# Patient Record
Sex: MALE | Race: WHITE | NOT HISPANIC OR LATINO | URBAN - METROPOLITAN AREA
[De-identification: names, ages, dates, MRNs, and addresses within clinical notes are randomized per-mention and may not be internally consistent; named-entity substitution may affect disease eponyms.]

---

## 2018-07-07 ENCOUNTER — EMERGENCY (EMERGENCY)
Facility: HOSPITAL | Age: 43
LOS: 0 days | Discharge: HOME | End: 2018-07-07
Attending: EMERGENCY MEDICINE | Admitting: EMERGENCY MEDICINE

## 2018-07-07 VITALS
DIASTOLIC BLOOD PRESSURE: 77 MMHG | TEMPERATURE: 97 F | OXYGEN SATURATION: 99 % | SYSTOLIC BLOOD PRESSURE: 149 MMHG | HEART RATE: 103 BPM | RESPIRATION RATE: 18 BRPM

## 2018-07-07 VITALS — HEIGHT: 69 IN | WEIGHT: 175.05 LBS

## 2018-07-07 DIAGNOSIS — Z88.0 ALLERGY STATUS TO PENICILLIN: ICD-10-CM

## 2018-07-07 DIAGNOSIS — K57.92 DIVERTICULITIS OF INTESTINE, PART UNSPECIFIED, WITHOUT PERFORATION OR ABSCESS WITHOUT BLEEDING: ICD-10-CM

## 2018-07-07 DIAGNOSIS — R10.9 UNSPECIFIED ABDOMINAL PAIN: ICD-10-CM

## 2018-07-07 DIAGNOSIS — I10 ESSENTIAL (PRIMARY) HYPERTENSION: ICD-10-CM

## 2018-07-07 LAB
ALBUMIN SERPL ELPH-MCNC: 4.9 G/DL — SIGNIFICANT CHANGE UP (ref 3.5–5.2)
ALP SERPL-CCNC: 57 U/L — SIGNIFICANT CHANGE UP (ref 30–115)
ALT FLD-CCNC: 15 U/L — SIGNIFICANT CHANGE UP (ref 0–41)
ANION GAP SERPL CALC-SCNC: 16 MMOL/L — HIGH (ref 7–14)
APPEARANCE UR: CLEAR — SIGNIFICANT CHANGE UP
APTT BLD: 32.5 SEC — SIGNIFICANT CHANGE UP (ref 27–39.2)
AST SERPL-CCNC: 20 U/L — SIGNIFICANT CHANGE UP (ref 0–41)
BASOPHILS # BLD AUTO: 0.03 K/UL — SIGNIFICANT CHANGE UP (ref 0–0.2)
BASOPHILS NFR BLD AUTO: 0.3 % — SIGNIFICANT CHANGE UP (ref 0–1)
BILIRUB SERPL-MCNC: 0.5 MG/DL — SIGNIFICANT CHANGE UP (ref 0.2–1.2)
BILIRUB UR-MCNC: NEGATIVE — SIGNIFICANT CHANGE UP
BUN SERPL-MCNC: 13 MG/DL — SIGNIFICANT CHANGE UP (ref 10–20)
CALCIUM SERPL-MCNC: 9.4 MG/DL — SIGNIFICANT CHANGE UP (ref 8.5–10.1)
CHLORIDE SERPL-SCNC: 89 MMOL/L — LOW (ref 98–110)
CO2 SERPL-SCNC: 26 MMOL/L — SIGNIFICANT CHANGE UP (ref 17–32)
COLOR SPEC: YELLOW — SIGNIFICANT CHANGE UP
CREAT SERPL-MCNC: 0.9 MG/DL — SIGNIFICANT CHANGE UP (ref 0.7–1.5)
DIFF PNL FLD: NEGATIVE — SIGNIFICANT CHANGE UP
EOSINOPHIL # BLD AUTO: 0.01 K/UL — SIGNIFICANT CHANGE UP (ref 0–0.7)
EOSINOPHIL NFR BLD AUTO: 0.1 % — SIGNIFICANT CHANGE UP (ref 0–8)
GAS PNL BLDV: SIGNIFICANT CHANGE UP
GLUCOSE SERPL-MCNC: 84 MG/DL — SIGNIFICANT CHANGE UP (ref 70–99)
GLUCOSE UR QL: NEGATIVE MG/DL — SIGNIFICANT CHANGE UP
HCT VFR BLD CALC: 40 % — LOW (ref 42–52)
HGB BLD-MCNC: 14.2 G/DL — SIGNIFICANT CHANGE UP (ref 14–18)
IMM GRANULOCYTES NFR BLD AUTO: 0.4 % — HIGH (ref 0.1–0.3)
INR BLD: 1.09 RATIO — SIGNIFICANT CHANGE UP (ref 0.65–1.3)
KETONES UR-MCNC: (no result)
LEUKOCYTE ESTERASE UR-ACNC: NEGATIVE — SIGNIFICANT CHANGE UP
LYMPHOCYTES # BLD AUTO: 0.69 K/UL — LOW (ref 1.2–3.4)
LYMPHOCYTES # BLD AUTO: 5.9 % — LOW (ref 20.5–51.1)
MCHC RBC-ENTMCNC: 30.4 PG — SIGNIFICANT CHANGE UP (ref 27–31)
MCHC RBC-ENTMCNC: 35.5 G/DL — SIGNIFICANT CHANGE UP (ref 32–37)
MCV RBC AUTO: 85.7 FL — SIGNIFICANT CHANGE UP (ref 80–94)
MONOCYTES # BLD AUTO: 0.81 K/UL — HIGH (ref 0.1–0.6)
MONOCYTES NFR BLD AUTO: 6.9 % — SIGNIFICANT CHANGE UP (ref 1.7–9.3)
NEUTROPHILS # BLD AUTO: 10.15 K/UL — HIGH (ref 1.4–6.5)
NEUTROPHILS NFR BLD AUTO: 86.4 % — HIGH (ref 42.2–75.2)
NITRITE UR-MCNC: NEGATIVE — SIGNIFICANT CHANGE UP
PH UR: 7.5 — SIGNIFICANT CHANGE UP (ref 5–8)
PLATELET # BLD AUTO: 268 K/UL — SIGNIFICANT CHANGE UP (ref 130–400)
POTASSIUM SERPL-MCNC: 4.7 MMOL/L — SIGNIFICANT CHANGE UP (ref 3.5–5)
POTASSIUM SERPL-SCNC: 4.7 MMOL/L — SIGNIFICANT CHANGE UP (ref 3.5–5)
PROT SERPL-MCNC: 7.5 G/DL — SIGNIFICANT CHANGE UP (ref 6–8)
PROT UR-MCNC: (no result) MG/DL
PROTHROM AB SERPL-ACNC: 11.8 SEC — SIGNIFICANT CHANGE UP (ref 9.95–12.87)
RBC # BLD: 4.67 M/UL — LOW (ref 4.7–6.1)
RBC # FLD: 11.7 % — SIGNIFICANT CHANGE UP (ref 11.5–14.5)
SODIUM SERPL-SCNC: 131 MMOL/L — LOW (ref 135–146)
SP GR SPEC: 1.02 — SIGNIFICANT CHANGE UP (ref 1.01–1.03)
UROBILINOGEN FLD QL: 0.2 MG/DL — SIGNIFICANT CHANGE UP (ref 0.2–0.2)
WBC # BLD: 11.74 K/UL — HIGH (ref 4.8–10.8)
WBC # FLD AUTO: 11.74 K/UL — HIGH (ref 4.8–10.8)

## 2018-07-07 RX ORDER — ACETAMINOPHEN 500 MG
975 TABLET ORAL ONCE
Qty: 0 | Refills: 0 | Status: COMPLETED | OUTPATIENT
Start: 2018-07-07 | End: 2018-07-07

## 2018-07-07 RX ORDER — SODIUM CHLORIDE 9 MG/ML
1000 INJECTION, SOLUTION INTRAVENOUS ONCE
Qty: 0 | Refills: 0 | Status: COMPLETED | OUTPATIENT
Start: 2018-07-07 | End: 2018-07-07

## 2018-07-07 RX ORDER — MORPHINE SULFATE 50 MG/1
4 CAPSULE, EXTENDED RELEASE ORAL ONCE
Qty: 0 | Refills: 0 | Status: DISCONTINUED | OUTPATIENT
Start: 2018-07-07 | End: 2018-07-07

## 2018-07-07 RX ORDER — MOXIFLOXACIN HYDROCHLORIDE TABLETS, 400 MG 400 MG/1
1 TABLET, FILM COATED ORAL
Qty: 10 | Refills: 0
Start: 2018-07-07 | End: 2018-07-16

## 2018-07-07 RX ADMIN — SODIUM CHLORIDE 2000 MILLILITER(S): 9 INJECTION, SOLUTION INTRAVENOUS at 10:29

## 2018-07-07 RX ADMIN — MORPHINE SULFATE 4 MILLIGRAM(S): 50 CAPSULE, EXTENDED RELEASE ORAL at 15:42

## 2018-07-07 RX ADMIN — MORPHINE SULFATE 4 MILLIGRAM(S): 50 CAPSULE, EXTENDED RELEASE ORAL at 10:29

## 2018-07-07 RX ADMIN — MORPHINE SULFATE 4 MILLIGRAM(S): 50 CAPSULE, EXTENDED RELEASE ORAL at 12:51

## 2018-07-07 NOTE — ED PROVIDER NOTE - PROGRESS NOTE DETAILS
Patient doing well, requests tylenol for pain. Results d/w pt and questions answered. Copies of all diagnostic tests provided to facilitate outpatient follow up w/ his GI at [facility] radiology called back, read changed to acute diverticulitis. will tx pt, he request moxifloxacin as he has had bad rxn to cipro in past.

## 2018-07-07 NOTE — ED PROVIDER NOTE - PHYSICAL EXAMINATION
AOx4, Non toxic appearing, NAD, speaking in full sentences. Skin  warm and dry, no acute rash. Head normocephalic, atraumatic. Conjunctiva and sclera clear. MM moist, no nasal discharge. Neck supple nt, no meningeal signs. Heart RRR s1s2 nl, no rub/murmur. Lungs- No retractions, BS equal, CTAB. Abdomen soft ntnd no r/g. L CVA ttp. Extremities- moves all, +equal distal pulses, brisk cap refill, sensation wnl, normal ROM. No LE edema, calves nttp b/l.

## 2018-07-07 NOTE — ED PROVIDER NOTE - ATTENDING CONTRIBUTION TO CARE
42M PMH htn, epilepsy,  erlos danlos, celiac artery aneurysm s/p repair, diverticulitis, p/w L flank pain x 2 days. feels like prior diverticulitis. started while eating dinner, constant, nonradiating, achy pain.no fever, chills. no nvdc. no dysuria, freq, hematuria. no cp, sob. no brbpr or melena. feels bloated. on exam, AFVSS, well romy nad, ncat, eomi, perrla, mmm, lctab, rrr nl s1s2 no mrg, abd soft ntnd, +LCVAT, aaox3, no focal deficits, no le edema or calf ttp, a/p; L flank pain consider renal colic vs uti/pyelo vs diverticulitis vs aortic dissection given pt vascular disease, will do CTA chest/abd/pelvis, labs UA, pain control, re-eval

## 2018-07-07 NOTE — ED PROVIDER NOTE - OBJECTIVE STATEMENT
43 y/o M pmh epilepsy, divertulitis, ehler danlos, p/w L flank for past three days 41 y/o M pmh epilepsy, divertulitis, ehler danlos, celiac and iliac aneurysms p/w L flank for past three days, pt states he has had similar pain intermittently which improved after he stopped eating gluten and worsened after he ate pasta this week. Denies f/c, n/v/d, dysuria, hematuria.

## 2018-07-07 NOTE — ED ADULT NURSE NOTE - OBJECTIVE STATEMENT
pt c.o. left flank pain that radiates to the pubic area. pt denies buring or pain during urination. hx of diverticulitis.

## 2018-07-07 NOTE — ED PROVIDER NOTE - NS ED ROS FT
Constitutional: See HPI.  Eyes: No visual changes, eye pain or discharge.  ENMT: No hearing changes, pain, discharge or infections. No neck pain or stiffness.  Cardiac: No chest pain, SOB or edema. No chest pain with exertion.  Respiratory: No cough or respiratory distress.   GI: No nausea, vomiting, diarrhea or abdominal pain.  : No dysuria, frequency or burning.  MS: No myalgia, muscle weakness, joint pain. +flank pain  Neuro: No headache or weakness. No LOC.  Skin: No skin rash.

## 2018-07-07 NOTE — ED ADULT NURSE NOTE - PMH
Aneurysm artery, celiac    Dislocations and subluxations    EDS (Melva-Danlos syndrome)    Epilepsy    HTN (hypertension)    Keratoconus

## 2019-04-28 ENCOUNTER — EMERGENCY (EMERGENCY)
Facility: HOSPITAL | Age: 44
LOS: 0 days | Discharge: HOME | End: 2019-04-28
Attending: EMERGENCY MEDICINE | Admitting: EMERGENCY MEDICINE
Payer: COMMERCIAL

## 2019-04-28 VITALS
SYSTOLIC BLOOD PRESSURE: 153 MMHG | TEMPERATURE: 97 F | OXYGEN SATURATION: 100 % | HEART RATE: 70 BPM | RESPIRATION RATE: 18 BRPM | DIASTOLIC BLOOD PRESSURE: 90 MMHG

## 2019-04-28 VITALS — HEIGHT: 69 IN | WEIGHT: 160.06 LBS

## 2019-04-28 DIAGNOSIS — I10 ESSENTIAL (PRIMARY) HYPERTENSION: ICD-10-CM

## 2019-04-28 DIAGNOSIS — R10.13 EPIGASTRIC PAIN: ICD-10-CM

## 2019-04-28 DIAGNOSIS — R07.89 OTHER CHEST PAIN: ICD-10-CM

## 2019-04-28 DIAGNOSIS — Z79.811 LONG TERM (CURRENT) USE OF AROMATASE INHIBITORS: ICD-10-CM

## 2019-04-28 DIAGNOSIS — Z79.84 LONG TERM (CURRENT) USE OF ORAL HYPOGLYCEMIC DRUGS: ICD-10-CM

## 2019-04-28 DIAGNOSIS — Z88.0 ALLERGY STATUS TO PENICILLIN: ICD-10-CM

## 2019-04-28 DIAGNOSIS — Z79.899 OTHER LONG TERM (CURRENT) DRUG THERAPY: ICD-10-CM

## 2019-04-28 DIAGNOSIS — Z79.82 LONG TERM (CURRENT) USE OF ASPIRIN: ICD-10-CM

## 2019-04-28 LAB
ALBUMIN SERPL ELPH-MCNC: 4.8 G/DL — SIGNIFICANT CHANGE UP (ref 3.5–5.2)
ALP SERPL-CCNC: 70 U/L — SIGNIFICANT CHANGE UP (ref 30–115)
ALT FLD-CCNC: 14 U/L — SIGNIFICANT CHANGE UP (ref 0–41)
ANION GAP SERPL CALC-SCNC: 13 MMOL/L — SIGNIFICANT CHANGE UP (ref 7–14)
AST SERPL-CCNC: 19 U/L — SIGNIFICANT CHANGE UP (ref 0–41)
BILIRUB SERPL-MCNC: 0.3 MG/DL — SIGNIFICANT CHANGE UP (ref 0.2–1.2)
BUN SERPL-MCNC: 16 MG/DL — SIGNIFICANT CHANGE UP (ref 10–20)
CALCIUM SERPL-MCNC: 9.9 MG/DL — SIGNIFICANT CHANGE UP (ref 8.5–10.1)
CHLORIDE SERPL-SCNC: 96 MMOL/L — LOW (ref 98–110)
CO2 SERPL-SCNC: 27 MMOL/L — SIGNIFICANT CHANGE UP (ref 17–32)
CREAT SERPL-MCNC: 0.9 MG/DL — SIGNIFICANT CHANGE UP (ref 0.7–1.5)
GLUCOSE SERPL-MCNC: 101 MG/DL — HIGH (ref 70–99)
HCT VFR BLD CALC: 41.2 % — LOW (ref 42–52)
HGB BLD-MCNC: 14.2 G/DL — SIGNIFICANT CHANGE UP (ref 14–18)
LIDOCAIN IGE QN: 27 U/L — SIGNIFICANT CHANGE UP (ref 7–60)
MCHC RBC-ENTMCNC: 30.3 PG — SIGNIFICANT CHANGE UP (ref 27–31)
MCHC RBC-ENTMCNC: 34.5 G/DL — SIGNIFICANT CHANGE UP (ref 32–37)
MCV RBC AUTO: 88 FL — SIGNIFICANT CHANGE UP (ref 80–94)
NRBC # BLD: 0 /100 WBCS — SIGNIFICANT CHANGE UP (ref 0–0)
PLATELET # BLD AUTO: 243 K/UL — SIGNIFICANT CHANGE UP (ref 130–400)
POTASSIUM SERPL-MCNC: 4.2 MMOL/L — SIGNIFICANT CHANGE UP (ref 3.5–5)
POTASSIUM SERPL-SCNC: 4.2 MMOL/L — SIGNIFICANT CHANGE UP (ref 3.5–5)
PROT SERPL-MCNC: 7.4 G/DL — SIGNIFICANT CHANGE UP (ref 6–8)
RBC # BLD: 4.68 M/UL — LOW (ref 4.7–6.1)
RBC # FLD: 11.7 % — SIGNIFICANT CHANGE UP (ref 11.5–14.5)
SODIUM SERPL-SCNC: 136 MMOL/L — SIGNIFICANT CHANGE UP (ref 135–146)
TROPONIN T SERPL-MCNC: <0.01 NG/ML — SIGNIFICANT CHANGE UP
WBC # BLD: 5.17 K/UL — SIGNIFICANT CHANGE UP (ref 4.8–10.8)
WBC # FLD AUTO: 5.17 K/UL — SIGNIFICANT CHANGE UP (ref 4.8–10.8)

## 2019-04-28 PROCEDURE — 93010 ELECTROCARDIOGRAM REPORT: CPT

## 2019-04-28 PROCEDURE — 71275 CT ANGIOGRAPHY CHEST: CPT | Mod: 26

## 2019-04-28 PROCEDURE — 99285 EMERGENCY DEPT VISIT HI MDM: CPT

## 2019-04-28 RX ORDER — FAMOTIDINE 10 MG/ML
20 INJECTION INTRAVENOUS ONCE
Qty: 0 | Refills: 0 | Status: COMPLETED | OUTPATIENT
Start: 2019-04-28 | End: 2019-04-28

## 2019-04-28 RX ORDER — OMEPRAZOLE 10 MG/1
1 CAPSULE, DELAYED RELEASE ORAL
Qty: 10 | Refills: 0
Start: 2019-04-28 | End: 2019-05-07

## 2019-04-28 RX ORDER — MORPHINE SULFATE 50 MG/1
6 CAPSULE, EXTENDED RELEASE ORAL ONCE
Qty: 0 | Refills: 0 | Status: DISCONTINUED | OUTPATIENT
Start: 2019-04-28 | End: 2019-04-28

## 2019-04-28 RX ADMIN — FAMOTIDINE 20 MILLIGRAM(S): 10 INJECTION INTRAVENOUS at 21:53

## 2019-04-28 RX ADMIN — MORPHINE SULFATE 6 MILLIGRAM(S): 50 CAPSULE, EXTENDED RELEASE ORAL at 21:28

## 2019-04-28 NOTE — ED PROVIDER NOTE - CLINICAL SUMMARY MEDICAL DECISION MAKING FREE TEXT BOX
Patient reports resolution of the pain. Labvs and ct reviewed. Patient aware of the results. Patient states pepcid helped resolve his pain. Denies any active CP, abd pain. No n/v. No evidence of dissection or aneurysm on CT. ECG non-ischemic. Pt eager to go home. I have full discussed the medical management and delivery of care with the patient. Patient confirms understanding and has been given detailed return precautions. Patient instructed to return to the ED should symptoms persist or worsen. Patient is well appearing upon discharge. Patient reports resolution of the pain. Labs and ct reviewed. Patient aware of the results. Patient states pepcid helped resolve his pain. Denies any active CP, abd pain. No n/v. No evidence of dissection or aneurysm on CT. ECG non-ischemic. Pt eager to go home. I have full discussed the medical management and delivery of care with the patient. Patient confirms understanding and has been given detailed return precautions. Patient instructed to return to the ED should symptoms persist or worsen. Patient is well appearing upon discharge.

## 2019-04-28 NOTE — ED ADULT NURSE NOTE - OBJECTIVE STATEMENT
c/o epigastric pain since Tuesday evening intermittently. denies any nausea/vomiting/diarrhea, report some black stool that started after having the pain.  hx

## 2019-04-28 NOTE — ED ADULT TRIAGE NOTE - ARRIVAL FROM
Home I have reviewed and confirmed nurses' notes for patient's medications, allergies, medical history, and surgical history.

## 2019-04-28 NOTE — ED PROVIDER NOTE - OBJECTIVE STATEMENT
42 y/o M w hx of EDS, HTN, epilepsy presents w epigastric burning sensation since Tuesday. Worse after meals. Non-radiating. No CP, SOB. Has hx of aneurysm in celiac and iliac arteries. No back pain. No CP, SOB. No flank pain.

## 2019-04-28 NOTE — ED ADULT TRIAGE NOTE - CHIEF COMPLAINT QUOTE
Pt c/o midsternal chest pain intermittently since Tuesday and abdominal pain, pt has a hx of celiac disease and Aneurysm sx in 2010, Pt also c/o black stools for the last few days. denies NVD.

## 2019-04-29 PROBLEM — Q79.6 EHLERS-DANLOS SYNDROMES: Chronic | Status: ACTIVE | Noted: 2018-07-07

## 2019-04-29 PROBLEM — I10 ESSENTIAL (PRIMARY) HYPERTENSION: Chronic | Status: ACTIVE | Noted: 2018-07-07

## 2019-04-29 PROBLEM — T14.8XXA OTHER INJURY OF UNSPECIFIED BODY REGION, INITIAL ENCOUNTER: Chronic | Status: ACTIVE | Noted: 2018-07-07

## 2019-04-29 PROBLEM — G40.909 EPILEPSY, UNSPECIFIED, NOT INTRACTABLE, WITHOUT STATUS EPILEPTICUS: Chronic | Status: ACTIVE | Noted: 2018-07-07

## 2019-04-29 PROBLEM — H18.609 KERATOCONUS, UNSPECIFIED, UNSPECIFIED EYE: Chronic | Status: ACTIVE | Noted: 2018-07-07

## 2019-09-03 ENCOUNTER — EMERGENCY (EMERGENCY)
Facility: HOSPITAL | Age: 44
LOS: 0 days | Discharge: HOME | End: 2019-09-04
Attending: EMERGENCY MEDICINE | Admitting: EMERGENCY MEDICINE
Payer: COMMERCIAL

## 2019-09-03 VITALS
HEART RATE: 102 BPM | OXYGEN SATURATION: 100 % | WEIGHT: 154.98 LBS | DIASTOLIC BLOOD PRESSURE: 68 MMHG | SYSTOLIC BLOOD PRESSURE: 104 MMHG | RESPIRATION RATE: 16 BRPM | TEMPERATURE: 98 F

## 2019-09-03 DIAGNOSIS — I10 ESSENTIAL (PRIMARY) HYPERTENSION: ICD-10-CM

## 2019-09-03 DIAGNOSIS — R50.9 FEVER, UNSPECIFIED: ICD-10-CM

## 2019-09-03 DIAGNOSIS — R14.0 ABDOMINAL DISTENSION (GASEOUS): ICD-10-CM

## 2019-09-03 DIAGNOSIS — Z88.0 ALLERGY STATUS TO PENICILLIN: ICD-10-CM

## 2019-09-03 PROCEDURE — 99284 EMERGENCY DEPT VISIT MOD MDM: CPT

## 2019-09-03 NOTE — ED ADULT NURSE NOTE - OBJECTIVE STATEMENT
Patient presents with epigastric abdominal pain, nausea, and fever. Patient states he takes pepcid dailly for acid reflux but states no relief. Admits to nausea but no vomiting. Patient reports that @ 14:30 he had a fever of 102 and took motrin. Fever has subsided to 98.6 at this time. Denies diarrhea. Hx of diverticulitis, gerd, aneurysms, and htn.

## 2019-09-04 VITALS
TEMPERATURE: 98 F | RESPIRATION RATE: 18 BRPM | OXYGEN SATURATION: 98 % | SYSTOLIC BLOOD PRESSURE: 110 MMHG | DIASTOLIC BLOOD PRESSURE: 68 MMHG | HEART RATE: 89 BPM

## 2019-09-04 LAB
ALBUMIN SERPL ELPH-MCNC: 4.7 G/DL — SIGNIFICANT CHANGE UP (ref 3.5–5.2)
ALP SERPL-CCNC: 48 U/L — SIGNIFICANT CHANGE UP (ref 30–115)
ALT FLD-CCNC: 16 U/L — SIGNIFICANT CHANGE UP (ref 0–41)
ANION GAP SERPL CALC-SCNC: 15 MMOL/L — HIGH (ref 7–14)
APPEARANCE UR: CLEAR — SIGNIFICANT CHANGE UP
AST SERPL-CCNC: 31 U/L — SIGNIFICANT CHANGE UP (ref 0–41)
BASOPHILS # BLD AUTO: 0.04 K/UL — SIGNIFICANT CHANGE UP (ref 0–0.2)
BASOPHILS NFR BLD AUTO: 1.5 % — HIGH (ref 0–1)
BILIRUB SERPL-MCNC: 0.3 MG/DL — SIGNIFICANT CHANGE UP (ref 0.2–1.2)
BILIRUB UR-MCNC: NEGATIVE — SIGNIFICANT CHANGE UP
BUN SERPL-MCNC: 17 MG/DL — SIGNIFICANT CHANGE UP (ref 10–20)
CALCIUM SERPL-MCNC: 8.9 MG/DL — SIGNIFICANT CHANGE UP (ref 8.5–10.1)
CHLORIDE SERPL-SCNC: 87 MMOL/L — LOW (ref 98–110)
CO2 SERPL-SCNC: 25 MMOL/L — SIGNIFICANT CHANGE UP (ref 17–32)
COLOR SPEC: YELLOW — SIGNIFICANT CHANGE UP
CREAT SERPL-MCNC: 1 MG/DL — SIGNIFICANT CHANGE UP (ref 0.7–1.5)
DIFF PNL FLD: NEGATIVE — SIGNIFICANT CHANGE UP
EOSINOPHIL # BLD AUTO: 0 K/UL — SIGNIFICANT CHANGE UP (ref 0–0.7)
EOSINOPHIL NFR BLD AUTO: 0 % — SIGNIFICANT CHANGE UP (ref 0–8)
GLUCOSE SERPL-MCNC: 92 MG/DL — SIGNIFICANT CHANGE UP (ref 70–99)
GLUCOSE UR QL: NEGATIVE — SIGNIFICANT CHANGE UP
HCT VFR BLD CALC: 43 % — SIGNIFICANT CHANGE UP (ref 42–52)
HGB BLD-MCNC: 15 G/DL — SIGNIFICANT CHANGE UP (ref 14–18)
IMM GRANULOCYTES NFR BLD AUTO: 0.8 % — HIGH (ref 0.1–0.3)
KETONES UR-MCNC: ABNORMAL
LACTATE SERPL-SCNC: 1.4 MMOL/L — SIGNIFICANT CHANGE UP (ref 0.5–2.2)
LEUKOCYTE ESTERASE UR-ACNC: NEGATIVE — SIGNIFICANT CHANGE UP
LIDOCAIN IGE QN: 36 U/L — SIGNIFICANT CHANGE UP (ref 7–60)
LYMPHOCYTES # BLD AUTO: 0.63 K/UL — LOW (ref 1.2–3.4)
LYMPHOCYTES # BLD AUTO: 24 % — SIGNIFICANT CHANGE UP (ref 20.5–51.1)
MCHC RBC-ENTMCNC: 29.9 PG — SIGNIFICANT CHANGE UP (ref 27–31)
MCHC RBC-ENTMCNC: 34.9 G/DL — SIGNIFICANT CHANGE UP (ref 32–37)
MCV RBC AUTO: 85.8 FL — SIGNIFICANT CHANGE UP (ref 80–94)
MONOCYTES # BLD AUTO: 0.37 K/UL — SIGNIFICANT CHANGE UP (ref 0.1–0.6)
MONOCYTES NFR BLD AUTO: 14.1 % — HIGH (ref 1.7–9.3)
NEUTROPHILS # BLD AUTO: 1.56 K/UL — SIGNIFICANT CHANGE UP (ref 1.4–6.5)
NEUTROPHILS NFR BLD AUTO: 59.6 % — SIGNIFICANT CHANGE UP (ref 42.2–75.2)
NITRITE UR-MCNC: NEGATIVE — SIGNIFICANT CHANGE UP
NRBC # BLD: 0 /100 WBCS — SIGNIFICANT CHANGE UP (ref 0–0)
PH UR: 6.5 — SIGNIFICANT CHANGE UP (ref 5–8)
PLATELET # BLD AUTO: 167 K/UL — SIGNIFICANT CHANGE UP (ref 130–400)
POTASSIUM SERPL-MCNC: 4.4 MMOL/L — SIGNIFICANT CHANGE UP (ref 3.5–5)
POTASSIUM SERPL-SCNC: 4.4 MMOL/L — SIGNIFICANT CHANGE UP (ref 3.5–5)
PROT SERPL-MCNC: 7.3 G/DL — SIGNIFICANT CHANGE UP (ref 6–8)
PROT UR-MCNC: ABNORMAL
RBC # BLD: 5.01 M/UL — SIGNIFICANT CHANGE UP (ref 4.7–6.1)
RBC # FLD: 11.7 % — SIGNIFICANT CHANGE UP (ref 11.5–14.5)
SODIUM SERPL-SCNC: 127 MMOL/L — LOW (ref 135–146)
SP GR SPEC: 1.04 — HIGH (ref 1.01–1.02)
UROBILINOGEN FLD QL: SIGNIFICANT CHANGE UP
WBC # BLD: 2.62 K/UL — LOW (ref 4.8–10.8)
WBC # FLD AUTO: 2.62 K/UL — LOW (ref 4.8–10.8)

## 2019-09-04 PROCEDURE — 74177 CT ABD & PELVIS W/CONTRAST: CPT | Mod: 26

## 2019-09-04 RX ORDER — SODIUM CHLORIDE 9 MG/ML
1000 INJECTION, SOLUTION INTRAVENOUS ONCE
Refills: 0 | Status: COMPLETED | OUTPATIENT
Start: 2019-09-04 | End: 2019-09-04

## 2019-09-04 RX ORDER — FAMOTIDINE 10 MG/ML
20 INJECTION INTRAVENOUS ONCE
Refills: 0 | Status: COMPLETED | OUTPATIENT
Start: 2019-09-04 | End: 2019-09-04

## 2019-09-04 RX ORDER — ONDANSETRON 8 MG/1
1 TABLET, FILM COATED ORAL
Qty: 7 | Refills: 0
Start: 2019-09-04 | End: 2019-09-10

## 2019-09-04 RX ADMIN — SODIUM CHLORIDE 1000 MILLILITER(S): 9 INJECTION, SOLUTION INTRAVENOUS at 01:18

## 2019-09-04 RX ADMIN — FAMOTIDINE 20 MILLIGRAM(S): 10 INJECTION INTRAVENOUS at 01:18

## 2019-09-04 NOTE — ED PROVIDER NOTE - NS ED ROS FT
Constitutional: (+) fever  Eyes/ENT: (-) visual changes   Cardiovascular: (-) chest pain, (-) syncope  Respiratory: (-) cough, (-) shortness of breath  Gastrointestinal: (-) vomiting, (-) diarrhea  Genitourinary: (-) dysuria, (-) hesitancy, (-) frequency   Musculoskeletal: (-) neck pain, (-) back pain, (-) joint pain  Integumentary: (-) rash, (-) edema  Neurological: (-) headache, (-) altered mental status  Allergic/Immunologic: (-) pruritus

## 2019-09-04 NOTE — ED PROVIDER NOTE - CLINICAL SUMMARY MEDICAL DECISION MAKING FREE TEXT BOX
Labs notable only for low WBC -- CT with no source of infection, stable aneurysys and diverticulosis without diverticultiis.     VS now normal, the patient feels well.     No indication for admission, is PO tolerant. Will dc. Advised on need for close follow up and that cultures will take 24-72 hours for completion.

## 2019-09-04 NOTE — ED PROVIDER NOTE - PATIENT PORTAL LINK FT
You can access the FollowMyHealth Patient Portal offered by [facility] by registering at the following website: [email or web address]. By joining [facility]’s FollowMyHealth portal, you will also be able to view your health information using other applications (apps) compatible with our system.

## 2019-09-04 NOTE — ED PROVIDER NOTE - OBJECTIVE STATEMENT
42 yo male with a pmh of Melva-Danlos, HTN, epilepsy, and diverticulitis presents with fevers. pt states to have had a fevers with generalized body aches for he past two days. he has had a tmax of 102.5 and has taken ibu/tylebnol with periodic relief. he denies any abdominal pain but states to have felt bloated and 44 yo male with a pmh of Melva-Danlos, HTN, epilepsy, and diverticulitis presents with fevers. pt states to have had fevers with generalized body aches for the past two days. he has had a tmax of 102.5 and has taken ibu/tylenol for relief. he denies any abdominal pain but states to have felt bloated intermittently. he denies any other symptoms including fevers, chill, headache, recent illness/travel, cough, abdominal pain, chest pain, or SOB.

## 2019-09-04 NOTE — ED PROVIDER NOTE - ATTENDING CONTRIBUTION TO CARE
44 yo M, history of Melva- Danlos syndrome with multiple known aneurysyms, HTN, epilepsy, diverticulitis here for assessment of myalgias, fever with Tm 102.5 and intermittent abdominal bloating x 2 days. No URI sx, abdominal pain, dysuria, cough. He reports nausea without vomiting or diarrhea, +globally decreased appetite but good liquid intake.     No recent travel, sick contacts.     VS notable for , afebrile orally and no tactile fever. He has clear lungs, RRR, soft, Nt, ND abdomen, no nasal congestion, normal pharynx, normal Tms, no rash.    Unclear etiology of symptoms, no clear source on exam, patient is immunocompetent , sx are likely viral but he and family are very concerned given his multiple complicated medcal problems. Will check labs, hydrate, get CT to ro diverticular disease, reassess.

## 2019-09-04 NOTE — ED PROVIDER NOTE - NSFOLLOWUPINSTRUCTIONS_ED_ALL_ED_FT
Please follow up with your primary care physician within 24-72 hours and return immediately if symptoms worsen.    Fever, Adult  A fever is an increase in the body’s temperature. It is usually defined as a temperature of 100°F (38°C) or higher. Brief mild or moderate fevers generally have no long-term effects, and they often do not require treatment. Moderate or high fevers may make you feel uncomfortable and can sometimes be a sign of a serious illness or disease. The sweating that may occur with repeated or prolonged fever may also cause dehydration.    Fever is confirmed by taking a temperature with a thermometer. A measured temperature can vary with:  Age.  Time of day.  Location of the thermometer:  Mouth (oral).  Rectum (rectal).  Ear (tympanic).  Underarm (axillary).  Forehead (temporal).  Follow these instructions at home:  Pay attention to any changes in your symptoms. Take these actions to help with your condition:  Take over-the counter and prescription medicines only as told by your health care provider. Follow the dosing instructions carefully.  If you were prescribed an antibiotic medicine, take it as told by your health care provider. Do not stop taking the antibiotic even if you start to feel better.  Rest as needed.  Drink enough fluid to keep your urine clear or pale yellow. This helps to prevent dehydration.  Sponge yourself or bathe with room-temperature water to help reduce your body temperature as needed. Do not use ice water.  Do not overbundle yourself in blankets or heavy clothes.  Contact a health care provider if:  You vomit.  You cannot eat or drink without vomiting.  You have diarrhea.  You have pain when you urinate.  Your symptoms do not improve with treatment.  You develop new symptoms.  You develop excessive weakness.  Get help right away if:  You have shortness of breath or have trouble breathing.  You are dizzy or you faint.  You are disoriented or confused.  You develop signs of dehydration, such as a dry mouth, decreased urination, or paleness.  You develop severe pain in your abdomen.  You have persistent vomiting or diarrhea.  You develop a skin rash.  Your symptoms suddenly get worse.  This information is not intended to replace advice given to you by your health care provider. Make sure you discuss any questions you have with your health care provider.

## 2019-09-05 ENCOUNTER — INPATIENT (INPATIENT)
Facility: HOSPITAL | Age: 44
LOS: 0 days | Discharge: LEFT BEFORE DISCHARGE | End: 2019-09-06
Attending: INTERNAL MEDICINE | Admitting: INTERNAL MEDICINE
Payer: COMMERCIAL

## 2019-09-05 VITALS
DIASTOLIC BLOOD PRESSURE: 88 MMHG | TEMPERATURE: 98 F | OXYGEN SATURATION: 97 % | SYSTOLIC BLOOD PRESSURE: 124 MMHG | RESPIRATION RATE: 18 BRPM | HEART RATE: 65 BPM

## 2019-09-05 DIAGNOSIS — Q79.6 EHLERS-DANLOS SYNDROMES: ICD-10-CM

## 2019-09-05 LAB
ALBUMIN SERPL ELPH-MCNC: 4.8 G/DL — SIGNIFICANT CHANGE UP (ref 3.5–5.2)
ALP SERPL-CCNC: 44 U/L — SIGNIFICANT CHANGE UP (ref 30–115)
ALT FLD-CCNC: 25 U/L — SIGNIFICANT CHANGE UP (ref 0–41)
ANION GAP SERPL CALC-SCNC: 22 MMOL/L — HIGH (ref 7–14)
AST SERPL-CCNC: 41 U/L — SIGNIFICANT CHANGE UP (ref 0–41)
BASOPHILS # BLD AUTO: 0.03 K/UL — SIGNIFICANT CHANGE UP (ref 0–0.2)
BASOPHILS NFR BLD AUTO: 0.4 % — SIGNIFICANT CHANGE UP (ref 0–1)
BILIRUB SERPL-MCNC: 0.6 MG/DL — SIGNIFICANT CHANGE UP (ref 0.2–1.2)
BUN SERPL-MCNC: 10 MG/DL — SIGNIFICANT CHANGE UP (ref 10–20)
CALCIUM SERPL-MCNC: 8.9 MG/DL — SIGNIFICANT CHANGE UP (ref 8.5–10.1)
CHLORIDE SERPL-SCNC: 84 MMOL/L — LOW (ref 98–110)
CO2 SERPL-SCNC: 21 MMOL/L — SIGNIFICANT CHANGE UP (ref 17–32)
CREAT SERPL-MCNC: 1.1 MG/DL — SIGNIFICANT CHANGE UP (ref 0.7–1.5)
CULTURE RESULTS: SIGNIFICANT CHANGE UP
EOSINOPHIL # BLD AUTO: 0 K/UL — SIGNIFICANT CHANGE UP (ref 0–0.7)
EOSINOPHIL NFR BLD AUTO: 0 % — SIGNIFICANT CHANGE UP (ref 0–8)
GLUCOSE SERPL-MCNC: 194 MG/DL — HIGH (ref 70–99)
HCT VFR BLD CALC: 41.7 % — LOW (ref 42–52)
HGB BLD-MCNC: 15.3 G/DL — SIGNIFICANT CHANGE UP (ref 14–18)
IMM GRANULOCYTES NFR BLD AUTO: 0.4 % — HIGH (ref 0.1–0.3)
LIDOCAIN IGE QN: 24 U/L — SIGNIFICANT CHANGE UP (ref 7–60)
LYMPHOCYTES # BLD AUTO: 0.63 K/UL — LOW (ref 1.2–3.4)
LYMPHOCYTES # BLD AUTO: 7.6 % — LOW (ref 20.5–51.1)
MCHC RBC-ENTMCNC: 30.2 PG — SIGNIFICANT CHANGE UP (ref 27–31)
MCHC RBC-ENTMCNC: 36.7 G/DL — SIGNIFICANT CHANGE UP (ref 32–37)
MCV RBC AUTO: 82.2 FL — SIGNIFICANT CHANGE UP (ref 80–94)
MONOCYTES # BLD AUTO: 0.65 K/UL — HIGH (ref 0.1–0.6)
MONOCYTES NFR BLD AUTO: 7.8 % — SIGNIFICANT CHANGE UP (ref 1.7–9.3)
NEUTROPHILS # BLD AUTO: 6.99 K/UL — HIGH (ref 1.4–6.5)
NEUTROPHILS NFR BLD AUTO: 83.8 % — HIGH (ref 42.2–75.2)
NRBC # BLD: 0 /100 WBCS — SIGNIFICANT CHANGE UP (ref 0–0)
PLATELET # BLD AUTO: 191 K/UL — SIGNIFICANT CHANGE UP (ref 130–400)
POTASSIUM SERPL-MCNC: 4.2 MMOL/L — SIGNIFICANT CHANGE UP (ref 3.5–5)
POTASSIUM SERPL-SCNC: 4.2 MMOL/L — SIGNIFICANT CHANGE UP (ref 3.5–5)
PROT SERPL-MCNC: 7.7 G/DL — SIGNIFICANT CHANGE UP (ref 6–8)
RBC # BLD: 5.07 M/UL — SIGNIFICANT CHANGE UP (ref 4.7–6.1)
RBC # FLD: 11.4 % — LOW (ref 11.5–14.5)
SODIUM SERPL-SCNC: 127 MMOL/L — LOW (ref 135–146)
SPECIMEN SOURCE: SIGNIFICANT CHANGE UP
WBC # BLD: 8.33 K/UL — SIGNIFICANT CHANGE UP (ref 4.8–10.8)
WBC # FLD AUTO: 8.33 K/UL — SIGNIFICANT CHANGE UP (ref 4.8–10.8)

## 2019-09-05 PROCEDURE — 93010 ELECTROCARDIOGRAM REPORT: CPT

## 2019-09-05 PROCEDURE — 99285 EMERGENCY DEPT VISIT HI MDM: CPT

## 2019-09-05 RX ORDER — LISINOPRIL 2.5 MG/1
0 TABLET ORAL
Qty: 0 | Refills: 0 | DISCHARGE

## 2019-09-05 RX ORDER — SODIUM CHLORIDE 9 MG/ML
1000 INJECTION INTRAMUSCULAR; INTRAVENOUS; SUBCUTANEOUS ONCE
Refills: 0 | Status: COMPLETED | OUTPATIENT
Start: 2019-09-05 | End: 2019-09-05

## 2019-09-05 RX ORDER — ONDANSETRON 8 MG/1
4 TABLET, FILM COATED ORAL ONCE
Refills: 0 | Status: COMPLETED | OUTPATIENT
Start: 2019-09-05 | End: 2019-09-05

## 2019-09-05 RX ORDER — ENOXAPARIN SODIUM 100 MG/ML
40 INJECTION SUBCUTANEOUS DAILY
Refills: 0 | Status: DISCONTINUED | OUTPATIENT
Start: 2019-09-05 | End: 2019-09-06

## 2019-09-05 RX ORDER — OXCARBAZEPINE 300 MG/1
0 TABLET, FILM COATED ORAL
Qty: 0 | Refills: 0 | DISCHARGE

## 2019-09-05 RX ORDER — HALOPERIDOL DECANOATE 100 MG/ML
5 INJECTION INTRAMUSCULAR ONCE
Refills: 0 | Status: DISCONTINUED | OUTPATIENT
Start: 2019-09-05 | End: 2019-09-05

## 2019-09-05 RX ORDER — LEVETIRACETAM 250 MG/1
1500 TABLET, FILM COATED ORAL
Refills: 0 | Status: DISCONTINUED | OUTPATIENT
Start: 2019-09-05 | End: 2019-09-06

## 2019-09-05 RX ORDER — ACETAMINOPHEN 500 MG
650 TABLET ORAL ONCE
Refills: 0 | Status: COMPLETED | OUTPATIENT
Start: 2019-09-05 | End: 2019-09-05

## 2019-09-05 RX ORDER — METOCLOPRAMIDE HCL 10 MG
10 TABLET ORAL ONCE
Refills: 0 | Status: COMPLETED | OUTPATIENT
Start: 2019-09-05 | End: 2019-09-05

## 2019-09-05 RX ORDER — LISINOPRIL 2.5 MG/1
30 TABLET ORAL DAILY
Refills: 0 | Status: DISCONTINUED | OUTPATIENT
Start: 2019-09-05 | End: 2019-09-06

## 2019-09-05 RX ORDER — SODIUM CHLORIDE 9 MG/ML
1000 INJECTION INTRAMUSCULAR; INTRAVENOUS; SUBCUTANEOUS
Refills: 0 | Status: DISCONTINUED | OUTPATIENT
Start: 2019-09-05 | End: 2019-09-06

## 2019-09-05 RX ORDER — OXCARBAZEPINE 300 MG/1
450 TABLET, FILM COATED ORAL
Refills: 0 | Status: DISCONTINUED | OUTPATIENT
Start: 2019-09-05 | End: 2019-09-06

## 2019-09-05 RX ORDER — PANTOPRAZOLE SODIUM 20 MG/1
40 TABLET, DELAYED RELEASE ORAL
Refills: 0 | Status: DISCONTINUED | OUTPATIENT
Start: 2019-09-05 | End: 2019-09-06

## 2019-09-05 RX ORDER — ONDANSETRON 8 MG/1
4 TABLET, FILM COATED ORAL ONCE
Refills: 0 | Status: DISCONTINUED | OUTPATIENT
Start: 2019-09-05 | End: 2019-09-05

## 2019-09-05 RX ORDER — LEVETIRACETAM 250 MG/1
0 TABLET, FILM COATED ORAL
Qty: 0 | Refills: 0 | DISCHARGE

## 2019-09-05 RX ORDER — ASPIRIN/CALCIUM CARB/MAGNESIUM 324 MG
81 TABLET ORAL DAILY
Refills: 0 | Status: DISCONTINUED | OUTPATIENT
Start: 2019-09-05 | End: 2019-09-06

## 2019-09-05 RX ORDER — ONDANSETRON 8 MG/1
4 TABLET, FILM COATED ORAL THREE TIMES A DAY
Refills: 0 | Status: DISCONTINUED | OUTPATIENT
Start: 2019-09-05 | End: 2019-09-06

## 2019-09-05 RX ADMIN — Medication 10 MILLIGRAM(S): at 17:53

## 2019-09-05 RX ADMIN — SODIUM CHLORIDE 1000 MILLILITER(S): 9 INJECTION INTRAMUSCULAR; INTRAVENOUS; SUBCUTANEOUS at 18:16

## 2019-09-05 RX ADMIN — SODIUM CHLORIDE 1000 MILLILITER(S): 9 INJECTION INTRAMUSCULAR; INTRAVENOUS; SUBCUTANEOUS at 17:01

## 2019-09-05 RX ADMIN — ONDANSETRON 4 MILLIGRAM(S): 8 TABLET, FILM COATED ORAL at 17:01

## 2019-09-05 NOTE — ED PROVIDER NOTE - PROGRESS NOTE DETAILS
Vomiting refractory to Zofran and Reglan. Spoke to pt about Haldol and sedative effects. Pt amenable. Pt resting in bed, feels much better than before, abdominal exam non tender, however pt feels uncomfortable going home, was recently admitted but left AMA Pt resting in bed, feels much better than before, abdominal exam non tender, however pt feels uncomfortable going home, was recently admitted but left AMA. Haldol was not given

## 2019-09-05 NOTE — H&P ADULT - NSHPREVIEWOFSYSTEMS_GEN_ALL_CORE
REVIEW OF SYSTEMS:  CONSTITUTIONAL: Admits to vomiting. No weakness, fevers or chills  EYES/ENT: No visual changes; No vertigo or throat pain   NECK: No pain or stiffness  RESPIRATORY: No cough, wheezing, hemoptysis; No shortness of breath  CARDIOVASCULAR: No chest pain or palpitations  GASTROINTESTINAL: No abdominal or epigastric pain. Admits to nausea, vomiting. Denies hematemesis; No diarrhea or constipation. No melena or hematochezia.  GENITOURINARY: No dysuria, frequency or hematuria  NEUROLOGICAL: No numbness or weakness  SKIN: No itching, rashes

## 2019-09-05 NOTE — H&P ADULT - NSHPPHYSICALEXAM_GEN_ALL_CORE
PHYSICAL EXAM:  GENERAL: NAD, AAO x 4, 43y M  HEAD:  Atraumatic, Normocephalic  EYES: EOMI, conjunctiva clear and sclera white  NECK: Supple, No JVD  CHEST/LUNG: Clear to auscultation bilaterally; No wheeze; No crackles; No accessory muscles used  HEART: Regular rate and rhythm; No murmurs;   ABDOMEN: Soft, Nondistended; Bowel sounds present; No guarding  EXTREMITIES:  2+ Peripheral Pulses, No cyanosis or edema  NEUROLOGY: non-focal

## 2019-09-05 NOTE — H&P ADULT - NSICDXPASTMEDICALHX_GEN_ALL_CORE_FT
PAST MEDICAL HISTORY:  Aneurysm artery, celiac     Dislocations and subluxations     EDS (Melva-Danlos syndrome)     Epilepsy     HTN (hypertension)     Keratoconus

## 2019-09-05 NOTE — ED PROVIDER NOTE - CARE PLAN
Principal Discharge DX:	Abdominal pain Principal Discharge DX:	Abdominal pain  Secondary Diagnosis:	Intractable vomiting with nausea

## 2019-09-05 NOTE — H&P ADULT - NSHPLABSRESULTS_GEN_ALL_CORE
LABS:                        15.3   8.33  )-----------( 191      ( 05 Sep 2019 17:00 )             41.7           127<L>  |  84<L>  |  10  ----------------------------<  194<H>  4.2   |  21  |  1.1    Ca    8.9      05 Sep 2019 17:00    TPro  7.7  /  Alb  4.8  /  TBili  0.6  /  DBili  x   /  AST  41  /  ALT  25  /  AlkPhos  44          Urinalysis Basic - ( 04 Sep 2019 05:00 )    Color: Yellow / Appearance: Clear / S.039 / pH: x  Gluc: x / Ketone: Moderate  / Bili: Negative / Urobili: <2 mg/dL   Blood: x / Protein: 30 mg/dL / Nitrite: Negative   Leuk Esterase: Negative / RBC: 6 /HPF / WBC 4 /HPF   Sq Epi: x / Non Sq Epi: 5 /HPF / Bacteria: Negative      Lactate Trend   @ 05:00 Lactate:1.4     CAPILLARY BLOOD GLUCOSE      RADIOLOGY:    < from: CT Abdomen and Pelvis w/ IV Cont (19 @ 04:58) >  IMPRESSION:  No acute inflammatory process within the abdomen and pelvis.  Stable aneurysms as above.  Stable renal scarring.  < end of copied text >          EKGS:

## 2019-09-05 NOTE — ED PROVIDER NOTE - PHYSICAL EXAMINATION
CONSTITUTIONAL: Well-developed; well-nourished; in no acute distress.   SKIN: warm, dry  HEAD: Normocephalic; atraumatic.  EYES: PERRL, EOMI, normal sclera and conjunctiva   ENT: No nasal discharge; airway clear.  NECK: Supple; non tender.  CARD:  Regular rate and rhythm.   RESP: NO inc WOB   ABD: soft , TTP LUQ  EXT: Normal ROM.    LYMPH: No acute cervical adenopathy.  NEURO: Alert, oriented, grossly unremarkable  PSYCH: Cooperative, appropriate.

## 2019-09-05 NOTE — H&P ADULT - ATTENDING COMMENTS
42 yo M w/ PMH of Melva danlos syndrome, HTN, epilepsy, diverticulosis (never had colonoscopy given his hx of EDS), asiyamoiz SALINAS. aneurysm presents to ED with nausea , vomiting and abdominal pain and fever started 1 week ago.     Pt seen and examined in ED - spoke with female  at bedside     Pt is sitting in chair- states he feels much better    Febrile, but no other complaints    Insisting on leaving the hospital despite two return visits and no diagnosis    Chart reviewed- agree with above plan- and GI eval    Spoke with housestaff    f/u cultures    may DC if seen and cleared by GI for outpt f/u; otherwise would have to leave AMA    in either case- must closely follow with his PMD Dr Bello- pt and  are aware

## 2019-09-05 NOTE — ED ADULT TRIAGE NOTE - CHIEF COMPLAINT QUOTE
vomiting x 4 days, patient reports being seen and treated in ED yesterday with relief but symptoms returned today.

## 2019-09-05 NOTE — ED PROVIDER NOTE - ATTENDING CONTRIBUTION TO CARE
43 year old male, pmhx as documented above, presenting with nausea, vomiting and diffuse abdominal pain described as crampy, non-radiating, worse with vomiting, no palliative factors, moderate severity. Patient was seen in ED yesterday for same complaint at which time CT abd/pelvis was done and negative. He was tx and felt better so he was discharged. States since discharge his vomiting has returned and zofran was not working so he came back. Otherwise denies fevers, headache, vision changes, weakness/numbness, confusion, URI symptoms, neck pain, chest pain, back pain, dyspnea, cough, palpitations, diarrhea, constipation, blood in stool/dark stools, urinary symptoms, penile discharge/testicular pain, leg swelling, rash, recent travel or sick contacts.    Vital Signs: I have reviewed the initial vital signs.  Constitutional: NAD, well-nourished, appears stated age, no acute distress.  HEENT: Airway patent, moist MM, no erythema/swelling/deformity of oral structures. EOMI, PERRLA.  CV: regular rate, regular rhythm, well-perfused extremities, 2+ b/l DP and radial pulses equal.  Lungs: BCTA, no increased WOB.  ABD: NTND, no guarding or rebound, no pulsatile mass, no hernias.   MSK: Neck supple, nontender, nl ROM, no stepoff. Chest nontender. Back nontender in TLS spine or to b/l bony structures or flanks. Ext nontender, nl rom, no deformity.   INTEG: Skin warm, dry, no rash.  NEURO: A&Ox3, normal strength, nl sensation throughout, normal speech.   PSYCH: Calm, cooperative, normal affect and interaction.    Abd non-tender and patient with negative CT abd/pelvis. Will give IVF, symptomatic control, recheck labs, re-eval.

## 2019-09-05 NOTE — ED PROVIDER NOTE - CLINICAL SUMMARY MEDICAL DECISION MAKING FREE TEXT BOX
Patient presented with diffuse abdominal pain, vomiting. Non-tender on exam and otherwise afebrile, HD stable, well appearing. Obtained repeat labs which were grossly unremarkable. Patient had negative CT abd/pelvis yesterday which was negative. Treated symptomatically in ED with mild improvement but unable to tolerate PO and does not feel well enough to go home. Repeat abdominal exam benign. Will admit for further IV hydration, monitoring and management. Patient agreeable with plan. HD stable at time of admission.

## 2019-09-05 NOTE — ED PROVIDER NOTE - OBJECTIVE STATEMENT
Pt is a 44 yo M with hx of Melva danlos syndrome, HTN, epilepsy presenting w/  nausea , vomiting and abdominal pain 4days. Vomiting started 4days ago. not associated with food. NBNB.  Abdominal pain located on left side, non radiating. Denies any diarrhea , BRBPR or melena. + Constipation prior to onset of vomiting and abdominal pain. Of note pt presented to Ed yesterday where he received CT scan and anti nausea medication. Declined to stay for further workup and monitoring because he felt better. Sent home with Zofran. Reports later that night he's started vomiting again. Refractory to home Zofran.

## 2019-09-05 NOTE — H&P ADULT - HISTORY OF PRESENT ILLNESS
A 42 yo M w/ PMH of Melva danlos syndrome, HTN, epilepsy, diverticulosis (never had colonoscopy given his hx of EDS), abdominal aneurysm presents to ED with nausea , vomiting and abdominal pain started 1 week ago. As per pt, Vomiting started 1 week ago and it is not associated with food. After the intractable vomiting, pt started having fever with Tmax of 102.5 (checked at home). Pt also complains of abdominal pain located on left side, non radiating. Pt states his vomiting is so severe he has not been eating for a week. Of note, pt was in ED yeserday for this sxs. He had a CT AP which shows no acute pathology. Pt was given anti nausea medication and decided to leave after he felt better. Today, pt presented to the ED with the same sxs. Pt denies recent travel or sick contacts. Pt denies SOB, CP, palpitation, diarrhea/constipation, LE edema, flank pain.     Vital Signs Last 24 Hrs  T(C): 36.6 (05 Sep 2019 16:27), Max: 36.6 (05 Sep 2019 16:27)  T(F): 97.9 (05 Sep 2019 16:27), Max: 97.9 (05 Sep 2019 16:27)  HR: 85 (05 Sep 2019 19:30) (65 - 85)  BP: 117/71 (05 Sep 2019 19:30) (117/71 - 124/88)  BP(mean): --  RR: 18 (05 Sep 2019 19:30) (18 - 18)  SpO2: 99% (05 Sep 2019 19:30) (97% - 99%)    In ED, pt was given zofran and reglan. A 42 yo M w/ PMH of Melva danlos syndrome, HTN, epilepsy, diverticulosis (never had colonoscopy given his hx of EDS), cristel sandra presents to ED with nausea , vomiting and abdominal pain started 1 week ago. As per pt, Vomiting started 1 week ago and it is not associated with food. After the intractable vomiting, pt started having fever with Tmax of 102.5 (checked at home). Pt also complains of abdominal pain located on left side, non radiating. Pt states his vomiting is so severe he has not been eating for a week. Of note, pt was in ED yeserday for this sxs. He had a CT AP which shows no acute pathology. Pt was given anti nausea medication and decided to leave after he felt better. Today, pt presented to the ED with the same sxs. Pt denies recent travel or sick contacts. Pt denies SOB, CP, palpitation, diarrhea/constipation, LE edema, flank pain.     Vital Signs Last 24 Hrs  T(C): 36.6 (05 Sep 2019 16:27), Max: 36.6 (05 Sep 2019 16:27)  T(F): 97.9 (05 Sep 2019 16:27), Max: 97.9 (05 Sep 2019 16:27)  HR: 85 (05 Sep 2019 19:30) (65 - 85)  BP: 117/71 (05 Sep 2019 19:30) (117/71 - 124/88)  BP(mean): --  RR: 18 (05 Sep 2019 19:30) (18 - 18)  SpO2: 99% (05 Sep 2019 19:30) (97% - 99%)    In ED, pt was given zofran and reglan.

## 2019-09-05 NOTE — H&P ADULT - ASSESSMENT
A 44 yo M w/ PMH of Melva danlos syndrome, HTN, epilepsy, diverticulosis (never had colonoscopy given his hx of EDS), asiyamoiz HERBERT jocy presents to ED with nausea , vomiting and abdominal pain started 1 week ago.     # Intractable vomiting   - r/o gastroenteritis   - zofran PRN, obtain EKG to monitor for QTC  - start gentle hydration as pt is not tolerating PO diet   - watch for hyponatremia as secondary cause of nausea and vomiting   - order GI PCR    # Hyponatremia likely chronic  - pt states he is known to have chronic hypoNa  - pt takes oxycarbazepine, consider holding if Na worsen  - DDx include psychogenic  vs renal vs extra-renal losses vs drugs vs hypothyroid vs SiADH   - check Uosmo, Nicci  - trend bmp     # HTN   - c/w home meds    # Hx of Epilepsy   - c/w home meds      Diet: DASH diet as tolerated  GI ppx: Protonix   DVT ppx: lovenox 40 mg qD  Activity: increase as tolerated  Code status: Full Code ( X ) / DNR (  ) / DNI (  )  Disposition: from home, requires medical management

## 2019-09-05 NOTE — ED PROVIDER NOTE - NS ED ROS FT
CONSTITUTIONAL -, No weight change  SKIN - No rash  HEMATOLOGIC - No abnormal bleeding or bruising  EYES - , No conjunctival injection, No drainage   ENT -, No sore throat, No neck pain, No rhinorrhea, No ear pain  RESPIRATORY - No shortness of breath, No cough  CARDIAC -No chest pain, No palpitations  GI - see Hpi ,  No diarrhea, No constipation, No bright red blood per rectum or melena.   - No dysuria, frequency, hematuria.   ENDO - No polydipsia, No polyuria, No heat/cold intolerance  MUSCULOSKELETAL - No joint paint, No swelling, No back pain  NEUROLOGIC - No numbness, No focal weakness, No headache, No dizziness  ALLERGIC- no pruritis

## 2019-09-06 ENCOUNTER — TRANSCRIPTION ENCOUNTER (OUTPATIENT)
Age: 44
End: 2019-09-06

## 2019-09-06 VITALS
OXYGEN SATURATION: 100 % | SYSTOLIC BLOOD PRESSURE: 125 MMHG | HEART RATE: 109 BPM | TEMPERATURE: 98 F | RESPIRATION RATE: 18 BRPM | DIASTOLIC BLOOD PRESSURE: 84 MMHG

## 2019-09-06 LAB
APPEARANCE UR: CLEAR — SIGNIFICANT CHANGE UP
BILIRUB UR-MCNC: NEGATIVE — SIGNIFICANT CHANGE UP
COLOR SPEC: COLORLESS — SIGNIFICANT CHANGE UP
CREAT ?TM UR-MCNC: 34 MG/DL — SIGNIFICANT CHANGE UP
CULTURE RESULTS: SIGNIFICANT CHANGE UP
DIFF PNL FLD: SIGNIFICANT CHANGE UP
GLUCOSE UR QL: NEGATIVE — SIGNIFICANT CHANGE UP
KETONES UR-MCNC: NEGATIVE — SIGNIFICANT CHANGE UP
LEUKOCYTE ESTERASE UR-ACNC: NEGATIVE — SIGNIFICANT CHANGE UP
NITRITE UR-MCNC: NEGATIVE — SIGNIFICANT CHANGE UP
OSMOLALITY UR: 168 MOS/KG — SIGNIFICANT CHANGE UP (ref 50–1400)
PH UR: 6.5 — SIGNIFICANT CHANGE UP (ref 5–8)
PROT ?TM UR-MCNC: 5 MG/DLG/24H — SIGNIFICANT CHANGE UP
PROT UR-MCNC: NEGATIVE — SIGNIFICANT CHANGE UP
PROT/CREAT UR-RTO: 0.1 RATIO — SIGNIFICANT CHANGE UP (ref 0–0.2)
SODIUM UR-SCNC: 34 MMOL/L — SIGNIFICANT CHANGE UP
SP GR SPEC: 1.01 — LOW (ref 1.01–1.02)
SPECIMEN SOURCE: SIGNIFICANT CHANGE UP
UROBILINOGEN FLD QL: SIGNIFICANT CHANGE UP

## 2019-09-06 RX ORDER — ACETAMINOPHEN 500 MG
650 TABLET ORAL ONCE
Refills: 0 | Status: COMPLETED | OUTPATIENT
Start: 2019-09-06 | End: 2019-09-06

## 2019-09-06 RX ADMIN — ONDANSETRON 4 MILLIGRAM(S): 8 TABLET, FILM COATED ORAL at 06:17

## 2019-09-06 RX ADMIN — Medication 650 MILLIGRAM(S): at 04:29

## 2019-09-06 RX ADMIN — Medication 650 MILLIGRAM(S): at 00:19

## 2019-09-06 NOTE — DISCHARGE NOTE PROVIDER - HOSPITAL COURSE
A 42 yo M w/ PMH of Melva danlos syndrome, HTN, epilepsy, diverticulosis (never had colonoscopy given his hx of EDS), cristel GODINEZ jocy presents to ED with nausea , vomiting and abdominal pain started 1 week ago. As per pt, Vomiting started 1 week ago and it is not associated with food. After the intractable vomiting, pt started having fever with Tmax of 102.5 (checked at home). Pt also complains of abdominal pain located on left side, non radiating. Pt states his vomiting is so severe he has not been eating for a week. Of note, pt was in ED yeserday for this sxs. He had a CT AP which shows no acute pathology. Pt was given anti nausea medication and decided to leave after he felt better. Today, pt presented to the ED with the same sxs. Pt denies recent travel or sick contacts. Pt denies SOB, CP, palpitation, diarrhea/constipation, LE edema, flank pain.         # Intractable vomiting     - r/o gastroenteritis     - zofran PRN, obtain EKG to monitor for QTC    - start gentle hydration as pt is not tolerating PO diet     - watch for hyponatremia as secondary cause of nausea and vomiting     - order GI PCR        # Hyponatremia likely chronic    - pt states he is known to have chronic hypoNa    - pt takes oxycarbazepine, consider holding if Na worsen    - DDx include psychogenic  vs renal vs extra-renal losses vs drugs vs hypothyroid vs SiADH     - check Uosmo, Nicci    - trend bmp         # HTN     - c/w home meds        # Hx of Epilepsy     - c/w home meds    Pt was admitted for workup and treatment but decided to leave AMA. Pt was seen by PMD and was discharge against medical advice.

## 2019-09-06 NOTE — DISCHARGE NOTE PROVIDER - NSDCCPCAREPLAN_GEN_ALL_CORE_FT
PRINCIPAL DISCHARGE DIAGNOSIS  Diagnosis: Abdominal pain  Assessment and Plan of Treatment: You are being discharged AMA. We didnot complete the workup for your abdominal pain and vomting. Follow up with your PMD for further care.

## 2019-09-06 NOTE — DISCHARGE NOTE PROVIDER - CARE PROVIDER_API CALL
Jerry Bello (DO)  Infectious Disease; Internal Medicine  [address] [place] [address]  Phone: [phone]  Fax: [phone]  Follow Up Time:

## 2019-09-09 DIAGNOSIS — G40.909 EPILEPSY, UNSPECIFIED, NOT INTRACTABLE, WITHOUT STATUS EPILEPTICUS: ICD-10-CM

## 2019-09-09 DIAGNOSIS — R11.2 NAUSEA WITH VOMITING, UNSPECIFIED: ICD-10-CM

## 2019-09-09 DIAGNOSIS — Q79.6 EHLERS-DANLOS SYNDROMES: ICD-10-CM

## 2019-09-09 DIAGNOSIS — R10.9 UNSPECIFIED ABDOMINAL PAIN: ICD-10-CM

## 2019-09-09 DIAGNOSIS — Z88.0 ALLERGY STATUS TO PENICILLIN: ICD-10-CM

## 2019-09-09 DIAGNOSIS — Z79.82 LONG TERM (CURRENT) USE OF ASPIRIN: ICD-10-CM

## 2019-09-09 DIAGNOSIS — I10 ESSENTIAL (PRIMARY) HYPERTENSION: ICD-10-CM

## 2019-09-09 LAB
CULTURE RESULTS: SIGNIFICANT CHANGE UP
SPECIMEN SOURCE: SIGNIFICANT CHANGE UP

## 2019-11-01 ENCOUNTER — INPATIENT (INPATIENT)
Facility: HOSPITAL | Age: 44
LOS: 6 days | Discharge: HOME HEALTH | End: 2019-11-08
Attending: COLON & RECTAL SURGERY | Admitting: COLON & RECTAL SURGERY
Payer: COMMERCIAL

## 2019-11-01 ENCOUNTER — RESULT REVIEW (OUTPATIENT)
Age: 44
End: 2019-11-01

## 2019-11-01 VITALS
TEMPERATURE: 99 F | RESPIRATION RATE: 18 BRPM | HEART RATE: 98 BPM | SYSTOLIC BLOOD PRESSURE: 131 MMHG | OXYGEN SATURATION: 98 % | DIASTOLIC BLOOD PRESSURE: 90 MMHG

## 2019-11-01 LAB
ALBUMIN SERPL ELPH-MCNC: 5.2 G/DL — SIGNIFICANT CHANGE UP (ref 3.5–5.2)
ALP SERPL-CCNC: 57 U/L — SIGNIFICANT CHANGE UP (ref 30–115)
ALT FLD-CCNC: 22 U/L — SIGNIFICANT CHANGE UP (ref 0–41)
ANION GAP SERPL CALC-SCNC: 15 MMOL/L — HIGH (ref 7–14)
ANION GAP SERPL CALC-SCNC: 18 MMOL/L — HIGH (ref 7–14)
APTT BLD: 26.2 SEC — LOW (ref 27–39.2)
APTT BLD: 30.7 SEC — SIGNIFICANT CHANGE UP (ref 27–39.2)
AST SERPL-CCNC: 21 U/L — SIGNIFICANT CHANGE UP (ref 0–41)
BASOPHILS # BLD AUTO: 0.03 K/UL — SIGNIFICANT CHANGE UP (ref 0–0.2)
BASOPHILS NFR BLD AUTO: 0.4 % — SIGNIFICANT CHANGE UP (ref 0–1)
BILIRUB SERPL-MCNC: 0.3 MG/DL — SIGNIFICANT CHANGE UP (ref 0.2–1.2)
BLD GP AB SCN SERPL QL: SIGNIFICANT CHANGE UP
BUN SERPL-MCNC: 16 MG/DL — SIGNIFICANT CHANGE UP (ref 10–20)
BUN SERPL-MCNC: 18 MG/DL — SIGNIFICANT CHANGE UP (ref 10–20)
CALCIUM SERPL-MCNC: 10.3 MG/DL — HIGH (ref 8.5–10.1)
CALCIUM SERPL-MCNC: 8.2 MG/DL — LOW (ref 8.5–10.1)
CHLORIDE SERPL-SCNC: 104 MMOL/L — SIGNIFICANT CHANGE UP (ref 98–110)
CHLORIDE SERPL-SCNC: 98 MMOL/L — SIGNIFICANT CHANGE UP (ref 98–110)
CK MB CFR SERPL CALC: 4.4 NG/ML — SIGNIFICANT CHANGE UP (ref 0.6–6.3)
CK SERPL-CCNC: 495 U/L — HIGH (ref 0–225)
CO2 SERPL-SCNC: 18 MMOL/L — SIGNIFICANT CHANGE UP (ref 17–32)
CO2 SERPL-SCNC: 24 MMOL/L — SIGNIFICANT CHANGE UP (ref 17–32)
CREAT SERPL-MCNC: 0.8 MG/DL — SIGNIFICANT CHANGE UP (ref 0.7–1.5)
CREAT SERPL-MCNC: 0.9 MG/DL — SIGNIFICANT CHANGE UP (ref 0.7–1.5)
EOSINOPHIL # BLD AUTO: 0 K/UL — SIGNIFICANT CHANGE UP (ref 0–0.7)
EOSINOPHIL NFR BLD AUTO: 0 % — SIGNIFICANT CHANGE UP (ref 0–8)
GAS PNL BLDA: SIGNIFICANT CHANGE UP
GLUCOSE BLDC GLUCOMTR-MCNC: 106 MG/DL — HIGH (ref 70–99)
GLUCOSE BLDC GLUCOMTR-MCNC: 110 MG/DL — HIGH (ref 70–99)
GLUCOSE BLDC GLUCOMTR-MCNC: 89 MG/DL — SIGNIFICANT CHANGE UP (ref 70–99)
GLUCOSE SERPL-MCNC: 125 MG/DL — HIGH (ref 70–99)
GLUCOSE SERPL-MCNC: 143 MG/DL — HIGH (ref 70–99)
HCT VFR BLD CALC: 46.8 % — SIGNIFICANT CHANGE UP (ref 42–52)
HCT VFR BLD CALC: 47.3 % — SIGNIFICANT CHANGE UP (ref 42–52)
HGB BLD-MCNC: 15.8 G/DL — SIGNIFICANT CHANGE UP (ref 14–18)
HGB BLD-MCNC: 15.9 G/DL — SIGNIFICANT CHANGE UP (ref 14–18)
IMM GRANULOCYTES NFR BLD AUTO: 0.7 % — HIGH (ref 0.1–0.3)
INR BLD: 1.03 RATIO — SIGNIFICANT CHANGE UP (ref 0.65–1.3)
INR BLD: 1.07 RATIO — SIGNIFICANT CHANGE UP (ref 0.65–1.3)
LACTATE SERPL-SCNC: 4.8 MMOL/L — CRITICAL HIGH (ref 0.5–2.2)
LIDOCAIN IGE QN: 50 U/L — SIGNIFICANT CHANGE UP (ref 7–60)
LYMPHOCYTES # BLD AUTO: 1.2 K/UL — SIGNIFICANT CHANGE UP (ref 1.2–3.4)
LYMPHOCYTES # BLD AUTO: 17.4 % — LOW (ref 20.5–51.1)
MAGNESIUM SERPL-MCNC: 1.3 MG/DL — LOW (ref 1.8–2.4)
MCHC RBC-ENTMCNC: 30.4 PG — SIGNIFICANT CHANGE UP (ref 27–31)
MCHC RBC-ENTMCNC: 30.6 PG — SIGNIFICANT CHANGE UP (ref 27–31)
MCHC RBC-ENTMCNC: 33.6 G/DL — SIGNIFICANT CHANGE UP (ref 32–37)
MCHC RBC-ENTMCNC: 33.8 G/DL — SIGNIFICANT CHANGE UP (ref 32–37)
MCV RBC AUTO: 90.4 FL — SIGNIFICANT CHANGE UP (ref 80–94)
MCV RBC AUTO: 90.7 FL — SIGNIFICANT CHANGE UP (ref 80–94)
MONOCYTES # BLD AUTO: 0.41 K/UL — SIGNIFICANT CHANGE UP (ref 0.1–0.6)
MONOCYTES NFR BLD AUTO: 5.9 % — SIGNIFICANT CHANGE UP (ref 1.7–9.3)
NEUTROPHILS # BLD AUTO: 5.22 K/UL — SIGNIFICANT CHANGE UP (ref 1.4–6.5)
NEUTROPHILS NFR BLD AUTO: 75.6 % — HIGH (ref 42.2–75.2)
NRBC # BLD: 0 /100 WBCS — SIGNIFICANT CHANGE UP (ref 0–0)
NRBC # BLD: 0 /100 WBCS — SIGNIFICANT CHANGE UP (ref 0–0)
PHOSPHATE SERPL-MCNC: 3.7 MG/DL — SIGNIFICANT CHANGE UP (ref 2.1–4.9)
PLATELET # BLD AUTO: 249 K/UL — SIGNIFICANT CHANGE UP (ref 130–400)
PLATELET # BLD AUTO: 290 K/UL — SIGNIFICANT CHANGE UP (ref 130–400)
POTASSIUM SERPL-MCNC: 4.2 MMOL/L — SIGNIFICANT CHANGE UP (ref 3.5–5)
POTASSIUM SERPL-MCNC: 4.2 MMOL/L — SIGNIFICANT CHANGE UP (ref 3.5–5)
POTASSIUM SERPL-SCNC: 4.2 MMOL/L — SIGNIFICANT CHANGE UP (ref 3.5–5)
POTASSIUM SERPL-SCNC: 4.2 MMOL/L — SIGNIFICANT CHANGE UP (ref 3.5–5)
PROT SERPL-MCNC: 8.2 G/DL — HIGH (ref 6–8)
PROTHROM AB SERPL-ACNC: 11.8 SEC — SIGNIFICANT CHANGE UP (ref 9.95–12.87)
PROTHROM AB SERPL-ACNC: 12.3 SEC — SIGNIFICANT CHANGE UP (ref 9.95–12.87)
RBC # BLD: 5.16 M/UL — SIGNIFICANT CHANGE UP (ref 4.7–6.1)
RBC # BLD: 5.23 M/UL — SIGNIFICANT CHANGE UP (ref 4.7–6.1)
RBC # FLD: 12.4 % — SIGNIFICANT CHANGE UP (ref 11.5–14.5)
RBC # FLD: 12.7 % — SIGNIFICANT CHANGE UP (ref 11.5–14.5)
SODIUM SERPL-SCNC: 137 MMOL/L — SIGNIFICANT CHANGE UP (ref 135–146)
SODIUM SERPL-SCNC: 140 MMOL/L — SIGNIFICANT CHANGE UP (ref 135–146)
TROPONIN T SERPL-MCNC: 0.2 NG/ML — CRITICAL HIGH
TROPONIN T SERPL-MCNC: <0.01 NG/ML — SIGNIFICANT CHANGE UP
WBC # BLD: 1.35 K/UL — LOW (ref 4.8–10.8)
WBC # BLD: 6.91 K/UL — SIGNIFICANT CHANGE UP (ref 4.8–10.8)
WBC # FLD AUTO: 1.35 K/UL — LOW (ref 4.8–10.8)
WBC # FLD AUTO: 6.91 K/UL — SIGNIFICANT CHANGE UP (ref 4.8–10.8)

## 2019-11-01 PROCEDURE — 71275 CT ANGIOGRAPHY CHEST: CPT | Mod: 26

## 2019-11-01 PROCEDURE — 93010 ELECTROCARDIOGRAM REPORT: CPT

## 2019-11-01 PROCEDURE — 99291 CRITICAL CARE FIRST HOUR: CPT

## 2019-11-01 PROCEDURE — 71045 X-RAY EXAM CHEST 1 VIEW: CPT | Mod: 26,59

## 2019-11-01 PROCEDURE — 88307 TISSUE EXAM BY PATHOLOGIST: CPT | Mod: 26

## 2019-11-01 PROCEDURE — 99223 1ST HOSP IP/OBS HIGH 75: CPT | Mod: 57

## 2019-11-01 PROCEDURE — 71045 X-RAY EXAM CHEST 1 VIEW: CPT | Mod: 26

## 2019-11-01 PROCEDURE — 44143 PARTIAL REMOVAL OF COLON: CPT

## 2019-11-01 PROCEDURE — 93010 ELECTROCARDIOGRAM REPORT: CPT | Mod: 77

## 2019-11-01 RX ORDER — OXCARBAZEPINE 300 MG/1
600 TABLET, FILM COATED ORAL
Refills: 0 | Status: DISCONTINUED | OUTPATIENT
Start: 2019-11-01 | End: 2019-11-08

## 2019-11-01 RX ORDER — SODIUM CHLORIDE 9 MG/ML
1000 INJECTION, SOLUTION INTRAVENOUS
Refills: 0 | Status: DISCONTINUED | OUTPATIENT
Start: 2019-11-01 | End: 2019-11-01

## 2019-11-01 RX ORDER — PANTOPRAZOLE SODIUM 20 MG/1
40 TABLET, DELAYED RELEASE ORAL DAILY
Refills: 0 | Status: DISCONTINUED | OUTPATIENT
Start: 2019-11-01 | End: 2019-11-01

## 2019-11-01 RX ORDER — SODIUM CHLORIDE 9 MG/ML
1000 INJECTION, SOLUTION INTRAVENOUS
Refills: 0 | Status: DISCONTINUED | OUTPATIENT
Start: 2019-11-01 | End: 2019-11-03

## 2019-11-01 RX ORDER — CIPROFLOXACIN LACTATE 400MG/40ML
VIAL (ML) INTRAVENOUS
Refills: 0 | Status: DISCONTINUED | OUTPATIENT
Start: 2019-11-01 | End: 2019-11-02

## 2019-11-01 RX ORDER — OXCARBAZEPINE 300 MG/1
300 TABLET, FILM COATED ORAL
Refills: 0 | Status: DISCONTINUED | OUTPATIENT
Start: 2019-11-01 | End: 2019-11-01

## 2019-11-01 RX ORDER — METRONIDAZOLE 500 MG
500 TABLET ORAL ONCE
Refills: 0 | Status: COMPLETED | OUTPATIENT
Start: 2019-11-01 | End: 2019-11-01

## 2019-11-01 RX ORDER — PROPOFOL 10 MG/ML
50 INJECTION, EMULSION INTRAVENOUS
Qty: 1000 | Refills: 0 | Status: DISCONTINUED | OUTPATIENT
Start: 2019-11-01 | End: 2019-11-01

## 2019-11-01 RX ORDER — OXCARBAZEPINE 300 MG/1
300 TABLET, FILM COATED ORAL
Refills: 0 | Status: DISCONTINUED | OUTPATIENT
Start: 2019-11-01 | End: 2019-11-08

## 2019-11-01 RX ORDER — CHLORHEXIDINE GLUCONATE 213 G/1000ML
1 SOLUTION TOPICAL
Refills: 0 | Status: DISCONTINUED | OUTPATIENT
Start: 2019-11-01 | End: 2019-11-01

## 2019-11-01 RX ORDER — HYDROMORPHONE HYDROCHLORIDE 2 MG/ML
0.5 INJECTION INTRAMUSCULAR; INTRAVENOUS; SUBCUTANEOUS ONCE
Refills: 0 | Status: DISCONTINUED | OUTPATIENT
Start: 2019-11-01 | End: 2019-11-01

## 2019-11-01 RX ORDER — SODIUM CHLORIDE 9 MG/ML
500 INJECTION, SOLUTION INTRAVENOUS ONCE
Refills: 0 | Status: COMPLETED | OUTPATIENT
Start: 2019-11-01 | End: 2019-11-01

## 2019-11-01 RX ORDER — CHLORHEXIDINE GLUCONATE 213 G/1000ML
15 SOLUTION TOPICAL EVERY 12 HOURS
Refills: 0 | Status: DISCONTINUED | OUTPATIENT
Start: 2019-11-01 | End: 2019-11-03

## 2019-11-01 RX ORDER — METRONIDAZOLE 500 MG
500 TABLET ORAL EVERY 8 HOURS
Refills: 0 | Status: DISCONTINUED | OUTPATIENT
Start: 2019-11-01 | End: 2019-11-01

## 2019-11-01 RX ORDER — CHLORHEXIDINE GLUCONATE 213 G/1000ML
1 SOLUTION TOPICAL
Refills: 0 | Status: DISCONTINUED | OUTPATIENT
Start: 2019-11-01 | End: 2019-11-08

## 2019-11-01 RX ORDER — HYDROMORPHONE HYDROCHLORIDE 2 MG/ML
0.5 INJECTION INTRAMUSCULAR; INTRAVENOUS; SUBCUTANEOUS EVERY 4 HOURS
Refills: 0 | Status: DISCONTINUED | OUTPATIENT
Start: 2019-11-01 | End: 2019-11-02

## 2019-11-01 RX ORDER — ACETAMINOPHEN 500 MG
650 TABLET ORAL EVERY 6 HOURS
Refills: 0 | Status: DISCONTINUED | OUTPATIENT
Start: 2019-11-01 | End: 2019-11-04

## 2019-11-01 RX ORDER — PANTOPRAZOLE SODIUM 20 MG/1
40 TABLET, DELAYED RELEASE ORAL DAILY
Refills: 0 | Status: DISCONTINUED | OUTPATIENT
Start: 2019-11-01 | End: 2019-11-02

## 2019-11-01 RX ORDER — CIPROFLOXACIN LACTATE 400MG/40ML
400 VIAL (ML) INTRAVENOUS ONCE
Refills: 0 | Status: COMPLETED | OUTPATIENT
Start: 2019-11-01 | End: 2019-11-01

## 2019-11-01 RX ORDER — METOCLOPRAMIDE HCL 10 MG
10 TABLET ORAL ONCE
Refills: 0 | Status: COMPLETED | OUTPATIENT
Start: 2019-11-01 | End: 2019-11-01

## 2019-11-01 RX ORDER — MAGNESIUM SULFATE 500 MG/ML
2 VIAL (ML) INJECTION
Refills: 0 | Status: COMPLETED | OUTPATIENT
Start: 2019-11-01 | End: 2019-11-01

## 2019-11-01 RX ORDER — CIPROFLOXACIN LACTATE 400MG/40ML
400 VIAL (ML) INTRAVENOUS EVERY 12 HOURS
Refills: 0 | Status: DISCONTINUED | OUTPATIENT
Start: 2019-11-01 | End: 2019-11-01

## 2019-11-01 RX ORDER — SODIUM CHLORIDE 9 MG/ML
1000 INJECTION, SOLUTION INTRAVENOUS ONCE
Refills: 0 | Status: DISCONTINUED | OUTPATIENT
Start: 2019-11-01 | End: 2019-11-01

## 2019-11-01 RX ORDER — CHLORHEXIDINE GLUCONATE 213 G/1000ML
15 SOLUTION TOPICAL EVERY 12 HOURS
Refills: 0 | Status: DISCONTINUED | OUTPATIENT
Start: 2019-11-01 | End: 2019-11-01

## 2019-11-01 RX ORDER — DEXMEDETOMIDINE HYDROCHLORIDE IN 0.9% SODIUM CHLORIDE 4 UG/ML
0.5 INJECTION INTRAVENOUS
Qty: 200 | Refills: 0 | Status: DISCONTINUED | OUTPATIENT
Start: 2019-11-01 | End: 2019-11-02

## 2019-11-01 RX ORDER — LEVETIRACETAM 250 MG/1
1500 TABLET, FILM COATED ORAL
Refills: 0 | Status: DISCONTINUED | OUTPATIENT
Start: 2019-11-01 | End: 2019-11-08

## 2019-11-01 RX ORDER — SODIUM CHLORIDE 9 MG/ML
1000 INJECTION INTRAMUSCULAR; INTRAVENOUS; SUBCUTANEOUS ONCE
Refills: 0 | Status: COMPLETED | OUTPATIENT
Start: 2019-11-01 | End: 2019-11-01

## 2019-11-01 RX ORDER — DEXMEDETOMIDINE HYDROCHLORIDE IN 0.9% SODIUM CHLORIDE 4 UG/ML
0.2 INJECTION INTRAVENOUS
Qty: 200 | Refills: 0 | Status: DISCONTINUED | OUTPATIENT
Start: 2019-11-01 | End: 2019-11-01

## 2019-11-01 RX ORDER — CIPROFLOXACIN LACTATE 400MG/40ML
400 VIAL (ML) INTRAVENOUS EVERY 12 HOURS
Refills: 0 | Status: DISCONTINUED | OUTPATIENT
Start: 2019-11-02 | End: 2019-11-02

## 2019-11-01 RX ORDER — ONDANSETRON 8 MG/1
4 TABLET, FILM COATED ORAL ONCE
Refills: 0 | Status: COMPLETED | OUTPATIENT
Start: 2019-11-01 | End: 2019-11-01

## 2019-11-01 RX ORDER — HYDROMORPHONE HYDROCHLORIDE 2 MG/ML
1 INJECTION INTRAMUSCULAR; INTRAVENOUS; SUBCUTANEOUS ONCE
Refills: 0 | Status: DISCONTINUED | OUTPATIENT
Start: 2019-11-01 | End: 2019-11-01

## 2019-11-01 RX ORDER — METRONIDAZOLE 500 MG
500 TABLET ORAL EVERY 8 HOURS
Refills: 0 | Status: DISCONTINUED | OUTPATIENT
Start: 2019-11-02 | End: 2019-11-02

## 2019-11-01 RX ORDER — LEVETIRACETAM 250 MG/1
750 TABLET, FILM COATED ORAL
Refills: 0 | Status: DISCONTINUED | OUTPATIENT
Start: 2019-11-01 | End: 2019-11-01

## 2019-11-01 RX ORDER — METRONIDAZOLE 500 MG
TABLET ORAL
Refills: 0 | Status: DISCONTINUED | OUTPATIENT
Start: 2019-11-01 | End: 2019-11-02

## 2019-11-01 RX ORDER — FENTANYL CITRATE 50 UG/ML
1 INJECTION INTRAVENOUS
Qty: 2500 | Refills: 0 | Status: DISCONTINUED | OUTPATIENT
Start: 2019-11-01 | End: 2019-11-01

## 2019-11-01 RX ORDER — SODIUM CHLORIDE 9 MG/ML
250 INJECTION INTRAMUSCULAR; INTRAVENOUS; SUBCUTANEOUS ONCE
Refills: 0 | Status: COMPLETED | OUTPATIENT
Start: 2019-11-01 | End: 2019-11-01

## 2019-11-01 RX ORDER — HEPARIN SODIUM 5000 [USP'U]/ML
5000 INJECTION INTRAVENOUS; SUBCUTANEOUS EVERY 8 HOURS
Refills: 0 | Status: DISCONTINUED | OUTPATIENT
Start: 2019-11-01 | End: 2019-11-08

## 2019-11-01 RX ORDER — FAMOTIDINE 10 MG/ML
20 INJECTION INTRAVENOUS ONCE
Refills: 0 | Status: COMPLETED | OUTPATIENT
Start: 2019-11-01 | End: 2019-11-01

## 2019-11-01 RX ORDER — METOPROLOL TARTRATE 50 MG
5 TABLET ORAL ONCE
Refills: 0 | Status: COMPLETED | OUTPATIENT
Start: 2019-11-01 | End: 2019-11-01

## 2019-11-01 RX ORDER — HYDROMORPHONE HYDROCHLORIDE 2 MG/ML
1 INJECTION INTRAMUSCULAR; INTRAVENOUS; SUBCUTANEOUS EVERY 4 HOURS
Refills: 0 | Status: DISCONTINUED | OUTPATIENT
Start: 2019-11-01 | End: 2019-11-01

## 2019-11-01 RX ORDER — HEPARIN SODIUM 5000 [USP'U]/ML
5000 INJECTION INTRAVENOUS; SUBCUTANEOUS EVERY 8 HOURS
Refills: 0 | Status: DISCONTINUED | OUTPATIENT
Start: 2019-11-01 | End: 2019-11-01

## 2019-11-01 RX ORDER — ONDANSETRON 8 MG/1
4 TABLET, FILM COATED ORAL ONCE
Refills: 0 | Status: DISCONTINUED | OUTPATIENT
Start: 2019-11-01 | End: 2019-11-01

## 2019-11-01 RX ORDER — MORPHINE SULFATE 50 MG/1
4 CAPSULE, EXTENDED RELEASE ORAL ONCE
Refills: 0 | Status: DISCONTINUED | OUTPATIENT
Start: 2019-11-01 | End: 2019-11-01

## 2019-11-01 RX ADMIN — HYDROMORPHONE HYDROCHLORIDE 0.5 MILLIGRAM(S): 2 INJECTION INTRAMUSCULAR; INTRAVENOUS; SUBCUTANEOUS at 22:20

## 2019-11-01 RX ADMIN — OXCARBAZEPINE 600 MILLIGRAM(S): 300 TABLET, FILM COATED ORAL at 23:15

## 2019-11-01 RX ADMIN — HYDROMORPHONE HYDROCHLORIDE 0.5 MILLIGRAM(S): 2 INJECTION INTRAMUSCULAR; INTRAVENOUS; SUBCUTANEOUS at 12:48

## 2019-11-01 RX ADMIN — DEXMEDETOMIDINE HYDROCHLORIDE IN 0.9% SODIUM CHLORIDE 8.22 MICROGRAM(S)/KG/HR: 4 INJECTION INTRAVENOUS at 20:00

## 2019-11-01 RX ADMIN — Medication 5 MILLIGRAM(S): at 20:02

## 2019-11-01 RX ADMIN — HYDROMORPHONE HYDROCHLORIDE 0.5 MILLIGRAM(S): 2 INJECTION INTRAMUSCULAR; INTRAVENOUS; SUBCUTANEOUS at 22:11

## 2019-11-01 RX ADMIN — PROPOFOL 19.74 MICROGRAM(S)/KG/MIN: 10 INJECTION, EMULSION INTRAVENOUS at 18:50

## 2019-11-01 RX ADMIN — CHLORHEXIDINE GLUCONATE 1 APPLICATION(S): 213 SOLUTION TOPICAL at 23:16

## 2019-11-01 RX ADMIN — MORPHINE SULFATE 4 MILLIGRAM(S): 50 CAPSULE, EXTENDED RELEASE ORAL at 11:28

## 2019-11-01 RX ADMIN — SODIUM CHLORIDE 100 MILLILITER(S): 9 INJECTION, SOLUTION INTRAVENOUS at 22:27

## 2019-11-01 RX ADMIN — Medication 100 MILLIGRAM(S): at 15:00

## 2019-11-01 RX ADMIN — HYDROMORPHONE HYDROCHLORIDE 1 MILLIGRAM(S): 2 INJECTION INTRAMUSCULAR; INTRAVENOUS; SUBCUTANEOUS at 14:59

## 2019-11-01 RX ADMIN — Medication 50 GRAM(S): at 23:15

## 2019-11-01 RX ADMIN — OXCARBAZEPINE 300 MILLIGRAM(S): 300 TABLET, FILM COATED ORAL at 23:15

## 2019-11-01 RX ADMIN — SODIUM CHLORIDE 1500 MILLILITER(S): 9 INJECTION, SOLUTION INTRAVENOUS at 20:30

## 2019-11-01 RX ADMIN — FAMOTIDINE 20 MILLIGRAM(S): 10 INJECTION INTRAVENOUS at 13:08

## 2019-11-01 RX ADMIN — MORPHINE SULFATE 4 MILLIGRAM(S): 50 CAPSULE, EXTENDED RELEASE ORAL at 11:43

## 2019-11-01 RX ADMIN — LEVETIRACETAM 1500 MILLIGRAM(S): 250 TABLET, FILM COATED ORAL at 23:15

## 2019-11-01 RX ADMIN — HYDROMORPHONE HYDROCHLORIDE 1 MILLIGRAM(S): 2 INJECTION INTRAMUSCULAR; INTRAVENOUS; SUBCUTANEOUS at 13:57

## 2019-11-01 RX ADMIN — DEXMEDETOMIDINE HYDROCHLORIDE IN 0.9% SODIUM CHLORIDE 8.22 MICROGRAM(S)/KG/HR: 4 INJECTION INTRAVENOUS at 22:28

## 2019-11-01 RX ADMIN — ONDANSETRON 4 MILLIGRAM(S): 8 TABLET, FILM COATED ORAL at 12:08

## 2019-11-01 RX ADMIN — Medication 50 GRAM(S): at 22:11

## 2019-11-01 RX ADMIN — HEPARIN SODIUM 5000 UNIT(S): 5000 INJECTION INTRAVENOUS; SUBCUTANEOUS at 22:10

## 2019-11-01 RX ADMIN — HYDROMORPHONE HYDROCHLORIDE 0.5 MILLIGRAM(S): 2 INJECTION INTRAMUSCULAR; INTRAVENOUS; SUBCUTANEOUS at 12:15

## 2019-11-01 RX ADMIN — Medication 10 MILLIGRAM(S): at 13:08

## 2019-11-01 RX ADMIN — SODIUM CHLORIDE 1000 MILLILITER(S): 9 INJECTION INTRAMUSCULAR; INTRAVENOUS; SUBCUTANEOUS at 21:18

## 2019-11-01 RX ADMIN — CHLORHEXIDINE GLUCONATE 15 MILLILITER(S): 213 SOLUTION TOPICAL at 23:15

## 2019-11-01 RX ADMIN — SODIUM CHLORIDE 1500 MILLILITER(S): 9 INJECTION, SOLUTION INTRAVENOUS at 23:14

## 2019-11-01 RX ADMIN — PANTOPRAZOLE SODIUM 40 MILLIGRAM(S): 20 TABLET, DELAYED RELEASE ORAL at 23:16

## 2019-11-01 RX ADMIN — HYDROMORPHONE HYDROCHLORIDE 0.5 MILLIGRAM(S): 2 INJECTION INTRAMUSCULAR; INTRAVENOUS; SUBCUTANEOUS at 12:00

## 2019-11-01 RX ADMIN — SODIUM CHLORIDE 1500 MILLILITER(S): 9 INJECTION INTRAMUSCULAR; INTRAVENOUS; SUBCUTANEOUS at 23:14

## 2019-11-01 RX ADMIN — HYDROMORPHONE HYDROCHLORIDE 0.5 MILLIGRAM(S): 2 INJECTION INTRAMUSCULAR; INTRAVENOUS; SUBCUTANEOUS at 20:02

## 2019-11-01 RX ADMIN — Medication 200 MILLIGRAM(S): at 20:13

## 2019-11-01 RX ADMIN — HYDROMORPHONE HYDROCHLORIDE 1 MILLIGRAM(S): 2 INJECTION INTRAMUSCULAR; INTRAVENOUS; SUBCUTANEOUS at 14:00

## 2019-11-01 RX ADMIN — SODIUM CHLORIDE 100 MILLILITER(S): 9 INJECTION, SOLUTION INTRAVENOUS at 18:43

## 2019-11-01 NOTE — CONSULT NOTE ADULT - SUBJECTIVE AND OBJECTIVE BOX
Vascular Surgery Consultation Note  =====================================================    HPI: 43y Male  HPI:  43M PMH Melva-Danlos syndrome followed at [facility] ([facility]--GI, [facility]--Vascular), s/p celiac artery embolization, iliac artery aneurysm b/l, HTN, seizures, diverticulitis presenting to the ED with chief complaint of acute onset abdominal pain since 9am this morning. Patient reports attempting to have a bowel movement which worsened the pain. On presentation to the ED he was afebrile, low grade tachycardic. CTA chest was performed demonstrating scattered tiny foci of free air within the visualized abdomen. Trace free fluid abutting the inferior right lobe of the liver is overall haziness of the mesentery. Surgery was consulted for urgent evaluation. He was found to be exquisitely tender, peritonitic, writhing in bed. He was scheduled for the OR as a level 1 case. (01 Nov 2019 15:04)    Vascular: Vascular team consulted for iliac artery aneurysms in setting of abdominal pain. Patient states that his pain started in his lower abdomen and migrated to his epigastrium, 10/10, described as sharp and constant. He has a history of celiac artery coil embolization in 2010. He has since been found to have bilateral common iliac artery aneurysms who he [facility]. CTA in the ED showed stable aneurysmal dilation of bilateral common iliacs (L common iliac 2.4 cm, R common iliac 1.9 cm). He was seen by Dr. Ramirez in the office yesterday (10/31/19) who follows with yearly ultrasounds. He also states a history of diverticulitis, most recent attack in april 2019 requiring IV antibiotics. Due to his history of Ethlers-Danlos, he has never received a colonoscopy. Due to peritonitic signs, to OR with general surgery.    PAST MEDICAL & SURGICAL HISTORY:  Dislocations and subluxations  Aneurysm artery, celiac  Keratoconus  HTN (hypertension)  Epilepsy  EDS (Melva-Danlos syndrome)  No significant past surgical history    Home Meds: Home Medications:  Aspir 81 oral delayed release tablet: orally once a day (05 Sep 2019 22:17)  hydroCHLOROthiazide 12.5 mg oral tablet: orally once a day (05 Sep 2019 22:17)  Keppra 750 mg oral tablet: 2 tab(s) orally 2 times a day (05 Sep 2019 22:17)  lisinopril 30 mg oral tablet: 1 tab(s) orally once a day (05 Sep 2019 22:17)  Trileptal 300 mg oral tablet: 3 tab(s) orally once a day (05 Sep 2019 22:17)    Allergies: Allergies    penicillin (Unknown)    Intolerances      Soc:   Denies Tobacco/EtOH/Substance use  Advanced Directives: Presumed Full Code     ROS:    REVIEW OF SYSTEMS    [ x ] A ten-point review of systems was otherwise negative except as noted.  [ ] Due to altered mental status/intubation, subjective information were not able to be obtained from the patient. History was obtained, to the extent possible, from review of the chart and collateral sources of information.      CURRENT MEDICATIONS:   --------------------------------------------------------------------------------------  Neurologic Medications  HYDROmorphone  Injectable 1 milliGRAM(s) IV Push every 4 hours PRN Severe Pain (7 - 10)    Respiratory Medications    Cardiovascular Medications    Gastrointestinal Medications  lactated ringers Bolus 1000 milliLiter(s) IV Bolus once  lactated ringers Bolus 1000 milliLiter(s) IV Bolus once  lactated ringers. 1000 milliLiter(s) IV Continuous <Continuous>  pantoprazole  Injectable 40 milliGRAM(s) IV Push daily    Genitourinary Medications    Hematologic/Oncologic Medications  heparin  Injectable 5000 Unit(s) SubCutaneous every 8 hours    Antimicrobial/Immunologic Medications  ciprofloxacin   IVPB 400 milliGRAM(s) IV Intermittent every 12 hours  metroNIDAZOLE  IVPB 500 milliGRAM(s) IV Intermittent every 8 hours    Endocrine/Metabolic Medications    Topical/Other Medications  chlorhexidine 4% Liquid 1 Application(s) Topical <User Schedule>    --------------------------------------------------------------------------------------    VITAL SIGNS, INS/OUTS (last 24 hours):  --------------------------------------------------------------------------------------  ICU Vital Signs Last 24 Hrs  T(C): 36.9 (01 Nov 2019 14:39), Max: 37.7 (01 Nov 2019 13:44)  T(F): 98.5 (01 Nov 2019 14:39), Max: 99.9 (01 Nov 2019 13:44)  HR: 131 (01 Nov 2019 14:39) (96 - 131)  BP: 162/103 (01 Nov 2019 14:39) (131/90 - 162/103)  BP(mean): --  ABP: --  ABP(mean): --  RR: 16 (01 Nov 2019 14:39) (16 - 19)  SpO2: 100% (01 Nov 2019 14:39) (98% - 100%)    I&O's Summary    --------------------------------------------------------------------------------------  PHYSICAL EXAM  General: NAD, AAOx3, calm and cooperative  HEENT: NCAT, DENNIS, EOMI  Cardiac: Tachycardic, S1, S2, no Murmurs, rubs or gallops  Respiratory: CTAB, normal respiratory effort, breath sounds equal BL  Abdomen: rigid, non-distended, TTP, +guarding, -rebound  Musculoskeletal: Strength 5/5 BL UE/LE, ROM intact, compartments soft  Vascular: R: DP 2+, L DP 2+, foot capillary refill <2 seconds, extremities well perfused  Skin: Warm/dry, normal color, no jaundice    LABS  --------------------------------------------------------------------------------------  Labs:  CAPILLARY BLOOD GLUCOSE      POCT Blood Glucose.: 110 mg/dL (01 Nov 2019 15:42)                          15.8   6.91  )-----------( 290      ( 01 Nov 2019 11:00 )             46.8       Auto Neutrophil %: 75.6 % (11-01-19 @ 11:00)  Auto Immature Granulocyte %: 0.7 % (11-01-19 @ 11:00)    11-01    140  |  98  |  18  ----------------------------<  125<H>  4.2   |  24  |  0.9      Calcium, Total Serum: 10.3 mg/dL (11-01-19 @ 11:00)      LFTs:             8.2  | 0.3  | 21       ------------------[57      ( 01 Nov 2019 11:00 )  5.2  | x    | 22          Lipase:50     Amylase:x             Coags:     12.30  ----< 1.07    ( 01 Nov 2019 14:50 )     26.2        CARDIAC MARKERS ( 01 Nov 2019 11:00 )  x     / <0.01 ng/mL / x     / x     / x              --------------------------------------------------------------------------------------  IMAGING RESULTS  < from: CT Angio Chest Dissection Protocol (11.01.19 @ 12:09) >  IMPRESSION:    No evidence of aortic aneurysm or dissection.  Stable aneurysmal   dilatation of the bilateral common iliac arteries.    Scattered tiny foci of free air within the visualized abdomen. Trace free   fluid abutting the inferior right lobe of the liver is overall haziness   of the mesentery. Cannot exclude a hollow viscus injury, note that the   pelvis was not included in this scan and patient has a history of   diverticulitis.    Segmental areas of hypoenhancement in the right kidney suspicious for   renal ischemia as detailed above. Finding is new since 9/4/2019. Patent   main right renal artery and accessory right renal artery. Less likely   differential includes pyelonephritis.    < end of copied text >    < from: CT Abdomen and Pelvis w/ IV Cont (09.04.19 @ 04:58) >  IMPRESSION:    No acute inflammatory process within the abdomen and pelvis.    Stable aneurysms as above.    Stable renal scarring.    < end of copied text >    < from: Xray Chest 1 View- PORTABLE-Urgent (11.01.19 @ 12:46) >  Impression:      No radiographic evidenceof acute cardiopulmonary disease.    < end of copied text >    ---------------------------------------------------------------------------------------

## 2019-11-01 NOTE — ED PROVIDER NOTE - PROGRESS NOTE DETAILS
Discussed with surgery. Will come evaluate the pt. Discussed with vascular. Will come evaluate the pt. patients abd is signficant tender. concern for pertioneal signs, surgery will take patietn to OR.

## 2019-11-01 NOTE — H&P ADULT - ASSESSMENT
43M PMH Melva-Danlos syndrome followed at [facility] ([facility]--GI, [facility]--Vascular), s/p celiac artery embolization, iliac artery aneuysm b/l, HTN, seizures, diverticulitis presenting to the ED w several hours of acute abdominal pain with free air on CTA.    Plan:  - OR as level 1 exlap, poss bowel resection, poss ostomy  - NPO, IVF  - pain control  - DVT/GI ppx

## 2019-11-01 NOTE — ED PROVIDER NOTE - ATTENDING CONTRIBUTION TO CARE
patient with abrupt onset abd pain with tenderness diffusely, not associated with vomiting, hx of EHS with prior aneurysm and rupture of celiac artery which is coiled. Plan is to obtain immediate CTA of abd to evaluate for anueryms or dissection. pain control and antiemetics.

## 2019-11-01 NOTE — PRE-ANESTHESIA EVALUATION ADULT - NSANTHPMHFT_GEN_ALL_CORE
History of multiple aneurysms Bilateral Carotid Arteries as well as Vertebral artery. Celiac aneurysm as well Iliac bilat  Patient also with History of multiple shoulder dislocations Left >right cannot Abduct arm > than 30 degrees without pain Bilaterally

## 2019-11-01 NOTE — H&P ADULT - NSHPPHYSICALEXAM_GEN_ALL_CORE
PHYSICAL EXAM:    General: slender, extremely uncomfortable  Chest/Lung: Clear to auscultation bilaterally; No wheeze  Heart: Regular rate and rhythm; No murmurs, rubs, or gallops  Abdomen: Soft, diffusely tender, peritonitic Nondistended;

## 2019-11-01 NOTE — BRIEF OPERATIVE NOTE - NSICDXBRIEFPROCEDURE_GEN_ALL_CORE_FT
PROCEDURES:  Kerri procedure 01-Nov-2019 18:33:20  Usman Conroy  Exploratory laparotomy 01-Nov-2019 18:33:11  Usman Conroy

## 2019-11-01 NOTE — ED PROVIDER NOTE - OBJECTIVE STATEMENT
43y M w/ PMH of Melva Danlos syndrome, HTN, epilepsy, diverticulosis, and illiac A. aneurysm presents with acute onset abd pain that started earlier today. States started in the lower abdominal region and now is migrating up to the upper abdominal region. Severe pain constant without relief. No alleviating/worsening factors. Denies fever, chills, n/v/d, or numbness/tingling.

## 2019-11-01 NOTE — H&P ADULT - ATTENDING COMMENTS
42 yo male patient with Melva-Danlos syndrome followed at [facility]. s/p celiac artery embolization, iliac artery aneurysm b/l, HTN, seizures, diverticulitis presenting to the Ed with severe abdominal pain after a failed attempt have a BM. Developed diffuce abdominal pian.  CT scan with no dissection and free air, possible perforation of sigmoid colon.  Abdomen with diffuse peritonitis, appears septic, dehydrated.    a/p:  Perforated viscus, most likely sigmoid colon.  Level 1 surgery for perforated viscus.  Sepsis on admission.  Exploratory laparotomy, possible bowel resection, possible ostomy creation.  Risks, benefits, consequences and alternatives fully discussed with patient. and wife.  Consent in chart and they agreed with the procedure.

## 2019-11-01 NOTE — PRE-ANESTHESIA EVALUATION ADULT - NSANTHADDINFOFT_GEN_ALL_CORE
R/B/A explained and accepted plan GETA . Possible postop ventilation and RBC transfusion.  Discussed Earline with Dr Luba moran not place given patient History of Vascular Enhlers Danlo syndrome Vascular Type and history of multiple vascular aneurysm and higher possibilty of complications from such a procedure .  Consent was obtained at 1540 wife at bedside. All questions answered.

## 2019-11-01 NOTE — ED PROVIDER NOTE - CLINICAL SUMMARY MEDICAL DECISION MAKING FREE TEXT BOX
patient evaluated for abrupt abd pain, he had signficant pain on exam, a ct angiogram was obtained immediately to evaluate for anuerysm/dissectionn which was negative however free air was noted. abx where started and patient was aggresively hydrated. surgery was contacted and patient was taken to OR.

## 2019-11-01 NOTE — H&P ADULT - HISTORY OF PRESENT ILLNESS
43M PMH Melva-Danlos syndrome followed at [facility] ([facility]--GI, [facility]--Vascular), s/p celiac artery embolization, iliac artery aneuysm b/l, HTN, seizures, diverticulitis presenting to the ED with chief complaint of acute onset abdominal pain since 9am this morning. Patient reports attempting to have a bowel movement which worsened the pain. On presentation to the ED he was afebrile, low grade tachycardic. CTA chest was performed demonstrating scattered tiny foci of free air within the visualized abdomen. Trace free fluid abutting the inferior right lobe of the liver is overall haziness of the mesentery. Surgery was consulted for urgent evaluation. He was found to be exquisitely tender, peritonitic, writhing in bed. He was scheduled for the OR as a level 1 case. 43M PMH Melva-Danlos syndrome followed at [facility] ([facility]--GI, [facility]--Vascular), b/l carotid d/s, vertebral a d/s s/p celiac artery embolization, iliac artery aneuysm b/l, HTN, seizures, diverticulitis presenting to the ED with chief complaint of acute onset abdominal pain since 9am this morning. Patient reports attempting to have a bowel movement which worsened the pain. On presentation to the ED he was afebrile, low grade tachycardic. CTA chest was performed demonstrating scattered tiny foci of free air within the visualized abdomen. Trace free fluid abutting the inferior right lobe of the liver is overall haziness of the mesentery. Surgery was consulted for urgent evaluation. He was found to be exquisitely tender, peritonitic, writhing in bed. He was scheduled for the OR as a level 1 case.

## 2019-11-01 NOTE — ED PROVIDER NOTE - OTHER RECORDS SUMMARY FREE TEXT FOR MDM OBTAINED AND REVIEWED OLD RECORDS QUESTION
records obtained by wife regarding CTA in 9/19 showed celaic artery stenting and no focal anuerysm of iliacs. records also showed lab work was nml

## 2019-11-01 NOTE — H&P ADULT - NSHPLABSRESULTS_GEN_ALL_CORE
Labs:  CAPILLARY BLOOD GLUCOSE                              15.8   6.91  )-----------( 290      ( 01 Nov 2019 11:00 )             46.8       Auto Neutrophil %: 75.6 % (11-01-19 @ 11:00)  Auto Immature Granulocyte %: 0.7 % (11-01-19 @ 11:00)    11-01    140  |  98  |  18  ----------------------------<  125<H>  4.2   |  24  |  0.9      Calcium, Total Serum: 10.3 mg/dL (11-01-19 @ 11:00)      LFTs:             8.2  | 0.3  | 21       ------------------[57      ( 01 Nov 2019 11:00 )  5.2  | x    | 22          Lipase:50     Amylase:x             Coags:    CARDIAC MARKERS ( 01 Nov 2019 11:00 )  x     / <0.01 ng/mL / x     / x     / x              < from: CT Angio Chest Dissection Protocol (11.01.19 @ 12:09) >    IMPRESSION:    No evidence of aortic aneurysm or dissection.  Stable aneurysmal   dilatation of the bilateral common iliac arteries.    Scattered tiny foci of free air within the visualized abdomen. Trace free   fluid abutting the inferior right lobe of the liver is overall haziness   of the mesentery. Cannot exclude a hollow viscus injury, note that the   pelvis was not included in this scan and patient has a history of   diverticulitis.    Segmental areas of hypoenhancement in the right kidney suspicious for   renal ischemia as detailed above. Finding is new since 9/4/2019. Patent   main right renal artery and accessory right renal artery. Less likely   differential includes pyelonephritis.    < end of copied text >

## 2019-11-01 NOTE — ED PROVIDER NOTE - PHYSICAL EXAMINATION
CONSTITUTIONAL: in severe pain.  SKIN: warm, dry  HEAD: Normocephalic; atraumatic.  EYES: PERRL, EOMI, no conjunctival erythema  ENT: No nasal discharge; airway clear.  NECK: Supple; non tender.  CARD: S1, S2 normal; no murmurs, gallops, or rubs. Regular rate and rhythm.   RESP: No wheezes, rales or rhonchi.  ABD: diffuse tenderness to palpation with guarding.   EXT: Normal ROM.  No clubbing, cyanosis or edema.   LYMPH: No acute cervical adenopathy.  NEURO: Alert, oriented, grossly unremarkable  PSYCH: Cooperative, appropriate.

## 2019-11-01 NOTE — ED ADULT NURSE NOTE - OBJECTIVE STATEMENT
Patient present to ED with complains of severe sharp abdomen pain rating 10/10 that started 2 hours ago, no aggregating factor expressed, did not try medication. Denies fever, chills, vomiting, dizziness, diarrhea, and blood in urine and stool.

## 2019-11-01 NOTE — CHART NOTE - NSCHARTNOTEFT_GEN_A_CORE
Anesthesia Post Op Assessment  		(  x  ) Intubated           TV _500____	Rate _14____	FiO2__50%___  		(    ) Patent airway. Full return of protective reflexes  		(    )Full recovery from anesthesia/sedation to baseline status      Cardiovascular Function:  		BP:	160/100	                  Pulse:		100                  RR:		vent12                  Temp:		99                  O2Sat:    99      Mental Status:  	        (    ) awake		  (    ) alert		 (    ) drowsy	               (  x  ) sedated      Nausea/Vomiting:  		(    ) Yes, See post-op orders		   (  x  ) No      Pain Scale: (0-10):			Treatment:     (    ) None	            (   x ) See Post-Op/PCA Orders      Post-operative Fluids: 	   (    ) Oral	          (  x  ) See post-op Orders        Comments:    Kept patient intubated. Uneventful. No complications from anesthesia.  Discharge to ICU when criteria met. Report given to ICU.

## 2019-11-01 NOTE — CONSULT NOTE ADULT - ASSESSMENT
ASSESSMENT:  43M PMH Melva-Danlos syndrome followed at [facility] ([facility]--GI, [facility]--Vascular), s/p celiac artery embolization, iliac artery aneurysm b/l, HTN, seizures, diverticulitis presenting to the ED with chief complaint of acute onset abdominal pain. Vascular surgery consulted for iliac artery aneurysms. Imaging reviewed and compared to prior CTA's showing stable bilateral common iliac artery aneurysms. Pt with free air in CT and will be going to OR with general surgery.    PLAN:   No acute vascular intervention  Stable aneurysmal dilation of common iliacs   To OR with general surgery as level 1

## 2019-11-02 LAB
ANION GAP SERPL CALC-SCNC: 16 MMOL/L — HIGH (ref 7–14)
BASOPHILS # BLD AUTO: 0.02 K/UL — SIGNIFICANT CHANGE UP (ref 0–0.2)
BASOPHILS NFR BLD AUTO: 0.4 % — SIGNIFICANT CHANGE UP (ref 0–1)
BUN SERPL-MCNC: 16 MG/DL — SIGNIFICANT CHANGE UP (ref 10–20)
CALCIUM SERPL-MCNC: 7.9 MG/DL — LOW (ref 8.5–10.1)
CHLORIDE SERPL-SCNC: 102 MMOL/L — SIGNIFICANT CHANGE UP (ref 98–110)
CK MB CFR SERPL CALC: 7.7 NG/ML — HIGH (ref 0.6–6.3)
CK MB CFR SERPL CALC: 9.2 NG/ML — HIGH (ref 0.6–6.3)
CK SERPL-CCNC: 1790 U/L — HIGH (ref 0–225)
CK SERPL-CCNC: 2964 U/L — HIGH (ref 0–225)
CO2 SERPL-SCNC: 20 MMOL/L — SIGNIFICANT CHANGE UP (ref 17–32)
CREAT SERPL-MCNC: 0.9 MG/DL — SIGNIFICANT CHANGE UP (ref 0.7–1.5)
EOSINOPHIL # BLD AUTO: 0 K/UL — SIGNIFICANT CHANGE UP (ref 0–0.7)
EOSINOPHIL NFR BLD AUTO: 0 % — SIGNIFICANT CHANGE UP (ref 0–8)
GLUCOSE SERPL-MCNC: 109 MG/DL — HIGH (ref 70–99)
HCT VFR BLD CALC: 41.7 % — LOW (ref 42–52)
HCT VFR BLD CALC: 44.2 % — SIGNIFICANT CHANGE UP (ref 42–52)
HGB BLD-MCNC: 14 G/DL — SIGNIFICANT CHANGE UP (ref 14–18)
HGB BLD-MCNC: 14.7 G/DL — SIGNIFICANT CHANGE UP (ref 14–18)
IMM GRANULOCYTES NFR BLD AUTO: 1.2 % — HIGH (ref 0.1–0.3)
LACTATE SERPL-SCNC: 2.5 MMOL/L — HIGH (ref 0.5–2.2)
LYMPHOCYTES # BLD AUTO: 0.3 K/UL — LOW (ref 1.2–3.4)
LYMPHOCYTES # BLD AUTO: 6 % — LOW (ref 20.5–51.1)
MAGNESIUM SERPL-MCNC: 2 MG/DL — SIGNIFICANT CHANGE UP (ref 1.8–2.4)
MCHC RBC-ENTMCNC: 30.1 PG — SIGNIFICANT CHANGE UP (ref 27–31)
MCHC RBC-ENTMCNC: 30.4 PG — SIGNIFICANT CHANGE UP (ref 27–31)
MCHC RBC-ENTMCNC: 33.3 G/DL — SIGNIFICANT CHANGE UP (ref 32–37)
MCHC RBC-ENTMCNC: 33.6 G/DL — SIGNIFICANT CHANGE UP (ref 32–37)
MCV RBC AUTO: 89.7 FL — SIGNIFICANT CHANGE UP (ref 80–94)
MCV RBC AUTO: 91.5 FL — SIGNIFICANT CHANGE UP (ref 80–94)
MONOCYTES # BLD AUTO: 0.18 K/UL — SIGNIFICANT CHANGE UP (ref 0.1–0.6)
MONOCYTES NFR BLD AUTO: 3.6 % — SIGNIFICANT CHANGE UP (ref 1.7–9.3)
NEUTROPHILS # BLD AUTO: 4.42 K/UL — SIGNIFICANT CHANGE UP (ref 1.4–6.5)
NEUTROPHILS NFR BLD AUTO: 88.8 % — HIGH (ref 42.2–75.2)
NRBC # BLD: 0 /100 WBCS — SIGNIFICANT CHANGE UP (ref 0–0)
NRBC # BLD: 0 /100 WBCS — SIGNIFICANT CHANGE UP (ref 0–0)
PHOSPHATE SERPL-MCNC: 3.6 MG/DL — SIGNIFICANT CHANGE UP (ref 2.1–4.9)
PLATELET # BLD AUTO: 166 K/UL — SIGNIFICANT CHANGE UP (ref 130–400)
PLATELET # BLD AUTO: 206 K/UL — SIGNIFICANT CHANGE UP (ref 130–400)
POTASSIUM SERPL-MCNC: 4.3 MMOL/L — SIGNIFICANT CHANGE UP (ref 3.5–5)
POTASSIUM SERPL-SCNC: 4.3 MMOL/L — SIGNIFICANT CHANGE UP (ref 3.5–5)
RBC # BLD: 4.65 M/UL — LOW (ref 4.7–6.1)
RBC # BLD: 4.83 M/UL — SIGNIFICANT CHANGE UP (ref 4.7–6.1)
RBC # FLD: 12.8 % — SIGNIFICANT CHANGE UP (ref 11.5–14.5)
RBC # FLD: 12.9 % — SIGNIFICANT CHANGE UP (ref 11.5–14.5)
SODIUM SERPL-SCNC: 138 MMOL/L — SIGNIFICANT CHANGE UP (ref 135–146)
TROPONIN T SERPL-MCNC: 0.02 NG/ML — HIGH
TROPONIN T SERPL-MCNC: 0.06 NG/ML — CRITICAL HIGH
WBC # BLD: 1.61 K/UL — LOW (ref 4.8–10.8)
WBC # BLD: 4.98 K/UL — SIGNIFICANT CHANGE UP (ref 4.8–10.8)
WBC # FLD AUTO: 1.61 K/UL — LOW (ref 4.8–10.8)
WBC # FLD AUTO: 4.98 K/UL — SIGNIFICANT CHANGE UP (ref 4.8–10.8)

## 2019-11-02 PROCEDURE — 93010 ELECTROCARDIOGRAM REPORT: CPT

## 2019-11-02 PROCEDURE — 99291 CRITICAL CARE FIRST HOUR: CPT

## 2019-11-02 PROCEDURE — 71045 X-RAY EXAM CHEST 1 VIEW: CPT | Mod: 26

## 2019-11-02 PROCEDURE — 99024 POSTOP FOLLOW-UP VISIT: CPT

## 2019-11-02 RX ORDER — SODIUM CHLORIDE 9 MG/ML
250 INJECTION INTRAMUSCULAR; INTRAVENOUS; SUBCUTANEOUS ONCE
Refills: 0 | Status: COMPLETED | OUTPATIENT
Start: 2019-11-02 | End: 2019-11-02

## 2019-11-02 RX ORDER — SODIUM CHLORIDE 9 MG/ML
500 INJECTION, SOLUTION INTRAVENOUS ONCE
Refills: 0 | Status: COMPLETED | OUTPATIENT
Start: 2019-11-02 | End: 2019-11-02

## 2019-11-02 RX ORDER — FENTANYL CITRATE 50 UG/ML
25 INJECTION INTRAVENOUS ONCE
Refills: 0 | Status: DISCONTINUED | OUTPATIENT
Start: 2019-11-02 | End: 2019-11-02

## 2019-11-02 RX ORDER — METRONIDAZOLE 500 MG
500 TABLET ORAL EVERY 8 HOURS
Refills: 0 | Status: DISCONTINUED | OUTPATIENT
Start: 2019-11-02 | End: 2019-11-08

## 2019-11-02 RX ORDER — PANTOPRAZOLE SODIUM 20 MG/1
40 TABLET, DELAYED RELEASE ORAL
Refills: 0 | Status: DISCONTINUED | OUTPATIENT
Start: 2019-11-02 | End: 2019-11-08

## 2019-11-02 RX ORDER — CEFEPIME 1 G/1
1000 INJECTION, POWDER, FOR SOLUTION INTRAMUSCULAR; INTRAVENOUS ONCE
Refills: 0 | Status: COMPLETED | OUTPATIENT
Start: 2019-11-02 | End: 2019-11-02

## 2019-11-02 RX ORDER — FENTANYL CITRATE 50 UG/ML
25 INJECTION INTRAVENOUS EVERY 6 HOURS
Refills: 0 | Status: DISCONTINUED | OUTPATIENT
Start: 2019-11-02 | End: 2019-11-02

## 2019-11-02 RX ORDER — SODIUM CHLORIDE 9 MG/ML
250 INJECTION, SOLUTION INTRAVENOUS ONCE
Refills: 0 | Status: COMPLETED | OUTPATIENT
Start: 2019-11-02 | End: 2019-11-02

## 2019-11-02 RX ORDER — HYDROMORPHONE HYDROCHLORIDE 2 MG/ML
0.25 INJECTION INTRAMUSCULAR; INTRAVENOUS; SUBCUTANEOUS ONCE
Refills: 0 | Status: DISCONTINUED | OUTPATIENT
Start: 2019-11-02 | End: 2019-11-02

## 2019-11-02 RX ORDER — INFLUENZA VIRUS VACCINE 15; 15; 15; 15 UG/.5ML; UG/.5ML; UG/.5ML; UG/.5ML
0.5 SUSPENSION INTRAMUSCULAR ONCE
Refills: 0 | Status: DISCONTINUED | OUTPATIENT
Start: 2019-11-02 | End: 2019-11-08

## 2019-11-02 RX ORDER — CEFEPIME 1 G/1
1000 INJECTION, POWDER, FOR SOLUTION INTRAMUSCULAR; INTRAVENOUS EVERY 8 HOURS
Refills: 0 | Status: DISCONTINUED | OUTPATIENT
Start: 2019-11-02 | End: 2019-11-08

## 2019-11-02 RX ORDER — NALOXONE HYDROCHLORIDE 4 MG/.1ML
0.1 SPRAY NASAL
Refills: 0 | Status: DISCONTINUED | OUTPATIENT
Start: 2019-11-02 | End: 2019-11-08

## 2019-11-02 RX ORDER — ONDANSETRON 8 MG/1
4 TABLET, FILM COATED ORAL EVERY 6 HOURS
Refills: 0 | Status: DISCONTINUED | OUTPATIENT
Start: 2019-11-02 | End: 2019-11-08

## 2019-11-02 RX ORDER — OXCARBAZEPINE 300 MG/1
3 TABLET, FILM COATED ORAL
Qty: 0 | Refills: 0 | DISCHARGE

## 2019-11-02 RX ORDER — ALBUTEROL 90 UG/1
2 AEROSOL, METERED ORAL EVERY 6 HOURS
Refills: 0 | Status: DISCONTINUED | OUTPATIENT
Start: 2019-11-02 | End: 2019-11-08

## 2019-11-02 RX ORDER — CEFEPIME 1 G/1
INJECTION, POWDER, FOR SOLUTION INTRAMUSCULAR; INTRAVENOUS
Refills: 0 | Status: DISCONTINUED | OUTPATIENT
Start: 2019-11-02 | End: 2019-11-08

## 2019-11-02 RX ORDER — HYDROMORPHONE HYDROCHLORIDE 2 MG/ML
30 INJECTION INTRAMUSCULAR; INTRAVENOUS; SUBCUTANEOUS
Refills: 0 | Status: DISCONTINUED | OUTPATIENT
Start: 2019-11-02 | End: 2019-11-04

## 2019-11-02 RX ORDER — OXYCODONE HYDROCHLORIDE 5 MG/1
5 TABLET ORAL ONCE
Refills: 0 | Status: DISCONTINUED | OUTPATIENT
Start: 2019-11-02 | End: 2019-11-02

## 2019-11-02 RX ADMIN — Medication 650 MILLIGRAM(S): at 12:40

## 2019-11-02 RX ADMIN — HEPARIN SODIUM 5000 UNIT(S): 5000 INJECTION INTRAVENOUS; SUBCUTANEOUS at 13:51

## 2019-11-02 RX ADMIN — SODIUM CHLORIDE 3000 MILLILITER(S): 9 INJECTION, SOLUTION INTRAVENOUS at 08:21

## 2019-11-02 RX ADMIN — HEPARIN SODIUM 5000 UNIT(S): 5000 INJECTION INTRAVENOUS; SUBCUTANEOUS at 21:11

## 2019-11-02 RX ADMIN — CHLORHEXIDINE GLUCONATE 15 MILLILITER(S): 213 SOLUTION TOPICAL at 05:10

## 2019-11-02 RX ADMIN — SODIUM CHLORIDE 1500 MILLILITER(S): 9 INJECTION, SOLUTION INTRAVENOUS at 00:52

## 2019-11-02 RX ADMIN — Medication 0.5 MILLIGRAM(S): at 15:48

## 2019-11-02 RX ADMIN — SODIUM CHLORIDE 1500 MILLILITER(S): 9 INJECTION, SOLUTION INTRAVENOUS at 01:35

## 2019-11-02 RX ADMIN — HYDROMORPHONE HYDROCHLORIDE 0.25 MILLIGRAM(S): 2 INJECTION INTRAMUSCULAR; INTRAVENOUS; SUBCUTANEOUS at 09:26

## 2019-11-02 RX ADMIN — HYDROMORPHONE HYDROCHLORIDE 0.5 MILLIGRAM(S): 2 INJECTION INTRAMUSCULAR; INTRAVENOUS; SUBCUTANEOUS at 10:55

## 2019-11-02 RX ADMIN — OXCARBAZEPINE 600 MILLIGRAM(S): 300 TABLET, FILM COATED ORAL at 21:11

## 2019-11-02 RX ADMIN — OXYCODONE HYDROCHLORIDE 5 MILLIGRAM(S): 5 TABLET ORAL at 09:35

## 2019-11-02 RX ADMIN — Medication 650 MILLIGRAM(S): at 23:46

## 2019-11-02 RX ADMIN — Medication 650 MILLIGRAM(S): at 18:15

## 2019-11-02 RX ADMIN — OXCARBAZEPINE 300 MILLIGRAM(S): 300 TABLET, FILM COATED ORAL at 05:11

## 2019-11-02 RX ADMIN — FENTANYL CITRATE 25 MICROGRAM(S): 50 INJECTION INTRAVENOUS at 03:15

## 2019-11-02 RX ADMIN — SODIUM CHLORIDE 1500 MILLILITER(S): 9 INJECTION INTRAMUSCULAR; INTRAVENOUS; SUBCUTANEOUS at 00:52

## 2019-11-02 RX ADMIN — Medication 650 MILLIGRAM(S): at 00:19

## 2019-11-02 RX ADMIN — Medication 650 MILLIGRAM(S): at 12:35

## 2019-11-02 RX ADMIN — PANTOPRAZOLE SODIUM 40 MILLIGRAM(S): 20 TABLET, DELAYED RELEASE ORAL at 12:35

## 2019-11-02 RX ADMIN — HYDROMORPHONE HYDROCHLORIDE 0.5 MILLIGRAM(S): 2 INJECTION INTRAMUSCULAR; INTRAVENOUS; SUBCUTANEOUS at 00:38

## 2019-11-02 RX ADMIN — HYDROMORPHONE HYDROCHLORIDE 0.25 MILLIGRAM(S): 2 INJECTION INTRAMUSCULAR; INTRAVENOUS; SUBCUTANEOUS at 09:17

## 2019-11-02 RX ADMIN — SODIUM CHLORIDE 1000 MILLILITER(S): 9 INJECTION, SOLUTION INTRAVENOUS at 08:45

## 2019-11-02 RX ADMIN — Medication 650 MILLIGRAM(S): at 05:46

## 2019-11-02 RX ADMIN — Medication 100 MILLIGRAM(S): at 13:51

## 2019-11-02 RX ADMIN — Medication 100 MILLIGRAM(S): at 21:11

## 2019-11-02 RX ADMIN — CHLORHEXIDINE GLUCONATE 1 APPLICATION(S): 213 SOLUTION TOPICAL at 05:10

## 2019-11-02 RX ADMIN — HEPARIN SODIUM 5000 UNIT(S): 5000 INJECTION INTRAVENOUS; SUBCUTANEOUS at 05:20

## 2019-11-02 RX ADMIN — FENTANYL CITRATE 25 MICROGRAM(S): 50 INJECTION INTRAVENOUS at 03:30

## 2019-11-02 RX ADMIN — SODIUM CHLORIDE 1500 MILLILITER(S): 9 INJECTION, SOLUTION INTRAVENOUS at 02:45

## 2019-11-02 RX ADMIN — CEFEPIME 100 MILLIGRAM(S): 1 INJECTION, POWDER, FOR SOLUTION INTRAMUSCULAR; INTRAVENOUS at 11:30

## 2019-11-02 RX ADMIN — Medication 650 MILLIGRAM(S): at 18:17

## 2019-11-02 RX ADMIN — Medication 650 MILLIGRAM(S): at 05:21

## 2019-11-02 RX ADMIN — CEFEPIME 100 MILLIGRAM(S): 1 INJECTION, POWDER, FOR SOLUTION INTRAMUSCULAR; INTRAVENOUS at 21:13

## 2019-11-02 RX ADMIN — LEVETIRACETAM 1500 MILLIGRAM(S): 250 TABLET, FILM COATED ORAL at 05:11

## 2019-11-02 RX ADMIN — LEVETIRACETAM 1500 MILLIGRAM(S): 250 TABLET, FILM COATED ORAL at 18:17

## 2019-11-02 RX ADMIN — SODIUM CHLORIDE 1500 MILLILITER(S): 9 INJECTION INTRAMUSCULAR; INTRAVENOUS; SUBCUTANEOUS at 06:51

## 2019-11-02 RX ADMIN — Medication 100 MILLIGRAM(S): at 05:09

## 2019-11-02 RX ADMIN — SODIUM CHLORIDE 1500 MILLILITER(S): 9 INJECTION, SOLUTION INTRAVENOUS at 05:09

## 2019-11-02 RX ADMIN — Medication 100 MILLIGRAM(S): at 11:30

## 2019-11-02 RX ADMIN — FENTANYL CITRATE 25 MICROGRAM(S): 50 INJECTION INTRAVENOUS at 05:44

## 2019-11-02 RX ADMIN — Medication 650 MILLIGRAM(S): at 00:20

## 2019-11-02 RX ADMIN — SODIUM CHLORIDE 1500 MILLILITER(S): 9 INJECTION INTRAMUSCULAR; INTRAVENOUS; SUBCUTANEOUS at 01:30

## 2019-11-02 RX ADMIN — FENTANYL CITRATE 25 MICROGRAM(S): 50 INJECTION INTRAVENOUS at 05:30

## 2019-11-02 RX ADMIN — Medication 200 MILLIGRAM(S): at 05:10

## 2019-11-02 RX ADMIN — OXYCODONE HYDROCHLORIDE 5 MILLIGRAM(S): 5 TABLET ORAL at 09:29

## 2019-11-02 RX ADMIN — HYDROMORPHONE HYDROCHLORIDE 0.5 MILLIGRAM(S): 2 INJECTION INTRAMUSCULAR; INTRAVENOUS; SUBCUTANEOUS at 00:55

## 2019-11-02 RX ADMIN — HYDROMORPHONE HYDROCHLORIDE 0.5 MILLIGRAM(S): 2 INJECTION INTRAMUSCULAR; INTRAVENOUS; SUBCUTANEOUS at 11:39

## 2019-11-02 NOTE — CONSULT NOTE ADULT - SUBJECTIVE AND OBJECTIVE BOX
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 Patient reports attempting to have a bowel movement which worsened the pain. On presentation to the ED he was afebrile, low grade tachycardic. CTA chest was performed demonstrating scattered tiny foci of free air within the visualized abdomen. Trace free fluid abutting the inferior right lobe of the liver is overall haziness of the mesentery. Surgery was consulted for urgent evaluation. He was found to be exquisitely tender, peritonitic, writhing in bed. He was scheduled for the OR as a level 1 case. (01 Nov 2019 15:04)    FAMILY HISTORY:    PAST MEDICAL & SURGICAL HISTORY:  Dislocations and subluxations  Aneurysm artery, celiac  Keratoconus  HTN (hypertension)  Epilepsy  EDS (Melva-Danlos syndrome)  No significant past surgical history      Substance Use ( x ) never used  (  ) IVDU (  ) Other:  Tobacco Usage:  (  x ) never smoked   (   ) former smoker   (   ) current smoker   Alcohol Usage: (   ) social  (   ) daily use (  x ) denies  Sexual History: na      ROS  General: Denies fevers, chills, nightsweats, weight loss  HEENT: Denies headache, rhinorrhea, sore throat, eye pain  CV: Denies CP, palpitations  PULM: Denies SOB, cough  GI: Denies abdominal pain, diarrhea  : Denies dysuria, hematuria  MSK: Denies arthralgias  SKIN: Denies rash   NEURO: Denies paresthesias, weakness  PSYCH: Denies depression    VITALS:  T(F): 99.6, Max: 102 (11-01-19 @ 22:00)  HR: 108  BP: 99/65  RR: 19Vital Signs Last 24 Hrs  T(C): 37.6 (02 Nov 2019 08:00), Max: 38.9 (01 Nov 2019 22:00)  T(F): 99.6 (02 Nov 2019 08:00), Max: 102 (01 Nov 2019 22:00)  HR: 108 (02 Nov 2019 09:00) (96 - 152)  BP: 99/65 (02 Nov 2019 09:00) (68/53 - 183/104)  BP(mean): 75 (02 Nov 2019 09:00) (56 - 89)  RR: 19 (02 Nov 2019 09:00) (11 - 28)  SpO2: 100% (02 Nov 2019 09:00) (98% - 100%)    PHYSICAL EXAM:  Gen: NAD, resting in bed  HEENT: Normocephalic, atraumatic  Neck: supple, no lymphadenopathy  CV: Regular rate & regular rhythm  Lungs: decreased BS at bases, no fremitus  Abdomen: Soft, BS present  Ext: Warm, well perfused  Neuro: non focal, awake  Skin: no rash, no erythema    TESTS & MEASUREMENTS:                        14.7   1.61  )-----------( 206      ( 02 Nov 2019 00:50 )             44.2     11-02    138  |  102  |  16  ----------------------------<  109<H>  4.3   |  20  |  0.9    Ca    7.9<L>      02 Nov 2019 00:50  Phos  3.6     11-02  Mg     2.0     11-02    TPro  8.2<H>  /  Alb  5.2  /  TBili  0.3  /  DBili  x   /  AST  21  /  ALT  22  /  AlkPhos  57  11-01    eGFR if Non African American: 104 mL/min/1.73M2 (11-02-19 @ 00:50)  eGFR if African American: 121 mL/min/1.73M2 (11-02-19 @ 00:50)  eGFR if Non African American: 109 mL/min/1.73M2 (11-01-19 @ 20:19)  eGFR if African American: 127 mL/min/1.73M2 (11-01-19 @ 20:19)  eGFR if Non African American: 104 mL/min/1.73M2 (11-01-19 @ 11:00)  eGFR if : 121 mL/min/1.73M2 (11-01-19 @ 11:00)    LIVER FUNCTIONS - ( 01 Nov 2019 11:00 )  Alb: 5.2 g/dL / Pro: 8.2 g/dL / ALK PHOS: 57 U/L / ALT: 22 U/L / AST: 21 U/L / GGT: x                 Lactate, Blood: 4.8 mmol/L (11-01-19 @ 14:50)      INFECTIOUS DISEASES TESTING      RADIOLOGY & ADDITIONAL TESTS:  I have personally reviewed the last Chest xray  CXR  Xray Chest 1 View- PORTABLE-Urgent:   EXAM:  XR CHEST PORTABLE URGENT 1V            PROCEDURE DATE:  11/01/2019            INTERPRETATION:  Clinical History / Reason for exam: Chest pain    Comparison : Chest radiograph 7/7/2018.    Technique/Positioning: Frontal view.    Findings:    Support devices: None.    Cardiac/mediastinum/hilum: Unremarkable.    Lung parenchyma/Pleura: Within normal limits.    Skeleton/soft tissues: Stable embolization coils overlying   gastroesophageal junction.    Impression:      No radiographic evidenceof acute cardiopulmonary disease.                      STACEY LORD M.D., ATTENDING RADIOLOGIST  This document has been electronically signed. Nov 1 2019  1:51PM             (11-01-19 @ 12:46)      CT  CT Angio Chest Dissection Protocol:   EXAM:  CTA DISSECTION             PROCEDURE DATE:  11/01/2019            INTERPRETATION:  CLINICAL STATEMENT: Dissection, aneurysm.    TECHNIQUE: Multislice helical sections were obtained from the thoracic   inlet to the upper abdomen before intravenous contrast administration.   CTA images were then obtained though the chest and abdomen following   administration of 100c Optiray 320 intravenous contrast. Multiplanar   reformats including MIP images were added.    COMPARISON CT: 4/28/2019. 9/4/2019.    OTHER STUDIES USED FOR CORRELATION: None.      FINDINGS:    AORTA: The aorta is unremarkable without evidence of dissection or   intramural hematoma. Ascending aorta is at upper limit of normal   measuring 4.0 cm in diameter. Redemonstrated celiac axis vascular coil   embolization with extensive surrounding beam hardening artifact. Multiple   small arterial collaterals are seen posterior to the pancreatic body.   Stable 2.4 cm aneurysmal dilatation of the left common iliac artery and   1.9 cm dilatation of the right common iliac artery.    CHEST:    LUNGS, PLEURA, AIRWAYS: Patent central tracheobronchial tree. No lobar  consolidation, mass, effusion, or pneumothorax.    THORACIC NODES: No mediastinal, hilar, supraclavicular, or axillary  lymphadenopathy.    MEDIASTINUM/GREAT VESSELS: No pericardial effusion. Heart size is within  normal limits. The main pulmonary arteries normal in caliber.    ABDOMEN/PELVIS:    HEPATOBILIARY: Unremarkable.    SPLEEN: Unremarkable.    PANCREAS: Unremarkable.    ADRENAL GLANDS: Unremarkable.    KIDNEYS: Focal segmental areas of hypoenhancement seen in the right   kidney at the mid to upper pole as well as at the lower pole. Note that   the right kidney upper pole is supplied by an accessory right renal   artery branch. Mild cortical scarring of the right kidney. More severe   cortical scarring of the left kidney. Normal enhancement of the left   kidney. No hydronephrosis bilaterally.    LYMPH NODES: No lymphadenopathy.    PERITONEUM/MESENTERY/VISUALIZED BOWEL: Scattered tiny foci of free air   within the visualized abdomen. Trace free fluid abutting the inferior   right lobe of the liver is overall haziness of the mesentery.    BONES/SOFT TISSUES: No acute osseous abnormality. Degenerative changes of  the spine.      IMPRESSION:    No evidence of aortic aneurysm or dissection.  Stable aneurysmal   dilatation of the bilateral common iliac arteries.    Scattered tiny foci of free air within the visualized abdomen. Trace free   fluid abutting the inferior right lobe of the liver is overall haziness   of the mesentery. Cannot exclude a hollow viscus injury, note that the   pelvis was not included in this scan and patient has a history of   diverticulitis.    Segmental areas of hypoenhancement in the right kidney suspicious for   renal ischemia as detailed above. Finding is new since 9/4/2019. Patent   main right renal artery and accessory right renal artery. Less likely   differential includes pyelonephritis.    Findings discussed with Dr. Blanco at 1:20 PM on 11/1/2019.                        BUDDY ULRICH M.D., ATTENDING RADIOLOGIST  This document has been electronically signed. Nov 1 2019  1:28PM             (11-01-19 @ 12:09)      CARDIOLOGY TESTING  12 Lead ECG:   Ventricular Rate 104 BPM    Atrial Rate 104 BPM    P-R Interval 142 ms    QRS Duration 88 ms    Q-T Interval 308 ms    QTC Calculation(Bezet) 405 ms    P Axis 64 degrees    R Axis 72 degrees    T Axis 56 degrees    Diagnosis Line Sinus tachycardia  Septal infarct , age undetermined  Abnormal ECG    Confirmed by Danelle Toth MD ([id number]) on 11/2/2019 7:40:01 AM (11-01-19 @ 20:22)  12 Lead ECG:   Ventricular Rate 119 BPM    Atrial Rate 119 BPM    P-R Interval 146 ms    QRS Duration 79 ms    Q-T Interval 308 ms    QTC Calculation(Bezet) 433 ms    P Axis 58 degrees    R Axis 56 degrees    T Axis 62 degrees    Diagnosis Line Sinus tachycardia  Septal infarct , age undetermined  Abnormal ECG    Confirmed by Danelle Toth MD ([id number]) on 11/2/2019 7:39:47 AM (11-01-19 @ 19:18)      MEDICATIONS  acetaminophen   Tablet .. 650  chlorhexidine 0.12% Liquid 15  chlorhexidine 4% Liquid 1  dexMEDEtomidine Infusion 0.5  heparin  Injectable 5000  influenza   Vaccine 0.5  lactated ringers. 1000  levETIRAcetam 1500  OXcarbazepine 600  OXcarbazepine 300  oxyCODONE    IR 5  pantoprazole  Injectable 40      ANTIBIOTICS:      All available historical data has been reviewed

## 2019-11-02 NOTE — CHART NOTE - NSCHARTNOTEFT_GEN_A_CORE
Post Operative Check    Patient is post op from a Kerri procedure (16918)  Exploratory laparotomy (70692)   and is intubated, resting in bed    Vitals    T(C): 38.5 (11-02-19 @ 00:00), Max: 38.9 (11-01-19 @ 22:00)  HR: 106 (11-02-19 @ 00:30) (96 - 152)  BP: 79/52 (11-02-19 @ 00:30) (70/51 - 183/104)  RR: 19 (11-02-19 @ 00:30) (14 - 28)  SpO2: 100% (11-02-19 @ 00:30) (98% - 100%)  Wt(kg): --      11-01 @ 07:01  -  11-02 @ 01:20  --------------------------------------------------------  IN:    dexmedetomidine Infusion: 10.3 mL    IV PiggyBack: 100 mL    Lactated Ringers IV Bolus: 1500 mL    lactated ringers.: 600 mL    propofol Infusion: 7.1 mL    Sodium Chloride 0.9% IV Bolus: 500 mL  Total IN: 2717.4 mL    OUT:    Bulb: 245 mL    Indwelling Catheter - Urethral: 360 mL  Total OUT: 605 mL    Total NET: 2112.4 mL    Physical Exam  General: NAD intubated  Cards: RRR S1S2  Resp: CTAB  Abdomen: Soft, hossein-incisional tenderness, NADIRA drain in place with serosanguinous drainage, dressing CDI  Ext: NTBL    Labs  Labs:  CAPILLARY BLOOD GLUCOSE      POCT Blood Glucose.: 106 mg/dL (01 Nov 2019 19:13)  POCT Blood Glucose.: 89 mg/dL (01 Nov 2019 18:35)  POCT Blood Glucose.: 110 mg/dL (01 Nov 2019 15:42)                          14.7   1.61  )-----------( 206      ( 02 Nov 2019 00:50 )             44.2       Auto Neutrophil %: 75.6 % (11-01-19 @ 11:00)  Auto Immature Granulocyte %: 0.7 % (11-01-19 @ 11:00)    11-01    137  |  104  |  16  ----------------------------<  143<H>  4.2   |  18  |  0.8      Calcium, Total Serum: 8.2 mg/dL (11-01-19 @ 20:19)      LFTs:             8.2  | 0.3  | 21       ------------------[57      ( 01 Nov 2019 11:00 )  5.2  | x    | 22          Lipase:50     Amylase:x         Blood Gas Arterial, Lactate: 4.7 mmoL/L (11-01-19 @ 20:18)  Lactate, Blood: 4.8 mmol/L (11-01-19 @ 14:50)    ABG - ( 01 Nov 2019 20:18 )  pH: 7.35  /  pCO2: 37    /  pO2: 236   / HCO3: 20    / Base Excess: -4.5  /  SaO2: 100               Coags:     11.80  ----< 1.03    ( 01 Nov 2019 20:19 )     30.7        CARDIAC MARKERS ( 01 Nov 2019 20:19 )  x     / 0.20 ng/mL / 495 U/L / x     / 4.4 ng/mL  CARDIAC MARKERS ( 01 Nov 2019 11:00 )  x     / <0.01 ng/mL / x     / x     / x        Patient is a 43y old Male s/p Kerri procedure (29708)  Exploratory laparotomy (74862)  Plan:  ICU management Post Operative Check    Patient is post op from a Kerri procedure ([id number])  Exploratory laparotomy (04274)   and is intubated, resting in bed    Vitals    T(C): 38.5 (11-02-19 @ 00:00), Max: 38.9 (11-01-19 @ 22:00)  HR: 106 (11-02-19 @ 00:30) (96 - 152)  BP: 79/52 (11-02-19 @ 00:30) (70/51 - 183/104)  RR: 19 (11-02-19 @ 00:30) (14 - 28)  SpO2: 100% (11-02-19 @ 00:30) (98% - 100%)  Wt(kg): --      11-01 @ 07:01  -  11-02 @ 01:20  --------------------------------------------------------  IN:    dexmedetomidine Infusion: 10.3 mL    IV PiggyBack: 100 mL    Lactated Ringers IV Bolus: 1500 mL    lactated ringers.: 600 mL    propofol Infusion: 7.1 mL    Sodium Chloride 0.9% IV Bolus: 500 mL  Total IN: 2717.4 mL    OUT:    Bulb: 245 mL    Indwelling Catheter - Urethral: 360 mL  Total OUT: 605 mL    Total NET: 2112.4 mL    Physical Exam  General: NAD intubated  Cards: RRR S1S2  Resp: CTAB  Abdomen: Soft, hossein-incisional tenderness, NADIRA drain in place with serosanguinous drainage, Ostomy viable, dressing CDI  Ext: NTBL    Labs  Labs:  CAPILLARY BLOOD GLUCOSE      POCT Blood Glucose.: 106 mg/dL (01 Nov 2019 19:13)  POCT Blood Glucose.: 89 mg/dL (01 Nov 2019 18:35)  POCT Blood Glucose.: 110 mg/dL (01 Nov 2019 15:42)                          14.7   1.61  )-----------( 206      ( 02 Nov 2019 00:50 )             44.2       Auto Neutrophil %: 75.6 % (11-01-19 @ 11:00)  Auto Immature Granulocyte %: 0.7 % (11-01-19 @ 11:00)    11-01    137  |  104  |  16  ----------------------------<  143<H>  4.2   |  18  |  0.8      Calcium, Total Serum: 8.2 mg/dL (11-01-19 @ 20:19)      LFTs:             8.2  | 0.3  | 21       ------------------[57      ( 01 Nov 2019 11:00 )  5.2  | x    | 22          Lipase:50     Amylase:x         Blood Gas Arterial, Lactate: 4.7 mmoL/L (11-01-19 @ 20:18)  Lactate, Blood: 4.8 mmol/L (11-01-19 @ 14:50)    ABG - ( 01 Nov 2019 20:18 )  pH: 7.35  /  pCO2: 37    /  pO2: 236   / HCO3: 20    / Base Excess: -4.5  /  SaO2: 100               Coags:     11.80  ----< 1.03    ( 01 Nov 2019 20:19 )     30.7        CARDIAC MARKERS ( 01 Nov 2019 20:19 )  x     / 0.20 ng/mL / 495 U/L / x     / 4.4 ng/mL  CARDIAC MARKERS ( 01 Nov 2019 11:00 )  x     / <0.01 ng/mL / x     / x     / x        Patient is a 43y old Male s/p Kerri procedure (79609)  Exploratory laparotomy (73919)  Plan:  ICU management

## 2019-11-02 NOTE — PROGRESS NOTE ADULT - ASSESSMENT
Assessment:  43y Male patient admitted S/P Kerri procedure and Exploratory laparotomy , with the above physical exam, labs, and imaging findings.    Plan:  -Pain control as needed  -Hemodynamic monitoring as per routine  -Check and replete CBC and BMP q daily  -Strict input and output monitoring  -NADIRA drain care    Date/Time: 11-02-19 @ 01:24

## 2019-11-02 NOTE — CONSULT NOTE ADULT - ASSESSMENT
Assessment & Plan    43y Male with PMH significant for Melva Danlos syndrome followed at [place], s/p celiac artery embolization, iliac artery aneurysm b/l, HTN, epilepsy, diverticulitis s/p ex-lap, Kerri procedure    NEURO:     Intubated, sedated on precedex    Hx epilepsy- on keppra 1500mg BID; oxcarbazepine 600mg at night, 300 in morning    Pain-controlled with tylenol, dilaudid    RESP:     Intubated /14/50/5    No chronic dx, maintain sat>95%    AM CXR    ABG 7.35/37/236/20.5 lact 4.7    AM ABG    CARDS:     Hx HTN- home lisinopril 30, ASA    Hypotension- bolused 4L overnight, NICOM responsive    Keep normotensive, maintain MAP>65    CE 0.2> f/u two more sets 430, 1100    EKG- sinus tachycardia 130- given 5mg lopressor    GI/NUTR:     Diet, NPO,     PPI      /RENAL:     Cr 0.8, [place]- monitor UOP 50-60cc/hr    Monitor lytes, replete PRN    Lytes-Na 138 // K 4.3 // Phos 3.6 //  Mag 2.0    HEME/ONC:     Monitor Hgb 14.7    HSQ    ID:     Afebrile, WBC 1.35>1.6 (from 6.9) monitor for signs of infection    Cipro, flagyl    No chronic dx, , 89    LINES/DRAINS: PIV, [place]   DISPO: SICU Assessment & Plan    43y Male with PMH significant for Melva Danlos syndrome followed at [place], s/p celiac artery embolization, iliac artery aneurysm b/l, HTN, epilepsy, diverticulitis s/p ex-lap, Kerri procedure    NEURO:     Intubated, sedated on precedex    Hx epilepsy- on keppra 1500mg BID; oxcarbazepine 600mg at night, 300 in morning    Pain-controlled with tylenol, dilaudid    RESP:     Intubated /14/40/5 (calc tidal vol 410)    No chronic dx, maintain sat>95%    AM CXR    ABG 7.35/37/236/20.5 lact 4.7    AM ABG 7.43/34/186/22 lact 2.5    CARDS:     Hx HTN- home lisinopril 30, ASA    Hypotension- bolused 4L overnight, NICOM responsive    Keep normotensive, maintain MAP>65    CE 0.2> f/u two more sets 430, 1100    EKG- sinus tachycardia 110-120    GI/NUTR:     Diet, NPO,     PPI      /RENAL:     Cr 0.8, austin- monitor UOP 50-60cc/hr    Monitor lytes, replete PRN    Lytes-Na 138 // K 4.3 // Phos 3.6 //  Mag 2.0    HEME/ONC:     Monitor Hgb 14.7    HSQ    ID:     Afebrile, WBC 1.35>1.6 (from 6.9) monitor for signs of infection    Cipro, flagyl    No chronic dx, , 89    LINES/DRAINS: PIV, [place]   DISPO: SICU Assessment & Plan    43y Male with PMH significant for Melva Danlos syndrome followed at [place], s/p celiac artery embolization, iliac artery aneurysm b/l, HTN, epilepsy, diverticulitis s/p ex-lap, Kerri procedure    NEURO:     Intubated, sedated on precedex    Hx epilepsy- on keppra 1500mg BID; oxcarbazepine 600mg at night, 300 in morning    Pain-controlled with tylenol, fentanyl 25mcg q6 PRN    RESP:     Intubated /14/40/5 (calc tidal vol 410)    No chronic dx, maintain sat>95%    AM CXR    ABG 7.35/37/236/20.5 lact 4.7    AM ABG 7.43/34/186/22 lact 2.5    CARDS:     Hx HTN- home lisinopril 30, ASA    Hypotension- bolused 4L overnight, NICOM responsive, last NICOM 5% 6am    Keep normotensive, maintain MAP>65    CE 0.2>0.06  f/u 1100    EKG- sinus tachycardia 110-120 overnight    GI/NUTR:     Diet, NPO,     PPI      /RENAL:     Cr 0.8, austin- monitor UOP 50-60cc/hr    Monitor lytes, replete PRN    Lytes-Na 138 // K 4.3 // Phos 3.6 //  Mag 2.0    HEME/ONC:     Monitor Hgb 14.7    HSQ    ID:     Afebrile, WBC 1.35>1.6 (from 6.9) monitor for signs of infection    Cipro, flagyl    No chronic dx, , 89    LINES/DRAINS: PIV, [place]   DISPO: SICU

## 2019-11-02 NOTE — CONSULT NOTE ADULT - ASSESSMENT
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 On presentation to the ED he was afebrile, low grade tachycardic. CTA chest was performed demonstrating scattered tiny foci of free air within the visualized abdomen. Trace free fluid abutting the inferior right lobe of the liver is overall haziness of the mesentery. Surgery was consulted for urgent evaluation. He was found to be exquisitely tender, peritonitic, writhing in bed.    ·  POST-OP DIAGNOSIS:  Perforation of sigmoid colon 01-Nov-2019 18:33:44  Usman Conroy.  ·  PROCEDURES:  Kerri procedure 01-Nov-2019 18:33:20  Usman Conroy  Exploratory laparotomy 01-Nov-2019 18:33:11  Usman Conroy.      Pt with Severe Sepsis ( T>101F, Pulse>90, Resp Rate>20, wbc<4), lactic acidosis, due to suspected peritonitis due to perforated sigmoid colon    New problem with additional W/U  acute illness which poses threat to bodily function    2. Lactic acidosis    3. Leukopenia  wbc 1.61  no diff  R/O neutropenia    4. Hypomagnesemia    5. 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 On presentation to the ED he was afebrile, low grade tachycardic. CTA chest was performed demonstrating scattered tiny foci of free air within the visualized abdomen. Trace free fluid abutting the inferior right lobe of the liver is overall haziness of the mesentery. Surgery was consulted for urgent evaluation. He was found to be exquisitely tender, peritonitic, writhing in bed.    ·  POST-OP DIAGNOSIS:  Perforation of sigmoid colon 01-Nov-2019 18:33:44  Usman Conroy.  ·  PROCEDURES:  Kerri procedure 01-Nov-2019 18:33:20  Usman Conroy  Exploratory laparotomy 01-Nov-2019 18:33:11  Usman Conroy.      Pt with Severe Sepsis ( T>101F, Pulse>90, Resp Rate>20, wbc<4), lactic acidosis, due to suspected peritonitis due to perforated sigmoid colon    New problem with additional W/U  acute illness which poses threat to bodily function    2. Lactic acidosis    3. Leukopenia  wbc 1.61  no diff  R/O neutropenia    4. Hypomagnesemia    5. Allergy: moxifloxacin (Hives)...Knee aches according to wife, but seizures on Cipro  penicillin (Unknown)  Has tolerated Rocephin & Flagyl according to wife    PLAN  Start Cefepime 1gm q8h IVPB (to cover Pseudomonas) & Flagyl 500mg q8h IVPB  Repeat  wbc with manual diff   Blood cultures x2

## 2019-11-02 NOTE — CHART NOTE - NSCHARTNOTEFT_GEN_A_CORE
Patient extubated.   Discussed with primary team PA and senior- in agreement to remove NGT, but no sips of clears. Must remain NPO with meds. In agreement with Abx as recommended by ID    ID recommended blood cultures, CBC with differential, and start cefepime and flagyl IV.

## 2019-11-02 NOTE — CONSULT NOTE ADULT - SUBJECTIVE AND OBJECTIVE BOX
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 He developed diffuse abdominal pain, peritonitic. WBC 7, afebrile, lactate 4.8, CT negative for dissection, stable b/l common iliac artery aneurysms, LCI 2.4 cm, RCI 1.9 cm-- however findings concerning for free air and possible perforated of sigmoid colon. Patient was taken to the OR, s/p ex-lap, Kerri procedure.     Procedure:  S/p exploratory laparotomy, Kerri procedure   OR time: 2hr       EBL: 100 mL           IV Fluids: 4L IVF                        UOP: 900 mL       Procedure Findings-  ·	Feculent peritonitis, sigmoid resection, colostomy created  ·	Required pushes of neosynephrine intraoperatively for hypotension  ·	Patient remained intubated postoperatively, transferred to PACU in stable condition    PAST MEDICAL & SURGICAL HISTORY:  Dislocations and subluxations  Aneurysm artery, celiac  Keratoconus  HTN (hypertension)  Epilepsy  EDS (Melva-Danlos syndrome)  No significant past surgical history    Home Meds:   Home Medications:  Aspir 81 oral delayed release tablet: orally once a day (01 Nov 2019 16:02)  Keppra 750 mg oral tablet: 2 tab(s) orally 2 times a day (01 Nov 2019 16:02)  lisinopril 30 mg oral tablet: 1 tab(s) orally once a day (01 Nov 2019 16:02)  Trileptal 300 mg oral tablet: 3 tab(s) orally once a day (01 Nov 2019 16:02)    Allergies  moxifloxacin (Hives)  penicillin (Unknown)    Advanced Directives: Full Code    CURRENT MEDICATIONS:   chlorhexidine 0.12% Liquid 15 milliLiter(s) Oral Mucosa every 12 hours  ciprofloxacin   IVPB      dexMEDEtomidine Infusion 0.5 MICROgram(s)/kG/Hr (8.225 mL/Hr) IV Continuous <Continuous>  heparin  Injectable 5000 Unit(s) SubCutaneous every 8 hours  HYDROmorphone  Injectable 0.5 milliGRAM(s) IV Push every 4 hours PRN  lactated ringers Bolus 500 milliLiter(s) IV Bolus once  lactated ringers Bolus 1000 milliLiter(s) IV Bolus once  lactated ringers Bolus 1000 milliLiter(s) IV Bolus once  lactated ringers. 1000 milliLiter(s) (100 mL/Hr) IV Continuous <Continuous>  metroNIDAZOLE  IVPB      ondansetron Injectable 4 milliGRAM(s) IV Push once PRN  pantoprazole  Injectable 40 milliGRAM(s) IV Push daily    VITAL SIGNS, INS/OUTS (Last 24hours):    ICU Vital Signs Last 24 Hrs  T(C): 37.4 (01 Nov 2019 20:30), Max: 37.7 (01 Nov 2019 13:44)  T(F): 99.4 (01 Nov 2019 20:30), Max: 99.9 (01 Nov 2019 13:44)  HR: 104 (01 Nov 2019 20:30) (96 - 152)  BP: 91/69 (01 Nov 2019 20:30) (86/65 - 183/104)  RR: 20 (01 Nov 2019 20:30) (14 - 28)  SpO2: 100% (01 Nov 2019 20:30) (98% - 100%)    I&O's Summary    01 Nov 2019 07:01  -  01 Nov 2019 21:07  --------------------------------------------------------  IN: 210.4 mL / OUT: 270 mL / NET: -59.6 mL    Height (cm): 172.72 (11-01-19)  Weight (kg): 65.8 (11-01-19)  BMI (kg/m2): 22.1 (11-01-19)  BSA (m2): 1.78 (11-01-19)    Physical Exam:  ---------------------------------------------------------------------------------------  RASS:   sedated, intubated  Exam: A&Ox3, no focal deficits    RESPIRATORY:  Intubated  Lungs clear to auscultation b/l, Normal expansion/effort  Mechanical Ventilation: Mode: AC/ CMV (Assist Control/ Continuous Mandatory Ventilation)  RR (machine): 14  TV (machine): 450  FiO2: 40  PEEP: 5  ITime: 1  MAP: 10  PIP: 24    CARDIOVASCULAR:   S1/S2.  RRR  No peripheral edema    GASTROINTESTINAL:  Abdomen soft, non-tender, incision site CDI. Fascia closed, skin open packed, staples.   Colostomy in place    MUSCULOSKELETAL:  Extremities warm, pink, well-perfused.  Palpable/Doppler pulses signals      DERM:  No skin breakdown     :   Exam: Salazar catheter in place.     Tubes/Lines/Drains   ----------------------------------------------------------------------------------------------------------  [x] Peripheral IV  [x] Urinary Catheter Salazar                                             Date Placed:     LABS  --------------------------------------------------------------------------------------  CAPILLARY BLOOD GLUCOSE      POCT Blood Glucose.: 106 mg/dL (01 Nov 2019 19:13)  POCT Blood Glucose.: 89 mg/dL (01 Nov 2019 18:35)  POCT Blood Glucose.: 110 mg/dL (01 Nov 2019 15:42)                          15.8   6.91  )-----------( 290      ( 01 Nov 2019 11:00 )             46.8         11-01    140  |  98  |  18  ----------------------------<  125<H>  4.2   |  24  |  0.9      Calcium, Total Serum: 10.3 mg/dL (11-01-19 @ 11:00)      LFTs:             8.2  | 0.3  | 21       ------------------[57      ( 01 Nov 2019 11:00 )  5.2  | x    | 22          Lipase:50       Coags:     11.80  ----< 1.03    ( 01 Nov 2019 20:19 )     30.7        CARDIAC MARKERS ( 01 Nov 2019 11:00 )  x     / <0.01 ng/mL / x     / x     / x          CT/XRAY/ECHO/TCD/EEG  --------------------------------------------------------------------------------------  < from: CT Angio Chest Dissection Protocol (11.01.19 @ 12:09) >  No evidence of aortic aneurysm or dissection.  Stable aneurysmal   dilatation of the bilateral common iliac arteries.    Scattered tiny foci of free air within the visualized abdomen. Trace free   fluid abutting the inferior right lobe of the liver is overall haziness   of the mesentery. Cannot exclude a hollow viscus injury, note that the   pelvis was not included in this scan and patient has a history of   diverticulitis.    Segmental areas of hypoenhancement in the right kidney suspicious for   renal ischemia as detailed above. Finding is new since 9/4/2019. Patent   main right renal artery and accessory right renal artery. Less likely   differential includes pyelonephritis.    < end of copied text >      --------------------------------------------------------------------------------------  Admit Diagnosis: ABDOMINAL PAIN, KIDNEY INFARCTION

## 2019-11-02 NOTE — PROGRESS NOTE ADULT - SUBJECTIVE AND OBJECTIVE BOX
Progress Note: Surgery  Patient: FAZAL CASSIDY , 43y (1975)Male   MRN: [id number]  Location: [facility] [address]  Visit: 11-01-19 Inpatient  Date: 11-02-19 @ 01:24    Procedure/Diagnosis: Kerri procedure (10099)  Exploratory laparotomy (38341)  Events over 24h: Spiking fevers, tachycardic and hypotensive overnight. No new complaint.     Vitals: T(F): 101.3 (11-02-19 @ 00:00), Max: 102 (11-01-19 @ 22:00)  HR: 106 (11-02-19 @ 00:30)  BP: 79/52 (11-02-19 @ 00:30) (70/51 - 183/104)  RR: 19 (11-02-19 @ 00:30)  SpO2: 100% (11-02-19 @ 00:30)  RR (machine): 14, TV (machine): 450, FiO2: 40, PEEP: 5, PIP: 22    Diet: Diet, NPO (11-01-19 @ 18:38)    IV Fluid: lactated ringers. 1000 milliLiter(s) (100 mL/Hr) IV Continuous <Continuous>      In:   11-01-19 @ 07:01  -  11-02-19 @ 01:24  --------------------------------------------------------  IN: 2717.4 mL      Out:   11-01-19 @ 07:01  -  11-02-19 @ 01:24  --------------------------------------------------------  OUT:    Bulb: 245 mL    Indwelling Catheter - Urethral: 360 mL  Total OUT: 605 mL        Net:   11-01-19 @ 07:01  -  11-02-19 @ 01:24  --------------------------------------------------------  NET: 2112.4 mL        Physical Examination:  General Appearance: NAD, alert and cooperative  HEENT: NCAT, WNL  Heart: S1 and S2. No murmurs. Rhythm is regular.  Lungs: Clear to auscultation BL without rales, rhonchi, wheezing, crackles or diminished breath sounds.  Abdomen:Soft, hossein-incisional tenderness, NADIRA drain in place with serosanguinous drainage, Ostomy viable, dressing CDI      Medications: [Standing]  acetaminophen   Tablet .. 650 milliGRAM(s) Oral every 6 hours  chlorhexidine 0.12% Liquid 15 milliLiter(s) Oral Mucosa every 12 hours  chlorhexidine 4% Liquid 1 Application(s) Topical <User Schedule>  ciprofloxacin   IVPB 400 milliGRAM(s) IV Intermittent every 12 hours  ciprofloxacin   IVPB      dexMEDEtomidine Infusion 0.5 MICROgram(s)/kG/Hr (8.225 mL/Hr) IV Continuous <Continuous>  heparin  Injectable 5000 Unit(s) SubCutaneous every 8 hours  influenza   Vaccine 0.5 milliLiter(s) IntraMuscular once  lactated ringers. 1000 milliLiter(s) (100 mL/Hr) IV Continuous <Continuous>  levETIRAcetam 1500 milliGRAM(s) Oral two times a day  metroNIDAZOLE  IVPB      metroNIDAZOLE  IVPB 500 milliGRAM(s) IV Intermittent every 8 hours  OXcarbazepine 600 milliGRAM(s) Oral <User Schedule>  OXcarbazepine 300 milliGRAM(s) Oral <User Schedule>  pantoprazole  Injectable 40 milliGRAM(s) IV Push daily    Medications:[PRN]  HYDROmorphone  Injectable 0.5 milliGRAM(s) IV Push every 4 hours PRN    Labs:                        14.7   1.61  )-----------( 206      ( 02 Nov 2019 00:50 )             44.2   11-01    137  |  104  |  16  ----------------------------<  143<H>  4.2   |  18  |  0.8    Ca    8.2<L>      01 Nov 2019 20:19  Phos  3.7     11-01  Mg     1.3     11-01    TPro  8.2<H>  /  Alb  5.2  /  TBili  0.3  /  DBili  x   /  AST  21  /  ALT  22  /  AlkPhos  57  11-01  LIVER FUNCTIONS - ( 01 Nov 2019 11:00 )  Alb: 5.2 g/dL / Pro: 8.2 g/dL / ALK PHOS: 57 U/L / ALT: 22 U/L / AST: 21 U/L / GGT: x         PT/INR - ( 01 Nov 2019 20:19 )   PT: 11.80 sec;   INR: 1.03 ratio         PTT - ( 01 Nov 2019 20:19 )  PTT:30.7 secABG - ( 01 Nov 2019 20:18 )  pH: 7.35  /  pCO2: 37    /  pO2: 236   / HCO3: 20    / Base Excess: -4.5  /  SaO2: 100             CARDIAC MARKERS ( 01 Nov 2019 20:19 )  x     / 0.20 ng/mL / 495 U/L / x     / 4.4 ng/mL  CARDIAC MARKERS ( 01 Nov 2019 11:00 )  x     / <0.01 ng/mL / x     / x     / x          Micro/Urine:    Imaging:  None/24h

## 2019-11-03 LAB
ANION GAP SERPL CALC-SCNC: 12 MMOL/L — SIGNIFICANT CHANGE UP (ref 7–14)
ANISOCYTOSIS BLD QL: SLIGHT — SIGNIFICANT CHANGE UP
APTT BLD: 41 SEC — HIGH (ref 27–39.2)
BUN SERPL-MCNC: 16 MG/DL — SIGNIFICANT CHANGE UP (ref 10–20)
CALCIUM SERPL-MCNC: 7.8 MG/DL — LOW (ref 8.5–10.1)
CHLORIDE SERPL-SCNC: 104 MMOL/L — SIGNIFICANT CHANGE UP (ref 98–110)
CK SERPL-CCNC: 2870 U/L — HIGH (ref 0–225)
CK SERPL-CCNC: 4024 U/L — HIGH (ref 0–225)
CO2 SERPL-SCNC: 23 MMOL/L — SIGNIFICANT CHANGE UP (ref 17–32)
CREAT SERPL-MCNC: 0.9 MG/DL — SIGNIFICANT CHANGE UP (ref 0.7–1.5)
GIANT PLATELETS BLD QL SMEAR: PRESENT — SIGNIFICANT CHANGE UP
GLUCOSE SERPL-MCNC: 100 MG/DL — HIGH (ref 70–99)
HCT VFR BLD CALC: 35.5 % — LOW (ref 42–52)
HGB BLD-MCNC: 12.1 G/DL — LOW (ref 14–18)
INR BLD: 1.93 RATIO — HIGH (ref 0.65–1.3)
LACTATE SERPL-SCNC: 2.2 MMOL/L — SIGNIFICANT CHANGE UP (ref 0.5–2.2)
MAGNESIUM SERPL-MCNC: 1.8 MG/DL — SIGNIFICANT CHANGE UP (ref 1.8–2.4)
MANUAL SMEAR VERIFICATION: SIGNIFICANT CHANGE UP
MCHC RBC-ENTMCNC: 30.7 PG — SIGNIFICANT CHANGE UP (ref 27–31)
MCHC RBC-ENTMCNC: 34.1 G/DL — SIGNIFICANT CHANGE UP (ref 32–37)
MCV RBC AUTO: 90.1 FL — SIGNIFICANT CHANGE UP (ref 80–94)
MICROCYTES BLD QL: SLIGHT — SIGNIFICANT CHANGE UP
NEUTS BAND # BLD: 12.2 % — HIGH (ref 0–6)
PHOSPHATE SERPL-MCNC: 3.5 MG/DL — SIGNIFICANT CHANGE UP (ref 2.1–4.9)
PLAT MORPH BLD: NORMAL — SIGNIFICANT CHANGE UP
PLATELET # BLD AUTO: 150 K/UL — SIGNIFICANT CHANGE UP (ref 130–400)
POIKILOCYTOSIS BLD QL AUTO: SLIGHT — SIGNIFICANT CHANGE UP
POTASSIUM SERPL-MCNC: 4.5 MMOL/L — SIGNIFICANT CHANGE UP (ref 3.5–5)
POTASSIUM SERPL-SCNC: 4.5 MMOL/L — SIGNIFICANT CHANGE UP (ref 3.5–5)
PROTHROM AB SERPL-ACNC: 22.1 SEC — HIGH (ref 9.95–12.87)
RBC # BLD: 3.94 M/UL — LOW (ref 4.7–6.1)
RBC # FLD: 13.1 % — SIGNIFICANT CHANGE UP (ref 11.5–14.5)
RBC BLD AUTO: ABNORMAL
SODIUM SERPL-SCNC: 139 MMOL/L — SIGNIFICANT CHANGE UP (ref 135–146)
VARIANT LYMPHS # BLD: 0.9 % — SIGNIFICANT CHANGE UP (ref 0–5)
WBC # BLD: 7.95 K/UL — SIGNIFICANT CHANGE UP (ref 4.8–10.8)
WBC # FLD AUTO: 7.95 K/UL — SIGNIFICANT CHANGE UP (ref 4.8–10.8)

## 2019-11-03 PROCEDURE — 93010 ELECTROCARDIOGRAM REPORT: CPT | Mod: 77

## 2019-11-03 PROCEDURE — 99232 SBSQ HOSP IP/OBS MODERATE 35: CPT

## 2019-11-03 PROCEDURE — 71045 X-RAY EXAM CHEST 1 VIEW: CPT | Mod: 26

## 2019-11-03 PROCEDURE — 93010 ELECTROCARDIOGRAM REPORT: CPT

## 2019-11-03 PROCEDURE — 99291 CRITICAL CARE FIRST HOUR: CPT | Mod: 24

## 2019-11-03 RX ORDER — METOPROLOL TARTRATE 50 MG
5 TABLET ORAL ONCE
Refills: 0 | Status: COMPLETED | OUTPATIENT
Start: 2019-11-03 | End: 2019-11-03

## 2019-11-03 RX ORDER — SODIUM CHLORIDE 9 MG/ML
1000 INJECTION, SOLUTION INTRAVENOUS
Refills: 0 | Status: DISCONTINUED | OUTPATIENT
Start: 2019-11-03 | End: 2019-11-03

## 2019-11-03 RX ORDER — ASPIRIN/CALCIUM CARB/MAGNESIUM 324 MG
81 TABLET ORAL DAILY
Refills: 0 | Status: DISCONTINUED | OUTPATIENT
Start: 2019-11-03 | End: 2019-11-08

## 2019-11-03 RX ORDER — METOPROLOL TARTRATE 50 MG
5 TABLET ORAL ONCE
Refills: 0 | Status: DISCONTINUED | OUTPATIENT
Start: 2019-11-03 | End: 2019-11-03

## 2019-11-03 RX ORDER — MAGNESIUM SULFATE 500 MG/ML
2 VIAL (ML) INJECTION ONCE
Refills: 0 | Status: COMPLETED | OUTPATIENT
Start: 2019-11-03 | End: 2019-11-03

## 2019-11-03 RX ADMIN — CEFEPIME 100 MILLIGRAM(S): 1 INJECTION, POWDER, FOR SOLUTION INTRAMUSCULAR; INTRAVENOUS at 21:19

## 2019-11-03 RX ADMIN — HEPARIN SODIUM 5000 UNIT(S): 5000 INJECTION INTRAVENOUS; SUBCUTANEOUS at 13:09

## 2019-11-03 RX ADMIN — Medication 650 MILLIGRAM(S): at 05:33

## 2019-11-03 RX ADMIN — Medication 650 MILLIGRAM(S): at 06:00

## 2019-11-03 RX ADMIN — CEFEPIME 100 MILLIGRAM(S): 1 INJECTION, POWDER, FOR SOLUTION INTRAMUSCULAR; INTRAVENOUS at 13:09

## 2019-11-03 RX ADMIN — Medication 5 MILLIGRAM(S): at 23:35

## 2019-11-03 RX ADMIN — PANTOPRAZOLE SODIUM 40 MILLIGRAM(S): 20 TABLET, DELAYED RELEASE ORAL at 05:33

## 2019-11-03 RX ADMIN — Medication 81 MILLIGRAM(S): at 10:07

## 2019-11-03 RX ADMIN — HEPARIN SODIUM 5000 UNIT(S): 5000 INJECTION INTRAVENOUS; SUBCUTANEOUS at 21:21

## 2019-11-03 RX ADMIN — LEVETIRACETAM 1500 MILLIGRAM(S): 250 TABLET, FILM COATED ORAL at 05:33

## 2019-11-03 RX ADMIN — Medication 5 MILLIGRAM(S): at 03:21

## 2019-11-03 RX ADMIN — Medication 100 MILLIGRAM(S): at 14:02

## 2019-11-03 RX ADMIN — LEVETIRACETAM 1500 MILLIGRAM(S): 250 TABLET, FILM COATED ORAL at 17:07

## 2019-11-03 RX ADMIN — OXCARBAZEPINE 300 MILLIGRAM(S): 300 TABLET, FILM COATED ORAL at 05:33

## 2019-11-03 RX ADMIN — OXCARBAZEPINE 600 MILLIGRAM(S): 300 TABLET, FILM COATED ORAL at 21:20

## 2019-11-03 RX ADMIN — Medication 650 MILLIGRAM(S): at 00:30

## 2019-11-03 RX ADMIN — Medication 50 GRAM(S): at 05:34

## 2019-11-03 RX ADMIN — Medication 650 MILLIGRAM(S): at 17:07

## 2019-11-03 RX ADMIN — Medication 100 MILLIGRAM(S): at 05:32

## 2019-11-03 RX ADMIN — Medication 100 MILLIGRAM(S): at 22:00

## 2019-11-03 RX ADMIN — HEPARIN SODIUM 5000 UNIT(S): 5000 INJECTION INTRAVENOUS; SUBCUTANEOUS at 05:32

## 2019-11-03 RX ADMIN — CHLORHEXIDINE GLUCONATE 1 APPLICATION(S): 213 SOLUTION TOPICAL at 05:32

## 2019-11-03 RX ADMIN — CEFEPIME 100 MILLIGRAM(S): 1 INJECTION, POWDER, FOR SOLUTION INTRAMUSCULAR; INTRAVENOUS at 05:32

## 2019-11-03 NOTE — PROGRESS NOTE ADULT - ASSESSMENT
Assessment:  43y Male patient admitted S/P Kerri procedure and Exploratory laparotomy , with the above physical exam, labs, and imaging findings.    Plan   Monitor drain output   Monitor Ostomy Function   f/u TOV  NPO   Trend labs   OOBTC

## 2019-11-03 NOTE — CHART NOTE - NSCHARTNOTEFT_GEN_A_CORE
Felix Hughes    44 y/o male POD # 2 s/p ex-lap Hartmanns w/ feculent peritonitis.     NEURO:     Intact, A&Ox3    Hx epilepsy- on keppra 1500mg BID; oxcarbazepine 600mg at night, 300mg in morning    Pain-controlled with Dilaudid PCA    hx anxiety     RESP:     Extubated 11/1, weaned to RA, complaining of SOB/tightness in chest/anxiety. O2 Sat 100%,  Given 1x0.5 ativan on 11/1, albuterol nebs (uses at home PRN),     No chronic dx, maintain sat>95%    AM CXR    CARDS:     Hx HTN- home lisinopril 30, ASA- restarted    Hypotension resolved    Keep normotensive, maintain MAP>65    CE 0.2> 0.06> 0.02     Overnight: new onset Afib w/ RVR, received Lopressor 5mg x1, AM EKG NSR    GI/NUTR:     Diet: advanced to clears     +ostomy output w/ stool     PPI (home PRN omeprazole)       /RENAL:     Cr 0.8, voiding     LA 4.7>2.4>3.0>2.2   CK rising 2964>4024, f/u 11am     IVL    HEME/ONC:     Monitor Hgb 14.7> 12    HSQ, restarted ASA     ID:     Afebrile, WBC increasing  1.35>1.6>4.98>9  (diff 88% neutrophils) (from 6.9 on admission)    Cipro, flagyl d/c'd. ID c/s and started on cefepime/flagyl    11/2  blood cx pending     Endo:    No chronic dx    f/u  -monitor HR  - blood cx  -full set PM labs   -monitor CK level @11am    full sign out given to primary team Dr Go  11/3 @ 9:50am

## 2019-11-03 NOTE — PROGRESS NOTE ADULT - ASSESSMENT
Assessment & Plan    43y Male with PMH significant for Melva Danlos syndrome followed at [place], s/p celiac artery embolization, iliac artery aneurysm b/l, HTN, epilepsy, diverticulitis s/p ex-lap, Kerri procedure    NEURO:     Pain control with Dilaudid PCA     Hx epilepsy- on keppra 1500mg BID; oxcarbazepine 600mg at night, 300 in morning    RESP:     RA saturating well     No chronic dx, maintain sat>95%    AM CXR    AM ABG     CARDS:     Hx HTN- home lisinopril 30, ASA    Hypotension- bolused 4L overnight, NICOM responsive, last NICOM 5% 6am    Keep normotensive, maintain MAP>65    CE 0.2>0.06> 0.02    EKG- sinus tachycardia 110-120 overnight    GI/NUTR:     Diet, NPO,     PPI      /RENAL:     Salazar out, voided     Monitor lytes, replete PRN    HEME/ONC:     Monitor Hgb 14.7    HSQ    ID:     Afebrile, WBC 1.35>1.6 (from 6.9) monitor for signs of infection    Cefepieme and Flagyl     No chronic dx, , 89    LINES/DRAINS: PIV    DISPO: SICU

## 2019-11-03 NOTE — PROGRESS NOTE ADULT - SUBJECTIVE AND OBJECTIVE BOX
GENERAL SURGERY PROGRESS NOTE     [address] day :2d  POD:  Procedure: Kerri procedure  Exploratory laparotomy    Surgical Attending: Rocky Verduzco  Overnight events: No acute events overnight, extubated, ngt, Salazar discontinued awaiting trial of void, pain well controlled on PCA.       T(F): 97.8 (19 @ 00:00), Max: 99.9 (19 @ 04:00)  HR: 116 (19 @ 01:00) (102 - 128)  BP: 127/70 (19 @ 01:00) (72/54 - 127/70)  ABP: --  ABP(mean): --  RR: 38 (19 @ 01:00) (11 - 38)  SpO2: 99% (19 @ 01:00) (99% - 100%)      19 @ 07:01  -  19 @ 07:00  --------------------------------------------------------  IN:    dexmedetomidine Infusion: 21.8 mL    IV PiggyBack: 300 mL    Lactated Ringers IV Bolus: 3000 mL    lactated ringers.: 1200 mL    propofol Infusion: 7.1 mL    Sodium Chloride 0.9% IV Bolus: 1000 mL  Total IN: 5528.9 mL    OUT:    Bulb: 285 mL    Indwelling Catheter - Urethral: 765 mL    Nasoenteral Tube: 150 mL  Total OUT: 1200 mL    Total NET: 4328.9 mL      19 @ 08:01  -  19 @ 01:18  --------------------------------------------------------  IN:    dexmedetomidine Infusion: 2 mL    IV PiggyBack: 1150 mL    lactated ringers.: 1800 mL  Total IN: 2952 mL    OUT:    Bulb: 235 mL    Indwelling Catheter - Urethral: 985 mL  Total OUT: 1220 mL    Total NET: 1732 mL        DIET/FLUIDS: lactated ringers. 1000 milliLiter(s) IV Continuous <Continuous>    N19 @ 07:01  -  19 @ 07:00  --------------------------------------------------------  OUT: 150 mL                                                                                 DRAINS:   19 @ 07:01  -  19 @ 07:00  --------------------------------------------------------  OUT: 285 mL  OUT: 765 mL       GI proph:  pantoprazole    Tablet 40 milliGRAM(s) Oral before breakfast    AC/ proph: heparin  Injectable 5000 Unit(s) SubCutaneous every 8 hours    ABx: cefepime   IVPB      cefepime   IVPB 1000 milliGRAM(s) IV Intermittent every 8 hours  metroNIDAZOLE  IVPB 500 milliGRAM(s) IV Intermittent every 8 hours      PHYSICAL EXAM:  GENERAL: NAD, well-appearing  CHEST/LUNG: Clear to auscultation bilaterally  HEART: Regular rate and rhythm  ABDOMEN: Soft, Nontender, Nondistended; staples in place, ostomy pink , well perfused with out put  EXTREMITIES:  No clubbing, cyanosis, or edema      LABS  Labs:  CAPILLARY BLOOD GLUCOSE                  14.0   4.98  )-----------( 166      ( 2019 11:26 )             41.7       Auto Immature Granulocyte %: 1.2 % (19 @ 11:26)  Auto Neutrophil %: 88.8 % (19 @ 11:26)        138  |  102  |  16  ----------------------------<  109<H>  4.3   |  20  |  0.9          LFTs:             8.2  | 0.3  | 21       ------------------[57      ( 2019 11:00 )  5.2  | x    | 22          Lipase:50     Amylase:x         Lactate, Blood: 2.5 mmol/L (19 @ 11:26)  Blood Gas Arterial, Lactate: ?1.8 mmoL/L (19 @ 10:43)  Blood Gas Arterial, Lactate: 3.0 mmoL/L (19 @ 08:35)  Blood Gas Arterial, Lactate: 2.4 mmoL/L (19 @ 04:28)  Blood Gas Arterial, Lactate: 4.7 mmoL/L (19 @ 20:18)  Lactate, Blood: 4.8 mmol/L (19 @ 14:50)    ABG - ( 2019 10:43 )  pH: 7.42  /  pCO2: 36    /  pO2: 159   / HCO3: 24    / Base Excess: -0.4  /  SaO2: 100             ABG - ( 2019 08:35 )  pH: 7.42  /  pCO2: 33    /  pO2: 130   / HCO3: 21    / Base Excess: -2.4  /  SaO2: 99              ABG - ( 2019 04:28 )  pH: 7.43  /  pCO2: 34    /  pO2: 186   / HCO3: 22    / Base Excess: -1.3  /  SaO2: 100               Coags:     11.80  ----< 1.03    ( 2019 20:19 )     30.7        CARDIAC MARKERS ( 2019 11:26 )  x     / 0.02 ng/mL / x     / x     / 9.2 ng/mL  CARDIAC MARKERS ( 2019 04:18 )  x     / 0.06 ng/mL / 2964 U/L / x     / 7.7 ng/mL  CARDIAC MARKERS ( 2019 00:50 )  x     / x     / 1790 U/L / x     / x      CARDIAC MARKERS ( 2019 20:19 )  x     / 0.20 ng/mL / 495 U/L / x     / 4.4 ng/mL  CARDIAC MARKERS ( 2019 11:00 )  x     / <0.01 ng/mL / x     / x     / x

## 2019-11-03 NOTE — PROGRESS NOTE ADULT - SUBJECTIVE AND OBJECTIVE BOX
FAZAL CASSIDY  [id number]  43y Male    Indication for ICU admission: S/p ex-lap, Kerri procedure for perforated diverticulitis  Admit Date:11-01-19  ICU Date:11-01-19  OR Date:11-01-19    moxifloxacin (Hives)  penicillin (Unknown)    PAST MEDICAL & SURGICAL HISTORY:  Dislocations and subluxations  Aneurysm artery, celiac  Keratoconus  HTN (hypertension)  Epilepsy  EDS (Melva-Danlos syndrome)  No significant past surgical history    Home Medications:  Aspir 81 oral delayed release tablet: orally once a day (01 Nov 2019 16:02)  Keppra 750 mg oral tablet: 2 tab(s) orally 2 times a day (01 Nov 2019 16:02)  lisinopril 30 mg oral tablet: 1 tab(s) orally once a day (01 Nov 2019 16:02)  Trileptal 300 mg oral tablet: 3 tab(s) orally once a day (01 Nov 2019 16:02)    24HRS EVENT:    Overnight: No acute events. Pain controled w/ dilaudid PCA. voiding. Normotensive. Afebrile. +ostomy output     NEURO:     extubated, no sedation     Hx epilepsy- on keppra 1500mg BID; oxcarbazepine 600mg at night, 300 in morning    Pain-controlled with Dilaudud PCA    Anxiety- 1x Ativan 0.5 IV, has hx anxiety     RESP:     Extubated, now tolerating 2 L NC, complaining of SOB/tightness in chest/anxiety. Os Sat 100%,  Given 1x0.5 ativan, albuterol nebs (uses at home PRN), EKG sinus tachycardia    No chronic dx, maintain sat>95%    AM CXR    CARDS:     Hx HTN- home lisinopril 30, ASA- both held    Hypotension- in am nicom responsive, bolused 750cc, recieved 4 L in ICU, 4 L intra-op    Keep normotensive, maintain MAP>65    CE 0.2> 0.06> 0.02     EKG- sinus tachycardia     GI/NUTR:     Diet, NPO, NTG d/c'd      No ostomy output    PPI (home PRN omeprazole)       /RENAL:     Cr 0.8, austin- monitor UOP 50-60cc/hr-> DC, TOV pending    LA 4.7>2.4>3.0    IVF LR @100    HEME/ONC:     Monitor Hgb 14.7    HSQ    continue holding aspirin    ID:     Afebrile, WBC incresing  1.35>1.6>4.98  (diff 88% neutrophils) (from 6.9 on admission) , lactate 1.8    Cipro, flagyl d/c'd. ID c/s and started on cefepime/flagyl    11/2  blood cx pending     Endo:    No chronic dx      DVT Prophylaxis: heparin  Injectable 5000 Unit(s) SubCutaneous every 8 hours  GI Prophylaxis:pantoprazole  Injectable 40 milliGRAM(s) IV Push daily    ***Tubes/Lines/Drains  ***  Peripheral IV  Urinary Catheter		Indication: Strict I&O    Date Placed: 11/1/19    REVIEW OF SYSTEMS    [x] A ten-point review of systems was otherwise negative except as noted.  [ ] Due to altered mental status/intubation, subjective information were not able to be obtained from the patient. History was obtained, to the extent possible, from review of the chart and collateral sources of information. FAZAL CASSIDY  [id number]  43y Male    Indication for ICU admission: S/p ex-lap, Kerri procedure for perforated diverticulitis  Admit Date:19  ICU Date:19  OR Date:19    moxifloxacin (Hives)  penicillin (Unknown)    PAST MEDICAL & SURGICAL HISTORY:  Dislocations and subluxations  Aneurysm artery, celiac  Keratoconus  HTN (hypertension)  Epilepsy  EDS (Melva-Danlos syndrome)  No significant past surgical history    Home Medications:  Aspir 81 oral delayed release tablet: orally once a day (2019 16:02)  Keppra 750 mg oral tablet: 2 tab(s) orally 2 times a day (2019 16:02)  lisinopril 30 mg oral tablet: 1 tab(s) orally once a day (2019 16:02)  Trileptal 300 mg oral tablet: 3 tab(s) orally once a day (2019 16:02)    24HRS EVENT:    Overnight: Went into Afib with RVR gicven lopressor 5mg x1 with good response. Pain controled w/ dilaudid PCA. voiding. Normotensive. Afebrile. +ostomy output     NEURO:     extubated, no sedation     Hx epilepsy- on keppra 1500mg BID; oxcarbazepine 600mg at night, 300 in morning    Pain-controlled with Dilaudud PCA    Anxiety- 1x Ativan 0.5 IV, has hx anxiety     RESP:     Extubated, now tolerating 2 L NC, complaining of SOB/tightness in chest/anxiety. Os Sat 100%,  Given 1x0.5 ativan, albuterol nebs (uses at home PRN), EKG sinus tachycardia    No chronic dx, maintain sat>95%    AM CXR    CARDS:     Hx HTN- home lisinopril 30, ASA- both held    Hypotension- in am nicom responsive, bolused 750cc, recieved 4 L in ICU, 4 L intra-op    Keep normotensive, maintain MAP>65    CE 0.2> 0.06> 0.02     EKG- sinus tachycardia     GI/NUTR:     Diet, NPO, NTG d/c'd      No ostomy output    PPI (home PRN omeprazole)       /RENAL:     Cr 0.8, austin- monitor UOP 50-60cc/hr-> DC, TOV pending    LA 4.7>2.4>3.0    IVF LR @100    HEME/ONC:     Monitor Hgb 14.7    HSQ    continue holding aspirin    ID:     Afebrile, WBC incresing  1.35>1.6>4.98  (diff 88% neutrophils) (from 6.9 on admission) , lactate 1.8    Cipro, flagyl d/c'd. ID c/s and started on cefepime/flagyl      blood cx pending     Endo:    No chronic dx      DVT Prophylaxis: heparin  Injectable 5000 Unit(s) SubCutaneous every 8 hours  GI Prophylaxis:pantoprazole  Injectable 40 milliGRAM(s) IV Push daily    ***Tubes/Lines/Drains  ***  Peripheral IV  Urinary Catheter		Indication: Strict I&O    Date Placed: 19    REVIEW OF SYSTEMS    [x] A ten-point review of systems was otherwise negative except as noted.  [ ] Due to altered mental status/intubation, subjective information were not able to be obtained from the patient. History was obtained, to the extent possible, from review of the chart and collateral sources of information.    Daily     Daily Weight in k.6 (2019 04:00)    Diet, NPO:   Except Medications (19 @ 09:22)      CURRENT MEDS:  Neurologic Medications  acetaminophen   Tablet .. 650 milliGRAM(s) Oral every 6 hours  HYDROmorphone PCA (1 mG/mL) 30 milliLiter(s) PCA Continuous PCA Continuous  levETIRAcetam 1500 milliGRAM(s) Oral two times a day  ondansetron Injectable 4 milliGRAM(s) IV Push every 6 hours PRN Nausea  OXcarbazepine 600 milliGRAM(s) Oral <User Schedule>  OXcarbazepine 300 milliGRAM(s) Oral <User Schedule>    Respiratory Medications  ALBUTerol    90 MICROgram(s) HFA Inhaler 2 Puff(s) Inhalation every 6 hours PRN Shortness of Breath and/or Wheezing    Cardiovascular Medications    Gastrointestinal Medications  lactated ringers. 1000 milliLiter(s) IV Continuous <Continuous>  pantoprazole    Tablet 40 milliGRAM(s) Oral before breakfast    Genitourinary Medications    Hematologic/Oncologic Medications  heparin  Injectable 5000 Unit(s) SubCutaneous every 8 hours  influenza   Vaccine 0.5 milliLiter(s) IntraMuscular once    Antimicrobial/Immunologic Medications  cefepime   IVPB      cefepime   IVPB 1000 milliGRAM(s) IV Intermittent every 8 hours  metroNIDAZOLE  IVPB 500 milliGRAM(s) IV Intermittent every 8 hours    Endocrine/Metabolic Medications    Topical/Other Medications  chlorhexidine 0.12% Liquid 15 milliLiter(s) Oral Mucosa every 12 hours  chlorhexidine 4% Liquid 1 Application(s) Topical <User Schedule>  naloxone Injectable 0.1 milliGRAM(s) IV Push every 3 minutes PRN For ANY of the following changes in patient status:  A. RR LESS THAN 10 breaths per minute, B. Oxygen saturation LESS THAN 90%, C. Sedation score of 6      ICU Vital Signs Last 24 Hrs  T(C): 36.4 (2019 04:00), Max: 37.6 (2019 08:00)  T(F): 97.6 (2019 04:00), Max: 99.6 (2019 08:00)  HR: 90 (2019 05:30) (90 - 128)  BP: 92/53 (2019 05:30) (81/61 - 127/70)  BP(mean): 67 (2019 05:30) (60 - 87)  ABP: --  ABP(mean): --  RR: 14 (2019 05:30) (11 - 38)  SpO2: 100% (2019 05:30) (99% - 100%)      Mode: CPAP with PS  FiO2: 40  PEEP: 5  PS: 5    ABG - ( 2019 10:43 )  pH, Arterial: 7.42  pH, Blood: x     /  pCO2: 36    /  pO2: 159   / HCO3: 24    / Base Excess: -0.4  /  SaO2: 100       I&O's Summary    2019 07:01  -  2019 07:00  --------------------------------------------------------  IN: 5528.9 mL / OUT: 1200 mL / NET: 4328.9 mL    2019 08:01  -  2019 06:31  --------------------------------------------------------  IN: 3602 mL / OUT: 1220 mL / NET: 2382 mL      I&O's Detail    2019 07:01  -  2019 07:00  --------------------------------------------------------  IN:    dexmedetomidine Infusion: 21.8 mL    IV PiggyBack: 300 mL    Lactated Ringers IV Bolus: 3000 mL    lactated ringers.: 1200 mL    propofol Infusion: 7.1 mL    Sodium Chloride 0.9% IV Bolus: 1000 mL  Total IN: 5528.9 mL    OUT:    Bulb: 285 mL    Indwelling Catheter - Urethral: 765 mL    Nasoenteral Tube: 150 mL  Total OUT: 1200 mL    Total NET: 4328.9 mL      2019 08:01  -  2019 06:31  --------------------------------------------------------  IN:    dexmedetomidine Infusion: 2 mL    IV PiggyBack: 1300 mL    lactated ringers.: 2300 mL  Total IN: 3602 mL    OUT:    Bulb: 235 mL    Indwelling Catheter - Urethral: 985 mL  Total OUT: 1220 mL    Total NET: 2382 mL      PHYSICAL EXAM:    General/Neuro  GCS: 15     = E   / V   / M      Deficits:  alert & oriented x 3, no focal deficits  Pupils: PERRLA    Lungs:  clear to auscultation, Normal expansion/effort.     Cardiovascular : S1, S2.  Regular rate and rhythm.      GI: Abdomen soft, Non-tender, Non-distended.      Extremities: Extremities warm, pink, well-perfused. Pulses: palpable    Derm: Good skin turgor, no skin breakdown.      : Austin catheter in place.      Imaging:     LABS:               12.1   7.95  )-----------( 150      ( 2019 00:36 )             35.5       11-    139  |  104  |  16  ----------------------------<  100<H>  4.5   |  23  |  0.9    Ca    7.8<L>      2019 00:36  Phos  3.5     11-  Mg     1.8     11    TPro  8.2<H>  /  Alb  5.2  /  TBili  0.3  /  DBili  x   /  AST  21  /  ALT  22  /  AlkPhos  57  11      PT/INR - ( 2019 00:36 )   PT: 22.10 sec;   INR: 1.93 ratio    PTT - ( 2019 00:36 )  PTT:41.0 sec    CARDIAC MARKERS ( 2019 00:36 )  x     / x     / 4024 U/L / x     / x      CARDIAC MARKERS ( 2019 11:26 )  x     / 0.02 ng/mL / x     / x     / 9.2 ng/mL  CARDIAC MARKERS ( 2019 04:18 )  x     / 0.06 ng/mL / 2964 U/L / x     / 7.7 ng/mL  CARDIAC MARKERS ( 2019 00:50 )  x     / x     / 1790 U/L / x     / x      CARDIAC MARKERS ( 2019 20:19 )  x     / 0.20 ng/mL / 495 U/L / x     / 4.4 ng/mL  CARDIAC MARKERS ( 2019 11:00 )  x     / <0.01 ng/mL / x     / x     / x

## 2019-11-04 DIAGNOSIS — R10.9 UNSPECIFIED ABDOMINAL PAIN: ICD-10-CM

## 2019-11-04 LAB
ANION GAP SERPL CALC-SCNC: 10 MMOL/L — SIGNIFICANT CHANGE UP (ref 7–14)
APTT BLD: 41 SEC — HIGH (ref 27–39.2)
BUN SERPL-MCNC: 13 MG/DL — SIGNIFICANT CHANGE UP (ref 10–20)
CALCIUM SERPL-MCNC: 8.1 MG/DL — LOW (ref 8.5–10.1)
CHLORIDE SERPL-SCNC: 102 MMOL/L — SIGNIFICANT CHANGE UP (ref 98–110)
CO2 SERPL-SCNC: 25 MMOL/L — SIGNIFICANT CHANGE UP (ref 17–32)
CREAT SERPL-MCNC: 0.7 MG/DL — SIGNIFICANT CHANGE UP (ref 0.7–1.5)
GLUCOSE SERPL-MCNC: 86 MG/DL — SIGNIFICANT CHANGE UP (ref 70–99)
HCT VFR BLD CALC: 30.8 % — LOW (ref 42–52)
HGB BLD-MCNC: 10.4 G/DL — LOW (ref 14–18)
INR BLD: 1.24 RATIO — SIGNIFICANT CHANGE UP (ref 0.65–1.3)
MAGNESIUM SERPL-MCNC: 2 MG/DL — SIGNIFICANT CHANGE UP (ref 1.8–2.4)
MCHC RBC-ENTMCNC: 30.8 PG — SIGNIFICANT CHANGE UP (ref 27–31)
MCHC RBC-ENTMCNC: 33.8 G/DL — SIGNIFICANT CHANGE UP (ref 32–37)
MCV RBC AUTO: 91.1 FL — SIGNIFICANT CHANGE UP (ref 80–94)
NRBC # BLD: 0 /100 WBCS — SIGNIFICANT CHANGE UP (ref 0–0)
PHOSPHATE SERPL-MCNC: 1.9 MG/DL — LOW (ref 2.1–4.9)
PLATELET # BLD AUTO: 126 K/UL — LOW (ref 130–400)
POTASSIUM SERPL-MCNC: 4 MMOL/L — SIGNIFICANT CHANGE UP (ref 3.5–5)
POTASSIUM SERPL-SCNC: 4 MMOL/L — SIGNIFICANT CHANGE UP (ref 3.5–5)
PROTHROM AB SERPL-ACNC: 14.2 SEC — HIGH (ref 9.95–12.87)
RBC # BLD: 3.38 M/UL — LOW (ref 4.7–6.1)
RBC # FLD: 13.3 % — SIGNIFICANT CHANGE UP (ref 11.5–14.5)
SODIUM SERPL-SCNC: 137 MMOL/L — SIGNIFICANT CHANGE UP (ref 135–146)
WBC # BLD: 8.4 K/UL — SIGNIFICANT CHANGE UP (ref 4.8–10.8)
WBC # FLD AUTO: 8.4 K/UL — SIGNIFICANT CHANGE UP (ref 4.8–10.8)

## 2019-11-04 PROCEDURE — 71045 X-RAY EXAM CHEST 1 VIEW: CPT | Mod: 26

## 2019-11-04 PROCEDURE — 93970 EXTREMITY STUDY: CPT | Mod: 26

## 2019-11-04 PROCEDURE — 74177 CT ABD & PELVIS W/CONTRAST: CPT | Mod: 26

## 2019-11-04 PROCEDURE — 93306 TTE W/DOPPLER COMPLETE: CPT | Mod: 26

## 2019-11-04 PROCEDURE — 71275 CT ANGIOGRAPHY CHEST: CPT | Mod: 26

## 2019-11-04 PROCEDURE — 99024 POSTOP FOLLOW-UP VISIT: CPT

## 2019-11-04 RX ORDER — OXYCODONE HYDROCHLORIDE 5 MG/1
10 TABLET ORAL EVERY 6 HOURS
Refills: 0 | Status: DISCONTINUED | OUTPATIENT
Start: 2019-11-04 | End: 2019-11-08

## 2019-11-04 RX ORDER — AMIODARONE HYDROCHLORIDE 400 MG/1
150 TABLET ORAL ONCE
Refills: 0 | Status: COMPLETED | OUTPATIENT
Start: 2019-11-04 | End: 2019-11-04

## 2019-11-04 RX ORDER — AMIODARONE HYDROCHLORIDE 400 MG/1
1 TABLET ORAL
Qty: 900 | Refills: 0 | Status: DISCONTINUED | OUTPATIENT
Start: 2019-11-04 | End: 2019-11-04

## 2019-11-04 RX ORDER — AMIODARONE HYDROCHLORIDE 400 MG/1
200 TABLET ORAL EVERY 12 HOURS
Refills: 0 | Status: DISCONTINUED | OUTPATIENT
Start: 2019-11-04 | End: 2019-11-04

## 2019-11-04 RX ORDER — AMIODARONE HYDROCHLORIDE 400 MG/1
200 TABLET ORAL EVERY 12 HOURS
Refills: 0 | Status: COMPLETED | OUTPATIENT
Start: 2019-11-04 | End: 2019-11-07

## 2019-11-04 RX ORDER — MORPHINE SULFATE 50 MG/1
2 CAPSULE, EXTENDED RELEASE ORAL EVERY 6 HOURS
Refills: 0 | Status: DISCONTINUED | OUTPATIENT
Start: 2019-11-04 | End: 2019-11-05

## 2019-11-04 RX ORDER — OXYCODONE HYDROCHLORIDE 5 MG/1
5 TABLET ORAL EVERY 6 HOURS
Refills: 0 | Status: DISCONTINUED | OUTPATIENT
Start: 2019-11-04 | End: 2019-11-08

## 2019-11-04 RX ORDER — HYDROMORPHONE HYDROCHLORIDE 2 MG/ML
0.5 INJECTION INTRAMUSCULAR; INTRAVENOUS; SUBCUTANEOUS EVERY 4 HOURS
Refills: 0 | Status: DISCONTINUED | OUTPATIENT
Start: 2019-11-04 | End: 2019-11-04

## 2019-11-04 RX ORDER — KETOROLAC TROMETHAMINE 30 MG/ML
15 SYRINGE (ML) INJECTION EVERY 6 HOURS
Refills: 0 | Status: DISCONTINUED | OUTPATIENT
Start: 2019-11-04 | End: 2019-11-06

## 2019-11-04 RX ORDER — ACETAMINOPHEN 500 MG
650 TABLET ORAL EVERY 6 HOURS
Refills: 0 | Status: DISCONTINUED | OUTPATIENT
Start: 2019-11-04 | End: 2019-11-04

## 2019-11-04 RX ORDER — ACETAMINOPHEN 500 MG
650 TABLET ORAL EVERY 6 HOURS
Refills: 0 | Status: DISCONTINUED | OUTPATIENT
Start: 2019-11-04 | End: 2019-11-08

## 2019-11-04 RX ORDER — METOPROLOL TARTRATE 50 MG
12.5 TABLET ORAL EVERY 12 HOURS
Refills: 0 | Status: DISCONTINUED | OUTPATIENT
Start: 2019-11-04 | End: 2019-11-08

## 2019-11-04 RX ORDER — AMIODARONE HYDROCHLORIDE 400 MG/1
0.5 TABLET ORAL
Qty: 900 | Refills: 0 | Status: DISCONTINUED | OUTPATIENT
Start: 2019-11-04 | End: 2019-11-05

## 2019-11-04 RX ADMIN — Medication 650 MILLIGRAM(S): at 05:25

## 2019-11-04 RX ADMIN — HEPARIN SODIUM 5000 UNIT(S): 5000 INJECTION INTRAVENOUS; SUBCUTANEOUS at 14:35

## 2019-11-04 RX ADMIN — LEVETIRACETAM 1500 MILLIGRAM(S): 250 TABLET, FILM COATED ORAL at 17:19

## 2019-11-04 RX ADMIN — Medication 650 MILLIGRAM(S): at 01:15

## 2019-11-04 RX ADMIN — OXCARBAZEPINE 300 MILLIGRAM(S): 300 TABLET, FILM COATED ORAL at 05:25

## 2019-11-04 RX ADMIN — Medication 650 MILLIGRAM(S): at 11:44

## 2019-11-04 RX ADMIN — CEFEPIME 100 MILLIGRAM(S): 1 INJECTION, POWDER, FOR SOLUTION INTRAMUSCULAR; INTRAVENOUS at 22:04

## 2019-11-04 RX ADMIN — OXCARBAZEPINE 600 MILLIGRAM(S): 300 TABLET, FILM COATED ORAL at 22:05

## 2019-11-04 RX ADMIN — Medication 100 MILLIGRAM(S): at 13:53

## 2019-11-04 RX ADMIN — Medication 650 MILLIGRAM(S): at 11:56

## 2019-11-04 RX ADMIN — Medication 62.5 MILLIMOLE(S): at 06:11

## 2019-11-04 RX ADMIN — Medication 81 MILLIGRAM(S): at 11:44

## 2019-11-04 RX ADMIN — Medication 100 MILLIGRAM(S): at 22:04

## 2019-11-04 RX ADMIN — Medication 15 MILLIGRAM(S): at 17:30

## 2019-11-04 RX ADMIN — CHLORHEXIDINE GLUCONATE 1 APPLICATION(S): 213 SOLUTION TOPICAL at 05:26

## 2019-11-04 RX ADMIN — Medication 650 MILLIGRAM(S): at 17:40

## 2019-11-04 RX ADMIN — AMIODARONE HYDROCHLORIDE 618 MILLIGRAM(S): 400 TABLET ORAL at 01:46

## 2019-11-04 RX ADMIN — LEVETIRACETAM 1500 MILLIGRAM(S): 250 TABLET, FILM COATED ORAL at 05:26

## 2019-11-04 RX ADMIN — Medication 100 MILLIGRAM(S): at 05:11

## 2019-11-04 RX ADMIN — PANTOPRAZOLE SODIUM 40 MILLIGRAM(S): 20 TABLET, DELAYED RELEASE ORAL at 06:11

## 2019-11-04 RX ADMIN — MORPHINE SULFATE 2 MILLIGRAM(S): 50 CAPSULE, EXTENDED RELEASE ORAL at 20:30

## 2019-11-04 RX ADMIN — Medication 650 MILLIGRAM(S): at 00:48

## 2019-11-04 RX ADMIN — CEFEPIME 100 MILLIGRAM(S): 1 INJECTION, POWDER, FOR SOLUTION INTRAMUSCULAR; INTRAVENOUS at 05:26

## 2019-11-04 RX ADMIN — HYDROMORPHONE HYDROCHLORIDE 0.5 MILLIGRAM(S): 2 INJECTION INTRAMUSCULAR; INTRAVENOUS; SUBCUTANEOUS at 09:50

## 2019-11-04 RX ADMIN — Medication 15 MILLIGRAM(S): at 11:45

## 2019-11-04 RX ADMIN — Medication 15 MILLIGRAM(S): at 17:19

## 2019-11-04 RX ADMIN — Medication 12.5 MILLIGRAM(S): at 08:14

## 2019-11-04 RX ADMIN — Medication 5 MILLIGRAM(S): at 00:07

## 2019-11-04 RX ADMIN — Medication 650 MILLIGRAM(S): at 05:40

## 2019-11-04 RX ADMIN — HEPARIN SODIUM 5000 UNIT(S): 5000 INJECTION INTRAVENOUS; SUBCUTANEOUS at 22:04

## 2019-11-04 RX ADMIN — HEPARIN SODIUM 5000 UNIT(S): 5000 INJECTION INTRAVENOUS; SUBCUTANEOUS at 05:25

## 2019-11-04 RX ADMIN — HYDROMORPHONE HYDROCHLORIDE 0.5 MILLIGRAM(S): 2 INJECTION INTRAMUSCULAR; INTRAVENOUS; SUBCUTANEOUS at 09:39

## 2019-11-04 RX ADMIN — AMIODARONE HYDROCHLORIDE 33.33 MG/MIN: 400 TABLET ORAL at 01:53

## 2019-11-04 RX ADMIN — Medication 650 MILLIGRAM(S): at 17:19

## 2019-11-04 RX ADMIN — Medication 15 MILLIGRAM(S): at 11:57

## 2019-11-04 RX ADMIN — Medication 12.5 MILLIGRAM(S): at 17:19

## 2019-11-04 RX ADMIN — AMIODARONE HYDROCHLORIDE 200 MILLIGRAM(S): 400 TABLET ORAL at 20:18

## 2019-11-04 RX ADMIN — Medication 15 MILLIGRAM(S): at 23:54

## 2019-11-04 RX ADMIN — CEFEPIME 100 MILLIGRAM(S): 1 INJECTION, POWDER, FOR SOLUTION INTRAMUSCULAR; INTRAVENOUS at 13:05

## 2019-11-04 NOTE — CONSULT NOTE ADULT - ASSESSMENT
REVIEW OF SYSTEMS    General:	NAD   Skin/Breast:	Neg  Ophthalmologic:	Neg  ENMT: Neg	  Respiratory and Thorax: Neg	  Cardiovascular:	Neg  Gastrointestinal:	Neg  Genitourinary:	Neg  Musculoskeletal:	Neg  Neurological:	Neg  Psychiatric:	Neg  Hematology/Lymphatics:	Neg  Endocrine:	Neg        PHYSICAL EXAM:    GENERAL: NAD, well-groomed, well-developed  HEAD:  Atraumatic, Normocephalic  EYES: EOMI, PERRLA, conjunctiva and sclera clear  ENMT: No tonsillar erythema, exudates, or enlargement; Moist mucous membranes, Good dentition, No lesions  NECK: Supple, No JVD, Normal thyroid  NERVOUS SYSTEM:  Alert & Oriented X3, Good concentration; Motor Strength 5/5 B/L upper and lower extremities  CHEST/LUNG: Clear to percussion bilaterally; No rales, rhonchi, wheezing, or rubs  HEART: Regular rate and rhythm; No murmurs, rubs, or gallops  ABDOMEN: Soft, tender, Nondistended; Bowel sounds present  EXTREMITIES: No clubbing, cyanosis, or edema  LYMPH: No lymphadenopathy noted  SKIN: No rashes or lesions      Patient  Abd with intermittent pain. Pain is burning. 5/10 now. Patient with drain to the right groin. neg s/s of infection. mid lower abd with dressing, dry and intact. Colostomy bag to the left side with very small amt of greenish fecal matter. Abd tender.

## 2019-11-04 NOTE — PROGRESS NOTE ADULT - SUBJECTIVE AND OBJECTIVE BOX
GENERAL SURGERY PROGRESS NOTE     FAZAL CASSIDY  Male  Hospital day: 4  POD: 3  Procedure: Kerri procedure  Exploratory laparotomy    OVERNIGHT EVENTS: Patient had multiple episodes of atrial fibrillation yesterday evening and last night. Patient given metoprolol.     T(F): 98 (11-03-19 @ 20:00), Max: 98 (11-03-19 @ 12:00)  HR: 106 (11-03-19 @ 22:00) (88 - 122)  BP: 97/54 (11-03-19 @ 22:00) (85/51 - 129/83)  RR: 19 (11-03-19 @ 22:00) (12 - 38)  SpO2: 100% (11-03-19 @ 22:00) (98% - 100%)                                                                       DRAINS:   11-02-19 @ 08:01  -  11-03-19 @ 07:00  --------------------------------------------------------  OUT: 310 mL    URINE:   11-02-19 @ 08:01  -  11-03-19 @ 07:00  --------------------------------------------------------  OUT: 985 mL     GI proph:  pantoprazole    Tablet 40 milliGRAM(s) Oral before breakfast    AC/ proph: aspirin  chewable 81 milliGRAM(s) Oral daily  heparin  Injectable 5000 Unit(s) SubCutaneous every 8 hours    ABx: cefepime   IVPB      cefepime   IVPB 1000 milliGRAM(s) IV Intermittent every 8 hours  metroNIDAZOLE  IVPB 500 milliGRAM(s) IV Intermittent every 8 hours    PHYSICAL EXAM:  GENERAL: NAD, well-appearing  CHEST/LUNG: Clear to auscultation bilaterally  HEART: Regular rate and rhythm  ABDOMEN: Soft, Nontender, Nondistended  EXTREMITIES:  No clubbing, cyanosis, or edema    LABS                        12.1   7.95  )-----------( 150      ( 03 Nov 2019 00:36 )             35.5       Band Neutrophils %: 12.2 % (11-03-19 @ 00:36)    11-03    139  |  104  |  16  ----------------------------<  100<H>  4.5   |  23  |  0.9      Calcium, Total Serum: 7.8 mg/dL (11-03-19 @ 00:36)    Lactate, Blood: 2.2 mmol/L (11-03-19 @ 00:36)  Lactate, Blood: 2.5 mmol/L (11-02-19 @ 11:26)  Blood Gas Arterial, Lactate: ?1.8 mmoL/L (11-02-19 @ 10:43)  Blood Gas Arterial, Lactate: 3.0 mmoL/L (11-02-19 @ 08:35)  Blood Gas Arterial, Lactate: 2.4 mmoL/L (11-02-19 @ 04:28)  Blood Gas Arterial, Lactate: 4.7 mmoL/L (11-01-19 @ 20:18)  Lactate, Blood: 4.8 mmol/L (11-01-19 @ 14:50)    ABG - ( 02 Nov 2019 10:43 )  pH: 7.42  /  pCO2: 36    /  pO2: 159   / HCO3: 24    / Base Excess: -0.4  /  SaO2: 100       ABG - ( 02 Nov 2019 08:35 )  pH: 7.42  /  pCO2: 33    /  pO2: 130   / HCO3: 21    / Base Excess: -2.4  /  SaO2: 99        ABG - ( 02 Nov 2019 04:28 )  pH: 7.43  /  pCO2: 34    /  pO2: 186   / HCO3: 22    / Base Excess: -1.3  /  SaO2: 100       Coags:     22.10  ----< 1.93    ( 03 Nov 2019 00:36 )     41.0        CARDIAC MARKERS ( 03 Nov 2019 11:41 )  x     / x     / 2870 U/L / x     / x      CARDIAC MARKERS ( 03 Nov 2019 00:36 )  x     / x     / 4024 U/L / x     / x      CARDIAC MARKERS ( 02 Nov 2019 11:26 )  x     / 0.02 ng/mL / x     / x     / 9.2 ng/mL  CARDIAC MARKERS ( 02 Nov 2019 04:18 )  x     / 0.06 ng/mL / 2964 U/L / x     / 7.7 ng/mL  CARDIAC MARKERS ( 02 Nov 2019 00:50 )  x     / x     / 1790 U/L / x     / x            Culture - Blood (collected 02 Nov 2019 11:26)  Source: .Blood None  Preliminary Report (03 Nov 2019 17:01):    No growth to date.    Culture - Blood (collected 02 Nov 2019 11:26)  Source: .Blood None  Preliminary Report (03 Nov 2019 17:01):    No growth to date.      RADIOLOGY & ADDITIONAL TESTS:  < from: Xray Chest 1 View- PORTABLE-Routine (11.03.19 @ 06:10) >  Impression:      Bibasilar opacities/effusions, increased. No pneumothorax.    < end of copied text >

## 2019-11-04 NOTE — PROGRESS NOTE ADULT - ASSESSMENT
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 On presentation to the ED he was afebrile, low grade tachycardic. CTA chest was performed demonstrating scattered tiny foci of free air within the visualized abdomen. Trace free fluid abutting the inferior right lobe of the liver is overall haziness of the mesentery. Found to have Perforation of sigmoid colon s/p Kerri procedure, Exploratory laparotomy 01-Nov-2019     IMPRESSION  #Feculent peritonitis, Severe Sepsis ( T>101F, Pulse>90, Resp Rate>20, wbc<4), lactic acidosis, due to suspected peritonitis due to perforated sigmoid colon    BCX NG  #Lactic acidosis  #Leukopenia, resolved  #Hypomagnesemia  #Allergy: moxifloxacin (Hives)...Knee aches according to wife, but seizures on Cipro  penicillin (Unknown)  Has tolerated Rocephin & Flagyl according to wife    PLAN  - Cefepime 1gm q8h IVPB & Flagyl 500mg q8h IVPB x 5-7 days post OR  - Trend WBC

## 2019-11-04 NOTE — PROGRESS NOTE ADULT - ASSESSMENT
43y Male patient admitted S/P Kerri procedure and Exploratory laparotomy 2/2 feculent peritonitis, with the above physical exam, labs, and imaging findings.    Plan:  -Continue close cardiac monitoring, metoprolol PRN for afib   -Pain control as needed  -Check and replete CBC and BMP q daily  -Strict input and output monitoring  -NADIRA drain care  -Management per SICU

## 2019-11-04 NOTE — PROGRESS NOTE ADULT - SUBJECTIVE AND OBJECTIVE BOX
ANGE, FAZAL  43y, Male  Allergy: moxifloxacin (Hives)  penicillin (Unknown)      CHIEF COMPLAINT: Diverticulitis (02 Nov 2019 09:22)      INTERVAL EVENTS/HPI  - No acute events overnight  - T(F): , Max: 98 (11-03-19 @ 12:00)  - Denies any worsening symptoms  - Tolerating medication  - WBC Count: 8.40 (11-04-19 @ 00:45) <--WBC Count: 7.95 (11-03-19 @ 00:36)  - Creatinine, Serum: 0.7 (11-04-19 @ 00:45)      ROS  General: Denies rigors, nightsweats  HEENT: Denies headache, rhinorrhea, sore throat, eye pain  CV: Denies CP, palpitations  PULM: Denies wheezing, hemoptysis  GI: Denies hematemesis, hematochezia, melena  : Denies discharge, hematuria  MSK: Denies arthralgias, myalgias  SKIN: Denies rash, lesions  NEURO: Denies paresthesias, weakness  PSYCH: Denies depression, anxiety    VITALS:  T(F): 98, Max: 98 (11-03-19 @ 12:00)  HR: 116  BP: 112/66  RR: 19Vital Signs Last 24 Hrs  T(C): 36.7 (04 Nov 2019 07:20), Max: 36.7 (03 Nov 2019 12:00)  T(F): 98 (04 Nov 2019 07:20), Max: 98 (03 Nov 2019 12:00)  HR: 116 (04 Nov 2019 07:51) (94 - 146)  BP: 112/66 (04 Nov 2019 07:51) (80/58 - 129/83)  BP(mean): 73 (04 Nov 2019 07:51) (62 - 90)  RR: 19 (04 Nov 2019 07:51) (12 - 33)  SpO2: 100% (04 Nov 2019 07:51) (94% - 100%)    PHYSICAL EXAM:  Gen: NAD, resting in bed  HEENT: Normocephalic, atraumatic  Neck: supple, no lymphadenopathy  CV: Regular rate & regular rhythm  Lungs: decreased BS at bases, no fremitus  Abdomen: Soft, BS present, ostomy + stool, dressing in place  Ext: Warm, well perfused  Neuro: non focal, awake  Skin: no rash, no erythema  Lines: no phlebitis    FH: Non-contributory  Social Hx: Non-contributory    TESTS & MEASUREMENTS:                        10.4   8.40  )-----------( 126      ( 04 Nov 2019 00:45 )             30.8     11-04    137  |  102  |  13  ----------------------------<  86  4.0   |  25  |  0.7    Ca    8.1<L>      04 Nov 2019 00:45  Phos  1.9     11-04  Mg     2.0     11-04      eGFR if Non African American: 116 mL/min/1.73M2 (11-04-19 @ 00:45)  eGFR if : 134 mL/min/1.73M2 (11-04-19 @ 00:45)          Culture - Blood (collected 11-02-19 @ 11:26)  Source: .Blood None  Preliminary Report (11-03-19 @ 17:01):    No growth to date.    Culture - Blood (collected 11-02-19 @ 11:26)  Source: .Blood None  Preliminary Report (11-03-19 @ 17:01):    No growth to date.        Lactate, Blood: 2.2 mmol/L (11-03-19 @ 00:36)  Lactate, Blood: 2.5 mmol/L (11-02-19 @ 11:26)  Lactate, Blood: 4.8 mmol/L (11-01-19 @ 14:50)      INFECTIOUS DISEASES TESTING      RADIOLOGY & ADDITIONAL TESTS:  I have personally reviewed the last Chest xray  CXR  Xray Chest 1 View- PORTABLE-Urgent:   EXAM:  XR CHEST PORTABLE URGENT 1V            PROCEDURE DATE:  11/01/2019            INTERPRETATION:  Clinical History / Reason for exam: Chest pain    Comparison : Chest radiograph 7/7/2018.    Technique/Positioning: Frontal view.    Findings:    Support devices: None.    Cardiac/mediastinum/hilum: Unremarkable.    Lung parenchyma/Pleura: Within normal limits.    Skeleton/soft tissues: Stable embolization coils overlying   gastroesophageal junction.    Impression:      No radiographic evidenceof acute cardiopulmonary disease.                      STACEY LORD M.D., ATTENDING RADIOLOGIST  This document has been electronically signed. Nov 1 2019  1:51PM             (11-01-19 @ 12:46)      CT  CT Angio Chest Dissection Protocol:   EXAM:  CTA DISSECTION             PROCEDURE DATE:  11/01/2019            INTERPRETATION:  CLINICAL STATEMENT: Dissection, aneurysm.    TECHNIQUE: Multislice helical sections were obtained from the thoracic   inlet to the upper abdomen before intravenous contrast administration.   CTA images were then obtained though the chest and abdomen following   administration of 100c Optiray 320 intravenous contrast. Multiplanar   reformats including MIP images were added.    COMPARISON CT: 4/28/2019. 9/4/2019.    OTHER STUDIES USED FOR CORRELATION: None.      FINDINGS:    AORTA: The aorta is unremarkable without evidence of dissection or   intramural hematoma. Ascending aorta is at upper limit of normal   measuring 4.0 cm in diameter. Redemonstrated celiac axis vascular coil   embolization with extensive surrounding beam hardening artifact. Multiple   small arterial collaterals are seen posterior to the pancreatic body.   Stable 2.4 cm aneurysmal dilatation of the left common iliac artery and   1.9 cm dilatation of the right common iliac artery.    CHEST:    LUNGS, PLEURA, AIRWAYS: Patent central tracheobronchial tree. No lobar  consolidation, mass, effusion, or pneumothorax.    THORACIC NODES: No mediastinal, hilar, supraclavicular, or axillary  lymphadenopathy.    MEDIASTINUM/GREAT VESSELS: No pericardial effusion. Heart size is within  normal limits. The main pulmonary arteries normal in caliber.    ABDOMEN/PELVIS:    HEPATOBILIARY: Unremarkable.    SPLEEN: Unremarkable.    PANCREAS: Unremarkable.    ADRENAL GLANDS: Unremarkable.    KIDNEYS: Focal segmental areas of hypoenhancement seen in the right   kidney at the mid to upper pole as well as at the lower pole. Note that   the right kidney upper pole is supplied by an accessory right renal   artery branch. Mild cortical scarring of the right kidney. More severe   cortical scarring of the left kidney. Normal enhancement of the left   kidney. No hydronephrosis bilaterally.    LYMPH NODES: No lymphadenopathy.    PERITONEUM/MESENTERY/VISUALIZED BOWEL: Scattered tiny foci of free air   within the visualized abdomen. Trace free fluid abutting the inferior   right lobe of the liver is overall haziness of the mesentery.    BONES/SOFT TISSUES: No acute osseous abnormality. Degenerative changes of  the spine.      IMPRESSION:    No evidence of aortic aneurysm or dissection.  Stable aneurysmal   dilatation of the bilateral common iliac arteries.    Scattered tiny foci of free air within the visualized abdomen. Trace free   fluid abutting the inferior right lobe of the liver is overall haziness   of the mesentery. Cannot exclude a hollow viscus injury, note that the   pelvis was not included in this scan and patient has a history of   diverticulitis.    Segmental areas of hypoenhancement in the right kidney suspicious for   renal ischemia as detailed above. Finding is new since 9/4/2019. Patent   main right renal artery and accessory right renal artery. Less likely   differential includes pyelonephritis.    Findings discussed with Dr. Blanco at 1:20 PM on 11/1/2019.                        BUDDY ULRICH M.D., ATTENDING RADIOLOGIST  This document has been electronically signed. Nov 1 2019  1:28PM             (11-01-19 @ 12:09)      CARDIOLOGY TESTING  12 Lead ECG:   Ventricular Rate 124 BPM    Atrial Rate 124 BPM    P-R Interval 130 ms    QRS Duration 100 ms    Q-T Interval 320 ms    QTC Calculation(Bezet) 459 ms    P Axis 27 degrees    R Axis 2 degrees    T Axis 55 degrees    Diagnosis Line Sinus tachycardia with Premature supraventricular complexes and with  occasional Premature ventricular complexes  Otherwise normal ECG    Confirmed by Danelle Toth MD ([id number]) on 11/4/2019 8:09:46 AM (11-03-19 @ 17:09)  12 Lead ECG:   Ventricular Rate 101 BPM    Atrial Rate 101 BPM    P-R Interval 140 ms    QRS Duration 86 ms    Q-T Interval 338 ms    QTC Calculation(Bezet) 438 ms    P Axis 32 degrees    R Axis 5 degrees    T Axis 27 degrees    Diagnosis Line Sinus tachycardia  Nonspecific ST abnormality  Abnormal ECG    Confirmed by Danelle Toth MD ([id number]) on 11/3/2019 11:22:54 AM (11-03-19 @ 03:18)      MEDICATIONS  acetaminophen   Tablet .. 650  aMIOdarone Infusion 1  aMIOdarone Infusion 0.5  aspirin  chewable 81  cefepime   IVPB   cefepime   IVPB 1000  chlorhexidine 4% Liquid 1  heparin  Injectable 5000  influenza   Vaccine 0.5  ketorolac   Injectable 15  levETIRAcetam 1500  metoprolol tartrate 12.5  metroNIDAZOLE  IVPB 500  OXcarbazepine 600  OXcarbazepine 300  pantoprazole    Tablet 40      ANTIBIOTICS:  cefepime   IVPB      cefepime   IVPB 1000 milliGRAM(s) IV Intermittent every 8 hours  metroNIDAZOLE  IVPB 500 milliGRAM(s) IV Intermittent every 8 hours      All available historical records have been reviewed

## 2019-11-04 NOTE — PROGRESS NOTE ADULT - SUBJECTIVE AND OBJECTIVE BOX
FAZAL CASSIDY  [id number]  43y Male    Indication for ICU admission: S/p ex-lap, Kerri procedure for perforated diverticulitis  Admit Date:19  ICU Date:19  OR Date:19    moxifloxacin (Hives)  penicillin (Unknown)    PAST MEDICAL & SURGICAL HISTORY:  Dislocations and subluxations  Aneurysm artery, celiac  Keratoconus  HTN (hypertension)  Epilepsy  EDS (Melva-Danlos syndrome)  No significant past surgical history    Home Medications:  Aspir 81 oral delayed release tablet: orally once a day (2019 16:02)  Keppra 750 mg oral tablet: 2 tab(s) orally 2 times a day (2019 16:02)  lisinopril 30 mg oral tablet: 1 tab(s) orally once a day (2019 16:02)  Trileptal 300 mg oral tablet: 3 tab(s) orally once a day (2019 16:02)    24HRS EVENT: Overnight, patient went into rapid atrial fibrillation with RVR, received lopressor 5mg x 2 without improvement- patient started on amiodarone drip    NEURO:     extubated, no sedation     Hx epilepsy- on keppra 1500mg BID; oxcarbazepine 600mg at night, 300 in morning    Pain-controlled with Dilaudud PCA    Hx of anxiety    RESP:   RA . O2 Sat 100%, albuterol nebs (uses at home PRN)    No chronic dx, maintain sat>95%    AM CXR    CARDS:     New onset rapid afib- on amiodarone drip    Hx HTN- home lisinopril 30, ASA- both held    Hypotension resolved    Keep normotensive, maintain MAP>65    CE 0.2> 0.06> 0.02 , CK downtrending 4024>2870    GI/NUTR:     Diet, advanced to clears     ostomy +stool     PPI (home PRN omeprazole)       /RENAL:     Cr 0.8, voiding     LA 4.7>2.4>3.0>2.2    IVL    HEME/ONC:     Monitor Hgb 14.7>12>10.4    HSQ, restarted ASA     ID:     Afebrile, WBC incresing  1.35>1.6>4.98>8  (diff 88% neutrophils) (from 6.9 on admission)    Cipro, flagyl d/c'd. ID c/s and started on cefepime/flagyl      blood cx -NGTD    Endo:    No chronic dx    DVT Prophylaxis: heparin  Injectable 5000 Unit(s) SubCutaneous every 8 hours  GI Prophylaxis:pantoprazole  Injectable 40 milliGRAM(s) IV Push daily    ***Tubes/Lines/Drains  ***  Peripheral IV    REVIEW OF SYSTEMS    [x] A ten-point review of systems was otherwise negative except as noted.  [ ] Due to altered mental status/intubation, subjective information were not able to be obtained from the patient. History was obtained, to the extent possible, from review of the chart and collateral sources of information.    Daily     Daily Weight in k.4 (2019 02:30)    Diet, Clear Liquid (19 @ 08:24)      CURRENT MEDS:  Neurologic Medications  acetaminophen   Tablet .. 650 milliGRAM(s) Oral every 6 hours  HYDROmorphone PCA (1 mG/mL) 30 milliLiter(s) PCA Continuous PCA Continuous  levETIRAcetam 1500 milliGRAM(s) Oral two times a day  ondansetron Injectable 4 milliGRAM(s) IV Push every 6 hours PRN Nausea  OXcarbazepine 600 milliGRAM(s) Oral <User Schedule>  OXcarbazepine 300 milliGRAM(s) Oral <User Schedule>    Respiratory Medications  ALBUTerol    90 MICROgram(s) HFA Inhaler 2 Puff(s) Inhalation every 6 hours PRN Shortness of Breath and/or Wheezing    Cardiovascular Medications  aMIOdarone Infusion 1 mG/Min IV Continuous <Continuous>    Gastrointestinal Medications  pantoprazole    Tablet 40 milliGRAM(s) Oral before breakfast    Genitourinary Medications    Hematologic/Oncologic Medications  aspirin  chewable 81 milliGRAM(s) Oral daily  heparin  Injectable 5000 Unit(s) SubCutaneous every 8 hours  influenza   Vaccine 0.5 milliLiter(s) IntraMuscular once    Antimicrobial/Immunologic Medications  cefepime   IVPB      cefepime   IVPB 1000 milliGRAM(s) IV Intermittent every 8 hours  metroNIDAZOLE  IVPB 500 milliGRAM(s) IV Intermittent every 8 hours    Endocrine/Metabolic Medications    Topical/Other Medications  chlorhexidine 4% Liquid 1 Application(s) Topical <User Schedule>  naloxone Injectable 0.1 milliGRAM(s) IV Push every 3 minutes PRN For ANY of the following changes in patient status:  A. RR LESS THAN 10 breaths per minute, B. Oxygen saturation LESS THAN 90%, C. Sedation score of 6      ICU Vital Signs Last 24 Hrs  T(C): 36.7 (2019 20:00), Max: 36.7 (2019 12:00)  T(F): 98 (2019 20:00), Max: 98 (2019 12:00)  HR: 136 (2019 03:00) (88 - 146)  BP: 91/53 (:00) (80/58 - 129/83)  BP(mean): 76 (:) (60 - 90)  ABP: --  ABP(mean): --  RR: 13 (2019 03:00) (12 - 28)  SpO2: 100% (2019 03:00) (98% - 100%)      Adult Advanced Hemodynamics Last 24 Hrs  CVP(mm Hg): --  CVP(cm H2O): --  CO: --  CI: --  PA: --  PA(mean): --  PCWP: --  SVR: --  SVRI: --  PVR: --  PVRI: --      ABG - ( 2019 10:43 )  pH, Arterial: 7.42  pH, Blood: x     /  pCO2: 36    /  pO2: 159   / HCO3: 24    / Base Excess: -0.4  /  SaO2: 100                 I&O's Summary    2019 08:  -  2019 07:00  --------------------------------------------------------  IN: 3752 mL / OUT: 1295 mL / NET: 2457 mL    2019 07:  -  2019 03:17  --------------------------------------------------------  IN: 1013.3 mL / OUT: 1195 mL / NET: -181.7 mL      I&O's Detail    2019 08:  -  2019 07:00  --------------------------------------------------------  IN:    dexmedetomidine Infusion: 2 mL    IV PiggyBack: 1350 mL    lactated ringers.: 2400 mL  Total IN: 3752 mL    OUT:    Bulb: 310 mL    Indwelling Catheter - Urethral: 985 mL  Total OUT: 1295 mL    Total NET: 2457 mL      2019 07:01  -  2019 03:17  --------------------------------------------------------  IN:    amiodarone Infusion: 33.3 mL    IV PiggyBack: 100 mL    lactated ringers.: 100 mL    Oral Fluid: 780 mL  Total IN: 1013.3 mL    OUT:    Bulb: 120 mL    Voided: 1075 mL  Total OUT: 1195 mL    Total NET: -181.7 mL      PHYSICAL EXAM:    General/Neuro  alert & oriented x 3, no focal deficits    Lungs:      clear to auscultation, Normal expansion/effort.     Cardiovascular : S1, S2.  Regular rate and rhythm.  Peripheral edema   Cardiac Rhythm: Normal Sinus Rhythm    GI: Abdomen soft, Non-tender, Non-distended. Dressign in place  Ostomy +stool    Extremities: Extremities warm, pink, well-perfused. Pulses:Rt     Lt    Derm: Good skin turgor, no skin breakdown.      :      Salazar catheter in place.      CXR:       LABS:  CAPILLARY BLOOD GLUCOSE                              10.4   8.40  )-----------( 126      ( 2019 00:45 )             30.8             139  |  104  |  16  ----------------------------<  100<H>  4.5   |  23  |  0.9    Ca    7.8<L>      2019 00:36  Phos  3.5       Mg     1.8             PT/INR - ( 2019 00:45 )   PT: 14.20 sec;   INR: 1.24 ratio         PTT - ( 2019 00:36 )  PTT:41.0 sec  CARDIAC MARKERS ( 2019 11:41 )  x     / x     / 2870 U/L / x     / x      CARDIAC MARKERS ( 2019 00:36 )  x     / x     / 4024 U/L / x     / x      CARDIAC MARKERS ( 2019 11:26 )  x     / 0.02 ng/mL / x     / x     / 9.2 ng/mL  CARDIAC MARKERS ( 2019 04:18 )  x     / 0.06 ng/mL / 2964 U/L / x     / 7.7 ng/mL      Culture - Blood (collected 2019 11:26)  Source: .Blood None  Preliminary Report (2019 17:01):    No growth to date.    Culture - Blood (collected 2019 11:26)  Source: .Blood None  Preliminary Report (2019 17:01):    No growth to date. FAZAL CASSIDY  [id number]  43y Male    Indication for ICU admission: S/p ex-lap, Kerri procedure for perforated diverticulitis  Admit Date:19  ICU Date:19  OR Date:19    moxifloxacin (Hives)  penicillin (Unknown)    PAST MEDICAL & SURGICAL HISTORY:  Dislocations and subluxations  Aneurysm artery, celiac  Keratoconus  HTN (hypertension)  Epilepsy  EDS (Melva-Danlos syndrome)  No significant past surgical history    Home Medications:  Aspir 81 oral delayed release tablet: orally once a day (2019 16:02)  Keppra 750 mg oral tablet: 2 tab(s) orally 2 times a day (2019 16:02)  lisinopril 30 mg oral tablet: 1 tab(s) orally once a day (2019 16:02)  Trileptal 300 mg oral tablet: 3 tab(s) orally once a day (2019 16:02)    24HRS EVENT: Overnight, patient went into rapid atrial fibrillation with RVR, received lopressor 5mg x 2 without improvement- patient started on amiodarone drip    NEURO:     extubated, no sedation     Hx epilepsy- on keppra 1500mg BID; oxcarbazepine 600mg at night, 300 in morning    Pain-controlled with Dilaudud PCA    Hx of anxiety    RESP:   RA . O2 Sat 100%, albuterol nebs (uses at home PRN)    No chronic dx, maintain sat>95%    AM CXR    CARDS:     New onset rapid afib- on amiodarone drip    Hx HTN- home lisinopril 30, ASA- both held    Hypotension resolved    Keep normotensive, maintain MAP>65    CE 0.2> 0.06> 0.02 , CK downtrending 4024>2870    GI/NUTR:     Diet, advanced to clears     ostomy +stool     PPI (home PRN omeprazole)       /RENAL:     Cr 0.8>0.7, voiding     LA 4.7>2.4>3.0>2.2    IVL    Lytes-Na 137 // K 4.0 // Phos 1.9 //  Mag 2.0    HEME/ONC:     Monitor Hgb 14.7>12>10.4    HSQ, restarted ASA     ID:     Afebrile, WBC incresing  1.35>1.6>4.98>8  (diff 88% neutrophils) (from 6.9 on admission)    Cipro, flagyl d/c'd. ID c/s and started on cefepime/flagyl      blood cx -NGTD    Endo:    No chronic dx    DVT Prophylaxis: heparin  Injectable 5000 Unit(s) SubCutaneous every 8 hours  GI Prophylaxis:pantoprazole  Injectable 40 milliGRAM(s) IV Push daily    ***Tubes/Lines/Drains  ***  Peripheral IV    REVIEW OF SYSTEMS    [x] A ten-point review of systems was otherwise negative except as noted.  [ ] Due to altered mental status/intubation, subjective information were not able to be obtained from the patient. History was obtained, to the extent possible, from review of the chart and collateral sources of information.    Daily     Daily Weight in k.4 (2019 02:30)    Diet, Clear Liquid (19 @ 08:24)      CURRENT MEDS:  Neurologic Medications  acetaminophen   Tablet .. 650 milliGRAM(s) Oral every 6 hours  HYDROmorphone PCA (1 mG/mL) 30 milliLiter(s) PCA Continuous PCA Continuous  levETIRAcetam 1500 milliGRAM(s) Oral two times a day  ondansetron Injectable 4 milliGRAM(s) IV Push every 6 hours PRN Nausea  OXcarbazepine 600 milliGRAM(s) Oral <User Schedule>  OXcarbazepine 300 milliGRAM(s) Oral <User Schedule>    Respiratory Medications  ALBUTerol    90 MICROgram(s) HFA Inhaler 2 Puff(s) Inhalation every 6 hours PRN Shortness of Breath and/or Wheezing    Cardiovascular Medications  aMIOdarone Infusion 1 mG/Min IV Continuous <Continuous>    Gastrointestinal Medications  pantoprazole    Tablet 40 milliGRAM(s) Oral before breakfast    Genitourinary Medications    Hematologic/Oncologic Medications  aspirin  chewable 81 milliGRAM(s) Oral daily  heparin  Injectable 5000 Unit(s) SubCutaneous every 8 hours  influenza   Vaccine 0.5 milliLiter(s) IntraMuscular once    Antimicrobial/Immunologic Medications  cefepime   IVPB      cefepime   IVPB 1000 milliGRAM(s) IV Intermittent every 8 hours  metroNIDAZOLE  IVPB 500 milliGRAM(s) IV Intermittent every 8 hours    Endocrine/Metabolic Medications    Topical/Other Medications  chlorhexidine 4% Liquid 1 Application(s) Topical <User Schedule>  naloxone Injectable 0.1 milliGRAM(s) IV Push every 3 minutes PRN For ANY of the following changes in patient status:  A. RR LESS THAN 10 breaths per minute, B. Oxygen saturation LESS THAN 90%, C. Sedation score of 6      ICU Vital Signs Last 24 Hrs  T(C): 36.7 (2019 20:00), Max: 36.7 (2019 12:00)  T(F): 98 (2019 20:00), Max: 98 (2019 12:00)  HR: 136 (2019 03:00) (88 - 146)  BP: 91/53 (2019 03:00) (80/58 - 129/83)  BP(mean): 76 (:) (60 - 90)  ABP: --  ABP(mean): --  RR: 13 (:) (12 - 28)  SpO2: 100% (2019 03:00) (98% - 100%)      Adult Advanced Hemodynamics Last 24 Hrs  CVP(mm Hg): --  CVP(cm H2O): --  CO: --  CI: --  PA: --  PA(mean): --  PCWP: --  SVR: --  SVRI: --  PVR: --  PVRI: --      ABG - ( 2019 10:43 )  pH, Arterial: 7.42  pH, Blood: x     /  pCO2: 36    /  pO2: 159   / HCO3: 24    / Base Excess: -0.4  /  SaO2: 100                 I&O's Summary    2019 08:01  -  2019 07:00  --------------------------------------------------------  IN: 3752 mL / OUT: 1295 mL / NET: 2457 mL    2019 07:  -  2019 03:17  --------------------------------------------------------  IN: 1013.3 mL / OUT: 1195 mL / NET: -181.7 mL      I&O's Detail    2019 08:01  -  2019 07:00  --------------------------------------------------------  IN:    dexmedetomidine Infusion: 2 mL    IV PiggyBack: 1350 mL    lactated ringers.: 2400 mL  Total IN: 3752 mL    OUT:    Bulb: 310 mL    Indwelling Catheter - Urethral: 985 mL  Total OUT: 1295 mL    Total NET: 2457 mL      2019 07:01  -  2019 03:17  --------------------------------------------------------  IN:    amiodarone Infusion: 33.3 mL    IV PiggyBack: 100 mL    lactated ringers.: 100 mL    Oral Fluid: 780 mL  Total IN: 1013.3 mL    OUT:    Bulb: 120 mL    Voided: 1075 mL  Total OUT: 1195 mL    Total NET: -181.7 mL      PHYSICAL EXAM:    General/Neuro  alert & oriented x 3, no focal deficits    Lungs:      clear to auscultation, Normal expansion/effort.     Cardiovascular : S1, S2.  Regular rate and rhythm.  Peripheral edema   Cardiac Rhythm: Normal Sinus Rhythm    GI: Abdomen soft, Non-tender, Non-distended. Dressign in place  Ostomy +stool    Extremities: Extremities warm, pink, well-perfused. Pulses:Rt     Lt    Derm: Good skin turgor, no skin breakdown.      :      Salazar catheter in place.      CXR:       LABS:  CAPILLARY BLOOD GLUCOSE                              10.4   8.40  )-----------( 126      ( 2019 00:45 )             30.8         11-    139  |  104  |  16  ----------------------------<  100<H>  4.5   |  23  |  0.9    Ca    7.8<L>      2019 00:36  Phos  3.5     11-03  Mg     1.8     -03        PT/INR - ( 2019 00:45 )   PT: 14.20 sec;   INR: 1.24 ratio         PTT - ( 2019 00:36 )  PTT:41.0 sec  CARDIAC MARKERS ( 2019 11:41 )  x     / x     / 2870 U/L / x     / x      CARDIAC MARKERS ( 2019 00:36 )  x     / x     / 4024 U/L / x     / x      CARDIAC MARKERS ( 2019 11:26 )  x     / 0.02 ng/mL / x     / x     / 9.2 ng/mL  CARDIAC MARKERS ( 2019 04:18 )  x     / 0.06 ng/mL / 2964 U/L / x     / 7.7 ng/mL      Culture - Blood (collected 2019 11:26)  Source: .Blood None  Preliminary Report (2019 17:01):    No growth to date.    Culture - Blood (collected 2019 11:26)  Source: .Blood None  Preliminary Report (2019 17:01):    No growth to date.

## 2019-11-04 NOTE — CONSULT NOTE ADULT - PROBLEM SELECTOR RECOMMENDATION 9
Con't acetaminophen   Tablet .. 650 milliGRAM(s) Oral every 6 hours  Con't ketorolac   Injectable 15 milliGRAM(s) IV Push every 6 hours  morphine  - Injectable 2 milliGRAM(s) IV Push every 6 hours PRN  DC oxyCODONE    IR 5 milliGRAM(s) Oral every 6 hours PRN  DC oxyCODONE    IR 10 milliGRAM(s) Oral every 6 hours PRN Con't acetaminophen   Tablet .. 650 milliGRAM(s) Oral every 6 hours  Con't ketorolac   Injectable 15 milliGRAM(s) IV Push every 6 hours  DC morphine  - Injectable 2 milliGRAM(s) IV Push every 6 hours PRN  DC oxyCODONE    IR 5 milliGRAM(s) Oral every 6 hours PRN  DC oxyCODONE    IR 10 milliGRAM(s) Oral every 6 hours PRN  Start MSIR 15mg PO Q4hrs PRN

## 2019-11-04 NOTE — CONSULT NOTE ADULT - ATTENDING COMMENTS
intubated and ventilated   SBT and extubate   PCA   OOBTC   clears   ID consult   continue SICU Care
44 y/o With EDS and chronic abdominal pain s/p ex-lap    Agree with above assessment and plan

## 2019-11-04 NOTE — CONSULT NOTE ADULT - SUBJECTIVE AND OBJECTIVE BOX
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     Patient with complaint of acute onset abdominal pain.     Allergies    moxifloxacin (Hives)  penicillin (Unknown)    Intolerances        PAST MEDICAL & SURGICAL HISTORY:  Dislocations and subluxations  Aneurysm artery, celiac  Keratoconus  HTN (hypertension)  Epilepsy  EDS (Melva-Danlos syndrome)  No significant past surgical history        SOCIAL HISTORY:  Denies Smoking, Alcohol, or Drug Use    moxifloxacin (Hives)  penicillin (Unknown)      PAIN MEDICATIONS:  acetaminophen   Tablet .. 650 milliGRAM(s) Oral every 6 hours  ketorolac   Injectable 15 milliGRAM(s) IV Push every 6 hours  levETIRAcetam 1500 milliGRAM(s) Oral two times a day  morphine  - Injectable 2 milliGRAM(s) IV Push every 6 hours PRN  ondansetron Injectable 4 milliGRAM(s) IV Push every 6 hours PRN  OXcarbazepine 600 milliGRAM(s) Oral <User Schedule>  OXcarbazepine 300 milliGRAM(s) Oral <User Schedule>  oxyCODONE    IR 5 milliGRAM(s) Oral every 6 hours PRN  oxyCODONE    IR 10 milliGRAM(s) Oral every 6 hours PRN    Heme:  aspirin  chewable 81 milliGRAM(s) Oral daily  heparin  Injectable 5000 Unit(s) SubCutaneous every 8 hours    Antibiotics:  cefepime   IVPB      cefepime   IVPB 1000 milliGRAM(s) IV Intermittent every 8 hours  metroNIDAZOLE  IVPB 500 milliGRAM(s) IV Intermittent every 8 hours    Cardiovascular:  aMIOdarone    Tablet 200 milliGRAM(s) Oral every 12 hours  aMIOdarone Infusion 1 mG/Min IV Continuous <Continuous>  aMIOdarone Infusion 0.5 mG/Min IV Continuous <Continuous>  metoprolol tartrate 12.5 milliGRAM(s) Oral every 12 hours    GI:  pantoprazole    Tablet 40 milliGRAM(s) Oral before breakfast    Endocrine:    All Other Medications:  chlorhexidine 4% Liquid 1 Application(s) Topical <User Schedule>  influenza   Vaccine 0.5 milliLiter(s) IntraMuscular once  naloxone Injectable 0.1 milliGRAM(s) IV Push every 3 minutes PRN      Vital Signs Last 24 Hrs  T(C): 36.7 (04 Nov 2019 16:00), Max: 36.8 (04 Nov 2019 12:00)  T(F): 98.1 (04 Nov 2019 16:00), Max: 98.2 (04 Nov 2019 12:00)  HR: 82 (04 Nov 2019 19:00) (82 - 146)  BP: 101/62 (04 Nov 2019 19:00) (80/58 - 130/81)  BP(mean): 78 (04 Nov 2019 19:00) (62 - 99)  RR: 18 (04 Nov 2019 19:00) (13 - 39)  SpO2: 98% (04 Nov 2019 19:00) (88% - 100%)      11-03-19 @ 07:01  -  11-04-19 @ 07:00  --------------------------------------------------------  IN: 1679.8 mL / OUT: 2360 mL / NET: -680.2 mL    11-04-19 @ 07:01  -  11-04-19 @ 20:41  --------------------------------------------------------  IN: 350.4 mL / OUT: 750 mL / NET: -399.6 mL        LABS:                          10.4   8.40  )-----------( 126      ( 04 Nov 2019 00:45 )             30.8     11-04    137  |  102  |  13  ----------------------------<  86  4.0   |  25  |  0.7    Ca    8.1<L>      04 Nov 2019 00:45  Phos  1.9     11-04  Mg     2.0     11-04      PT/INR - ( 04 Nov 2019 00:45 )   PT: 14.20 sec;   INR: 1.24 ratio         PTT - ( 04 Nov 2019 00:45 )  PTT:41.0 sec      RADIOLOGY:  EXAM:  CT ABDOMEN AND PELVIS IC            PROCEDURE DATE:  11/04/2019            INTERPRETATION:  CLINICAL HISTORY: Abdominal pain. Status post Caraballo's..    TECHNIQUE: Contiguous axial CT images were obtained from the lower chest   to the pubic symphysis following administration of Optiray intravenous   contrast. Oral contrast was not administered. Reformatted images in the   coronal and sagittal planes were acquired.    COMPARISON: CT dated 11/1/2019 CT dated 9/4/2019.      FINDINGS:    LOWER CHEST: See separately dictated CT chest.    HEPATOBILIARY: Gallbladder sludge. No biliary duct dilatation.   Unremarkable appearance of the liver.    SPLEEN: Unremarkable.    PANCREAS: Limited visualization due to extensive streak artifact from   upper abdominal embolization coils.    ADRENAL GLANDS: Unremarkable.    KIDNEYS: Focal hypoenhancement in the lower pole of the right kidney   again demonstrated. No hydronephrosis bilaterally. Bilateral renal   cortical scarring.    ABDOMINOPELVIC NODES: No lymphadenopathy.    PELVIC ORGANS: Foci of air in the bladder, likely related to recent   instrumentation.    PERITONEUM/MESENTERY/BOWEL:  Interval Caraballo's procedure with left lower quadrant colostomy and   rectal stump noted. Small pneumoperitoneum related to recent laparotomy.   Percutaneous drain with tip in the left lower quadrant. Mild mural   thickening of the right colon, which may be reactive or represent mild   colitis. Mild generalized small bowel distention to 3.0 cm without   discrete transition point identified. No fluid collections.    BONES/SOFT TISSUES: Postsurgical changes in anterior abdominal wall. Body   wall edema. Degenerative changes of the spine.    OTHER: Upper abdominal embolization coils. Bilateral iliac artery   aneurysms, larger on the left, similar to prior.      IMPRESSION:  1.  Postsurgical changes of Caraballo's procedure as described.  Mild mural   thickening of the right colon, which may be reactive or represent mild   colitis.   2.  Mild generalized small bowel distention without discrete transition   point identified, may be postoperative.  3.  Redemonstration of hyperenhancement of the lower pole of the right   kidney - differential includes pyelonephritis versus renal infarct.   Correlation with urinalysis recommended.    See separately dictated CT chest for findings in the thorax.                  GUILLERMINA GONG M.D., ATTENDING RADIOLOGIST  This document has been electronically signed. Nov 4 2019 10:03AM        Drug Screen:        [ ]  [place]  Reviewed on 11/4/19, 8:44P

## 2019-11-04 NOTE — PROGRESS NOTE ADULT - ASSESSMENT
Assessment & Plan    43y Male with PMH significant for Melva Danlos syndrome followed at [place], s/p celiac artery embolization, iliac artery aneurysm b/l, HTN, epilepsy, diverticulitis s/p ex-lap, Kerri procedure    NEURO:     extubated, no sedation     Hx epilepsy- on keppra 1500mg BID; oxcarbazepine 600mg at night, 300 in morning    Pain-controlled with Dilaudud PCA    Hx of anxiety    RESP:   RA . O2 Sat 100%, albuterol nebs (uses at home PRN)    No chronic dx, maintain sat>95%    AM CXR    CARDS:     New onset rapid afib- on amiodarone drip    Hx HTN- home lisinopril 30, ASA- both held    Hypotension resolved    Keep normotensive, maintain MAP>65    CE 0.2> 0.06> 0.02 , CK downtrending 4024>2870    GI/NUTR:     Diet, advanced to clears     ostomy +stool     PPI (home PRN omeprazole)       /RENAL:     Cr 0.8, voiding     LA 4.7>2.4>3.0>2.2    IVL    HEME/ONC:     Monitor Hgb 14.7>12>10.4    HSQ, restarted ASA     ID:     Afebrile, WBC incresing  1.35>1.6>4.98>8  (diff 88% neutrophils) (from 6.9 on admission)    Cipro, flagyl d/c'd. ID c/s and started on cefepime/flagyl    11/2  blood cx -NGTD    Endo:    No chronic dx

## 2019-11-05 LAB
ALBUMIN SERPL ELPH-MCNC: 2.2 G/DL — LOW (ref 3.5–5.2)
ALP SERPL-CCNC: 69 U/L — SIGNIFICANT CHANGE UP (ref 30–115)
ALT FLD-CCNC: 31 U/L — SIGNIFICANT CHANGE UP (ref 0–41)
ANION GAP SERPL CALC-SCNC: 14 MMOL/L — SIGNIFICANT CHANGE UP (ref 7–14)
APTT BLD: 33.7 SEC — SIGNIFICANT CHANGE UP (ref 27–39.2)
AST SERPL-CCNC: 57 U/L — HIGH (ref 0–41)
BILIRUB DIRECT SERPL-MCNC: 0.2 MG/DL — SIGNIFICANT CHANGE UP (ref 0–0.2)
BILIRUB INDIRECT FLD-MCNC: 0.2 MG/DL — SIGNIFICANT CHANGE UP (ref 0.2–1.2)
BILIRUB SERPL-MCNC: 0.4 MG/DL — SIGNIFICANT CHANGE UP (ref 0.2–1.2)
BUN SERPL-MCNC: 14 MG/DL — SIGNIFICANT CHANGE UP (ref 10–20)
CALCIUM SERPL-MCNC: 7.7 MG/DL — LOW (ref 8.5–10.1)
CHLORIDE SERPL-SCNC: 101 MMOL/L — SIGNIFICANT CHANGE UP (ref 98–110)
CO2 SERPL-SCNC: 23 MMOL/L — SIGNIFICANT CHANGE UP (ref 17–32)
CREAT SERPL-MCNC: 0.7 MG/DL — SIGNIFICANT CHANGE UP (ref 0.7–1.5)
GLUCOSE SERPL-MCNC: 80 MG/DL — SIGNIFICANT CHANGE UP (ref 70–99)
HCT VFR BLD CALC: 28.5 % — LOW (ref 42–52)
HGB BLD-MCNC: 9.5 G/DL — LOW (ref 14–18)
INR BLD: 1.17 RATIO — SIGNIFICANT CHANGE UP (ref 0.65–1.3)
MAGNESIUM SERPL-MCNC: 1.7 MG/DL — LOW (ref 1.8–2.4)
MCHC RBC-ENTMCNC: 30.4 PG — SIGNIFICANT CHANGE UP (ref 27–31)
MCHC RBC-ENTMCNC: 33.3 G/DL — SIGNIFICANT CHANGE UP (ref 32–37)
MCV RBC AUTO: 91.1 FL — SIGNIFICANT CHANGE UP (ref 80–94)
NRBC # BLD: 0 /100 WBCS — SIGNIFICANT CHANGE UP (ref 0–0)
PHOSPHATE SERPL-MCNC: 2.8 MG/DL — SIGNIFICANT CHANGE UP (ref 2.1–4.9)
PLATELET # BLD AUTO: 119 K/UL — LOW (ref 130–400)
POTASSIUM SERPL-MCNC: 3.7 MMOL/L — SIGNIFICANT CHANGE UP (ref 3.5–5)
POTASSIUM SERPL-SCNC: 3.7 MMOL/L — SIGNIFICANT CHANGE UP (ref 3.5–5)
PROT SERPL-MCNC: 4 G/DL — LOW (ref 6–8)
PROTHROM AB SERPL-ACNC: 13.4 SEC — HIGH (ref 9.95–12.87)
RBC # BLD: 3.13 M/UL — LOW (ref 4.7–6.1)
RBC # FLD: 13.7 % — SIGNIFICANT CHANGE UP (ref 11.5–14.5)
SODIUM SERPL-SCNC: 138 MMOL/L — SIGNIFICANT CHANGE UP (ref 135–146)
WBC # BLD: 7.96 K/UL — SIGNIFICANT CHANGE UP (ref 4.8–10.8)
WBC # FLD AUTO: 7.96 K/UL — SIGNIFICANT CHANGE UP (ref 4.8–10.8)

## 2019-11-05 PROCEDURE — 71045 X-RAY EXAM CHEST 1 VIEW: CPT | Mod: 26

## 2019-11-05 PROCEDURE — 99024 POSTOP FOLLOW-UP VISIT: CPT

## 2019-11-05 PROCEDURE — 99233 SBSQ HOSP IP/OBS HIGH 50: CPT

## 2019-11-05 RX ORDER — POTASSIUM PHOSPHATE, MONOBASIC POTASSIUM PHOSPHATE, DIBASIC 236; 224 MG/ML; MG/ML
15 INJECTION, SOLUTION INTRAVENOUS ONCE
Refills: 0 | Status: COMPLETED | OUTPATIENT
Start: 2019-11-05 | End: 2019-11-05

## 2019-11-05 RX ORDER — MAGNESIUM SULFATE 500 MG/ML
2 VIAL (ML) INJECTION ONCE
Refills: 0 | Status: COMPLETED | OUTPATIENT
Start: 2019-11-05 | End: 2019-11-05

## 2019-11-05 RX ORDER — AMIODARONE HYDROCHLORIDE 400 MG/1
200 TABLET ORAL DAILY
Refills: 0 | Status: DISCONTINUED | OUTPATIENT
Start: 2019-11-08 | End: 2019-11-08

## 2019-11-05 RX ADMIN — HEPARIN SODIUM 5000 UNIT(S): 5000 INJECTION INTRAVENOUS; SUBCUTANEOUS at 22:00

## 2019-11-05 RX ADMIN — Medication 15 MILLIGRAM(S): at 17:22

## 2019-11-05 RX ADMIN — Medication 15 MILLIGRAM(S): at 05:20

## 2019-11-05 RX ADMIN — POTASSIUM PHOSPHATE, MONOBASIC POTASSIUM PHOSPHATE, DIBASIC 63.75 MILLIMOLE(S): 236; 224 INJECTION, SOLUTION INTRAVENOUS at 04:53

## 2019-11-05 RX ADMIN — Medication 100 MILLIGRAM(S): at 15:23

## 2019-11-05 RX ADMIN — Medication 15 MILLIGRAM(S): at 11:13

## 2019-11-05 RX ADMIN — Medication 100 MILLIGRAM(S): at 22:00

## 2019-11-05 RX ADMIN — Medication 25 GRAM(S): at 03:28

## 2019-11-05 RX ADMIN — AMIODARONE HYDROCHLORIDE 200 MILLIGRAM(S): 400 TABLET ORAL at 05:17

## 2019-11-05 RX ADMIN — MORPHINE SULFATE 2 MILLIGRAM(S): 50 CAPSULE, EXTENDED RELEASE ORAL at 03:31

## 2019-11-05 RX ADMIN — OXYCODONE HYDROCHLORIDE 10 MILLIGRAM(S): 5 TABLET ORAL at 09:58

## 2019-11-05 RX ADMIN — Medication 650 MILLIGRAM(S): at 10:28

## 2019-11-05 RX ADMIN — CEFEPIME 100 MILLIGRAM(S): 1 INJECTION, POWDER, FOR SOLUTION INTRAMUSCULAR; INTRAVENOUS at 22:00

## 2019-11-05 RX ADMIN — Medication 15 MILLIGRAM(S): at 17:59

## 2019-11-05 RX ADMIN — Medication 81 MILLIGRAM(S): at 11:13

## 2019-11-05 RX ADMIN — PANTOPRAZOLE SODIUM 40 MILLIGRAM(S): 20 TABLET, DELAYED RELEASE ORAL at 05:25

## 2019-11-05 RX ADMIN — CEFEPIME 100 MILLIGRAM(S): 1 INJECTION, POWDER, FOR SOLUTION INTRAMUSCULAR; INTRAVENOUS at 14:02

## 2019-11-05 RX ADMIN — OXCARBAZEPINE 300 MILLIGRAM(S): 300 TABLET, FILM COATED ORAL at 05:20

## 2019-11-05 RX ADMIN — Medication 12.5 MILLIGRAM(S): at 17:29

## 2019-11-05 RX ADMIN — CHLORHEXIDINE GLUCONATE 1 APPLICATION(S): 213 SOLUTION TOPICAL at 05:17

## 2019-11-05 RX ADMIN — LEVETIRACETAM 1500 MILLIGRAM(S): 250 TABLET, FILM COATED ORAL at 17:19

## 2019-11-05 RX ADMIN — CEFEPIME 100 MILLIGRAM(S): 1 INJECTION, POWDER, FOR SOLUTION INTRAMUSCULAR; INTRAVENOUS at 05:21

## 2019-11-05 RX ADMIN — Medication 100 MILLIGRAM(S): at 05:16

## 2019-11-05 RX ADMIN — AMIODARONE HYDROCHLORIDE 200 MILLIGRAM(S): 400 TABLET ORAL at 17:22

## 2019-11-05 RX ADMIN — OXCARBAZEPINE 600 MILLIGRAM(S): 300 TABLET, FILM COATED ORAL at 22:00

## 2019-11-05 RX ADMIN — HEPARIN SODIUM 5000 UNIT(S): 5000 INJECTION INTRAVENOUS; SUBCUTANEOUS at 05:18

## 2019-11-05 RX ADMIN — OXYCODONE HYDROCHLORIDE 10 MILLIGRAM(S): 5 TABLET ORAL at 10:28

## 2019-11-05 RX ADMIN — Medication 12.5 MILLIGRAM(S): at 06:08

## 2019-11-05 RX ADMIN — Medication 650 MILLIGRAM(S): at 09:58

## 2019-11-05 RX ADMIN — LEVETIRACETAM 1500 MILLIGRAM(S): 250 TABLET, FILM COATED ORAL at 05:20

## 2019-11-05 RX ADMIN — Medication 15 MILLIGRAM(S): at 11:28

## 2019-11-05 RX ADMIN — HEPARIN SODIUM 5000 UNIT(S): 5000 INJECTION INTRAVENOUS; SUBCUTANEOUS at 14:02

## 2019-11-05 NOTE — PROGRESS NOTE ADULT - ASSESSMENT
ASSESSMENT:    NEURO:     Hx epilepsy- on keppra 1500mg BID; oxcarbazepine 600mg at night, 300 in morning    Pain-control: -D/c'd Dilaudid PCA, started Toradol standing x 48h, Oxy and Tylenol PRN    Hx of anxiety    RESP:   RA . O2 Sat 100%, albuterol nebs (uses at home PRN)    No chronic dx, maintain sat>95%    CT chest , effusions vs pulm edema    AM CXR    CARDS:   - New onset rapid afib- on PO amio,  added Lopressor 12.5mg q12h, HR slowed down to 80-90s, converted to NSR  - On PO amio  -CT chest: NEG for PE, has b/l pleural effusion, suspicious for pulm edema, scanned bedside IJs, looks euvolemic, no diuresis for now.  -ECHO 11/4: EF 55-60%, mild to moderate MR and TR    Hx HTN- home lisinopril held for now, added BB, ASA resumed.    Keep normotensive, maintain MAP>65    CE 0.2> 0.06> 0.02, not trending anymore, CK downtrending 4024>2870     GI/NUTR:     -CT a/p: post-surgical changes, no obvious collections, but abdomen mildly distended, primary team wants to keep pt NPO x meds and ice chips    ostomy +stool     PPI (home PRN omeprazole)       /RENAL:     Cr 0.8 > 0.7 voiding     LA 4.7>2.4>3.0>2.2     IVL    Lytes-Na 138 // K 3.7 // Phos 2.8 //  Mag 1.7 (repleted 2g mg sulfate, 15mmol K phos)    HEME/ONC:     Monitor Hgb 14.7>12>10.4 > 9.5    HSQ, restarted ASA   -DVT sono 11/4: NEG    ID:     Afebrile, WBC increasing  1.35>1.6>4.98>8>7.9  (diff 88% neutrophils) (from 6.9 on admission)    Cipro, flagyl d/c'd. ID c/s and started on cefepime/flagyl    11/2  blood cx -NGTD    Endo:    No chronic dx

## 2019-11-05 NOTE — DIETITIAN INITIAL EVALUATION ADULT. - ENERGY NEEDS
Energy Needs: 1874 - 2030 kcal/day  (MSJ 1.2 - 1.3 AF)  Protein Needs: 69 - 83 g/day ( 1.0 - 1.2 g/kg)  Fluid Needs: per ICU team

## 2019-11-05 NOTE — DIETITIAN INITIAL EVALUATION ADULT. - DIET TYPE
Pt. is NPO, received clear liquid diet x1day, however was discontinued 2/2 scheduled CT scan. Pt reported tolerating clear liquids well, and an increased appetite. Pt. is currently NPO, received clear liquid diet x1day, however was discontinued 2/2 scheduled CT scan. Pt reported tolerating clear liquids well, and an increased appetite.

## 2019-11-05 NOTE — DIETITIAN INITIAL EVALUATION ADULT. - ETIOLOGY
NPO in the setting of perforated diverticulitis + Kerri's procedure diet order in the setting of perforated diverticulitis s/p Kerri's procedure

## 2019-11-05 NOTE — DIETITIAN INITIAL EVALUATION ADULT. - NAME AND PHONE
RD to monitor: energy intake, diet order, nutrition focused physical finding, body composition RD to monitor: energy intake, diet order, nutrition focused physical finding, body composition, PO tolerance

## 2019-11-05 NOTE — DIETITIAN INITIAL EVALUATION ADULT. - OTHER INFO
Pertinent subjective information: Pt eating very good PTA, however on early september, pt admitted to [place]; during that time pt. lost 14 lb. in 2 weeks 2/2 loss op appetite, constant vomiting, increased medications. Pt. gained his appetite again; eats very well, 3 meals/day, drinks a protein nutrition supplement (unsure of name) 2-3 x day. Pt. reports currently has an increased appetite. NKFA. Pt was on a multivitamin. Educated pt. on nutrition education for a colostomy bag, provided hand off, pt. very receptive & verbalized agreement.       Pertinent medical information: Pt. admitted for sigmoid perforation s/p ex-lap with Hartmanns procedure. Neuro following. New onset rapid afib- on PO amio. Per GI, CT a/p: post-surgical changes, no obvious collections, but abdomen mildly distended, primary team wants to keep pt NPO x meds and ice chips. Pertinent subjective information: Pt eating very good PTA, however on early september, pt admitted to [place]; during that time pt. lost 14 lb. in 2 weeks 2/2 loss of appetite, constant vomiting, increased medications. Pt. gained his appetite again shortly after; eats very well, 3 meals/day, drinks a protein nutrition supplement (unsure of name) 2-3 x day. Pt. reports currently has an increased appetite. NKFA. Pt was on a multivitamin. Educated pt. on nutrition education for a colostomy bag, provided educational material, pt. very receptive & verbalized agreement.       Pertinent medical information: Pt. admitted for sigmoid perforation s/p ex-lap with Hartmanns procedure. Neuro following. New onset rapid afib- on PO amio. Per GI, CT a/p: post-surgical changes, no obvious collections, but abdomen mildly distended, primary team wants to keep pt NPO x meds and ice chips. Currently NADIRA drainage, pain control, CBC labs. SX team following Pertinent subjective information: Pt eating very good PTA, however on early september, pt admitted to [place]; during that time pt. lost 14 lb. in 2 weeks 2/2 loss of appetite, constant vomiting, increased medications. Pt. gained his appetite again shortly after; eats very well, 3 meals/day, drinks a protein nutrition supplement (unsure of name) 2-3 x day. Pt. reports currently has an increased appetite. NKFA. Pt was on a multivitamin. Educated pt. on nutrition education for a colostomy bag, provided educational material, pt. very receptive & verbalized agreement.       Pertinent medical information: Pt. admitted for sigmoid perforation s/p ex-lap with Hartmanns procedure. Neuro following. New onset rapid afib- on PO amio. Per GI, CT a/p: post-surgical changes, no obvious collections, but abdomen mildly distended, primary team wants to keep pt NPO x meds and ice chips. Currently with NADIRA drainage, pain control, monitoring labs. SX team following

## 2019-11-05 NOTE — PROGRESS NOTE ADULT - SUBJECTIVE AND OBJECTIVE BOX
FAZAL CASSIDY  [id number]  43y Male    Indication for ICU admission: S/p ex-lap, Kerri procedure for perforated diverticulitis  Admit Date:11-01-19  ICU Date:11-01-19  OR Date:11-01-19    moxifloxacin (Hives)  penicillin (Unknown)    PAST MEDICAL & SURGICAL HISTORY:  Dislocations and subluxations  Aneurysm artery, celiac  Keratoconus  HTN (hypertension)  Epilepsy  EDS (Melva-Danlos syndrome)  No significant past surgical history    Home Medications:  Aspir 81 oral delayed release tablet: orally once a day (01 Nov 2019 16:02)  Keppra 750 mg oral tablet: 2 tab(s) orally 2 times a day (01 Nov 2019 16:02)  lisinopril 30 mg oral tablet: 1 tab(s) orally once a day (01 Nov 2019 16:02)  Trileptal 300 mg oral tablet: 3 tab(s) orally once a day (01 Nov 2019 16:02)    24HRS EVENT:     Stable overnight. Dilaudid d/c'd. Changed to morphine. Made Tylenol PRN    NEURO:     Hx epilepsy- on keppra 1500mg BID; oxcarbazepine 600mg at night, 300 in morning    Pain-control: -D/c'd Dilaudid PCA, started Toradol standing x 48h, Oxy and Tylenol PRN    Hx of anxiety    RESP:   RA . O2 Sat 100%, albuterol nebs (uses at home PRN)    No chronic dx, maintain sat>95%    CT chest , effusions vs pulm edema    AM CXR    CARDS:   - New onset rapid afib- on PO amio,  added Lopressor 12.5mg q12h, HR slowed down to 80-90s, converted to NSR  - On PO amio  -CT chest: NEG for PE, has b/l pleural effusion, suspicious for pulm edema, scanned bedside IJs, looks euvolemic, no diuresis for now.  -ECHO 11/4: EF 55-60%, mild to moderate MR and TR    Hx HTN- home lisinopril held for now, added BB, ASA resumed.    Keep normotensive, maintain MAP>65    CE 0.2> 0.06> 0.02, not trending anymore, CK downtrending 4024>2870     GI/NUTR:     -CT a/p: post-surgical changes, no obvious collections, but abdomen mildly distended, primary team wants to keep pt NPO x meds and ice chips    ostomy +stool     PPI (home PRN omeprazole)       /RENAL:     Cr 0.8 > 0.7 voiding     LA 4.7>2.4>3.0>2.2     IVL    Lytes-Na 138 // K 3.7 // Phos 2.8 //  Mag 1.7 (repleted 2g mg sulfate, 15mmol K phos)    HEME/ONC:     Monitor Hgb 14.7>12>10.4 > 9.5    HSQ, restarted ASA   -DVT sono 11/4: NEG    ID:     Afebrile, WBC increasing  1.35>1.6>4.98>8>7.9  (diff 88% neutrophils) (from 6.9 on admission)    Cipro, flagyl d/c'd. ID c/s and started on cefepime/flagyl    11/2  blood cx -NGTD    Endo:    No chronic dx      DVT Prophylaxis: heparin  Injectable 5000 Unit(s) SubCutaneous every 8 hours  GI Prophylaxis:pantoprazole  Injectable 40 milliGRAM(s) IV Push daily    ***Tubes/Lines/Drains  ***  Peripheral IV    REVIEW OF SYSTEMS    [x] A ten-point review of systems was otherwise negative except as noted.  [ ] Due to altered mental status/intubation, subjective information were not able to be obtained from the patient. History was obtained, to the extent possible, from review of the chart and collateral sources of information. FAZAL CASSIDY  [id number]  43y Male    Indication for ICU admission: S/p ex-lap, Kerri procedure for perforated diverticulitis  Admit Date:11-01-19  ICU Date:11-01-19  OR Date:11-01-19    moxifloxacin (Hives)  penicillin (Unknown)    PAST MEDICAL & SURGICAL HISTORY:  Dislocations and subluxations  Aneurysm artery, celiac  Keratoconus  HTN (hypertension)  Epilepsy  EDS (Melva-Danlos syndrome)  No significant past surgical history    Home Medications:  Aspir 81 oral delayed release tablet: orally once a day (01 Nov 2019 16:02)  Keppra 750 mg oral tablet: 2 tab(s) orally 2 times a day (01 Nov 2019 16:02)  lisinopril 30 mg oral tablet: 1 tab(s) orally once a day (01 Nov 2019 16:02)  Trileptal 300 mg oral tablet: 3 tab(s) orally once a day (01 Nov 2019 16:02)    24HRS EVENT:     Stable overnight. Dilaudid d/c'd. Changed to morphine. Made Tylenol PRN. Remained NSR    NEURO:     Hx epilepsy- on keppra 1500mg BID; oxcarbazepine 600mg at night, 300 in morning    Pain-control: -D/c'd Dilaudid PCA, started Toradol standing x 48h, Oxy and Tylenol PRN    Hx of anxiety    RESP:   RA . O2 Sat 100%, albuterol nebs (uses at home PRN)    No chronic dx, maintain sat>95%    CT chest , effusions vs pulm edema    AM CXR    CARDS:   - New onset rapid afib- on PO amio,  added Lopressor 12.5mg q12h, HR slowed down to 80-90s, converted to NSR  - On PO amio  -CT chest: NEG for PE, has b/l pleural effusion, suspicious for pulm edema, scanned bedside IJs, looks euvolemic, no diuresis for now.  -ECHO 11/4: EF 55-60%, mild to moderate MR and TR    Hx HTN- home lisinopril held for now, added BB, ASA resumed.    Keep normotensive, maintain MAP>65    CE 0.2> 0.06> 0.02, not trending anymore, CK downtrending 4024>2870     GI/NUTR:     -CT a/p: post-surgical changes, no obvious collections, but abdomen mildly distended, primary team wants to keep pt NPO x meds and ice chips    ostomy +stool     PPI (home PRN omeprazole)       /RENAL:     Cr 0.8 > 0.7 voiding     LA 4.7>2.4>3.0>2.2     IVL    Lytes-Na 138 // K 3.7 // Phos 2.8 //  Mag 1.7 (repleted 2g mg sulfate, 15mmol K phos)    HEME/ONC:     Monitor Hgb 14.7>12>10.4 > 9.5    HSQ, restarted ASA   -DVT sono 11/4: NEG    ID:     Afebrile, WBC increasing  1.35>1.6>4.98>8>7.9  (diff 88% neutrophils) (from 6.9 on admission)    Cipro, flagyl d/c'd. ID c/s and started on cefepime/flagyl    11/2  blood cx -NGTD    Endo:    No chronic dx      DVT Prophylaxis: heparin  Injectable 5000 Unit(s) SubCutaneous every 8 hours  GI Prophylaxis:pantoprazole  Injectable 40 milliGRAM(s) IV Push daily    ***Tubes/Lines/Drains  ***  Peripheral IV    REVIEW OF SYSTEMS    [x] A ten-point review of systems was otherwise negative except as noted.  [ ] Due to altered mental status/intubation, subjective information were not able to be obtained from the patient. History was obtained, to the extent possible, from review of the chart and collateral sources of information.    Daily     Daily     Diet, NPO:   Except Medications  With Ice Chips/Sips of Water (11-04-19 @ 12:35)      CURRENT MEDS:  Neurologic Medications  acetaminophen   Tablet .. 650 milliGRAM(s) Oral every 6 hours PRN Mild Pain (1 - 3), Moderate Pain (4 - 6)  ketorolac   Injectable 15 milliGRAM(s) IV Push every 6 hours  levETIRAcetam 1500 milliGRAM(s) Oral two times a day  morphine  - Injectable 2 milliGRAM(s) IV Push every 6 hours PRN Severe Pain (7 - 10)  ondansetron Injectable 4 milliGRAM(s) IV Push every 6 hours PRN Nausea  OXcarbazepine 600 milliGRAM(s) Oral <User Schedule>  OXcarbazepine 300 milliGRAM(s) Oral <User Schedule>  oxyCODONE    IR 5 milliGRAM(s) Oral every 6 hours PRN Moderate Pain (4 - 6)  oxyCODONE    IR 10 milliGRAM(s) Oral every 6 hours PRN Severe Pain (7 - 10)    Respiratory Medications  ALBUTerol    90 MICROgram(s) HFA Inhaler 2 Puff(s) Inhalation every 6 hours PRN Shortness of Breath and/or Wheezing    Cardiovascular Medications  aMIOdarone    Tablet 200 milliGRAM(s) Oral every 12 hours  metoprolol tartrate 12.5 milliGRAM(s) Oral every 12 hours    Gastrointestinal Medications  pantoprazole    Tablet 40 milliGRAM(s) Oral before breakfast    Genitourinary Medications    Hematologic/Oncologic Medications  aspirin  chewable 81 milliGRAM(s) Oral daily  heparin  Injectable 5000 Unit(s) SubCutaneous every 8 hours  influenza   Vaccine 0.5 milliLiter(s) IntraMuscular once    Antimicrobial/Immunologic Medications  cefepime   IVPB      cefepime   IVPB 1000 milliGRAM(s) IV Intermittent every 8 hours  metroNIDAZOLE  IVPB 500 milliGRAM(s) IV Intermittent every 8 hours    Endocrine/Metabolic Medications    Topical/Other Medications  chlorhexidine 4% Liquid 1 Application(s) Topical <User Schedule>  naloxone Injectable 0.1 milliGRAM(s) IV Push every 3 minutes PRN For ANY of the following changes in patient status:  A. RR LESS THAN 10 breaths per minute, B. Oxygen saturation LESS THAN 90%, C. Sedation score of 6      ICU Vital Signs Last 24 Hrs  T(C): 36.8 (04 Nov 2019 20:00), Max: 36.8 (04 Nov 2019 12:00)  T(F): 98.2 (04 Nov 2019 20:00), Max: 98.2 (04 Nov 2019 12:00)  HR: 74 (05 Nov 2019 00:00) (74 - 126)  BP: 102/71 (05 Nov 2019 00:00) (98/62 - 130/81)  BP(mean): 80 (05 Nov 2019 00:00) (70 - 99)  ABP: --  ABP(mean): --  RR: 19 (05 Nov 2019 00:00) (16 - 39)  SpO2: 100% (05 Nov 2019 00:00) (88% - 100%)      Adult Advanced Hemodynamics Last 24 Hrs  CVP(mm Hg): --  CVP(cm H2O): --  CO: --  CI: --  PA: --  PA(mean): --  PCWP: --  SVR: --  SVRI: --  PVR: --  PVRI: --          I&O's Summary    03 Nov 2019 07:01  -  04 Nov 2019 07:00  --------------------------------------------------------  IN: 1679.8 mL / OUT: 2360 mL / NET: -680.2 mL    04 Nov 2019 07:01  -  05 Nov 2019 06:26  --------------------------------------------------------  IN: 567.2 mL / OUT: 915 mL / NET: -347.8 mL      I&O's Detail    03 Nov 2019 07:01  -  04 Nov 2019 07:00  --------------------------------------------------------  IN:    amiodarone Infusion: 199.8 mL    IV PiggyBack: 500 mL    lactated ringers.: 100 mL    Oral Fluid: 880 mL  Total IN: 1679.8 mL    OUT:    Bulb: 180 mL    Colostomy: 5 mL    Voided: 2175 mL  Total OUT: 2360 mL    Total NET: -680.2 mL      04 Nov 2019 07:01  -  05 Nov 2019 06:26  --------------------------------------------------------  IN:    amiodarone Infusion: 267.2 mL    IV PiggyBack: 300 mL  Total IN: 567.2 mL    OUT:    Bulb: 55 mL    Colostomy: 10 mL    Voided: 850 mL  Total OUT: 915 mL    Total NET: -347.8 mL      PHYSICAL EXAM:    General/Neuro   alert & oriented x 3, no focal deficits    Lungs:      clear to auscultation, Normal expansion/effort.     Cardiovascular : S1, S2.  Regular rate and rhythm.  Peripheral edema   Cardiac Rhythm: Normal Sinus Rhythm    GI: Abdomen soft, Non-tender, Non-distended.    Ostomy +stool +flatus, incision CDI    Extremities: Extremities warm, pink, well-perfused. Pulses:Rt     Lt    Derm: Good skin turgor, no skin breakdown.      :   voiding      LABS:  CAPILLARY BLOOD GLUCOSE                              9.5    7.96  )-----------( 119      ( 05 Nov 2019 00:33 )             28.5         11-05    138  |  101  |  14  ----------------------------<  80  3.7   |  23  |  0.7    Ca    7.7<L>      05 Nov 2019 00:33  Phos  2.8     11-05  Mg     1.7     11-05    TPro  4.0<L>  /  Alb  2.2<L>  /  TBili  0.4  /  DBili  0.2  /  AST  57<H>  /  ALT  31  /  AlkPhos  69  11-05      PT/INR - ( 05 Nov 2019 00:33 )   PT: 13.40 sec;   INR: 1.17 ratio         PTT - ( 05 Nov 2019 00:33 )  PTT:33.7 sec  CARDIAC MARKERS ( 03 Nov 2019 11:41 )  x     / x     / 2870 U/L / x     / x          Culture - Blood (collected 02 Nov 2019 11:26)  Source: .Blood None  Preliminary Report (03 Nov 2019 17:01):    No growth to date.    Culture - Blood (collected 02 Nov 2019 11:26)  Source: .Blood None  Preliminary Report (03 Nov 2019 17:01):    No growth to date.

## 2019-11-05 NOTE — ADVANCED PRACTICE NURSE CONSULT - ASSESSMENT
Mid quadrant Stoma  Stoma pink and moist  passing effluent  Wife and Mother engaged in education and are supportive  Stoma close to incision

## 2019-11-05 NOTE — DIETITIAN INITIAL EVALUATION ADULT. - NUTRITIONGOAL OUTCOME1
When medically feasible pt to advance diet to clear liquid diet to a goal diet order of GI soft. When medically feasible pt to consume and tolerate >50% of meal trays in the next 3 days. When medically feasible to adv diet pt to consume and tolerate >50% of meal trays in the next 3 days

## 2019-11-05 NOTE — DIETITIAN INITIAL EVALUATION ADULT. - PHYSICAL APPEARANCE
BMI: 23.1 ( ht: 172.7 wt: 69 kg)/overweight overweight/BMI: 23.1 ( ht: 172.7 wt: 69 kg) UBW:? skin: surgical incision ; no edema noted overweight/BMI: 23.1 ( ht: 172.7 wt: 69 kg) UBW: unknown at this time. skin: surgical incision ; no edema noted

## 2019-11-05 NOTE — DIETITIAN INITIAL EVALUATION ADULT. - ADD RECOMMEND
When medically feasible advance diet order to clear liquid diet with a goal diet order of DASH/TLC GI soft diet , as tolerated.

## 2019-11-05 NOTE — PROGRESS NOTE ADULT - SUBJECTIVE AND OBJECTIVE BOX
Patient sitting to chair. Patient states his abd pain is now controlled. Pain is 2/10 with current pain medication. Will sign off. Consult as needed.

## 2019-11-05 NOTE — PROGRESS NOTE ADULT - SUBJECTIVE AND OBJECTIVE BOX
GENERAL SURGERY PROGRESS NOTE     FAZAL CASSIDY  Male  Hospital day: 5d  POD: 4d  Procedure: Kerri procedure  Exploratory laparotomy    OVERNIGHT EVENTS: patient had CT PE (neg), CT A/P (mild distension without transition point), bilateral LE duplex (neg) and ECHO. Patient was transitioned off the amio drip to PO amio and lopressor. Patient has been in sinus for >12 hours and is controlled on PO meds. Patient continues to void and have ostomy output and tolerate CLD    T(F): 98.2 (11-04-19 @ 20:00), Max: 98.2 (11-04-19 @ 12:00)  HR: 74 (11-05-19 @ 00:00) (74 - 128)  BP: 102/71 (11-05-19 @ 00:00) (98/62 - 130/81)  RR: 19 (11-05-19 @ 00:00) (16 - 39)  SpO2: 100% (11-05-19 @ 00:00) (88% - 100%)                                                                            DRAINS:   11-03-19 @ 07:01  -  11-04-19 @ 07:00  --------------------------------------------------------  OUT: 180 mL    BM:   11-03-19 @ 07:01  -  11-04-19 @ 07:00  --------------------------------------------------------  OUT: 5 mL    GI proph:  pantoprazole    Tablet 40 milliGRAM(s) Oral before breakfast    AC/ proph: aspirin  chewable 81 milliGRAM(s) Oral daily  heparin  Injectable 5000 Unit(s) SubCutaneous every 8 hours    ABx: cefepime   IVPB      cefepime   IVPB 1000 milliGRAM(s) IV Intermittent every 8 hours  metroNIDAZOLE  IVPB 500 milliGRAM(s) IV Intermittent every 8 hours    PHYSICAL EXAM:  GENERAL: NAD, well-appearing  CHEST/LUNG: Clear to auscultation bilaterally  HEART: Regular rate and rhythm  ABDOMEN: Soft, Nontender, Nondistended; ostomy intact with stool, drain with serosanguinous fluid  EXTREMITIES:  No clubbing, cyanosis, or edema    Labs:               9.5    7.96  )-----------( 119      ( 05 Nov 2019 00:33 )             28.5       11-05    138  |  101  |  14  ----------------------------<  80  3.7   |  23  |  0.7    Calcium, Total Serum: 7.7 mg/dL (11-05-19 @ 00:33)    LFTs:             4.0  | 0.4  | 57       ------------------[69      ( 05 Nov 2019 00:33 )  2.2  | 0.2  | 31          Lactate, Blood: 2.2 mmol/L (11-03-19 @ 00:36)  Lactate, Blood: 2.5 mmol/L (11-02-19 @ 11:26)  Blood Gas Arterial, Lactate: ?1.8 mmoL/L (11-02-19 @ 10:43)  Blood Gas Arterial, Lactate: 3.0 mmoL/L (11-02-19 @ 08:35)    ABG - ( 02 Nov 2019 10:43 )  pH: 7.42  /  pCO2: 36    /  pO2: 159   / HCO3: 24    / Base Excess: -0.4  /  SaO2: 100       ABG - ( 02 Nov 2019 08:35 )  pH: 7.42  /  pCO2: 33    /  pO2: 130   / HCO3: 21    / Base Excess: -2.4  /  SaO2: 99        ABG - ( 02 Nov 2019 04:28 )  pH: 7.43  /  pCO2: 34    /  pO2: 186   / HCO3: 22    / Base Excess: -1.3  /  SaO2: 100       Coags:     13.40  ----< 1.17    ( 05 Nov 2019 00:33 )     33.7     CARDIAC MARKERS ( 03 Nov 2019 11:41 )  x     / x     / 2870 U/L / x     / x        Culture - Blood (collected 02 Nov 2019 11:26)  Source: .Blood None  Preliminary Report (03 Nov 2019 17:01):    No growth to date.    Culture - Blood (collected 02 Nov 2019 11:26)  Source: .Blood None  Preliminary Report (03 Nov 2019 17:01):    No growth to date.    RADIOLOGY & ADDITIONAL TESTS:    < from: CT Angio Chest w/ IV Cont (11.04.19 @ 09:29) >  IMPRESSION:    No central pulmonary embolus.  New groundglass opacities and small to moderate bilateral pleural   effusions which may represent pulmonary edema.  Please see separate report for concurrent evaluation of the abdomen and   pelvis.  < end of copied text >    < from: CT Abdomen and Pelvis w/ IV Cont (11.04.19 @ 09:29) >  IMPRESSION:  1.  Postsurgical changes of Caraballo's procedure as described.  Mild mural   thickening of the right colon, which may be reactive or represent mild   colitis.   2.  Mild generalized small bowel distention without discrete transition   point identified, may be postoperative.  3.  Redemonstration of hyperenhancement of the lower pole of the right   kidney - differential includes pyelonephritis versus renal infarct.   Correlationwith urinalysis recommended.  < end of copied text >    < from: [facility] Duplex Lower Ext Vein Scan, Bilat (11.04.19 @ 09:00) >  Impression:  No evidence of deep venous thrombosis or superficial thrombophlebitis in   bilateral lower extremities.  < end of copied text >    < from: Transthoracic Echocardiogram (11.04.19 @ 08:04) >  Summary:   1. Left ventricular ejection fraction, by visual estimation, is 55 to   60%.   2. Normal global left ventricular systolic function.   3. The left ventricular diastolic function could not be assessed in this  study.   4. Mild-to-moderate mitral regurgitation.   5. Mild-to-moderate tricuspid regurgitation.  < end of copied text >

## 2019-11-05 NOTE — CHART NOTE - NSCHARTNOTEFT_GEN_A_CORE
FAZAL CASSIDY  [id number]  43y Male    Indication for ICU admission: S/p ex-lap, Kerri procedure for perforated diverticulitis  Admit Date:11-01-19  ICU Date:11-01-19  OR Date:11-01-19    moxifloxacin (Hives)  penicillin (Unknown)    PAST MEDICAL & SURGICAL HISTORY:  Dislocations and subluxations  Aneurysm artery, celiac  Keratoconus  HTN (hypertension)  Epilepsy  EDS (Melva-Danlos syndrome)  No significant past surgical history      24HRS EVENT:     Stable overnight. Dilaudid d/c'd. Changed to morphine. Made Tylenol PRN. Remained NSR    NEURO:     Hx epilepsy- on keppra 1500mg BID; oxcarbazepine 600mg at night, 300 in morning    Pain-control: -D/c'd Dilaudid PCA, started Toradol standing x 48h, Oxy and Tylenol PRN    Hx of anxiety    RESP:   RA . O2 Sat 100%, albuterol nebs (uses at home PRN)    No chronic dx, maintain sat>95%    CT chest , effusions vs pulm edema    AM CXR    CARDS:   - New onset rapid afib- converted    - amio gtt d/c'd bridging to PO Amio 200mg BID x 3 days, ordered to change to once a day starting 11/8/19  -CT chest: NEG for PE, has b/l pleural effusion, suspicious for pulm edema, scanned bedside IJs, looks euvolemic, no diuresis for now.  -ECHO 11/4: EF 55-60%, mild to moderate MR and TR      GI/NUTR:     -CT a/p: post-surgical changes, no obvious collections, but abdomen mildly distended, primary team wants to keep pt NPO x meds and ice chips    ostomy +stool     PPI (home PRN omeprazole)       /RENAL:     Cr 0.8 > 0.7 voiding     IVL    Lytes-Na 138 // K 3.7 // Phos 2.8 //  Mag 1.7 (repleted 2g mg sulfate, 15mmol K phos)    HEME/ONC:     Monitor Hgb 10.4>9.5    HSQ for DVT ppx, restarted ASA   -DVT sono 11/4: NEG    ID:     Afebrile, WBC  7.8 (diff 88% neutrophils)     Abx: cefepime/flagyl     11/2  blood cx -NGTD    Endo:    No chronic dx      FOLLOW UP  -HR, f/u if patient flips back into afib, will need cards  -2330 labs  -amio switches to 200mg once a day on 11/8 FAZAL CASSIDY  [id number]  43y Male    Indication for ICU admission: S/p ex-lap, Kerri procedure for perforated diverticulitis  Admit Date:11-01-19  ICU Date:11-01-19  OR Date:11-01-19    moxifloxacin (Hives)  penicillin (Unknown)    PAST MEDICAL & SURGICAL HISTORY:  Dislocations and subluxations  Aneurysm artery, celiac  Keratoconus  HTN (hypertension)  Epilepsy  EDS (Melva-Danlos syndrome)  No significant past surgical history      24HRS EVENT:     NEURO:     Hx epilepsy- on keppra 1500mg BID; oxcarbazepine 600mg at night, 300 in morning    Pain-control: -D/c'd Dilaudid PCA, started Toradol standing x 48h (to be d/c'd tonight), Oxy and Tylenol PRN    Hx of anxiety    RESP:   RA . O2 Sat 100%, albuterol nebs (uses at home PRN)    No chronic dx, maintain sat>95%    CT chest , effusions vs pulm edema    AM CXR    CARDS:   - New onset rapid afib- converted    - amio gtt d/c'd bridging to PO Amio 200mg BID x 3 days, ordered to change to once a day starting 11/8/19  -CT chest: NEG for PE, has b/l pleural effusion, suspicious for pulm edema, scanned bedside IJs, looks euvolemic, no diuresis for now.  -ECHO 11/4: EF 55-60%, mild to moderate MR and TR      GI/NUTR:     -CT a/p: post-surgical changes, no obvious collections, but abdomen mildly distended, primary team wants to keep pt NPO x meds and ice chips    ostomy +stool     PPI (home PRN omeprazole)       /RENAL:     Cr 0.8 > 0.7 voiding     IVL    Lytes-Na 138 // K 3.7 // Phos 2.8 //  Mag 1.7 (repleted 2g mg sulfate, 15mmol K phos)    HEME/ONC:     Monitor Hgb 10.4>9.5    HSQ for DVT ppx, restarted ASA     DVT sono 11/4: NEG    ID:     Afebrile, WBC  7.8 (diff 88% neutrophils)     Abx: cefepime/flagyl     11/2  blood cx -NGTD    Endo:    No chronic dx    MSK: OOB2C, ambulating around unit    D/G: TELE    FOLLOW UP  -HR, f/u if patient flips back into afib, will need cards  -1810 labs  -amio switches to 200mg once a day on 11/8 FAZAL CASSIDY  [id number]  43y Male    Indication for ICU admission: S/p ex-lap, Kerri procedure for perforated diverticulitis  Admit Date:11-01-19  ICU Date:11-01-19  OR Date:11-01-19    moxifloxacin (Hives)  penicillin (Unknown)    PAST MEDICAL & SURGICAL HISTORY:  Dislocations and subluxations  Aneurysm artery, celiac  Keratoconus  HTN (hypertension)  Epilepsy  EDS (Melva-Danlos syndrome)  No significant past surgical history      24HRS EVENT:     NEURO:     Hx epilepsy- on keppra 1500mg BID; oxcarbazepine 600mg at night, 300 in morning    Pain-control: -started Toradol standing x 48h (to be d/c'd tonight), Oxy and Tylenol PRN    Hx of anxiety    RESP:   RA . O2 Sat 100%, albuterol nebs (uses at home PRN)    No chronic dx, maintain sat>95%    CT chest , effusions vs pulm edema    AM CXR    CARDS:   - New onset rapid afib started Sat converted back to NSR after 5mg metoprolol, sunday at 11pm flipped back to into afib rvr, resistant to multiple pushes of metoprolol started on amio gtt  - amio gtt converted to PO Amio 200mg BID x 3 days, ordered to change to once a day starting 11/8/19  -CT chest: NEG for PE, has b/l pleural effusion, suspicious for pulm edema, scanned bedside IJs, looks euvolemic, did not diuresis   -ECHO 11/4: EF 55-60%, mild to moderate MR and TR    GI/NUTR:     -CT a/p: post-surgical changes, no obvious collections, but abdomen mildly distended, primary team wants to keep pt NPO x meds and ice chips    ostomy +stool     PPI (home PRN omeprazole)       /RENAL:     Cr 0.8 > 0.7 voiding     IVL    Lytes-Na 138 // K 3.7 // Phos 2.8 //  Mag 1.7 (repleted 2g mg sulfate, 15mmol K phos)    HEME/ONC:     Monitor Hgb 10.4>9.5    HSQ for DVT ppx,    On home ASA     DVT sono 11/4: NEG    ID:     Afebrile, WBC  7.8 (diff 88% neutrophils)     Abx: cefepime/flagyl     11/2  blood cx -NGTD    Endo:    No chronic dx    MSK: OOB2C, ambulating around unit    D/G: TELE    FOLLOW UP  -HR, f/u if patient flips back into afib, will need cards c/s  -1888 labs  -amio switches to 200mg once a day on 11/8 FAZAL CASSIDY  [id number]  43y Male    Indication for ICU admission: S/p ex-lap, Kerri procedure for perforated diverticulitis  Admit Date:11-01-19  ICU Date:11-01-19  OR Date:11-01-19    moxifloxacin (Hives)  penicillin (Unknown)    PAST MEDICAL & SURGICAL HISTORY:  Dislocations and subluxations  Aneurysm artery, celiac  Keratoconus  HTN (hypertension)  Epilepsy  EDS (Melva-Danlos syndrome)  No significant past surgical history      24HRS EVENT:     NEURO:     Hx epilepsy- on keppra 1500mg BID; oxcarbazepine 600mg at night, 300 in morning    Pain-control: -started Toradol standing x 48h (to be d/c'd tonight), Oxy and Tylenol PRN    Hx of anxiety    RESP:   RA . O2 Sat 100%, albuterol nebs (uses at home PRN)    No chronic dx, maintain sat>95%    CT chest , effusions vs pulm edema    AM CXR    CARDS:   - New onset rapid afib started Sat converted back to NSR after 5mg metoprolol, sunday at 11pm flipped back to into afib rvr, resistant to multiple pushes of metoprolol started on amio gtt  - amio gtt converted to PO Amio 200mg BID x 3 days, ordered to change to once a day starting 11/8/19  -CT chest: NEG for PE, has b/l pleural effusion, suspicious for pulm edema, scanned bedside IJs, looks euvolemic, did not diuresis   -ECHO 11/4: EF 55-60%, mild to moderate MR and TR    GI/NUTR:     -CT a/p: post-surgical changes, no obvious collections, but abdomen mildly distended, primary team wants to keep pt NPO x meds and ice chips    ostomy +stool     PPI (home PRN omeprazole)       /RENAL:     Cr 0.8 > 0.7 voiding     IVL    Lytes-Na 138 // K 3.7 // Phos 2.8 //  Mag 1.7 (repleted 2g mg sulfate, 15mmol K phos)    HEME/ONC:     Monitor Hgb 10.4>9.5    HSQ for DVT ppx,    On home ASA     DVT sono 11/4: NEG    ID:     Afebrile, WBC  7.8 (diff 88% neutrophils)     Abx: cefepime/flagyl     11/2  blood cx -NGTD    Endo:    No chronic dx    MSK: OOB2C, ambulating around unit    D/G: TELE    FOLLOW UP  -HR, f/u if patient flips back into afib, will need cards c/s  -1492 labs  -amio switches to 200mg once a day on 11/8        Signed out to Dr Go 1400  on 11/5/19

## 2019-11-05 NOTE — PROGRESS NOTE ADULT - ASSESSMENT
Assessment:  43y Male patient admitted S/P Kerri procedure and Exploratory laparotomy 2/2 feculent peritonitis, with the above physical exam, labs, and imaging findings.    Plan:   -Pain control as needed  -Check and replete CBC and BMP q daily  -Strict input and output monitoring  -NADIRA drain care  -Management per SICU

## 2019-11-05 NOTE — ADVANCED PRACTICE NURSE CONSULT - RECOMMEDATIONS
met with patient and his support system  Supplies and literature provided  patient and his support system engaged and appropriate for self care at home.

## 2019-11-05 NOTE — DIETITIAN INITIAL EVALUATION ADULT. - PERTINENT MEDS FT
heparin, abx, Lopressor, Oxycodone, Zofran, Protonix, heparin, abx, Lopressor, Oxycodone, Zofran, Protonix, Flagyl, Ketorolac

## 2019-11-05 NOTE — DIETITIAN INITIAL EVALUATION ADULT. - FACTORS AFF FOOD INTAKE
none/No nausea/ vomiting. No chewing/swallowing issues. Pt. has a left lower Q  colostomy bag. Last night has some output. No nausea/ vomiting. No chewing/swallowing issues. Pt. has a colostomy bag. LBM: 11/4/none none/Currently with no nausea/ vomiting. No chewing/swallowing issues. Pt. has a colostomy bag. LBM: 11/4

## 2019-11-05 NOTE — DIETITIAN INITIAL EVALUATION ADULT. - PERTINENT LABORATORY DATA
11/5: RBC: 3.13, Hb.: 9.5, hct.: 28.5, Mg.: 1.7, AST: 57, corrected calcium: 9.14. 11/5: RBC: 3.13, Hb.: 9.5, hct.: 28.5, Mg.: 1.7, AST: 57, corrected calcium: 9.14, albumin: 2.2

## 2019-11-06 ENCOUNTER — TRANSCRIPTION ENCOUNTER (OUTPATIENT)
Age: 44
End: 2019-11-06

## 2019-11-06 LAB
ANION GAP SERPL CALC-SCNC: 13 MMOL/L — SIGNIFICANT CHANGE UP (ref 7–14)
ANION GAP SERPL CALC-SCNC: 9 MMOL/L — SIGNIFICANT CHANGE UP (ref 7–14)
APTT BLD: 30.2 SEC — SIGNIFICANT CHANGE UP (ref 27–39.2)
BASOPHILS # BLD AUTO: 0.04 K/UL — SIGNIFICANT CHANGE UP (ref 0–0.2)
BASOPHILS NFR BLD AUTO: 0.5 % — SIGNIFICANT CHANGE UP (ref 0–1)
BUN SERPL-MCNC: 14 MG/DL — SIGNIFICANT CHANGE UP (ref 10–20)
BUN SERPL-MCNC: 16 MG/DL — SIGNIFICANT CHANGE UP (ref 10–20)
CALCIUM SERPL-MCNC: 7.5 MG/DL — LOW (ref 8.5–10.1)
CALCIUM SERPL-MCNC: 7.9 MG/DL — LOW (ref 8.5–10.1)
CHLORIDE SERPL-SCNC: 101 MMOL/L — SIGNIFICANT CHANGE UP (ref 98–110)
CHLORIDE SERPL-SCNC: 102 MMOL/L — SIGNIFICANT CHANGE UP (ref 98–110)
CO2 SERPL-SCNC: 24 MMOL/L — SIGNIFICANT CHANGE UP (ref 17–32)
CO2 SERPL-SCNC: 25 MMOL/L — SIGNIFICANT CHANGE UP (ref 17–32)
CREAT SERPL-MCNC: 0.7 MG/DL — SIGNIFICANT CHANGE UP (ref 0.7–1.5)
CREAT SERPL-MCNC: 0.8 MG/DL — SIGNIFICANT CHANGE UP (ref 0.7–1.5)
EOSINOPHIL # BLD AUTO: 0 K/UL — SIGNIFICANT CHANGE UP (ref 0–0.7)
EOSINOPHIL NFR BLD AUTO: 0 % — SIGNIFICANT CHANGE UP (ref 0–8)
GLUCOSE SERPL-MCNC: 110 MG/DL — HIGH (ref 70–99)
GLUCOSE SERPL-MCNC: 111 MG/DL — HIGH (ref 70–99)
HCT VFR BLD CALC: 29.1 % — LOW (ref 42–52)
HCT VFR BLD CALC: 30.9 % — LOW (ref 42–52)
HGB BLD-MCNC: 10.3 G/DL — LOW (ref 14–18)
HGB BLD-MCNC: 9.7 G/DL — LOW (ref 14–18)
IMM GRANULOCYTES NFR BLD AUTO: 2.4 % — HIGH (ref 0.1–0.3)
INR BLD: 1.28 RATIO — SIGNIFICANT CHANGE UP (ref 0.65–1.3)
LYMPHOCYTES # BLD AUTO: 0.72 K/UL — LOW (ref 1.2–3.4)
LYMPHOCYTES # BLD AUTO: 8.2 % — LOW (ref 20.5–51.1)
MAGNESIUM SERPL-MCNC: 1.7 MG/DL — LOW (ref 1.8–2.4)
MAGNESIUM SERPL-MCNC: 1.9 MG/DL — SIGNIFICANT CHANGE UP (ref 1.8–2.4)
MCHC RBC-ENTMCNC: 30 PG — SIGNIFICANT CHANGE UP (ref 27–31)
MCHC RBC-ENTMCNC: 30.1 PG — SIGNIFICANT CHANGE UP (ref 27–31)
MCHC RBC-ENTMCNC: 33.3 G/DL — SIGNIFICANT CHANGE UP (ref 32–37)
MCHC RBC-ENTMCNC: 33.3 G/DL — SIGNIFICANT CHANGE UP (ref 32–37)
MCV RBC AUTO: 90.1 FL — SIGNIFICANT CHANGE UP (ref 80–94)
MCV RBC AUTO: 90.4 FL — SIGNIFICANT CHANGE UP (ref 80–94)
MONOCYTES # BLD AUTO: 0.56 K/UL — SIGNIFICANT CHANGE UP (ref 0.1–0.6)
MONOCYTES NFR BLD AUTO: 6.3 % — SIGNIFICANT CHANGE UP (ref 1.7–9.3)
NEUTROPHILS # BLD AUTO: 7.29 K/UL — HIGH (ref 1.4–6.5)
NEUTROPHILS NFR BLD AUTO: 82.6 % — HIGH (ref 42.2–75.2)
NRBC # BLD: 0 /100 WBCS — SIGNIFICANT CHANGE UP (ref 0–0)
NRBC # BLD: 0 /100 WBCS — SIGNIFICANT CHANGE UP (ref 0–0)
PHOSPHATE SERPL-MCNC: 2.6 MG/DL — SIGNIFICANT CHANGE UP (ref 2.1–4.9)
PHOSPHATE SERPL-MCNC: 3.1 MG/DL — SIGNIFICANT CHANGE UP (ref 2.1–4.9)
PLATELET # BLD AUTO: 141 K/UL — SIGNIFICANT CHANGE UP (ref 130–400)
PLATELET # BLD AUTO: 208 K/UL — SIGNIFICANT CHANGE UP (ref 130–400)
POTASSIUM SERPL-MCNC: 3.5 MMOL/L — SIGNIFICANT CHANGE UP (ref 3.5–5)
POTASSIUM SERPL-MCNC: 4.2 MMOL/L — SIGNIFICANT CHANGE UP (ref 3.5–5)
POTASSIUM SERPL-SCNC: 3.5 MMOL/L — SIGNIFICANT CHANGE UP (ref 3.5–5)
POTASSIUM SERPL-SCNC: 4.2 MMOL/L — SIGNIFICANT CHANGE UP (ref 3.5–5)
PROTHROM AB SERPL-ACNC: 14.7 SEC — HIGH (ref 9.95–12.87)
RBC # BLD: 3.22 M/UL — LOW (ref 4.7–6.1)
RBC # BLD: 3.43 M/UL — LOW (ref 4.7–6.1)
RBC # FLD: 13.6 % — SIGNIFICANT CHANGE UP (ref 11.5–14.5)
RBC # FLD: 14 % — SIGNIFICANT CHANGE UP (ref 11.5–14.5)
SODIUM SERPL-SCNC: 135 MMOL/L — SIGNIFICANT CHANGE UP (ref 135–146)
SODIUM SERPL-SCNC: 139 MMOL/L — SIGNIFICANT CHANGE UP (ref 135–146)
WBC # BLD: 6.26 K/UL — SIGNIFICANT CHANGE UP (ref 4.8–10.8)
WBC # BLD: 8.82 K/UL — SIGNIFICANT CHANGE UP (ref 4.8–10.8)
WBC # FLD AUTO: 6.26 K/UL — SIGNIFICANT CHANGE UP (ref 4.8–10.8)
WBC # FLD AUTO: 8.82 K/UL — SIGNIFICANT CHANGE UP (ref 4.8–10.8)

## 2019-11-06 PROCEDURE — 99024 POSTOP FOLLOW-UP VISIT: CPT

## 2019-11-06 PROCEDURE — 71045 X-RAY EXAM CHEST 1 VIEW: CPT | Mod: 26

## 2019-11-06 RX ORDER — POTASSIUM CHLORIDE 20 MEQ
40 PACKET (EA) ORAL ONCE
Refills: 0 | Status: COMPLETED | OUTPATIENT
Start: 2019-11-06 | End: 2019-11-06

## 2019-11-06 RX ORDER — MAGNESIUM SULFATE 500 MG/ML
2 VIAL (ML) INJECTION ONCE
Refills: 0 | Status: COMPLETED | OUTPATIENT
Start: 2019-11-06 | End: 2019-11-06

## 2019-11-06 RX ORDER — LACTULOSE 10 G/15ML
20 SOLUTION ORAL EVERY 6 HOURS
Refills: 0 | Status: DISCONTINUED | OUTPATIENT
Start: 2019-11-06 | End: 2019-11-07

## 2019-11-06 RX ORDER — LACTULOSE 10 G/15ML
20 SOLUTION ORAL DAILY
Refills: 0 | Status: DISCONTINUED | OUTPATIENT
Start: 2019-11-06 | End: 2019-11-06

## 2019-11-06 RX ORDER — SENNA PLUS 8.6 MG/1
2 TABLET ORAL AT BEDTIME
Refills: 0 | Status: DISCONTINUED | OUTPATIENT
Start: 2019-11-06 | End: 2019-11-06

## 2019-11-06 RX ADMIN — Medication 81 MILLIGRAM(S): at 11:35

## 2019-11-06 RX ADMIN — Medication 15 MILLIGRAM(S): at 06:14

## 2019-11-06 RX ADMIN — OXYCODONE HYDROCHLORIDE 10 MILLIGRAM(S): 5 TABLET ORAL at 02:15

## 2019-11-06 RX ADMIN — HEPARIN SODIUM 5000 UNIT(S): 5000 INJECTION INTRAVENOUS; SUBCUTANEOUS at 06:45

## 2019-11-06 RX ADMIN — CHLORHEXIDINE GLUCONATE 1 APPLICATION(S): 213 SOLUTION TOPICAL at 06:14

## 2019-11-06 RX ADMIN — OXYCODONE HYDROCHLORIDE 5 MILLIGRAM(S): 5 TABLET ORAL at 15:16

## 2019-11-06 RX ADMIN — LEVETIRACETAM 1500 MILLIGRAM(S): 250 TABLET, FILM COATED ORAL at 06:13

## 2019-11-06 RX ADMIN — Medication 100 MILLIGRAM(S): at 22:40

## 2019-11-06 RX ADMIN — CEFEPIME 100 MILLIGRAM(S): 1 INJECTION, POWDER, FOR SOLUTION INTRAMUSCULAR; INTRAVENOUS at 06:25

## 2019-11-06 RX ADMIN — LACTULOSE 20 GRAM(S): 10 SOLUTION ORAL at 23:10

## 2019-11-06 RX ADMIN — Medication 15 MILLIGRAM(S): at 00:05

## 2019-11-06 RX ADMIN — Medication 15 MILLIGRAM(S): at 06:45

## 2019-11-06 RX ADMIN — HEPARIN SODIUM 5000 UNIT(S): 5000 INJECTION INTRAVENOUS; SUBCUTANEOUS at 15:06

## 2019-11-06 RX ADMIN — OXYCODONE HYDROCHLORIDE 10 MILLIGRAM(S): 5 TABLET ORAL at 21:16

## 2019-11-06 RX ADMIN — AMIODARONE HYDROCHLORIDE 200 MILLIGRAM(S): 400 TABLET ORAL at 06:13

## 2019-11-06 RX ADMIN — LEVETIRACETAM 1500 MILLIGRAM(S): 250 TABLET, FILM COATED ORAL at 17:44

## 2019-11-06 RX ADMIN — Medication 40 MILLIEQUIVALENT(S): at 06:15

## 2019-11-06 RX ADMIN — Medication 25 GRAM(S): at 06:13

## 2019-11-06 RX ADMIN — Medication 12.5 MILLIGRAM(S): at 06:12

## 2019-11-06 RX ADMIN — Medication 100 MILLIGRAM(S): at 06:46

## 2019-11-06 RX ADMIN — Medication 12.5 MILLIGRAM(S): at 17:44

## 2019-11-06 RX ADMIN — CEFEPIME 100 MILLIGRAM(S): 1 INJECTION, POWDER, FOR SOLUTION INTRAMUSCULAR; INTRAVENOUS at 22:40

## 2019-11-06 RX ADMIN — OXYCODONE HYDROCHLORIDE 10 MILLIGRAM(S): 5 TABLET ORAL at 23:12

## 2019-11-06 RX ADMIN — OXCARBAZEPINE 600 MILLIGRAM(S): 300 TABLET, FILM COATED ORAL at 22:43

## 2019-11-06 RX ADMIN — LACTULOSE 20 GRAM(S): 10 SOLUTION ORAL at 17:43

## 2019-11-06 RX ADMIN — CEFEPIME 100 MILLIGRAM(S): 1 INJECTION, POWDER, FOR SOLUTION INTRAMUSCULAR; INTRAVENOUS at 15:06

## 2019-11-06 RX ADMIN — HEPARIN SODIUM 5000 UNIT(S): 5000 INJECTION INTRAVENOUS; SUBCUTANEOUS at 22:40

## 2019-11-06 RX ADMIN — Medication 15 MILLIGRAM(S): at 00:35

## 2019-11-06 RX ADMIN — PANTOPRAZOLE SODIUM 40 MILLIGRAM(S): 20 TABLET, DELAYED RELEASE ORAL at 06:12

## 2019-11-06 RX ADMIN — OXCARBAZEPINE 300 MILLIGRAM(S): 300 TABLET, FILM COATED ORAL at 06:12

## 2019-11-06 RX ADMIN — Medication 100 MILLIGRAM(S): at 15:05

## 2019-11-06 RX ADMIN — OXYCODONE HYDROCHLORIDE 5 MILLIGRAM(S): 5 TABLET ORAL at 16:15

## 2019-11-06 RX ADMIN — OXYCODONE HYDROCHLORIDE 10 MILLIGRAM(S): 5 TABLET ORAL at 01:45

## 2019-11-06 RX ADMIN — AMIODARONE HYDROCHLORIDE 200 MILLIGRAM(S): 400 TABLET ORAL at 17:45

## 2019-11-06 NOTE — PROGRESS NOTE ADULT - ASSESSMENT
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 On presentation to the ED he was afebrile, low grade tachycardic. CTA chest was performed demonstrating scattered tiny foci of free air within the visualized abdomen. Trace free fluid abutting the inferior right lobe of the liver is overall haziness of the mesentery. Found to have Perforation of sigmoid colon s/p Kerri procedure, Exploratory laparotomy 01-Nov-2019     IMPRESSION  #Feculent peritonitis OR 11/1, Severe Sepsis ( T>101F, Pulse>90, Resp Rate>20, wbc<4), lactic acidosis, due to suspected peritonitis due to perforated sigmoid colon    BCX NG  #Lactic acidosis  #Leukopenia, resolved  #Hypomagnesemia  #Allergy: moxifloxacin (Hives)...Knee aches according to wife, but seizures on Cipro  penicillin (Unknown)  Has tolerated Rocephin & Flagyl according to wife    PLAN  - Cefepime 1gm q8h IVPB & Flagyl 500mg q8h IVPB x 5 days post OR end 11/7  - Trend WBC ASSESSMENT  43y M Melva-Danlos syndrome followed at [facility] ([facility]--GI, [facility]--Vascular), b/l carotid d/s, 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 On presentation to the ED he was afebrile, low grade tachycardic. CTA chest was performed demonstrating scattered tiny foci of free air within the visualized abdomen. Trace free fluid abutting the inferior right lobe of the liver is overall haziness of the mesentery. Found to have Perforation of sigmoid colon s/p Kerri procedure, Exploratory laparotomy 01-Nov-2019     IMPRESSION  #Feculent peritonitis OR 11/1, Severe Sepsis ( T>101F, Pulse>90, Resp Rate>20, wbc<4), lactic acidosis, due to suspected peritonitis due to perforated sigmoid colon    BCX NG  #Lactic acidosis  #Leukopenia, resolved  #Hypomagnesemia  #Allergy: moxifloxacin (Hives)...Knee aches according to wife, but seizures on Cipro  penicillin (Unknown)  Has tolerated Rocephin & Flagyl according to wife    PLAN  - Cefepime 1gm q8h IVPB & Flagyl 500mg q8h IVPB x 7-10 days post OR (can complete with PO vantin 200mg BID and flagyl 500mg TID)  - Trend WBC

## 2019-11-06 NOTE — PROGRESS NOTE ADULT - SUBJECTIVE AND OBJECTIVE BOX
GENERAL SURGERY PROGRESS NOTE     FAZAL CASSIDY  Male  Hospital day :5d  POD:  Procedure: Kerri procedure  Exploratory laparotomy    OVERNIGHT EVENTS:    T(F): 97.4 (11-05-19 @ 20:01), Max: 99.8 (11-05-19 @ 08:00)  HR: 96 (11-05-19 @ 20:01) (82 - 96)  BP: 116/76 (11-05-19 @ 20:01) (106/61 - 116/76)  ABP: --  ABP(mean): --  RR: 19 (11-05-19 @ 20:01) (19 - 33)  SpO2: 100% (11-05-19 @ 14:00) (97% - 100%)    DIET/FLUIDS: magnesium sulfate  IVPB 2 Gram(s) IV Intermittent once  potassium chloride   Powder 40 milliEquivalent(s) Oral once    NG:                                                                                DRAINS:   11-04-19 @ 07:01  -  11-05-19 @ 07:00  --------------------------------------------------------  OUT: 90 mL      BM:   11-04-19 @ 07:01  -  11-05-19 @ 07:00  --------------------------------------------------------  OUT: 20 mL      EMESIS:     URINE:      GI proph:  pantoprazole    Tablet 40 milliGRAM(s) Oral before breakfast    AC/ proph: aspirin  chewable 81 milliGRAM(s) Oral daily  heparin  Injectable 5000 Unit(s) SubCutaneous every 8 hours    ABx: cefepime   IVPB      cefepime   IVPB 1000 milliGRAM(s) IV Intermittent every 8 hours  metroNIDAZOLE  IVPB 500 milliGRAM(s) IV Intermittent every 8 hours      PHYSICAL EXAM:  GENERAL: NAD, well-appearing  CHEST/LUNG: Clear to auscultation bilaterally  HEART: Regular rate and rhythm  ABDOMEN: Soft, Nontender, Nondistended;   EXTREMITIES:  No clubbing, cyanosis, or edema      LABS  Labs:  CAPILLARY BLOOD GLUCOSE                              9.7    6.26  )-----------( 141      ( 06 Nov 2019 02:02 )             29.1         11-06    139  |  102  |  14  ----------------------------<  110<H>  3.5   |  24  |  0.7      Calcium, Total Serum: 7.5 mg/dL (11-06-19 @ 02:02)      LFTs:             4.0  | 0.4  | 57       ------------------[69      ( 05 Nov 2019 00:33 )  2.2  | 0.2  | 31          Lipase:x      Amylase:x           ABG - ( 02 Nov 2019 10:43 )  pH: 7.42  /  pCO2: 36    /  pO2: 159   / HCO3: 24    / Base Excess: -0.4  /  SaO2: 100             ABG - ( 02 Nov 2019 08:35 )  pH: 7.42  /  pCO2: 33    /  pO2: 130   / HCO3: 21    / Base Excess: -2.4  /  SaO2: 99              ABG - ( 02 Nov 2019 04:28 )  pH: 7.43  /  pCO2: 34    /  pO2: 186   / HCO3: 22    / Base Excess: -1.3  /  SaO2: 100               Coags:     14.70  ----< 1.28    ( 06 Nov 2019 02:02 )     30.2 GENERAL SURGERY PROGRESS NOTE     FAZAL CASSIDY  Male  Hospital day: 6d  POD: 5d  Procedure: Kerri procedure  Exploratory laparotomy    OVERNIGHT EVENTS: no acute events overnight. Patient continues to tolerate a liquid diet without nausea or vomiting. Is having hard ostomy output. Patient has been ambulating and has minimal complaints of abdominal pain.    T(F): 97.4 (11-05-19 @ 20:01), Max: 99.8 (11-05-19 @ 08:00)  HR: 96 (11-05-19 @ 20:01) (82 - 96)  BP: 116/76 (11-05-19 @ 20:01) (106/61 - 116/76)  RR: 19 (11-05-19 @ 20:01) (19 - 33)  SpO2: 100% (11-05-19 @ 14:00) (97% - 100%)    DIET/FLUIDS: magnesium sulfate  IVPB 2 Gram(s) IV Intermittent once  potassium chloride   Powder 40 milliEquivalent(s) Oral once                                                                              DRAINS:   11-04-19 @ 07:01  -  11-05-19 @ 07:00  --------------------------------------------------------  OUT: 90 mL    BM:   11-04-19 @ 07:01  -  11-05-19 @ 07:00  --------------------------------------------------------  OUT: 20 mL    GI proph:  pantoprazole    Tablet 40 milliGRAM(s) Oral before breakfast    AC/ proph: aspirin  chewable 81 milliGRAM(s) Oral daily  heparin  Injectable 5000 Unit(s) SubCutaneous every 8 hours    ABx: cefepime   IVPB      cefepime   IVPB 1000 milliGRAM(s) IV Intermittent every 8 hours  metroNIDAZOLE  IVPB 500 milliGRAM(s) IV Intermittent every 8 hours    PHYSICAL EXAM:  GENERAL: NAD, well-appearing  CHEST/LUNG: Clear to auscultation bilaterally  HEART: Regular rate and rhythm  ABDOMEN: Soft, Nontender, Nondistended; ostomy with stool  EXTREMITIES:  No clubbing, cyanosis, or edema    Labs:               9.7    6.26  )-----------( 141      ( 06 Nov 2019 02:02 )             29.1       11-06    139  |  102  |  14  ----------------------------<  110<H>  3.5   |  24  |  0.7    Calcium, Total Serum: 7.5 mg/dL (11-06-19 @ 02:02)    LFTs:             4.0  | 0.4  | 57       ------------------[69      ( 05 Nov 2019 00:33 )  2.2  | 0.2  | 31          ABG - ( 02 Nov 2019 10:43 )  pH: 7.42  /  pCO2: 36    /  pO2: 159   / HCO3: 24    / Base Excess: -0.4  /  SaO2: 100       ABG - ( 02 Nov 2019 08:35 )  pH: 7.42  /  pCO2: 33    /  pO2: 130   / HCO3: 21    / Base Excess: -2.4  /  SaO2: 99        ABG - ( 02 Nov 2019 04:28 )  pH: 7.43  /  pCO2: 34    /  pO2: 186   / HCO3: 22    / Base Excess: -1.3  /  SaO2: 100       Coags:     14.70  ----< 1.28    ( 06 Nov 2019 02:02 )     30.2

## 2019-11-06 NOTE — DISCHARGE NOTE NURSING/CASE MANAGEMENT/SOCIAL WORK - PATIENT PORTAL LINK FT
You can access the FollowMyHealth Patient Portal offered by [facility] by registering at the following website: [email or web address]. By joining [facility]’s FollowMyHealth portal, you will also be able to view your health information using other applications (apps) compatible with our system.

## 2019-11-06 NOTE — PROGRESS NOTE ADULT - SUBJECTIVE AND OBJECTIVE BOX
ANGE, FAZAL  43y, Male  Allergy: moxifloxacin (Hives)  penicillin (Unknown)      CHIEF COMPLAINT: sigmoid perforation s/p ex-lap with Hartmanns (2019 02:06)      INTERVAL EVENTS/HPI  - No acute events overnight  - T(F): , Max: 98.4 (19 @ 07:31)  - Denies any worsening symptoms  - Tolerating medication  - WBC Count: 6.26 (19 @ 02:02)  WBC Count: 7.96 (19 @ 00:33)  - Creatinine, Serum: 0.7 (19 @ 02:02)  Creatinine, Serum: 0.7 (19 @ 00:33)       ROS  General: Denies rigors, nightsweats  HEENT: Denies headache, rhinorrhea, sore throat, eye pain  CV: Denies CP, palpitations  PULM: Denies wheezing, hemoptysis  GI: Denies hematemesis, hematochezia, melena  : Denies discharge, hematuria  MSK: Denies arthralgias, myalgias  SKIN: Denies rash, lesions  NEURO: Denies paresthesias, weakness  PSYCH: Denies depression, anxiety    VITALS:  T(F): 96.8, Max: 98.4 (19 @ 07:31)  HR: 81  BP: 119/86  RR: 18Vital Signs Last 24 Hrs  T(C): 36 (2019 12:37), Max: 36.9 (2019 07:31)  T(F): 96.8 (2019 12:37), Max: 98.4 (2019 07:31)  HR: 81 (2019 12:37) (81 - 96)  BP: 119/86 (2019 12:37) (116/76 - 121/82)  BP(mean): --  RR: 18 (2019 12:37) (18 - 19)  SpO2: --    PHYSICAL EXAM:  Gen: NAD, resting in bed  HEENT: Normocephalic, atraumatic  Neck: supple, no lymphadenopathy  CV: Regular rate & regular rhythm  Lungs: decreased BS at bases, no fremitus  Abdomen: Soft, BS present, ostomy + stool, dressing in place  Ext: Warm, well perfused  Neuro: non focal, awake  Skin: no rash, no erythema  Lines: no phlebitis        FH: Non-contributory  Social Hx: Non-contributory    TESTS & MEASUREMENTS:                        9.7    6.26  )-----------( 141      ( 2019 02:02 )             29.1         139  |  102  |  14  ----------------------------<  110<H>  3.5   |  24  |  0.7    Ca    7.5<L>      2019 02:02  Phos  3.1       Mg     1.7         TPro  4.0<L>  /  Alb  2.2<L>  /  TBili  0.4  /  DBili  0.2  /  AST  57<H>  /  ALT  31  /  AlkPhos  69      eGFR if Non African American: 116 mL/min/1.73M2 (19 @ 02:02)  eGFR if : 134 mL/min/1.73M2 (19 @ 02:02)    LIVER FUNCTIONS - ( 2019 00:33 )  Alb: 2.2 g/dL / Pro: 4.0 g/dL / ALK PHOS: 69 U/L / ALT: 31 U/L / AST: 57 U/L / GGT: x               Culture - Blood (collected 19 @ 11:26)  Source: .Blood None  Preliminary Report (19 @ 17:01):    No growth to date.    Culture - Blood (collected 19 @ 11:26)  Source: .Blood None  Preliminary Report (19 @ 17:01):    No growth to date.        Lactate, Blood: 2.2 mmol/L (19 @ 00:36)  Lactate, Blood: 2.5 mmol/L (19 @ 11:26)      INFECTIOUS DISEASES TESTING      RADIOLOGY & ADDITIONAL TESTS:  I have personally reviewed the last Chest xray  CXR      CT  CT Abdomen and Pelvis w/ IV Cont:   EXAM:  CT ABDOMEN AND PELVIS IC            PROCEDURE DATE:  2019            INTERPRETATION:  CLINICAL HISTORY: Abdominal pain. Status post Caraballo's..    TECHNIQUE: Contiguous axial CT images were obtained from the lower chest   to the pubic symphysis following administration of Optiray intravenous   contrast. Oral contrast was not administered. Reformatted images in the   coronal and sagittal planes were acquired.    COMPARISON: CT dated 2019 CT dated 2019.      FINDINGS:    LOWER CHEST: See separately dictated CT chest.    HEPATOBILIARY: Gallbladder sludge. No biliary duct dilatation.   Unremarkable appearance of the liver.    SPLEEN: Unremarkable.    PANCREAS: Limited visualization due to extensive streak artifact from   upper abdominal embolization coils.    ADRENAL GLANDS: Unremarkable.    KIDNEYS: Focal hypoenhancement in the lower pole of the right kidney   again demonstrated. No hydronephrosis bilaterally. Bilateral renal   cortical scarring.    ABDOMINOPELVIC NODES: No lymphadenopathy.    PELVIC ORGANS: Foci of air in the bladder, likely related to recent   instrumentation.    PERITONEUM/MESENTERY/BOWEL:  Interval Caraballo's procedure with left lower quadrant colostomy and   rectal stump noted. Small pneumoperitoneum related to recent laparotomy.   Percutaneous drain with tip in the left lower quadrant. Mild mural   thickening of the right colon, which may be reactive or represent mild   colitis. Mild generalized small bowel distention to 3.0 cm without   discrete transition point identified. No fluid collections.    BONES/SOFT TISSUES: Postsurgical changes in anterior abdominal wall. Body   wall edema. Degenerative changes of the spine.    OTHER: Upper abdominal embolization coils. Bilateral iliac artery   aneurysms, larger on the left, similar to prior.      IMPRESSION:  1.  Postsurgical changes of Caraballo's procedure as described.  Mild mural   thickening of the right colon, which may be reactive or represent mild   colitis.   2.  Mild generalized small bowel distention without discrete transition   point identified, may be postoperative.  3.  Redemonstration of hyperenhancement of the lower pole of the right   kidney - differential includes pyelonephritis versus renal infarct.   Correlationwith urinalysis recommended.    See separately dictated CT chest for findings in the thorax.                  GUILLERMINA GONG M.D., ATTENDING RADIOLOGIST  This document has been electronically signed. 2019 10:03AM             (19 @ 09:29)      CARDIOLOGY TESTING  Transthoracic Echocardiogram:    EXAM:  2-D ECHO (TTE) COMPLETE        PROCEDURE DATE:  2019      INTERPRETATION:  REPORT:    TRANSTHORACIC ECHOCARDIOGRAM REPORT         Patient Name:   FAZAL CASSIDY Accession #: [id number]  Medical Rec #:  [id number]      Height:      69.0 in 175.3 cm  YOB: 1975   Weight:      152.0 lb 68.95 kg  Patient Age:    43 years     BSA:         1.84 m²  Patient Gender: M            BP:          112/66 mmHg       Date of Exam:        2019 8:04:08 AM  Referring Physician: [id number]  Sonographer:         Jacinto Murillo  Reading Physician:   Theo Ramirez M.D.    Procedure:   2D Echo/Doppler/Color Doppler Complete.  Indications: R94.31 - Abnormal Electrocardiogram [ECG] [EKG]  Diagnosis:   Abnormal electrocardiogram [ECG] [EKG] - R94.31         Summary:   1. Left ventricular ejection fraction, by visual estimation, is 55 to   60%.   2. Normal global left ventricular systolic function.   3. The left ventricular diastolic function could not be assessed in this  study.   4. Mild-to-moderate mitral regurgitation.   5. Mild-to-moderate tricuspid regurgitation.    PHYSICIAN INTERPRETATION:  Left Ventricle: The left ventricular internal cavity size is normal. Left   ventricular wall thickness is normal. GlobalLV systolic function was   normal. Left ventricular ejection fraction, by visual estimation, is 55   to 60%. The left ventricular diastolic function could not be assessed in   this study.  Right Ventricle: Normal right ventricular size and function.  Left Atrium: Normal left atrial size.  Right Atrium: Normal right atrial size.  Pericardium: There is no evidence of pericardial effusion.  Mitral Valve: The mitral valve is normal in structure. Mild to moderate   mitral valve regurgitation is seen.  Tricuspid Valve: The tricuspid valve is normal in structure.   Mild-moderate tricuspid regurgitation is visualized.  Aortic Valve: The aortic valve is trileaflet. No evidence of aortic   stenosis. No aortic regurgitation.  Pulmonic Valve: Trace pulmonic valve regurgitation.  Aorta: Aortic root measured at sinotubular junction is normal.  Venous: The inferior vena cava is not well visualized.       2D AND M-MODE MEASUREMENTS (normal ranges within parentheses):  Left                  Normal   Aorta/Left             Normal  Ventricle:                     Atrium:  IVSd (2D):  1.01 cm  (0.7-1.1) AoV Cusp       1.95  (1.5-2.6)  LVPWd (2D): 0.99 cm  (0.7-1.1) Separation:     cm  LVIDd (2D): 3.83 cm  (3.4-5.7) Left Atrium    3.29  (1.9-4.0)  LVIDs (2D): 2.73 cm            (Mmode):        cm  LV FS (2D):  28.8 %   (>25%)   LA Volume      20.2  Relative      0.52    (<0.42)  Index         ml/m²  Wall  Thickness    SPECTRAL DOPPLER ANALYSIS:  Aortic Valve:  AoV VMax:    1.33 m/s AoV Area, Vmax: 3.10cm² Vmax Indx: 1.69 cm²/m²  AoV Pk Grad: 7.1 mmHg    LVOT Vmax: 1.27 m/s  LVOT VTI:  0.20 m  LVOT Diam: 2.04 cm    Tricuspid Valve and PA/RV Systolic Pressure: TR Max Velocity: 2.64 m/s RA   Pressure: 3 mmHg RVSP/PASP: 30.9 mmHg    Pulmonic Valve:  PV Max Velocity: 1.02 m/s PV Max P.2 mmHg PV Mean PG:       [id number] Theo Ramirez M.D., Electronically signed on 2019 at   9:33:27 AM         *** Final ***                    THEO RAMIREZ M.D., ATTENDING CARDIOLOGIST  This document has been electronically signed. 2019  8:04AM             (19 @ 08:04)  12 Lead ECG:   Ventricular Rate 124 BPM    Atrial Rate 124 BPM    P-R Interval 130 ms    QRS Duration 100 ms    Q-T Interval 320 ms    QTC Calculation(Bezet) 459 ms    P Axis 27 degrees    R Axis 2 degrees    T Axis 55 degrees    Diagnosis Line Sinus tachycardia with Premature supraventricular complexes and with  occasional Premature ventricular complexes  Otherwise normal ECG    Confirmed by Theo Ramirez MD ([id number]) on 2019 8:09:46 AM (19 @ 17:09)      MEDICATIONS  aMIOdarone    Tablet 200  aspirin  chewable 81  cefepime   IVPB   cefepime   IVPB 1000  chlorhexidine 4% Liquid 1  heparin  Injectable 5000  influenza   Vaccine 0.5  lactulose Syrup 20  levETIRAcetam 1500  metoprolol tartrate 12.5  metroNIDAZOLE  IVPB 500  OXcarbazepine 600  OXcarbazepine 300  pantoprazole    Tablet 40  senna 2      ANTIBIOTICS:  cefepime   IVPB      cefepime   IVPB 1000 milliGRAM(s) IV Intermittent every 8 hours  metroNIDAZOLE  IVPB 500 milliGRAM(s) IV Intermittent every 8 hours      All available historical records have been reviewed

## 2019-11-06 NOTE — PROGRESS NOTE ADULT - ASSESSMENT
Assessment:  Assessment:  43y Male patient admitted S/P Kerri procedure and Exploratory laparotomy 2/2 feculent peritonitis, with the above physical exam, labs, and imaging findings.    Plan:   -Full liquid diet  -Pain control as needed  -Check and replete CBC and BMP q daily  -Strict input and output monitoring  -NADIRA drain care  -d/c planning Assessment:  43y Male patient admitted S/P Kerri procedure and Exploratory laparotomy 2/2 feculent peritonitis    Plan:   -Full liquid diet  -bowel reg  -Pain control as needed  -Check and replete CBC and BMP q daily  -Strict input and output monitoring  -NADIRA drain care  -d/c planning

## 2019-11-07 ENCOUNTER — TRANSCRIPTION ENCOUNTER (OUTPATIENT)
Age: 44
End: 2019-11-07

## 2019-11-07 LAB
CULTURE RESULTS: SIGNIFICANT CHANGE UP
CULTURE RESULTS: SIGNIFICANT CHANGE UP
SPECIMEN SOURCE: SIGNIFICANT CHANGE UP
SPECIMEN SOURCE: SIGNIFICANT CHANGE UP

## 2019-11-07 PROCEDURE — 99024 POSTOP FOLLOW-UP VISIT: CPT

## 2019-11-07 RX ORDER — CEFPODOXIME PROXETIL 100 MG
1 TABLET ORAL
Qty: 8 | Refills: 0
Start: 2019-11-07 | End: 2019-11-10

## 2019-11-07 RX ORDER — AMIODARONE HYDROCHLORIDE 400 MG/1
1 TABLET ORAL
Qty: 60 | Refills: 0
Start: 2019-11-07 | End: 2019-12-06

## 2019-11-07 RX ORDER — METRONIDAZOLE 500 MG
1 TABLET ORAL
Qty: 12 | Refills: 0
Start: 2019-11-07 | End: 2019-11-10

## 2019-11-07 RX ORDER — OXYCODONE HYDROCHLORIDE 5 MG/1
1 TABLET ORAL
Qty: 20 | Refills: 0
Start: 2019-11-07 | End: 2019-11-11

## 2019-11-07 RX ADMIN — CEFEPIME 100 MILLIGRAM(S): 1 INJECTION, POWDER, FOR SOLUTION INTRAMUSCULAR; INTRAVENOUS at 22:36

## 2019-11-07 RX ADMIN — PANTOPRAZOLE SODIUM 40 MILLIGRAM(S): 20 TABLET, DELAYED RELEASE ORAL at 06:24

## 2019-11-07 RX ADMIN — Medication 100 MILLIGRAM(S): at 06:25

## 2019-11-07 RX ADMIN — Medication 100 MILLIGRAM(S): at 14:27

## 2019-11-07 RX ADMIN — Medication 12.5 MILLIGRAM(S): at 17:46

## 2019-11-07 RX ADMIN — AMIODARONE HYDROCHLORIDE 200 MILLIGRAM(S): 400 TABLET ORAL at 17:47

## 2019-11-07 RX ADMIN — OXCARBAZEPINE 300 MILLIGRAM(S): 300 TABLET, FILM COATED ORAL at 06:24

## 2019-11-07 RX ADMIN — LEVETIRACETAM 1500 MILLIGRAM(S): 250 TABLET, FILM COATED ORAL at 17:46

## 2019-11-07 RX ADMIN — Medication 81 MILLIGRAM(S): at 11:57

## 2019-11-07 RX ADMIN — OXYCODONE HYDROCHLORIDE 10 MILLIGRAM(S): 5 TABLET ORAL at 08:30

## 2019-11-07 RX ADMIN — Medication 85 MILLIMOLE(S): at 07:52

## 2019-11-07 RX ADMIN — AMIODARONE HYDROCHLORIDE 200 MILLIGRAM(S): 400 TABLET ORAL at 06:25

## 2019-11-07 RX ADMIN — HEPARIN SODIUM 5000 UNIT(S): 5000 INJECTION INTRAVENOUS; SUBCUTANEOUS at 14:25

## 2019-11-07 RX ADMIN — CEFEPIME 100 MILLIGRAM(S): 1 INJECTION, POWDER, FOR SOLUTION INTRAMUSCULAR; INTRAVENOUS at 14:24

## 2019-11-07 RX ADMIN — OXYCODONE HYDROCHLORIDE 10 MILLIGRAM(S): 5 TABLET ORAL at 07:52

## 2019-11-07 RX ADMIN — OXYCODONE HYDROCHLORIDE 10 MILLIGRAM(S): 5 TABLET ORAL at 23:00

## 2019-11-07 RX ADMIN — OXCARBAZEPINE 600 MILLIGRAM(S): 300 TABLET, FILM COATED ORAL at 22:36

## 2019-11-07 RX ADMIN — OXYCODONE HYDROCHLORIDE 10 MILLIGRAM(S): 5 TABLET ORAL at 22:36

## 2019-11-07 RX ADMIN — CEFEPIME 100 MILLIGRAM(S): 1 INJECTION, POWDER, FOR SOLUTION INTRAMUSCULAR; INTRAVENOUS at 06:25

## 2019-11-07 RX ADMIN — LEVETIRACETAM 1500 MILLIGRAM(S): 250 TABLET, FILM COATED ORAL at 06:24

## 2019-11-07 RX ADMIN — Medication 12.5 MILLIGRAM(S): at 06:24

## 2019-11-07 RX ADMIN — Medication 100 MILLIGRAM(S): at 22:36

## 2019-11-07 NOTE — PROGRESS NOTE ADULT - SUBJECTIVE AND OBJECTIVE BOX
GENERAL SURGERY PROGRESS NOTE     FAZAL CASSIDY  Male  Hospital day :6d  POD:  Procedure: Kerri procedure  Exploratory laparotomy    OVERNIGHT EVENTS:    T(F): 99.1 (11-07-19 @ 05:02), Max: 99.1 (11-07-19 @ 05:02)  HR: 97 (11-07-19 @ 05:02) (81 - 97)  BP: 137/85 (11-07-19 @ 05:02) (119/86 - 137/85)  ABP: --  ABP(mean): --  RR: 18 (11-07-19 @ 05:02) (18 - 19)  SpO2: --    DIET/FLUIDS: sodium phosphate IVPB 30 milliMole(s) IV Intermittent once    NG:                                                                                DRAINS:   11-05-19 @ 07:01  -  11-06-19 @ 07:00  --------------------------------------------------------  OUT: 30 mL      BM:   11-05-19 @ 07:01  -  11-06-19 @ 07:00  --------------------------------------------------------  OUT: 0 mL      EMESIS:     URINE:      GI proph:  pantoprazole    Tablet 40 milliGRAM(s) Oral before breakfast    AC/ proph: aspirin  chewable 81 milliGRAM(s) Oral daily  heparin  Injectable 5000 Unit(s) SubCutaneous every 8 hours    ABx: cefepime   IVPB      cefepime   IVPB 1000 milliGRAM(s) IV Intermittent every 8 hours  metroNIDAZOLE  IVPB 500 milliGRAM(s) IV Intermittent every 8 hours      PHYSICAL EXAM:  GENERAL: NAD, well-appearing  CHEST/LUNG: Clear to auscultation bilaterally  HEART: Regular rate and rhythm  ABDOMEN: Soft, Nontender, Nondistended;   EXTREMITIES:  No clubbing, cyanosis, or edema      LABS  Labs:  CAPILLARY BLOOD GLUCOSE                              10.3   8.82  )-----------( 208      ( 06 Nov 2019 21:23 )             30.9       Auto Neutrophil %: 82.6 % (11-06-19 @ 21:23)  Auto Immature Granulocyte %: 2.4 % (11-06-19 @ 21:23)    11-06    135  |  101  |  16  ----------------------------<  111<H>  4.2   |  25  |  0.8      Calcium, Total Serum: 7.9 mg/dL (11-06-19 @ 21:23)      LFTs:       ABG - ( 02 Nov 2019 10:43 )  pH: 7.42  /  pCO2: 36    /  pO2: 159   / HCO3: 24    / Base Excess: -0.4  /  SaO2: 100             ABG - ( 02 Nov 2019 08:35 )  pH: 7.42  /  pCO2: 33    /  pO2: 130   / HCO3: 21    / Base Excess: -2.4  /  SaO2: 99              ABG - ( 02 Nov 2019 04:28 )  pH: 7.43  /  pCO2: 34    /  pO2: 186   / HCO3: 22    / Base Excess: -1.3  /  SaO2: 100               Coags:     14.70  ----< 1.28    ( 06 Nov 2019 02:02 )     30.2                        RADIOLOGY & ADDITIONAL TESTS:      A/P GENERAL SURGERY PROGRESS NOTE     FAZAL CASSIDY  Male  Hospital day: 7d  POD: 6d  Procedure: Kerri procedure  Exploratory laparotomy    OVERNIGHT EVENTS: patient had 4 ostomy bad worth of soft stool, is tolerating a full liquid diet, ambulating and voiding. Denies abdominal pain.    T(F): 99.1 (11-07-19 @ 05:02), Max: 99.1 (11-07-19 @ 05:02)  HR: 97 (11-07-19 @ 05:02) (81 - 97)  BP: 137/85 (11-07-19 @ 05:02) (119/86 - 137/85)  RR: 18 (11-07-19 @ 05:02) (18 - 19)    DIET/FLUIDS: sodium phosphate IVPB 30 milliMole(s) IV Intermittent once                                                                   DRAINS:   11-05-19 @ 07:01  -  11-06-19 @ 07:00  --------------------------------------------------------  OUT: 30 mL    BM:   11-05-19 @ 07:01  -  11-06-19 @ 07:00  --------------------------------------------------------  OUT: 0 mL    GI proph:  pantoprazole    Tablet 40 milliGRAM(s) Oral before breakfast    AC/ proph: aspirin  chewable 81 milliGRAM(s) Oral daily  heparin  Injectable 5000 Unit(s) SubCutaneous every 8 hours    ABx: cefepime   IVPB      cefepime   IVPB 1000 milliGRAM(s) IV Intermittent every 8 hours  metroNIDAZOLE  IVPB 500 milliGRAM(s) IV Intermittent every 8 hours    PHYSICAL EXAM:  GENERAL: NAD, well-appearing  CHEST/LUNG: Clear to auscultation bilaterally  HEART: Regular rate and rhythm  ABDOMEN: Soft, Nontender, Nondistended; ostomy with liquid brown stool  EXTREMITIES:  No clubbing, cyanosis, or edema    Labs:  CAPILLARY BLOOD GLUCOSE               10.3   8.82  )-----------( 208      ( 06 Nov 2019 21:23 )             30.9       Auto Neutrophil %: 82.6 % (11-06-19 @ 21:23)  Auto Immature Granulocyte %: 2.4 % (11-06-19 @ 21:23)    11-06    135  |  101  |  16  ----------------------------<  111<H>  4.2   |  25  |  0.8    Calcium, Total Serum: 7.9 mg/dL (11-06-19 @ 21:23)    ABG - ( 02 Nov 2019 10:43 )  pH: 7.42  /  pCO2: 36    /  pO2: 159   / HCO3: 24    / Base Excess: -0.4  /  SaO2: 100       ABG - ( 02 Nov 2019 08:35 )  pH: 7.42  /  pCO2: 33    /  pO2: 130   / HCO3: 21    / Base Excess: -2.4  /  SaO2: 99        ABG - ( 02 Nov 2019 04:28 )  pH: 7.43  /  pCO2: 34    /  pO2: 186   / HCO3: 22    / Base Excess: -1.3  /  SaO2: 100       Coags:     14.70  ----< 1.28    ( 06 Nov 2019 02:02 )     30.2

## 2019-11-07 NOTE — ADVANCED PRACTICE NURSE CONSULT - RECOMMEDATIONS
due to the closeness between the incision and the stoma , a 1 3/4" appliance was use due to limited space.  Stoma is oval 1 1/4' in length,   cut the edge of the flange to avoid overlapping the incision.  Paste was applied around the stoma as a gasket.  Paste was applied to fill the indentation form the retention sutures.  Tape to secure the edges.  Family was engaged receptive and appropriate for support and care.  Patient continues to be overwhelmed with limited retention of education.    Family will be providing the necessary  care.  Supplies and literature provided and reviewed.

## 2019-11-07 NOTE — ADVANCED PRACTICE NURSE CONSULT - ASSESSMENT
Met with patient and his very supportive family  Mother , Father, and wife  Low lying pink stoma just to the left of the abdominal incision( close Proximity)  midline Abdominal incision  intact with retention sutures causing indentions and uneveness.  Stoma  pink and moist. Passing stool.

## 2019-11-07 NOTE — PROGRESS NOTE ADULT - ASSESSMENT
Assessment:  43y Male patient admitted S/P Kerri procedure and Exploratory laparotomy 2/2 feculent peritonitis. Now downgraded to telemetry, having good ostomy output and tolerating diet    Plan:   - advance to regular diet  - d/c lactulose  - f/u SW for VNS  - d/c today

## 2019-11-07 NOTE — CONSULT NOTE ADULT - ASSESSMENT
Patient post op develop afib  in NSR now  continue amiodarone 200 mg BID for one month only  no anticoagulation due to CHADs 0-1  follow up as out patient

## 2019-11-07 NOTE — CONSULT NOTE ADULT - SUBJECTIVE AND OBJECTIVE BOX
Date of Admission:    CHIEF COMPLAINT:    HISTORY OF PRESENT ILLNESS: 43 y Male with PMH below presented to the hospital for acute abdomen, had surgery   develop afib with RVR    PAST MEDICAL & SURGICAL HISTORY:  Dislocations and subluxations  Aneurysm artery, celiac  Keratoconus  HTN (hypertension)  Epilepsy  EDS (Melva-Danlos syndrome)  No significant past surgical history    HEALTH ISSUES - PROBLEM Dx:  Abdominal pain: Abdominal pain        FAMILY HISTORY:    Allergies    moxifloxacin (Hives)  penicillin (Unknown)    Intolerances    	  Home Medications:  albuterol 90 mcg/inh inhalation aerosol: inhaled once a day, As Needed (02 Nov 2019 18:21)  Aspir 81 oral delayed release tablet: orally once a day (01 Nov 2019 16:02)  Keppra 750 mg oral tablet: 2 tab(s) orally 2 times a day (01 Nov 2019 16:02)  lisinopril 30 mg oral tablet: 1 tab(s) orally once a day (01 Nov 2019 16:02)  Multiple Vitamins oral tablet: 1 tab(s) orally once a day (02 Nov 2019 18:21)  omeprazole 20 mg oral delayed release tablet: 1 tab(s) orally once a day, As Needed (02 Nov 2019 18:20)  Trileptal 300 mg oral tablet: 2 tab(s) orally in the morning   (02 Nov 2019 18:18)  Trileptal 300 mg oral tablet: 1 tab(s) orally once a day (at bedtime) (02 Nov 2019 18:18)    MEDICATIONS  (STANDING):  aMIOdarone    Tablet 200 milliGRAM(s) Oral every 12 hours  aspirin  chewable 81 milliGRAM(s) Oral daily  cefepime   IVPB      cefepime   IVPB 1000 milliGRAM(s) IV Intermittent every 8 hours  chlorhexidine 4% Liquid 1 Application(s) Topical <User Schedule>  heparin  Injectable 5000 Unit(s) SubCutaneous every 8 hours  influenza   Vaccine 0.5 milliLiter(s) IntraMuscular once  levETIRAcetam 1500 milliGRAM(s) Oral two times a day  metoprolol tartrate 12.5 milliGRAM(s) Oral every 12 hours  metroNIDAZOLE  IVPB 500 milliGRAM(s) IV Intermittent every 8 hours  OXcarbazepine 600 milliGRAM(s) Oral <User Schedule>  OXcarbazepine 300 milliGRAM(s) Oral <User Schedule>  pantoprazole    Tablet 40 milliGRAM(s) Oral before breakfast    MEDICATIONS  (PRN):  acetaminophen   Tablet .. 650 milliGRAM(s) Oral every 6 hours PRN Mild Pain (1 - 3), Moderate Pain (4 - 6)  ALBUTerol    90 MICROgram(s) HFA Inhaler 2 Puff(s) Inhalation every 6 hours PRN Shortness of Breath and/or Wheezing  naloxone Injectable 0.1 milliGRAM(s) IV Push every 3 minutes PRN For ANY of the following changes in patient status:  A. RR LESS THAN 10 breaths per minute, B. Oxygen saturation LESS THAN 90%, C. Sedation score of 6  ondansetron Injectable 4 milliGRAM(s) IV Push every 6 hours PRN Nausea  oxyCODONE    IR 5 milliGRAM(s) Oral every 6 hours PRN Moderate Pain (4 - 6)  oxyCODONE    IR 10 milliGRAM(s) Oral every 6 hours PRN Severe Pain (7 - 10)              SOCIAL HISTORY:    [x ] Non-smoker  [ ] Smoker  [ ] Alcohol      REVIEW OF SYSTEMS:  CONSTITUTIONAL: No fever, weight loss, or fatigue  CARDIOLOGY: PAtient denies chest pain, shortness of breath or syncopal episodes.   RESPIRATORY: denies shortness of breath, wheezeing.   NEUROLOGICAL: NO weakness, no focal deficits to report.  ENDOCRINOLOGICAL: no recent change in diabetic medications.   GI: no BRBPR, no N,V,diarrhea.    PSYCHIATRY: normal mood and affect  HEENT: no nasal discharge, no ecchymosis  SKIN: no ecchymosis, no breakdown  MUSCULOSKELETAL: Full range of motion x4.      PHYSICAL EXAM:  T(C): 37.3 (11-07-19 @ 05:02), Max: 37.3 (11-07-19 @ 05:02)  HR: 97 (11-07-19 @ 05:02) (81 - 97)  BP: 137/85 (11-07-19 @ 05:02) (119/86 - 137/85)  RR: 18 (11-07-19 @ 05:02) (18 - 18)  SpO2: --  Wt(kg): --  I&O's Summary    06 Nov 2019 07:01  -  07 Nov 2019 07:00  --------------------------------------------------------  IN: 750 mL / OUT: 24 mL / NET: 726 mL    07 Nov 2019 07:01  -  07 Nov 2019 10:35  --------------------------------------------------------  IN: 330 mL / OUT: 0 mL / NET: 330 mL      Daily     Daily     General Appearance: Normal	  Cardiovascular: Normal S1 S2, No JVD, No murmurs, No edema  Respiratory: Lungs clear to auscultation	  Psychiatry: A & O x 3, Mood & affect appropriate  Gastrointestinal:  Soft, Non-tender  Skin: No rashes, No ecchymoses, No cyanosis	  Neurologic: Non-focal  Extremities: Normal range of motion, No clubbing, cyanosis or edema  Vascular: Peripheral pulses palpable 2+ bilaterally        LABS:	 	                          10.3   8.82  )-----------( 208      ( 06 Nov 2019 21:23 )             30.9     11-06    135  |  101  |  16  ----------------------------<  111<H>  4.2   |  25  |  0.8    Ca    7.9<L>      06 Nov 2019 21:23  Phos  2.6     11-06  Mg     1.9     11-06          PT/INR - ( 06 Nov 2019 02:02 )   PT: 14.70 sec;   INR: 1.28 ratio         PTT - ( 06 Nov 2019 02:02 )  PTT:30.2 sec    proBNP:   Lipid Profile:   HgA1c:   TSH:       CARDIAC MARKERS:            TELEMETRY EVENTS: 	    ECG:  	NSR  RADIOLOGY:  OTHER: 	    PREVIOUS DIAGNOSTIC TESTING:    [ ] Echocardiogram:  [ ]  Catheterization:  [ ] Stress Test:  	  	  ASSESSMENT/PLAN:
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
## 2019-11-07 NOTE — DISCHARGE NOTE PROVIDER - NSDCCPCAREPLAN_GEN_ALL_CORE_FT
PRINCIPAL DISCHARGE DIAGNOSIS  Diagnosis: Abdominal pain  Assessment and Plan of Treatment:       SECONDARY DISCHARGE DIAGNOSES  Diagnosis: Kidney infarction  Assessment and Plan of Treatment:
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
## 2019-11-07 NOTE — DISCHARGE NOTE PROVIDER - NSDCMRMEDTOKEN_GEN_ALL_CORE_FT
albuterol 90 mcg/inh inhalation aerosol: inhaled once a day, As Needed  Aspir 81 oral delayed release tablet: orally once a day  Keppra 750 mg oral tablet: 2 tab(s) orally 2 times a day  lisinopril 30 mg oral tablet: 1 tab(s) orally once a day  Multiple Vitamins oral tablet: 1 tab(s) orally once a day  omeprazole 20 mg oral delayed release tablet: 1 tab(s) orally once a day, As Needed  Trileptal 300 mg oral tablet: 2 tab(s) orally in the morning    Trileptal 300 mg oral tablet: 1 tab(s) orally once a day (at bedtime)  Zofran 4 mg oral tablet: 1 tab(s) orally once a day albuterol 90 mcg/inh inhalation aerosol: inhaled once a day, As Needed  amiodarone 200 mg oral tablet: 1 tab(s) orally 2 times a day MDD:2 tablets  Aspir 81 oral delayed release tablet: orally once a day  cefpodoxime 200 mg oral tablet: 1 tab(s) orally 2 times a day MDD:2 tablets  Flagyl 500 mg oral tablet: 1 tab(s) orally 3 times a day MDD:3 tablets  Keppra 750 mg oral tablet: 2 tab(s) orally 2 times a day  lisinopril 30 mg oral tablet: 1 tab(s) orally once a day  Multiple Vitamins oral tablet: 1 tab(s) orally once a day  omeprazole 20 mg oral delayed release tablet: 1 tab(s) orally once a day, As Needed  oxyCODONE 5 mg oral tablet: 1 tab(s) orally every 6 hours, As Needed MDD:4 tablets   Trileptal 300 mg oral tablet: 2 tab(s) orally in the morning    Trileptal 300 mg oral tablet: 1 tab(s) orally once a day (at bedtime)  Zofran 4 mg oral tablet: 1 tab(s) orally once a day

## 2019-11-07 NOTE — DISCHARGE NOTE PROVIDER - NSDCFUADDINST_GEN_ALL_CORE_FT
You are being discharged from [facility]. You are scheduled to follow-up in clinic with Dr. Verduzco next Wednesday, November 13th at 9:30 AM. Please call the phone number provided and schedule a follow-up appointment with Dr. Rubio in Cardiology. You have been prescribed amiodarone for new onset atrial fibrillation per Cardiology recommendations, please take as directed. You have been prescribed antibiotics, please take for the entire course prescribed and as directed. You have been prescribed oxycodone for breakthrough pain relief, please take as needed and as directed, avoid driving while taking narcotic pain medications. Please avoid heavy weight lifting for the next 4-6 weeks.  If you have any further questions about your care, please do not hesitate to contact Dr. Verduzco's clinic.

## 2019-11-07 NOTE — DISCHARGE NOTE PROVIDER - CARE PROVIDER_API CALL
Rocky Verduzco)  Surgery  [address], [address] [place] [address]  Phone: [phone]  Fax: [phone]  Follow Up Time:     Filippo Rubio)  Cardiology; Interventional Cardiology  [address] [place] [address]  Phone: [phone]  Fax: [phone]  Follow Up Time:

## 2019-11-08 VITALS
RESPIRATION RATE: 18 BRPM | DIASTOLIC BLOOD PRESSURE: 84 MMHG | WEIGHT: 166.23 LBS | HEART RATE: 89 BPM | TEMPERATURE: 98 F | SYSTOLIC BLOOD PRESSURE: 130 MMHG

## 2019-11-08 LAB
ANION GAP SERPL CALC-SCNC: 14 MMOL/L — SIGNIFICANT CHANGE UP (ref 7–14)
BUN SERPL-MCNC: 10 MG/DL — SIGNIFICANT CHANGE UP (ref 10–20)
CALCIUM SERPL-MCNC: 7.5 MG/DL — LOW (ref 8.5–10.1)
CHLORIDE SERPL-SCNC: 99 MMOL/L — SIGNIFICANT CHANGE UP (ref 98–110)
CO2 SERPL-SCNC: 24 MMOL/L — SIGNIFICANT CHANGE UP (ref 17–32)
CREAT SERPL-MCNC: 0.7 MG/DL — SIGNIFICANT CHANGE UP (ref 0.7–1.5)
GLUCOSE SERPL-MCNC: 108 MG/DL — HIGH (ref 70–99)
HCT VFR BLD CALC: 28.6 % — LOW (ref 42–52)
HGB BLD-MCNC: 9.6 G/DL — LOW (ref 14–18)
MAGNESIUM SERPL-MCNC: 1.6 MG/DL — LOW (ref 1.8–2.4)
MCHC RBC-ENTMCNC: 30.1 PG — SIGNIFICANT CHANGE UP (ref 27–31)
MCHC RBC-ENTMCNC: 33.6 G/DL — SIGNIFICANT CHANGE UP (ref 32–37)
MCV RBC AUTO: 89.7 FL — SIGNIFICANT CHANGE UP (ref 80–94)
NRBC # BLD: 0 /100 WBCS — SIGNIFICANT CHANGE UP (ref 0–0)
PHOSPHATE SERPL-MCNC: 3.5 MG/DL — SIGNIFICANT CHANGE UP (ref 2.1–4.9)
PLATELET # BLD AUTO: 257 K/UL — SIGNIFICANT CHANGE UP (ref 130–400)
POTASSIUM SERPL-MCNC: 3.8 MMOL/L — SIGNIFICANT CHANGE UP (ref 3.5–5)
POTASSIUM SERPL-SCNC: 3.8 MMOL/L — SIGNIFICANT CHANGE UP (ref 3.5–5)
RBC # BLD: 3.19 M/UL — LOW (ref 4.7–6.1)
RBC # FLD: 13.6 % — SIGNIFICANT CHANGE UP (ref 11.5–14.5)
SODIUM SERPL-SCNC: 137 MMOL/L — SIGNIFICANT CHANGE UP (ref 135–146)
SURGICAL PATHOLOGY STUDY: SIGNIFICANT CHANGE UP
WBC # BLD: 8.39 K/UL — SIGNIFICANT CHANGE UP (ref 4.8–10.8)
WBC # FLD AUTO: 8.39 K/UL — SIGNIFICANT CHANGE UP (ref 4.8–10.8)

## 2019-11-08 PROCEDURE — 99024 POSTOP FOLLOW-UP VISIT: CPT

## 2019-11-08 RX ORDER — MAGNESIUM SULFATE 500 MG/ML
2 VIAL (ML) INJECTION ONCE
Refills: 0 | Status: COMPLETED | OUTPATIENT
Start: 2019-11-08 | End: 2019-11-08

## 2019-11-08 RX ADMIN — Medication 12.5 MILLIGRAM(S): at 06:16

## 2019-11-08 RX ADMIN — CEFEPIME 100 MILLIGRAM(S): 1 INJECTION, POWDER, FOR SOLUTION INTRAMUSCULAR; INTRAVENOUS at 06:16

## 2019-11-08 RX ADMIN — PANTOPRAZOLE SODIUM 40 MILLIGRAM(S): 20 TABLET, DELAYED RELEASE ORAL at 06:16

## 2019-11-08 RX ADMIN — AMIODARONE HYDROCHLORIDE 200 MILLIGRAM(S): 400 TABLET ORAL at 06:15

## 2019-11-08 RX ADMIN — Medication 50 GRAM(S): at 04:12

## 2019-11-08 RX ADMIN — Medication 100 MILLIGRAM(S): at 06:15

## 2019-11-08 RX ADMIN — OXCARBAZEPINE 300 MILLIGRAM(S): 300 TABLET, FILM COATED ORAL at 06:15

## 2019-11-08 RX ADMIN — LEVETIRACETAM 1500 MILLIGRAM(S): 250 TABLET, FILM COATED ORAL at 06:14

## 2019-11-08 NOTE — PROGRESS NOTE ADULT - ATTENDING COMMENTS
Stable and afebrile.  A. fib over the weekend, on Amiodarone drip. New onset.  Tolerates PO.  Colostomy gas, no stools.  Wound intact, packed open.  WC normal.  Hb stable.    a/p:  Progressing well despite his a. fib.  CT chest r/o PE.  CXR with gastric distension, ileus.  NPO, IVF.  Abx as per ID.  Cardiology consult,
Stable and afebrile.  On the vent, no pressors.  On the hypotensive side.  Progressing well.  Neutropenic from sepsis.  Hb stable.  Colostomy viable, no gas or stools in bag.    a/p:  progressing well.  Resolving sepsis.  wean off event as per ICU.  Remove NGT and keep NPO, after extubation.  Continue local wound care.
Stable and afebrile.  Tolerates clears.  Colostomy functioning.  No n/v/  wound packed open.  Colostomy viable.  Normal WC.    a/p:  progressing well.  clears today, fulls tomorrow.  Colostomy teaching.  D/c planing.   for VNS.  Possible d/c home on Thursday.
s/p ex lap with hartmans  for perforated diverticulitis.   doing better  started on clear liquid diet  ambulate  advance diet    continue abx
stable and afebrile.  Tolerates PO.  Colostomy functioning.  Wound packed open.  WC normal.    a/p:  progressing well.  clinically to go home today.  VNS for colostomy and wound care at home, ready.
A&Ox3, pain control will switch from pca to toradol, oxycodone and dilaudid  IS, deep breathing exercise  AFib with RVR : cont amio, added metoprolol, will get CTA to rule out PE   NPo: distended stomach   Iv antibiotics   oob
Stable and afebrile.  Tolerates PO.  Ileostomy functioning.  Colostomy care and teaching done.  Wound packed open.  WC normal.  NADIRA SS fluid.    a/p:  Progressed well.  Regular diet.  OOB, IS.  d/c home today with VNS for wound care and colostomy care.  Followup in my office in 6 days.
doing well   start clears   decrease IVF   OOB and ambulate   continue PCA   transfer to Tele
I examined the patient with the pa and resident and discussed my plan with them    the patient has stable afib on po meds, in sinus, no recurrence so no indication for anticoagulation at this time  lisinopril held  po pain meds  no resp dx, albuterol prn at home  anemia of acute blood loss, no evidence of active bleed, not reaching threshold for transfusion  npo with meds for ileus picture on ct, awaiting primary team for diet
Stable and afebrile.  Tolerates clears.  colostomy functioning.  wound packed open.  WC and Hb stable,    a/p:  progressing well.  fulls.  d/c IVF.  OOB, IS.  Abx as per ID.  anticipated for tomorrow to go home.

## 2019-11-08 NOTE — PROGRESS NOTE ADULT - SUBJECTIVE AND OBJECTIVE BOX
GENERAL SURGERY PROGRESS NOTE     FAZAL CASSIDY  Male  Hospital day: 8  POD: 7  Procedure: Kerri procedure  Exploratory laparotomy    OVERNIGHT EVENTS: No acute events overnight, pain well controlled and continues to have ostomy output. Stable and ready for discharge home with VNS.     T(F): 98.8 (11-07-19 @ 20:54), Max: 99.1 (11-07-19 @ 05:02)  HR: 87 (11-07-19 @ 20:54) (83 - 97)  BP: 120/86 (11-07-19 @ 20:54) (120/86 - 137/85)  RR: 18 (11-07-19 @ 20:54) (18 - 18)                                                                DRAINS:   11-06-19 @ 07:01  -  11-07-19 @ 07:00  --------------------------------------------------------  OUT: 20 mL      BM:   11-06-19 @ 07:01  -  11-07-19 @ 07:00  --------------------------------------------------------  OUT: 4 mL      GI proph:  pantoprazole    Tablet 40 milliGRAM(s) Oral before breakfast    AC/ proph: aspirin  chewable 81 milliGRAM(s) Oral daily  heparin  Injectable 5000 Unit(s) SubCutaneous every 8 hours    ABx: cefepime   IVPB      cefepime   IVPB 1000 milliGRAM(s) IV Intermittent every 8 hours  metroNIDAZOLE  IVPB 500 milliGRAM(s) IV Intermittent every 8 hours      PHYSICAL EXAM:  GENERAL: NAD, well-appearing  CHEST/LUNG: Clear to auscultation bilaterally  HEART: Regular rate and rhythm  ABDOMEN: Soft, tender at incision site which is packed and covered in gauze, minimal drainage, ostomy in place with bag  EXTREMITIES:  No clubbing, cyanosis, or edema    LABS               9.6    8.39  )-----------( 257      ( 08 Nov 2019 00:59 )             28.6         11-06    135  |  101  |  16  ----------------------------<  111<H>  4.2   |  25  |  0.8    ABG - ( 02 Nov 2019 10:43 )  pH: 7.42  /  pCO2: 36    /  pO2: 159   / HCO3: 24    / Base Excess: -0.4  /  SaO2: 100       ABG - ( 02 Nov 2019 08:35 )  pH: 7.42  /  pCO2: 33    /  pO2: 130   / HCO3: 21    / Base Excess: -2.4  /  SaO2: 99        ABG - ( 02 Nov 2019 04:28 )  pH: 7.43  /  pCO2: 34    /  pO2: 186   / HCO3: 22    / Base Excess: -1.3  /  SaO2: 100         RADIOLOGY & ADDITIONAL TESTS:  < from: Xray Chest 1 View- PORTABLE-Routine (11.06.19 @ 07:51) >  Impression:      Bibasilar opacities, right greater than left. These appear decreased   since prior exam.

## 2019-11-08 NOTE — CHART NOTE - NSCHARTNOTEFT_GEN_A_CORE
pt seen without complaints. tolerated po diet and ambulated. vital signs stable and afebrile. pt doing well . dressing change done. As per Dr Verduzco pt can be discharged home today with VNS. pt advised to follow up with Dr Verduzco in 1 week for wound check  and with cardiologist Dr Rubio . Resume home medications. and continue antibiotics and amiodarone as prescribed. precaution provided.

## 2019-11-08 NOTE — PROGRESS NOTE ADULT - ASSESSMENT
43y Male patient admitted S/P Kerri procedure and Exploratory laparotomy 2/2 feculent peritonitis. Now downgraded to telemetry, having good ostomy output and tolerating diet    Plan:   - Tolerating regular diet   - NADIRA drain removed   - d/c lactulose  - VNS set up for home  - Discharge home today

## 2019-11-08 NOTE — ADVANCED PRACTICE NURSE CONSULT - RECOMMEDATIONS
review of education and support  spoke to wife, remains engaged and appropriate   appliance 1 3/4' intact with out leakage.  ready for discharge. supplies at bedside.

## 2019-11-08 NOTE — PROGRESS NOTE ADULT - NSHPATTENDINGPLANDISCUSS_GEN_ALL_CORE
family
patient and wife
patient, SICU team and family.
patient, wife and family.
pt , family and surgical staff
patient
patient and wife
patient, family and SICU Team
patient

## 2019-11-12 ENCOUNTER — LABORATORY RESULT (OUTPATIENT)
Age: 44
End: 2019-11-12

## 2019-11-12 DIAGNOSIS — R56.9 UNSPECIFIED CONVULSIONS: ICD-10-CM

## 2019-11-12 DIAGNOSIS — Z79.82 LONG TERM (CURRENT) USE OF ASPIRIN: ICD-10-CM

## 2019-11-12 DIAGNOSIS — E83.42 HYPOMAGNESEMIA: ICD-10-CM

## 2019-11-12 DIAGNOSIS — Q79.60 EHLERS-DANLOS SYNDROME, UNSPECIFIED: ICD-10-CM

## 2019-11-12 DIAGNOSIS — E87.2 ACIDOSIS: ICD-10-CM

## 2019-11-12 DIAGNOSIS — I48.91 UNSPECIFIED ATRIAL FIBRILLATION: ICD-10-CM

## 2019-11-12 DIAGNOSIS — Z88.0 ALLERGY STATUS TO PENICILLIN: ICD-10-CM

## 2019-11-12 DIAGNOSIS — R65.20 SEVERE SEPSIS WITHOUT SEPTIC SHOCK: ICD-10-CM

## 2019-11-12 DIAGNOSIS — A41.9 SEPSIS, UNSPECIFIED ORGANISM: ICD-10-CM

## 2019-11-12 DIAGNOSIS — I10 ESSENTIAL (PRIMARY) HYPERTENSION: ICD-10-CM

## 2019-11-12 DIAGNOSIS — K63.1 PERFORATION OF INTESTINE (NONTRAUMATIC): ICD-10-CM

## 2019-11-12 DIAGNOSIS — K65.9 PERITONITIS, UNSPECIFIED: ICD-10-CM

## 2019-11-12 DIAGNOSIS — K57.20 DIVERTICULITIS OF LARGE INTESTINE WITH PERFORATION AND ABSCESS WITHOUT BLEEDING: ICD-10-CM

## 2019-11-12 DIAGNOSIS — I72.3 ANEURYSM OF ILIAC ARTERY: ICD-10-CM

## 2019-11-12 DIAGNOSIS — K65.8 OTHER PERITONITIS: ICD-10-CM

## 2019-11-12 DIAGNOSIS — I95.9 HYPOTENSION, UNSPECIFIED: ICD-10-CM

## 2019-11-12 DIAGNOSIS — H18.609 KERATOCONUS, UNSPECIFIED, UNSPECIFIED EYE: ICD-10-CM

## 2019-11-13 ENCOUNTER — CHART COPY (OUTPATIENT)
Age: 44
End: 2019-11-13

## 2019-11-13 ENCOUNTER — OUTPATIENT (OUTPATIENT)
Dept: OUTPATIENT SERVICES | Facility: HOSPITAL | Age: 44
LOS: 1 days | Discharge: HOME | End: 2019-11-13

## 2019-11-13 ENCOUNTER — APPOINTMENT (OUTPATIENT)
Dept: SURGERY | Facility: CLINIC | Age: 44
End: 2019-11-13
Payer: COMMERCIAL

## 2019-11-13 VITALS
WEIGHT: 146 LBS | HEIGHT: 69 IN | SYSTOLIC BLOOD PRESSURE: 120 MMHG | BODY MASS INDEX: 21.62 KG/M2 | HEART RATE: 96 BPM | DIASTOLIC BLOOD PRESSURE: 78 MMHG | TEMPERATURE: 98 F

## 2019-11-13 PROCEDURE — 99024 POSTOP FOLLOW-UP VISIT: CPT

## 2019-11-14 DIAGNOSIS — K57.20 DIVERTICULITIS OF LARGE INTESTINE WITH PERFORATION AND ABSCESS WITHOUT BLEEDING: ICD-10-CM

## 2019-11-15 NOTE — REASON FOR VISIT
[Follow-Up Visit] : a follow-up visit for [Spouse] : spouse [Parent] : parent [Family Member] : family member [FreeTextEntry2] : perforated sigmoid colon cancer

## 2019-11-15 NOTE — ASSESSMENT
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 S/p multiple interventional procedures for dissection, including splenic artery embolization. Followed at [facility] for his EDS. Never had a colonoscopy and has h/o sigmoid colon diverticulitis. Presented to the ED on November 1, 2019 with sepsis and diffuse peritonitis. Taken to the OR emergently for exploratory laparotomy, sigmoid colon resection and end- colostomy. Retention sutures placed. He di well and went home with VNS for colostomy care and wound care. Vantin and Flagyl were recommended for a week. Today for followup visit with no major complains.\par \par Assessment and Plan:\par \par s/p exploratory laparotomy, sigmoid colon resection and end colostomy on November 1, 2019, for perforated sigmoid colon. Pathology: diverticulitis. \par \par Continue local wound care and colostomy care.\par Culture from wound sent. No abx recommended at this point.\par Increase PO intake and protein shakes.\par Followup with Dr. Bello and at [place] for his EDS.\par \par Return in 2 weeks.\par GI for colonoscopy at some point, Dr. Oseguera.\par \par All the questions were answered to their satisfaction and I encouraged the patient to call my office at anytime if they had any questions. Plan of care fully discussed with the patient.

## 2019-11-15 NOTE — HISTORY OF PRESENT ILLNESS
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 S/p multiple interventional procedures for dissection, including splenic artery embolization. Followed at [facility] for his EDS. Never had a colonoscopy and has h/o sigmoid colon diverticulitis. Presented to the ED on November 1, 2019 with sepsis and diffuse peritonitis. Taken to the OR emergently for exploratory laparotomy, sigmoid colon resection and end- colostomy. Retention sutures placed. He di well and went home with VNS for colostomy care and wound care. Vantin and Flagyl were recommended for a week. Today for followup visit with no major complains.

## 2019-11-15 NOTE — PHYSICAL EXAM
[Normal] : supple, no neck mass and thyroid not enlarged [Normal] : oriented to person, place and time, with appropriate affect [de-identified] : LLQ colostomy viable and functioning. wound with retention sutures, staples removed, wound packed open. Culture sent.

## 2019-11-27 ENCOUNTER — APPOINTMENT (OUTPATIENT)
Dept: SURGERY | Facility: CLINIC | Age: 44
End: 2019-11-27
Payer: COMMERCIAL

## 2019-11-27 VITALS
SYSTOLIC BLOOD PRESSURE: 122 MMHG | WEIGHT: 137 LBS | DIASTOLIC BLOOD PRESSURE: 66 MMHG | HEIGHT: 69 IN | BODY MASS INDEX: 20.29 KG/M2 | HEART RATE: 73 BPM | TEMPERATURE: 97.9 F

## 2019-11-27 PROCEDURE — 99024 POSTOP FOLLOW-UP VISIT: CPT

## 2019-12-01 NOTE — ASSESSMENT
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 S/p multiple interventional procedures for dissection, including splenic artery embolization. Followed at [facility] for his EDS. Never had a colonoscopy and has h/o sigmoid colon diverticulitis. Presented to the ED on November 1, 2019 with sepsis and diffuse peritonitis. Taken to the OR emergently for exploratory laparotomy, sigmoid colon resection and end- colostomy. Retention sutures placed. He did well and went home with VNS for colostomy care and wound care. Vantin and Flagyl were recommended for a week.\par \par On f/u visit, wound culture was sent and revealed Enterococcus sensitive to Doxycycline. Today for followup visit with no major complains. Feels better, gained weight, colostomy functioning and he is happy.\par \par Assessment and Plan:\par \par s/p exploratory laparotomy, sigmoid colon resection and end colostomy on November 1, 2019, for perforated sigmoid colon. Pathology: diverticulitis. \par \par Continue local wound care and colostomy care.\par Culture from wound sent. Finished course of Dioxyline to treat enterococcus faecalis in the wound, finished course.\par Increase PO intake and protein shakes.\par Followup with Dr. Bello and at [place] for his EDS.\par \par Return in 2 weeks. we will remove retention sutures.\par GI for colonoscopy at some point, Dr. Oseguera.\par \par All the questions were answered to their satisfaction and I encouraged the patient to call my office at anytime if they had any questions. Plan of care fully discussed with the patient.

## 2019-12-01 NOTE — PHYSICAL EXAM
[Normal] : supple, no neck mass and thyroid not enlarged [Normal] : oriented to person, place and time, with appropriate affect [de-identified] : LLQ colostomy viable and functioning. wound with retention sutures, wound packed open. One retention suture removed.

## 2019-12-01 NOTE — HISTORY OF PRESENT ILLNESS
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 S/p multiple interventional procedures for dissection, including splenic artery embolization. Followed at [facility] for his EDS. Never had a colonoscopy and has h/o sigmoid colon diverticulitis. Presented to the ED on November 1, 2019 with sepsis and diffuse peritonitis. Taken to the OR emergently for exploratory laparotomy, sigmoid colon resection and end- colostomy. Retention sutures placed. He did well and went home with VNS for colostomy care and wound care. Vantin and Flagyl were recommended for a week.\par \par On f/u visit, wound culture was sent and revealed Enterococcus sensitive to Doxycycline. Today for followup visit with no major complains. Feels better, gained weight, colostomy functioning and he is happy.\par

## 2019-12-09 ENCOUNTER — APPOINTMENT (OUTPATIENT)
Dept: SURGERY | Facility: CLINIC | Age: 44
End: 2019-12-09
Payer: COMMERCIAL

## 2019-12-09 VITALS
DIASTOLIC BLOOD PRESSURE: 81 MMHG | BODY MASS INDEX: 20.44 KG/M2 | TEMPERATURE: 97.3 F | SYSTOLIC BLOOD PRESSURE: 122 MMHG | HEART RATE: 99 BPM | WEIGHT: 138 LBS | HEIGHT: 69 IN

## 2019-12-09 PROCEDURE — 99024 POSTOP FOLLOW-UP VISIT: CPT

## 2019-12-22 NOTE — PHYSICAL EXAM
[Normal] : supple, no neck mass and thyroid not enlarged [Normal] : oriented to person, place and time, with appropriate affect [de-identified] : LLQ colostomy viable and functioning. retention sutures removed, Wound packed open.

## 2019-12-22 NOTE — HISTORY OF PRESENT ILLNESS
[de-identified] : 45 yo male patient with Melva-Danlos syndrome (EDS) type IV. S/p multiple interventional procedures for dissection, including splenic artery embolization. Followed at [facility] for his EDS. Never had a colonoscopy and has h/o sigmoid colon diverticulitis. Presented to the ED on November 1, 2019 with sepsis and diffuse peritonitis. Taken to the OR emergently for exploratory laparotomy, sigmoid colon resection and end- colostomy. Retention sutures placed. He did well and went home with VNS for colostomy care and wound care. Vantin and Flagyl were recommended for a week.\par \par On f/u visit, wound culture was sent and revealed Enterococcus sensitive to Doxycycline, course completed. Today for followup visit with no major complains. Feels better, gained weight, colostomy functioning and he is happy.

## 2019-12-22 NOTE — ASSESSMENT
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 S/p multiple interventional procedures for dissection, including splenic artery embolization. Followed at [facility] for his EDS. Never had a colonoscopy and has h/o sigmoid colon diverticulitis. Presented to the ED on November 1, 2019 with sepsis and diffuse peritonitis. Taken to the OR emergently for exploratory laparotomy, sigmoid colon resection and end- colostomy. Retention sutures placed. He did well and went home with VNS for colostomy care and wound care. Vantin and Flagyl were recommended for a week.\par \par On f/u visit, wound culture was sent and revealed Enterococcus sensitive to Doxycycline, course completed. Today for followup visit with no major complains. Feels better, gained weight, colostomy functioning and he is happy.\par \par Assessment and Plan:\par \par s/p exploratory laparotomy, sigmoid colon resection and end colostomy on November 1, 2019, for perforated sigmoid colon. Pathology: diverticulitis. \par \par Continue local wound care and colostomy care. Retention sutures removed.\par Increase PO intake and protein shakes.\par Followup with Dr. Bello and at [place] for his EDS.\par \par Return in 2020 to discuss colostomy closure.\par GI for colonoscopy at some point, Dr. Oseguera.\par \par All the questions were answered to their satisfaction and I encouraged the patient to call my office at anytime if they had any questions. Plan of care fully discussed with the patient.

## 2020-02-03 ENCOUNTER — APPOINTMENT (OUTPATIENT)
Dept: SURGERY | Facility: CLINIC | Age: 45
End: 2020-02-03
Payer: COMMERCIAL

## 2020-02-03 VITALS
HEIGHT: 69 IN | DIASTOLIC BLOOD PRESSURE: 80 MMHG | HEART RATE: 103 BPM | TEMPERATURE: 97.9 F | WEIGHT: 9.19 LBS | SYSTOLIC BLOOD PRESSURE: 115 MMHG | BODY MASS INDEX: 1.36 KG/M2

## 2020-02-03 PROCEDURE — 99214 OFFICE O/P EST MOD 30 MIN: CPT

## 2020-02-04 NOTE — PHYSICAL EXAM
[Normal] : supple, no neck mass and thyroid not enlarged [Normal] : no peripheral adenopathy appreciated [Normal Neck Lymph Nodes] : normal neck lymph nodes  [de-identified] : LLQ colostomy viable and functioning. well healed scar, no hernia. [Normal Supraclavicular Lymph Nodes] : normal supraclavicular lymph nodes

## 2020-02-04 NOTE — ASSESSMENT
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 S/p multiple interventional procedures for dissection, including splenic artery embolization. Followed at [facility] for his EDS. Never had a colonoscopy and has h/o sigmoid colon diverticulitis. Presented to the ED on November 1, 2019 with sepsis and diffuse peritonitis. Taken to the OR emergently for exploratory laparotomy, sigmoid colon resection and end- colostomy. Retention sutures placed. He did well and went home with VNS for colostomy care and wound care. Vantin and Flagyl were recommended for a week.\par \par On f/u visit, wound culture was sent and revealed Enterococcus sensitive to Doxycycline, course completed. Today for followup visit with no major complains. Feels better, gained weight, colostomy functioning and he is happy. He wants to reverse colostomy this year. He denies any symptoms.\par \par Assessment and Plan:\par \par s/p exploratory laparotomy, sigmoid colon resection and end colostomy on November 1, 2019, for perforated sigmoid colon. Pathology: diverticulitis. \par \par CT colonography ordered to evaluate colon prior to colostomy closure.\par \par Risks, benefits, consequences and alternatives for his colostomy closure fully explained. mentioned his high risk of complications, including anastomotic leak, sepsis and permanent colostomy. He understands his risks and he wants to go ahead with surgery.\par \par Followup with Dr. Bello and at [place] for his EDS.\par \par Return after CT colonography to discuss and schedule colostomy closure.\par Patient does not want to undergo colonoscopy due to the risks for perforation.\par \par All the questions were answered to their satisfaction and I encouraged the patient to call my office at anytime if he had any questions. Plan of care fully discussed with the patient.

## 2020-02-04 NOTE — HISTORY OF PRESENT ILLNESS
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 S/p multiple interventional procedures for dissection, including splenic artery embolization. Followed at [facility] for his EDS. Never had a colonoscopy and has h/o sigmoid colon diverticulitis. Presented to the ED on November 1, 2019 with sepsis and diffuse peritonitis. Taken to the OR emergently for exploratory laparotomy, sigmoid colon resection and end- colostomy. Retention sutures placed. He did well and went home with VNS for colostomy care and wound care. Vantin and Flagyl were recommended for a week.\par \par On f/u visit, wound culture was sent and revealed Enterococcus sensitive to Doxycycline, course completed. Today for followup visit with no major complains. Feels better, gained weight, colostomy functioning and he is happy. He wants to reverse colostomy this year. He denies any symptoms.

## 2020-05-05 NOTE — ED PROVIDER NOTE - CRITICAL CARE INDICATION, MLM
patient was critically ill... Patient was critically ill with a high probability of imminent or life threatening deterioration. BELEM DAHL is a 34y  at 39w1d by LMP presenting to L&D for complaints of increasing frequency/severity of contractions since this AM; admin for early labor      PLAN:  - Admin to L&D  - Consent  - Admission labs  - GBS negative  - COVID-19 swab obtained. Patient denies any fevers, chills, headaches, myalgias, anosmia CP, SOB, or diarrhea. Denies any recent travel or recent sick contacts.   - Reactive tracing, regular contractions on toco  - HX of SVT s/p ablation. Patient denies any current issues throughout this pregnancy. She is being followed by outside cardiologist  - Patient desires pain relief option. Patient to receive epidural. Plan for ROM post epidural. Will consider pitocin if ctx become infrequent  - Continuous toco/FHT  - Maternal/fetal status reassuring      Plan D/w Dr. Flanagan

## 2020-06-22 ENCOUNTER — APPOINTMENT (OUTPATIENT)
Dept: SURGERY | Facility: CLINIC | Age: 45
End: 2020-06-22

## 2020-06-28 ENCOUNTER — RESULT REVIEW (OUTPATIENT)
Age: 45
End: 2020-06-28

## 2020-06-28 ENCOUNTER — INPATIENT (INPATIENT)
Facility: HOSPITAL | Age: 45
LOS: 4 days | Discharge: HOME | End: 2020-07-03
Attending: SURGERY | Admitting: SURGERY
Payer: COMMERCIAL

## 2020-06-28 VITALS
OXYGEN SATURATION: 99 % | HEART RATE: 107 BPM | RESPIRATION RATE: 18 BRPM | TEMPERATURE: 98 F | SYSTOLIC BLOOD PRESSURE: 135 MMHG | DIASTOLIC BLOOD PRESSURE: 98 MMHG

## 2020-06-28 DIAGNOSIS — Z93.3 COLOSTOMY STATUS: Chronic | ICD-10-CM

## 2020-06-28 DIAGNOSIS — Z88.1 ALLERGY STATUS TO OTHER ANTIBIOTIC AGENTS STATUS: ICD-10-CM

## 2020-06-28 LAB
ACANTHOCYTES BLD QL SMEAR: SLIGHT — SIGNIFICANT CHANGE UP
ALBUMIN SERPL ELPH-MCNC: 4.7 G/DL — SIGNIFICANT CHANGE UP (ref 3.5–5.2)
ALP SERPL-CCNC: 58 U/L — SIGNIFICANT CHANGE UP (ref 30–115)
ALT FLD-CCNC: 18 U/L — SIGNIFICANT CHANGE UP (ref 0–41)
ANION GAP SERPL CALC-SCNC: 15 MMOL/L — HIGH (ref 7–14)
ANION GAP SERPL CALC-SCNC: 16 MMOL/L — HIGH (ref 7–14)
ANION GAP SERPL CALC-SCNC: 16 MMOL/L — HIGH (ref 7–14)
APTT BLD: 26.4 SEC — LOW (ref 27–39.2)
APTT BLD: 28.1 SEC — SIGNIFICANT CHANGE UP (ref 27–39.2)
AST SERPL-CCNC: 39 U/L — SIGNIFICANT CHANGE UP (ref 0–41)
BASOPHILS # BLD AUTO: 0.03 K/UL — SIGNIFICANT CHANGE UP (ref 0–0.2)
BASOPHILS NFR BLD AUTO: 0.6 % — SIGNIFICANT CHANGE UP (ref 0–1)
BILIRUB SERPL-MCNC: 0.3 MG/DL — SIGNIFICANT CHANGE UP (ref 0.2–1.2)
BLD GP AB SCN SERPL QL: SIGNIFICANT CHANGE UP
BUN SERPL-MCNC: 13 MG/DL — SIGNIFICANT CHANGE UP (ref 10–20)
BUN SERPL-MCNC: 14 MG/DL — SIGNIFICANT CHANGE UP (ref 10–20)
BUN SERPL-MCNC: 16 MG/DL — SIGNIFICANT CHANGE UP (ref 10–20)
CALCIUM SERPL-MCNC: 7.9 MG/DL — LOW (ref 8.5–10.1)
CALCIUM SERPL-MCNC: 8.7 MG/DL — SIGNIFICANT CHANGE UP (ref 8.5–10.1)
CALCIUM SERPL-MCNC: 9.4 MG/DL — SIGNIFICANT CHANGE UP (ref 8.5–10.1)
CHLORIDE SERPL-SCNC: 97 MMOL/L — LOW (ref 98–110)
CHLORIDE SERPL-SCNC: 98 MMOL/L — SIGNIFICANT CHANGE UP (ref 98–110)
CHLORIDE SERPL-SCNC: 98 MMOL/L — SIGNIFICANT CHANGE UP (ref 98–110)
CK SERPL-CCNC: 286 U/L — HIGH (ref 0–225)
CO2 SERPL-SCNC: 18 MMOL/L — SIGNIFICANT CHANGE UP (ref 17–32)
CO2 SERPL-SCNC: 20 MMOL/L — SIGNIFICANT CHANGE UP (ref 17–32)
CO2 SERPL-SCNC: 21 MMOL/L — SIGNIFICANT CHANGE UP (ref 17–32)
CREAT SERPL-MCNC: 0.9 MG/DL — SIGNIFICANT CHANGE UP (ref 0.7–1.5)
CREAT SERPL-MCNC: 0.9 MG/DL — SIGNIFICANT CHANGE UP (ref 0.7–1.5)
CREAT SERPL-MCNC: 1 MG/DL — SIGNIFICANT CHANGE UP (ref 0.7–1.5)
EOSINOPHIL # BLD AUTO: 0.06 K/UL — SIGNIFICANT CHANGE UP (ref 0–0.7)
EOSINOPHIL NFR BLD AUTO: 1.1 % — SIGNIFICANT CHANGE UP (ref 0–8)
GAS PNL BLDA: SIGNIFICANT CHANGE UP
GIANT PLATELETS BLD QL SMEAR: PRESENT — SIGNIFICANT CHANGE UP
GLUCOSE BLDC GLUCOMTR-MCNC: 124 MG/DL — HIGH (ref 70–99)
GLUCOSE BLDC GLUCOMTR-MCNC: 134 MG/DL — HIGH (ref 70–99)
GLUCOSE SERPL-MCNC: 121 MG/DL — HIGH (ref 70–99)
GLUCOSE SERPL-MCNC: 152 MG/DL — HIGH (ref 70–99)
GLUCOSE SERPL-MCNC: 165 MG/DL — HIGH (ref 70–99)
HCT VFR BLD CALC: 43.3 % — SIGNIFICANT CHANGE UP (ref 42–52)
HCT VFR BLD CALC: 47.9 % — SIGNIFICANT CHANGE UP (ref 42–52)
HGB BLD-MCNC: 15 G/DL — SIGNIFICANT CHANGE UP (ref 14–18)
HGB BLD-MCNC: 16 G/DL — SIGNIFICANT CHANGE UP (ref 14–18)
IMM GRANULOCYTES NFR BLD AUTO: 0.6 % — HIGH (ref 0.1–0.3)
INR BLD: 0.97 RATIO — SIGNIFICANT CHANGE UP (ref 0.65–1.3)
INR BLD: 1.12 RATIO — SIGNIFICANT CHANGE UP (ref 0.65–1.3)
LACTATE SERPL-SCNC: 3.4 MMOL/L — HIGH (ref 0.7–2)
LACTATE SERPL-SCNC: 4.6 MMOL/L — CRITICAL HIGH (ref 0.7–2)
LIDOCAIN IGE QN: 32 U/L — SIGNIFICANT CHANGE UP (ref 7–60)
LYMPHOCYTES # BLD AUTO: 1.57 K/UL — SIGNIFICANT CHANGE UP (ref 1.2–3.4)
LYMPHOCYTES # BLD AUTO: 29.5 % — SIGNIFICANT CHANGE UP (ref 20.5–51.1)
MAGNESIUM SERPL-MCNC: 1.2 MG/DL — LOW (ref 1.8–2.4)
MANUAL SMEAR VERIFICATION: SIGNIFICANT CHANGE UP
MCHC RBC-ENTMCNC: 30.9 PG — SIGNIFICANT CHANGE UP (ref 27–31)
MCHC RBC-ENTMCNC: 31.2 PG — HIGH (ref 27–31)
MCHC RBC-ENTMCNC: 33.4 G/DL — SIGNIFICANT CHANGE UP (ref 32–37)
MCHC RBC-ENTMCNC: 34.6 G/DL — SIGNIFICANT CHANGE UP (ref 32–37)
MCV RBC AUTO: 90 FL — SIGNIFICANT CHANGE UP (ref 80–94)
MCV RBC AUTO: 92.5 FL — SIGNIFICANT CHANGE UP (ref 80–94)
METAMYELOCYTES # FLD: 19.4 % — HIGH (ref 0–0)
MICROCYTES BLD QL: SLIGHT — SIGNIFICANT CHANGE UP
MONOCYTES # BLD AUTO: 0.48 K/UL — SIGNIFICANT CHANGE UP (ref 0.1–0.6)
MONOCYTES NFR BLD AUTO: 9 % — SIGNIFICANT CHANGE UP (ref 1.7–9.3)
MYELOCYTES NFR BLD: 1.8 % — HIGH (ref 0–0)
NEUTROPHILS # BLD AUTO: 3.16 K/UL — SIGNIFICANT CHANGE UP (ref 1.4–6.5)
NEUTROPHILS NFR BLD AUTO: 59.2 % — SIGNIFICANT CHANGE UP (ref 42.2–75.2)
NEUTS BAND # BLD: 0.9 % — SIGNIFICANT CHANGE UP (ref 0–6)
NRBC # BLD: 0 /100 WBCS — SIGNIFICANT CHANGE UP (ref 0–0)
OVALOCYTES BLD QL SMEAR: SLIGHT — SIGNIFICANT CHANGE UP
PHOSPHATE SERPL-MCNC: 3.3 MG/DL — SIGNIFICANT CHANGE UP (ref 2.1–4.9)
PLAT MORPH BLD: NORMAL — SIGNIFICANT CHANGE UP
PLATELET # BLD AUTO: 262 K/UL — SIGNIFICANT CHANGE UP (ref 130–400)
PLATELET # BLD AUTO: 287 K/UL — SIGNIFICANT CHANGE UP (ref 130–400)
POIKILOCYTOSIS BLD QL AUTO: SLIGHT — SIGNIFICANT CHANGE UP
POTASSIUM SERPL-MCNC: 3.8 MMOL/L — SIGNIFICANT CHANGE UP (ref 3.5–5)
POTASSIUM SERPL-MCNC: 5.2 MMOL/L — HIGH (ref 3.5–5)
POTASSIUM SERPL-MCNC: 5.3 MMOL/L — HIGH (ref 3.5–5)
POTASSIUM SERPL-SCNC: 3.8 MMOL/L — SIGNIFICANT CHANGE UP (ref 3.5–5)
POTASSIUM SERPL-SCNC: 5.2 MMOL/L — HIGH (ref 3.5–5)
POTASSIUM SERPL-SCNC: 5.3 MMOL/L — HIGH (ref 3.5–5)
PROT SERPL-MCNC: 8.1 G/DL — HIGH (ref 6–8)
PROTHROM AB SERPL-ACNC: 11.2 SEC — SIGNIFICANT CHANGE UP (ref 9.95–12.87)
PROTHROM AB SERPL-ACNC: 12.9 SEC — HIGH (ref 9.95–12.87)
RBC # BLD: 4.81 M/UL — SIGNIFICANT CHANGE UP (ref 4.7–6.1)
RBC # BLD: 5.18 M/UL — SIGNIFICANT CHANGE UP (ref 4.7–6.1)
RBC # FLD: 12.3 % — SIGNIFICANT CHANGE UP (ref 11.5–14.5)
RBC # FLD: 12.6 % — SIGNIFICANT CHANGE UP (ref 11.5–14.5)
RBC BLD AUTO: NORMAL — SIGNIFICANT CHANGE UP
SARS-COV-2 RNA SPEC QL NAA+PROBE: SIGNIFICANT CHANGE UP
SODIUM SERPL-SCNC: 132 MMOL/L — LOW (ref 135–146)
SODIUM SERPL-SCNC: 133 MMOL/L — LOW (ref 135–146)
SODIUM SERPL-SCNC: 134 MMOL/L — LOW (ref 135–146)
TROPONIN T SERPL-MCNC: 0.03 NG/ML — CRITICAL HIGH
VARIANT LYMPHS # BLD: 1.8 % — SIGNIFICANT CHANGE UP (ref 0–5)
WBC # BLD: 1.78 K/UL — LOW (ref 4.8–10.8)
WBC # BLD: 5.33 K/UL — SIGNIFICANT CHANGE UP (ref 4.8–10.8)
WBC # FLD AUTO: 1.78 K/UL — LOW (ref 4.8–10.8)
WBC # FLD AUTO: 5.33 K/UL — SIGNIFICANT CHANGE UP (ref 4.8–10.8)

## 2020-06-28 PROCEDURE — 44139 MOBILIZATION OF COLON: CPT

## 2020-06-28 PROCEDURE — 88305 TISSUE EXAM BY PATHOLOGIST: CPT | Mod: 26

## 2020-06-28 PROCEDURE — 99291 CRITICAL CARE FIRST HOUR: CPT | Mod: 25

## 2020-06-28 PROCEDURE — 44144 PARTIAL REMOVAL OF COLON: CPT

## 2020-06-28 PROCEDURE — 71045 X-RAY EXAM CHEST 1 VIEW: CPT | Mod: 26,77

## 2020-06-28 PROCEDURE — 97606 NEG PRS WND THER DME>50 SQCM: CPT

## 2020-06-28 PROCEDURE — 74177 CT ABD & PELVIS W/CONTRAST: CPT | Mod: 26

## 2020-06-28 PROCEDURE — 43753 TX GASTRO INTUB W/ASP: CPT

## 2020-06-28 PROCEDURE — 93010 ELECTROCARDIOGRAM REPORT: CPT | Mod: 76

## 2020-06-28 PROCEDURE — 71045 X-RAY EXAM CHEST 1 VIEW: CPT | Mod: 26

## 2020-06-28 PROCEDURE — 88307 TISSUE EXAM BY PATHOLOGIST: CPT | Mod: 26

## 2020-06-28 PROCEDURE — 74018 RADEX ABDOMEN 1 VIEW: CPT | Mod: 26

## 2020-06-28 RX ORDER — SODIUM CHLORIDE 9 MG/ML
1000 INJECTION, SOLUTION INTRAVENOUS
Refills: 0 | Status: DISCONTINUED | OUTPATIENT
Start: 2020-06-28 | End: 2020-06-28

## 2020-06-28 RX ORDER — OMEPRAZOLE 10 MG/1
1 CAPSULE, DELAYED RELEASE ORAL
Qty: 0 | Refills: 0 | DISCHARGE

## 2020-06-28 RX ORDER — SODIUM CHLORIDE 9 MG/ML
1000 INJECTION, SOLUTION INTRAVENOUS
Refills: 0 | Status: DISCONTINUED | OUTPATIENT
Start: 2020-06-28 | End: 2020-06-29

## 2020-06-28 RX ORDER — METRONIDAZOLE 500 MG
500 TABLET ORAL ONCE
Refills: 0 | Status: COMPLETED | OUTPATIENT
Start: 2020-06-28 | End: 2020-06-28

## 2020-06-28 RX ORDER — FENTANYL CITRATE 50 UG/ML
50 INJECTION INTRAVENOUS ONCE
Refills: 0 | Status: DISCONTINUED | OUTPATIENT
Start: 2020-06-28 | End: 2020-06-28

## 2020-06-28 RX ORDER — MORPHINE SULFATE 50 MG/1
4 CAPSULE, EXTENDED RELEASE ORAL ONCE
Refills: 0 | Status: DISCONTINUED | OUTPATIENT
Start: 2020-06-28 | End: 2020-06-28

## 2020-06-28 RX ORDER — HYDROMORPHONE HYDROCHLORIDE 2 MG/ML
1 INJECTION INTRAMUSCULAR; INTRAVENOUS; SUBCUTANEOUS ONCE
Refills: 0 | Status: DISCONTINUED | OUTPATIENT
Start: 2020-06-28 | End: 2020-06-28

## 2020-06-28 RX ORDER — CALCIUM GLUCONATE 100 MG/ML
1 VIAL (ML) INTRAVENOUS ONCE
Refills: 0 | Status: COMPLETED | OUTPATIENT
Start: 2020-06-28 | End: 2020-06-28

## 2020-06-28 RX ORDER — AZTREONAM 2 G
VIAL (EA) INJECTION
Refills: 0 | Status: DISCONTINUED | OUTPATIENT
Start: 2020-06-28 | End: 2020-06-29

## 2020-06-28 RX ORDER — PANTOPRAZOLE SODIUM 20 MG/1
40 TABLET, DELAYED RELEASE ORAL DAILY
Refills: 0 | Status: DISCONTINUED | OUTPATIENT
Start: 2020-06-28 | End: 2020-06-28

## 2020-06-28 RX ORDER — HEPARIN SODIUM 5000 [USP'U]/ML
5000 INJECTION INTRAVENOUS; SUBCUTANEOUS EVERY 8 HOURS
Refills: 0 | Status: DISCONTINUED | OUTPATIENT
Start: 2020-06-28 | End: 2020-06-28

## 2020-06-28 RX ORDER — ONDANSETRON 8 MG/1
4 TABLET, FILM COATED ORAL ONCE
Refills: 0 | Status: COMPLETED | OUTPATIENT
Start: 2020-06-28 | End: 2020-06-28

## 2020-06-28 RX ORDER — OXCARBAZEPINE 300 MG/1
2 TABLET, FILM COATED ORAL
Qty: 0 | Refills: 0 | DISCHARGE

## 2020-06-28 RX ORDER — AZTREONAM 2 G
2000 VIAL (EA) INJECTION ONCE
Refills: 0 | Status: COMPLETED | OUTPATIENT
Start: 2020-06-28 | End: 2020-06-28

## 2020-06-28 RX ORDER — FENTANYL CITRATE 50 UG/ML
0.5 INJECTION INTRAVENOUS
Qty: 2500 | Refills: 0 | Status: DISCONTINUED | OUTPATIENT
Start: 2020-06-28 | End: 2020-06-28

## 2020-06-28 RX ORDER — ONDANSETRON 8 MG/1
4 TABLET, FILM COATED ORAL ONCE
Refills: 0 | Status: DISCONTINUED | OUTPATIENT
Start: 2020-06-28 | End: 2020-06-28

## 2020-06-28 RX ORDER — METRONIDAZOLE 500 MG
TABLET ORAL
Refills: 0 | Status: DISCONTINUED | OUTPATIENT
Start: 2020-06-28 | End: 2020-06-29

## 2020-06-28 RX ORDER — IOHEXOL 300 MG/ML
30 INJECTION, SOLUTION INTRAVENOUS ONCE
Refills: 0 | Status: COMPLETED | OUTPATIENT
Start: 2020-06-28 | End: 2020-06-28

## 2020-06-28 RX ORDER — FENTANYL CITRATE 50 UG/ML
0.5 INJECTION INTRAVENOUS
Qty: 2500 | Refills: 0 | Status: DISCONTINUED | OUTPATIENT
Start: 2020-06-28 | End: 2020-06-29

## 2020-06-28 RX ORDER — CHLORHEXIDINE GLUCONATE 213 G/1000ML
15 SOLUTION TOPICAL EVERY 12 HOURS
Refills: 0 | Status: DISCONTINUED | OUTPATIENT
Start: 2020-06-28 | End: 2020-06-28

## 2020-06-28 RX ORDER — HEPARIN SODIUM 5000 [USP'U]/ML
5000 INJECTION INTRAVENOUS; SUBCUTANEOUS EVERY 8 HOURS
Refills: 0 | Status: DISCONTINUED | OUTPATIENT
Start: 2020-06-28 | End: 2020-06-29

## 2020-06-28 RX ORDER — ALBUTEROL 90 UG/1
0 AEROSOL, METERED ORAL
Qty: 0 | Refills: 0 | DISCHARGE

## 2020-06-28 RX ORDER — MEPERIDINE HYDROCHLORIDE 50 MG/ML
12.5 INJECTION INTRAMUSCULAR; INTRAVENOUS; SUBCUTANEOUS
Refills: 0 | Status: DISCONTINUED | OUTPATIENT
Start: 2020-06-28 | End: 2020-06-28

## 2020-06-28 RX ORDER — METRONIDAZOLE 500 MG
500 TABLET ORAL EVERY 8 HOURS
Refills: 0 | Status: DISCONTINUED | OUTPATIENT
Start: 2020-06-29 | End: 2020-06-29

## 2020-06-28 RX ORDER — OXCARBAZEPINE 300 MG/1
1 TABLET, FILM COATED ORAL
Qty: 0 | Refills: 0 | DISCHARGE

## 2020-06-28 RX ORDER — DEXMEDETOMIDINE HYDROCHLORIDE IN 0.9% SODIUM CHLORIDE 4 UG/ML
0.25 INJECTION INTRAVENOUS
Qty: 200 | Refills: 0 | Status: DISCONTINUED | OUTPATIENT
Start: 2020-06-28 | End: 2020-06-29

## 2020-06-28 RX ORDER — LISINOPRIL 2.5 MG/1
1 TABLET ORAL
Qty: 0 | Refills: 0 | DISCHARGE

## 2020-06-28 RX ORDER — CHLORHEXIDINE GLUCONATE 213 G/1000ML
1 SOLUTION TOPICAL
Refills: 0 | Status: DISCONTINUED | OUTPATIENT
Start: 2020-06-28 | End: 2020-06-28

## 2020-06-28 RX ORDER — MORPHINE SULFATE 50 MG/1
2 CAPSULE, EXTENDED RELEASE ORAL EVERY 4 HOURS
Refills: 0 | Status: DISCONTINUED | OUTPATIENT
Start: 2020-06-28 | End: 2020-06-28

## 2020-06-28 RX ORDER — SODIUM CHLORIDE 9 MG/ML
1000 INJECTION, SOLUTION INTRAVENOUS ONCE
Refills: 0 | Status: COMPLETED | OUTPATIENT
Start: 2020-06-28 | End: 2020-06-28

## 2020-06-28 RX ORDER — PANTOPRAZOLE SODIUM 20 MG/1
40 TABLET, DELAYED RELEASE ORAL DAILY
Refills: 0 | Status: DISCONTINUED | OUTPATIENT
Start: 2020-06-28 | End: 2020-06-29

## 2020-06-28 RX ORDER — ASPIRIN/CALCIUM CARB/MAGNESIUM 324 MG
0 TABLET ORAL
Qty: 0 | Refills: 0 | DISCHARGE

## 2020-06-28 RX ORDER — OXCARBAZEPINE 300 MG/1
0 TABLET, FILM COATED ORAL
Qty: 135 | Refills: 0 | DISCHARGE

## 2020-06-28 RX ORDER — SODIUM CHLORIDE 9 MG/ML
500 INJECTION, SOLUTION INTRAVENOUS ONCE
Refills: 0 | Status: COMPLETED | OUTPATIENT
Start: 2020-06-28 | End: 2020-06-28

## 2020-06-28 RX ORDER — SODIUM CHLORIDE 9 MG/ML
1000 INJECTION INTRAMUSCULAR; INTRAVENOUS; SUBCUTANEOUS ONCE
Refills: 0 | Status: COMPLETED | OUTPATIENT
Start: 2020-06-28 | End: 2020-06-28

## 2020-06-28 RX ORDER — HYDROMORPHONE HYDROCHLORIDE 2 MG/ML
0.5 INJECTION INTRAMUSCULAR; INTRAVENOUS; SUBCUTANEOUS ONCE
Refills: 0 | Status: DISCONTINUED | OUTPATIENT
Start: 2020-06-28 | End: 2020-06-28

## 2020-06-28 RX ORDER — PROPOFOL 10 MG/ML
20 INJECTION, EMULSION INTRAVENOUS
Qty: 500 | Refills: 0 | Status: DISCONTINUED | OUTPATIENT
Start: 2020-06-28 | End: 2020-06-28

## 2020-06-28 RX ORDER — CEFOTETAN DISODIUM 1 G
2 VIAL (EA) INJECTION ONCE
Refills: 0 | Status: COMPLETED | OUTPATIENT
Start: 2020-06-28 | End: 2020-06-28

## 2020-06-28 RX ORDER — POTASSIUM PHOSPHATE, MONOBASIC POTASSIUM PHOSPHATE, DIBASIC 236; 224 MG/ML; MG/ML
15 INJECTION, SOLUTION INTRAVENOUS ONCE
Refills: 0 | Status: COMPLETED | OUTPATIENT
Start: 2020-06-28 | End: 2020-06-29

## 2020-06-28 RX ORDER — HYDROMORPHONE HYDROCHLORIDE 2 MG/ML
0.5 INJECTION INTRAMUSCULAR; INTRAVENOUS; SUBCUTANEOUS
Refills: 0 | Status: DISCONTINUED | OUTPATIENT
Start: 2020-06-28 | End: 2020-06-28

## 2020-06-28 RX ORDER — AZTREONAM 2 G
2000 VIAL (EA) INJECTION EVERY 8 HOURS
Refills: 0 | Status: DISCONTINUED | OUTPATIENT
Start: 2020-06-29 | End: 2020-06-29

## 2020-06-28 RX ORDER — SODIUM CHLORIDE 9 MG/ML
250 INJECTION, SOLUTION INTRAVENOUS ONCE
Refills: 0 | Status: COMPLETED | OUTPATIENT
Start: 2020-06-28 | End: 2020-06-28

## 2020-06-28 RX ORDER — HYDROMORPHONE HYDROCHLORIDE 2 MG/ML
1 INJECTION INTRAMUSCULAR; INTRAVENOUS; SUBCUTANEOUS
Refills: 0 | Status: DISCONTINUED | OUTPATIENT
Start: 2020-06-28 | End: 2020-06-28

## 2020-06-28 RX ORDER — MAGNESIUM SULFATE 500 MG/ML
2 VIAL (ML) INJECTION
Refills: 0 | Status: COMPLETED | OUTPATIENT
Start: 2020-06-28 | End: 2020-06-28

## 2020-06-28 RX ORDER — LEVETIRACETAM 250 MG/1
0 TABLET, FILM COATED ORAL
Qty: 360 | Refills: 0 | DISCHARGE

## 2020-06-28 RX ADMIN — ONDANSETRON 4 MILLIGRAM(S): 8 TABLET, FILM COATED ORAL at 10:52

## 2020-06-28 RX ADMIN — Medication 100 GRAM(S): at 14:00

## 2020-06-28 RX ADMIN — Medication 100 MILLIGRAM(S): at 23:48

## 2020-06-28 RX ADMIN — DEXMEDETOMIDINE HYDROCHLORIDE IN 0.9% SODIUM CHLORIDE 4.25 MICROGRAM(S)/KG/HR: 4 INJECTION INTRAVENOUS at 19:11

## 2020-06-28 RX ADMIN — FENTANYL CITRATE 50 MICROGRAM(S): 50 INJECTION INTRAVENOUS at 20:38

## 2020-06-28 RX ADMIN — Medication 100 MILLIGRAM(S): at 21:05

## 2020-06-28 RX ADMIN — HYDROMORPHONE HYDROCHLORIDE 1 MILLIGRAM(S): 2 INJECTION INTRAMUSCULAR; INTRAVENOUS; SUBCUTANEOUS at 13:30

## 2020-06-28 RX ADMIN — MORPHINE SULFATE 4 MILLIGRAM(S): 50 CAPSULE, EXTENDED RELEASE ORAL at 11:12

## 2020-06-28 RX ADMIN — FENTANYL CITRATE 3.4 MICROGRAM(S)/KG/HR: 50 INJECTION INTRAVENOUS at 19:29

## 2020-06-28 RX ADMIN — HEPARIN SODIUM 5000 UNIT(S): 5000 INJECTION INTRAVENOUS; SUBCUTANEOUS at 21:55

## 2020-06-28 RX ADMIN — SODIUM CHLORIDE 500 MILLILITER(S): 9 INJECTION, SOLUTION INTRAVENOUS at 22:03

## 2020-06-28 RX ADMIN — IOHEXOL 30 MILLILITER(S): 300 INJECTION, SOLUTION INTRAVENOUS at 10:52

## 2020-06-28 RX ADMIN — Medication 25 GRAM(S): at 21:17

## 2020-06-28 RX ADMIN — ONDANSETRON 4 MILLIGRAM(S): 8 TABLET, FILM COATED ORAL at 12:12

## 2020-06-28 RX ADMIN — SODIUM CHLORIDE 1000 MILLILITER(S): 9 INJECTION INTRAMUSCULAR; INTRAVENOUS; SUBCUTANEOUS at 11:24

## 2020-06-28 RX ADMIN — SODIUM CHLORIDE 500 MILLILITER(S): 9 INJECTION, SOLUTION INTRAVENOUS at 20:39

## 2020-06-28 RX ADMIN — HYDROMORPHONE HYDROCHLORIDE 0.5 MILLIGRAM(S): 2 INJECTION INTRAMUSCULAR; INTRAVENOUS; SUBCUTANEOUS at 11:41

## 2020-06-28 RX ADMIN — SODIUM CHLORIDE 1000 MILLILITER(S): 9 INJECTION, SOLUTION INTRAVENOUS at 13:41

## 2020-06-28 RX ADMIN — Medication 25 GRAM(S): at 22:41

## 2020-06-28 RX ADMIN — PANTOPRAZOLE SODIUM 40 MILLIGRAM(S): 20 TABLET, DELAYED RELEASE ORAL at 20:39

## 2020-06-28 RX ADMIN — MORPHINE SULFATE 4 MILLIGRAM(S): 50 CAPSULE, EXTENDED RELEASE ORAL at 10:53

## 2020-06-28 RX ADMIN — FENTANYL CITRATE 50 MICROGRAM(S): 50 INJECTION INTRAVENOUS at 14:50

## 2020-06-28 RX ADMIN — SODIUM CHLORIDE 1000 MILLILITER(S): 9 INJECTION INTRAMUSCULAR; INTRAVENOUS; SUBCUTANEOUS at 10:52

## 2020-06-28 RX ADMIN — HYDROMORPHONE HYDROCHLORIDE 0.5 MILLIGRAM(S): 2 INJECTION INTRAMUSCULAR; INTRAVENOUS; SUBCUTANEOUS at 12:12

## 2020-06-28 NOTE — H&P ADULT - NSHPLABSRESULTS_GEN_ALL_CORE
Labs:                          15.0   5.33  )-----------( 287      ( 28 Jun 2020 10:45 )             43.3       Auto Neutrophil %: 59.2 % (06-28-20 @ 10:45)  Auto Immature Granulocyte %: 0.6 % (06-28-20 @ 10:45)    06-28    133<L>  |  97<L>  |  16  ----------------------------<  121<H>  5.3<H>   |  21  |  0.9      Calcium, Total Serum: 9.4 mg/dL (06-28-20 @ 10:45)      LFTs:             8.1  | 0.3  | 39       ------------------[58      ( 28 Jun 2020 10:45 )  4.7  | x    | 18          Lipase:32        Lactate, Blood: 3.4 mmol/L (06-28-20 @ 10:45)      Coags:     11.20  ----< 0.97    ( 28 Jun 2020 10:45 )     28.1          < from: Xray Kidney Ureter Bladder (06.28.20 @ 11:12) >  IMPRESSION:    1.  Post coil embolization of celiac axis aneurysm.    2.  Paucity of bowel gas, limited to the right upper quadrant.  < end of copied text >    < from: CT Abdomen and Pelvis w/ Oral Cont and w/ IV Cont (06.28.20 @ 13:20) >  PERITONEUM/MESENTERY/BOWEL: Moderate volume free air with fecal-like material in the anterior mid abdomen (series 3 image 42). Discontinuity of the descending colonic wall, just proximal to the left lower quadrant colostomy (series 3 image 45). Post Caraballo's procedure with unchanged appearance of the rectal stump in the pelvis.  Small volume abdominal ascites and diffuse mesenteric edema. No evidence of abscess formation. Circumferential wall thickening of the transverse colon and a small bowel loop in the left hemiabdomen, reactive. An enteric tube terminates in the stomach. Small duodenal diverticulum.  IMPRESSION:   1.  Intraperitoneal free air with fecal-like material in the mid abdomen and discontinuity of the descending colonic wall, compatible with a colonic perforation.  2.  Small volume ascites and diffuse mesenteric edema.  3.  Transverse colonic and small bowel wall thickening, likely reactive from surrounding inflammation.  4.  Additional findings are stable since November 4, 2019.  < end of copied text >

## 2020-06-28 NOTE — CHART NOTE - NSCHARTNOTEFT_GEN_A_CORE
PACU ANESTHESIA ADMISSION NOTE      Procedure: Colectomy, subtotal, open    Post op diagnosis:  Perforation of colon      _x___  Intubated  TV:__500____       Rate: __15____      FiO2: ___50___    ____  Patent Airway    ____  Full return of protective reflexes    ____  Full recovery from anesthesia / back to baseline status    Vitals:            T:       37.1         BP :        127/71        R:    15          Sat:   99            P: 105      Mental Status:  ____ Awake   _____ Alert   _____ Drowsy   ___x__ Sedated    Nausea/Vomiting:  _x___  NO       ______Yes,   See Post - Op Orders         Pain Scale (0-10):  __0___    Treatment: _x___ None    ____ See Post - Op/PCA Orders    Post - Operative Fluids:   ____ Oral   _x___ See Post - Op Orders    Plan: Discharge:   ____Home       ____Floor     _____Critical Care    ___x__  Other:_________________    Comments:  No anesthesia issues or complications noted.  Discharge when criteria met. PACU ANESTHESIA ADMISSION NOTE      Procedure: Colectomy, subtotal, open    Post op diagnosis:  Perforation of colon      _x___  Intubated  TV:__500____       Rate: __15____      FiO2: ___50___    ____  Patent Airway    ____  Full return of protective reflexes    ____  Full recovery from anesthesia / back to baseline status    Vitals:            T:       37.1         BP :        127/71        R:    15          Sat:   99            P: 105      Mental Status:  ____ Awake   _____ Alert   _____ Drowsy   ___x__ Sedated    Nausea/Vomiting:  _x___  NO       ______Yes,   See Post - Op Orders         Pain Scale (0-10):  __0___    Treatment: _x___ None    ____ See Post - Op/PCA Orders    Post - Operative Fluids:   ____ Oral   _x___ See Post - Op Orders    Plan: Discharge:   ____Home       ____Floor     _____Critical Care    ___x__  Other:_________________    Comments:  No anesthesia issues or complications noted.  Patient remains intubated at surgeon request as abdominal wound remains open. PACU ANESTHESIA ADMISSION NOTE      Procedure: Colectomy, subtotal, open    Post op diagnosis:  Perforation of colon      _x___  Intubated  TV:__500____       Rate: __15____      FiO2: ___50___    ____  Patent Airway    ____  Full return of protective reflexes    ____  Full recovery from anesthesia / back to baseline status    Vitals:            T:       37.1         BP :        127/71        R:    15          Sat:   99            P: 105      Mental Status:  ____ Awake   _____ Alert   _____ Drowsy   ___x__ Sedated    Nausea/Vomiting:  _x___  NO       ______Yes,   See Post - Op Orders         Pain Scale (0-10):  __0___    Treatment: _x___ None    ____ See Post - Op/PCA Orders    Post - Operative Fluids:   ____ Oral   _x___ See Post - Op Orders    Plan: Discharge:   ____Home       ____Floor     _____Critical Care    ___x__  Other:_________________    Comments:  No anesthesia issues or complications noted.  Patient remains intubated at surgeon request as abdominal wound remains open. Will be transferred to ICU. Report given to ICU team and RN

## 2020-06-28 NOTE — ED PROVIDER NOTE - CLINICAL SUMMARY MEDICAL DECISION MAKING FREE TEXT BOX
Patient presented with sudden onset worsening abdominal pain that began this AM. Otherwise afebrile, HD stable, but in significant painful distress. Obtained labs which showed (+) elevated lactate but otherwise were grossly unremarkable including no significant leukocytosis, anemia, signs of RAIN, transaminitis or significant electrolyte abnormalities. CXR without evidence of definitive free air on initial evaluation. CT abd/pelvis however showed (+) colonic perforation. Surgery consulted prior to CT abd/pelvis given severity of patient's pain and exam findings - evaluated patient in ED and will take patient to the OR STAT for surgical intervention. Patient agreeable with plan. HD stable at time of admission.

## 2020-06-28 NOTE — CONSULT NOTE ADULT - SUBJECTIVE AND OBJECTIVE BOX
FAZAL CASSIDY  [id number]  44y Male w/ past history of Melva Danlos 4, previous hospitalization in 2019 for perforation of sigmoid colon s/p Kerri's, epilepsy, HTN, celiac aneurysm s/p coiling 2010, b/l carotid and iliac artery aneurysms    He presented on  w/severe abd pain , + nausea x1 day,with CT findings of a colonic perforation and is currently s/p OR with intraop findings of feculent peritonitis, large perforation of descending colon at splenic flexure, no defect at small bowel......    He is currently  POD 0 s/p subtotal colectomy , take down of previous colostomy, abdomen left open , abthera vac placed....    Of note, patient experienced significant "oozing" intra-op 2/2 to his underlying connective tissue disorder. Patient remained HDS and no transfusions needed. Intraop course otherwise uncomplicated. Patient remained intubated  w/plans for take back to OR on . He was transferred to PACU in stable but guarded condition.    Upgraded to SICU for close monitoring of potentially worsening intra abdominal sepsis.    Indication for ICU admission: + Colonic perforation s/p subtotal colectomy/takedown of previous colostomy, abd open upgraded for close monitoring in the setting of intraabdominal sepsis  Admit Date:   ICU Date:   OR Date:     moxifloxacin (Hives)  penicillin (Unknown)    PAST MEDICAL & SURGICAL HISTORY:  Dislocations and subluxations  Aneurysm artery, celiac: s/p coiling 2010  Keratoconus  HTN (hypertension)  Epilepsy  EDS (Melva-Danlos syndrome)  Status post Kerri's procedure: 2019    Home Medications:  Aspir 81 oral delayed release tablet: orally once a day (2020 14:40)  Keppra 750 mg oral tablet: 2 tab(s) orally 2 times a day (2020 14:40)  LISINOPRIL   TAB 30MG:  (2020 14:40)  Multiple Vitamins oral tablet: 1 tab(s) orally once a day (2020 14:40)  OXCARBAZEPIN TAB 600M milligram(s) orally once a day (at bedtime) (2020 14:44)  Trileptal 300 mg oral tablet: 300 milligram(s) orally once a day in the morning (2020 14:44)    Daily     Diet, NPO (20 @ 18:54)      CURRENT MEDS:  Neurologic Medications  dexMEDEtomidine Infusion 0.25 MICROgram(s)/kG/Hr IV Continuous <Continuous>  fentaNYL   Infusion 0.5 MICROgram(s)/kG/Hr IV Continuous <Continuous>  fentaNYL   Infusion. 0.5 MICROgram(s)/kG/Hr IV Continuous <Continuous>  Gastrointestinal Medications  magnesium sulfate  IVPB 2 Gram(s) IV Intermittent every 2 hours  pantoprazole  Injectable 40 milliGRAM(s) IV Push daily  Hematologic/Oncologic Medications  heparin   Injectable 5000 Unit(s) SubCutaneous every 8 hours  Antiicrobial/Immunologic Medications  clindamycin IVPB 900 milliGRAM(s) IV Intermittent every 8 hours    ICU Vital Signs Last 24 Hrs  T(C): 36.6 (2020 21:00), Max: 37.6 (2020 14:39)  T(F): 97.8 (2020 21:00), Max: 99.7 (2020 14:39)  HR: 108 (2020 21:40) (107 - 129)  BP: 90/52 (2020 21:40) (69/46 - 135/98)  BP(mean): 65 (2020 21:40) (60 - 110)  RR: 18 (2020 21:40) (14 - 22)  SpO2: 100% (2020 21:40) (98% - 100%)    Mode: AC/ CMV (Assist Control/ Continuous Mandatory Ventilation)  RR (machine): 16  TV (machine): 450  FiO2: 50  PEEP: 5  ITime: 1  MAP: 8.4  PIP: 18    ABG - ( 2020 20:42 )  pH, Arterial: 7.36  pH, Blood: x     /  pCO2: 36    /  pO2: 271   / HCO3: 21    / Base Excess: -4.1  /  SaO2: 99          I&O's Summary    2020 07:01  -  2020 21:46  --------------------------------------------------------  IN: 2100.5 mL / OUT: 130 mL / NET: 1970.5 mL      I&O's Detail    2020 07:01  -  2020 21:46  --------------------------------------------------------  IN:    dexmedetomidine Infusion: 7.7 mL    fentaNYL  Infusion: 142.8 mL    IV PiggyBack: 100 mL    Lactated Ringers IV Bolus: 750 mL    lactated ringers.: 100 mL    Sodium Chloride 0.9% IV Bolus: 1000 mL  Total IN: 2100.5 mL    OUT:    Indwelling Catheter - Urethral: 130 mL  Total OUT: 130 mL    Total NET: 1970.5 mL    PHYSICAL EXAM:    General/Neuro  arousable, RASS-2  Lungs:      clear to auscultation, Normal expansion/effort.   Cardiovascular : S1, S2.  Regular rate and rhythm.   GI: Abdomen soft, , Non-distended, + abthera vac   Extremities: Extremities cold  Derm: Good skin turgor, no skin breakdown.    :       Salazar catheter in place.      CXR:     LABS:  CAPILLARY BLOOD GLUCOSE      POCT Blood Glucose.: 124 mg/dL (2020 18:55)  POCT Blood Glucose.: 134 mg/dL (2020 17:21)                          16.0   1.78  )-----------( 262      ( 2020 20:22 )             47.9       28    134<L>  |  98  |  13  ----------------------------<  165<H>  3.8   |  20  |  1.0    Ca    7.9<L>      2020 20:22  Phos  3.3       Mg     1.2         TPro  8.1<H>  /  Alb  4.7  /  TBili  0.3  /  DBili  x   /  AST  39  /  ALT  18  /  AlkPhos  58  28      PT/INR - ( 2020 10:45 )   PT: 11.20 sec;   INR: 0.97 ratio         PTT - ( 2020 10:45 )  PTT:28.1 sec  CARDIAC MARKERS ( 2020 20:22 )  x     / 0.03 ng/mL / 286 U/L / x     / x        DVT PTX: Hepsq     GI PTX: PPI    ***Tubes/Lines/Drains  ***  Peripheral IV  Urinary Catheter		    REVIEW OF SYSTEMS    [x ] A ten-point review of systems was otherwise negative except as noted.  [ ] Due to altered mental status/intubation, subjective information were not able to be obtained from the patient. History was obtained, to the extent possible, from review of the chart and collateral sources of information. FAZAL CASSIDY  [id number]  44y Male w/ past history of Melva Danlos 4, previous hospitalization in 2019 for perforation of sigmoid colon s/p Kerri's, epilepsy, HTN, celiac aneurysm s/p coiling 2010, b/l carotid and iliac artery aneurysms    He presented on  w/severe abd pain , + nausea x1 day, with CT findings of a colonic perforation and is currently s/p OR with intraop findings of feculent peritonitis, large perforation of descending colon at splenic flexure, no defect at small bowel......    He is currently  POD 0 s/p subtotal colectomy , take down of previous colostomy, abdomen left open , abthera vac placed....    Of note, patient experienced significant "oozing" intra-op 2/2 to his underlying connective tissue disorder. Patient remained HDS and no transfusions needed. Intraop course otherwise uncomplicated. Patient remained intubated  w/plans for take back to OR on . He was transferred to PACU in stable but guarded condition.    Upgraded to SICU for close monitoring of potentially worsening intra abdominal sepsis.    OR time: 2 hours   IVF:3700ml  UO: 450  EBL:1200    Indication for ICU admission: + Colonic perforation s/p subtotal colectomy/takedown of previous colostomy, abd open upgraded for close monitoring in the setting of intraabdominal sepsis  Admit Date:   ICU Date:   OR Date:     moxifloxacin (Hives)  penicillin (Unknown)    PAST MEDICAL & SURGICAL HISTORY:  Dislocations and subluxations  Aneurysm artery, celiac: s/p coiling 2010  Keratoconus  HTN (hypertension)  Epilepsy  EDS (Melva-Danlos syndrome)  Status post Kerri's procedure: 2019    Home Medications:  Aspir 81 oral delayed release tablet: orally once a day (2020 14:40)  Keppra 750 mg oral tablet: 2 tab(s) orally 2 times a day (2020 14:40)  LISINOPRIL   TAB 30MG:  (2020 14:40)  Multiple Vitamins oral tablet: 1 tab(s) orally once a day (2020 14:40)  OXCARBAZEPIN TAB 600M milligram(s) orally once a day (at bedtime) (2020 14:44)  Trileptal 300 mg oral tablet: 300 milligram(s) orally once a day in the morning (2020 14:44)    Daily     Diet, NPO (20 @ 18:54)      CURRENT MEDS:  Neurologic Medications  dexMEDEtomidine Infusion 0.25 MICROgram(s)/kG/Hr IV Continuous <Continuous>  fentaNYL   Infusion 0.5 MICROgram(s)/kG/Hr IV Continuous <Continuous>  fentaNYL   Infusion. 0.5 MICROgram(s)/kG/Hr IV Continuous <Continuous>  Gastrointestinal Medications  magnesium sulfate  IVPB 2 Gram(s) IV Intermittent every 2 hours  pantoprazole  Injectable 40 milliGRAM(s) IV Push daily  Hematologic/Oncologic Medications  heparin   Injectable 5000 Unit(s) SubCutaneous every 8 hours  Antiicrobial/Immunologic Medications  clindamycin IVPB 900 milliGRAM(s) IV Intermittent every 8 hours    ICU Vital Signs Last 24 Hrs  T(C): 36.6 (2020 21:00), Max: 37.6 (2020 14:39)  T(F): 97.8 (2020 21:00), Max: 99.7 (2020 14:39)  HR: 108 (2020 21:40) (107 - 129)  BP: 90/52 (2020 21:40) (69/46 - 135/98)  BP(mean): 65 (2020 21:40) (60 - 110)  RR: 18 (2020 21:40) (14 - 22)  SpO2: 100% (2020 21:40) (98% - 100%)    Mode: AC/ CMV (Assist Control/ Continuous Mandatory Ventilation)  RR (machine): 16  TV (machine): 450  FiO2: 50  PEEP: 5  ITime: 1  MAP: 8.4  PIP: 18    ABG - ( 2020 20:42 )  pH, Arterial: 7.36  pH, Blood: x     /  pCO2: 36    /  pO2: 271   / HCO3: 21    / Base Excess: -4.1  /  SaO2: 99          I&O's Summary    2020 07:01  -  2020 21:46  --------------------------------------------------------  IN: 2100.5 mL / OUT: 130 mL / NET: 1970.5 mL      I&O's Detail    2020 07:01  -  2020 21:46  --------------------------------------------------------  IN:    dexmedetomidine Infusion: 7.7 mL    fentaNYL  Infusion: 142.8 mL    IV PiggyBack: 100 mL    Lactated Ringers IV Bolus: 750 mL    lactated ringers.: 100 mL    Sodium Chloride 0.9% IV Bolus: 1000 mL  Total IN: 2100.5 mL    OUT:    Indwelling Catheter - Urethral: 130 mL  Total OUT: 130 mL    Total NET: 1970.5 mL    PHYSICAL EXAM:    General/Neuro  arousable, RASS-2  Lungs:      clear to auscultation, Normal expansion/effort.   Cardiovascular : S1, S2.  Regular rate and rhythm.   GI: Abdomen soft, , Non-distended, + abthera vac   Extremities: Extremities cold  Derm: Good skin turgor, no skin breakdown.    :       Salazar catheter in place.      CXR:     LABS:  CAPILLARY BLOOD GLUCOSE      POCT Blood Glucose.: 124 mg/dL (2020 18:55)  POCT Blood Glucose.: 134 mg/dL (2020 17:21)                          16.0   1.78  )-----------( 262      ( 2020 20:22 )             47.9       -28    134<L>  |  98  |  13  ----------------------------<  165<H>  3.8   |  20  |  1.0    Ca    7.9<L>      2020 20:22  Phos  3.3     -  Mg     1.2         TPro  8.1<H>  /  Alb  4.7  /  TBili  0.3  /  DBili  x   /  AST  39  /  ALT  18  /  AlkPhos  58  28      PT/INR - ( 2020 10:45 )   PT: 11.20 sec;   INR: 0.97 ratio         PTT - ( 2020 10:45 )  PTT:28.1 sec  CARDIAC MARKERS ( 2020 20:22 )  x     / 0.03 ng/mL / 286 U/L / x     / x        DVT PTX: Hepsq     GI PTX: PPI    ***Tubes/Lines/Drains  ***  Peripheral IV  Urinary Catheter		    REVIEW OF SYSTEMS    [x ] A ten-point review of systems was otherwise negative except as noted.  [ ] Due to altered mental status/intubation, subjective information were not able to be obtained from the patient. History was obtained, to the extent possible, from review of the chart and collateral sources of information. FAZAL CASSIDY  [id number]  44y Male w/ past history of Melva Danlos 4, previous hospitalization in 2019 for perforation of sigmoid colon s/p Kerri's, epilepsy, HTN, celiac aneurysm s/p coiling 2010, b/l carotid and iliac artery aneurysms?    He presented on  w/severe abd pain , + nausea x1 day, with CT findings of a colonic perforation and is currently s/p OR with intraop findings of feculent peritonitis, large perforation of descending colon at splenic flexure, no defect at small bowel......    He is currently  POD 0 s/p subtotal colectomy , take down of previous colostomy, abdomen left open , abthera vac placed....    Of note, patient experienced significant "oozing" intra-op 2/2 to his underlying connective tissue disorder. Patient remained HDS and no transfusions needed. Intraop course otherwise uncomplicated. Patient remained intubated  w/plans for take back to OR on . He was transferred to PACU in stable but guarded condition.    Upgraded to SICU for close monitoring of potentially worsening intra abdominal sepsis.    OR time: 2 hours   IVF:3700ml  UO: 450  EBL:1200    Indication for ICU admission: + Colonic perforation s/p subtotal colectomy/takedown of previous colostomy, abd open upgraded for close monitoring in the setting of intraabdominal sepsis  Admit Date:   ICU Date:   OR Date:     moxifloxacin (Hives)  penicillin (Unknown)    PAST MEDICAL & SURGICAL HISTORY:  Dislocations and subluxations  Aneurysm artery, celiac: s/p coiling 2010  Keratoconus  HTN (hypertension)  Epilepsy  EDS (Melva-Danlos syndrome)  Status post Kerri's procedure: 2019    Home Medications:  Aspir 81 oral delayed release tablet: orally once a day (2020 14:40)  Keppra 750 mg oral tablet: 2 tab(s) orally 2 times a day (2020 14:40)  LISINOPRIL   TAB 30MG:  (2020 14:40)  Multiple Vitamins oral tablet: 1 tab(s) orally once a day (2020 14:40)  OXCARBAZEPIN TAB 600M milligram(s) orally once a day (at bedtime) (2020 14:44)  Trileptal 300 mg oral tablet: 300 milligram(s) orally once a day in the morning (2020 14:44)    Daily     Diet, NPO (20 @ 18:54)      CURRENT MEDS:  Neurologic Medications  dexMEDEtomidine Infusion 0.25 MICROgram(s)/kG/Hr IV Continuous <Continuous>  fentaNYL   Infusion 0.5 MICROgram(s)/kG/Hr IV Continuous <Continuous>  fentaNYL   Infusion. 0.5 MICROgram(s)/kG/Hr IV Continuous <Continuous>  Gastrointestinal Medications  magnesium sulfate  IVPB 2 Gram(s) IV Intermittent every 2 hours  pantoprazole  Injectable 40 milliGRAM(s) IV Push daily  Hematologic/Oncologic Medications  heparin   Injectable 5000 Unit(s) SubCutaneous every 8 hours  Antiicrobial/Immunologic Medications  clindamycin IVPB 900 milliGRAM(s) IV Intermittent every 8 hours    ICU Vital Signs Last 24 Hrs  T(C): 36.6 (2020 21:00), Max: 37.6 (2020 14:39)  T(F): 97.8 (2020 21:00), Max: 99.7 (2020 14:39)  HR: 108 (2020 21:40) (107 - 129)  BP: 90/52 (2020 21:40) (69/46 - 135/98)  BP(mean): 65 (2020 21:40) (60 - 110)  RR: 18 (2020 21:40) (14 - 22)  SpO2: 100% (2020 21:40) (98% - 100%)    Mode: AC/ CMV (Assist Control/ Continuous Mandatory Ventilation)  RR (machine): 16  TV (machine): 450  FiO2: 50  PEEP: 5  ITime: 1  MAP: 8.4  PIP: 18    ABG - ( 2020 20:42 )  pH, Arterial: 7.36  pH, Blood: x     /  pCO2: 36    /  pO2: 271   / HCO3: 21    / Base Excess: -4.1  /  SaO2: 99          I&O's Summary    2020 07:01  -  2020 21:46  --------------------------------------------------------  IN: 2100.5 mL / OUT: 130 mL / NET: 1970.5 mL      I&O's Detail    2020 07:01  -  2020 21:46  --------------------------------------------------------  IN:    dexmedetomidine Infusion: 7.7 mL    fentaNYL  Infusion: 142.8 mL    IV PiggyBack: 100 mL    Lactated Ringers IV Bolus: 750 mL    lactated ringers.: 100 mL    Sodium Chloride 0.9% IV Bolus: 1000 mL  Total IN: 2100.5 mL    OUT:    Indwelling Catheter - Urethral: 130 mL  Total OUT: 130 mL    Total NET: 1970.5 mL    PHYSICAL EXAM:    General/Neuro  arousable, RASS-2  Lungs:      clear to auscultation, Normal expansion/effort.   Cardiovascular : S1, S2.  Regular rate and rhythm.   GI: Abdomen soft, , Non-distended, + abthera vac   Extremities: Extremities cold  Derm: Good skin turgor, no skin breakdown.    :       Salazar catheter in place.      CXR:     LABS:  CAPILLARY BLOOD GLUCOSE      POCT Blood Glucose.: 124 mg/dL (2020 18:55)  POCT Blood Glucose.: 134 mg/dL (2020 17:21)                          16.0   1.78  )-----------( 262      ( 2020 20:22 )             47.9       28    134<L>  |  98  |  13  ----------------------------<  165<H>  3.8   |  20  |  1.0    Ca    7.9<L>      2020 20:22  Phos  3.3       Mg     1.2         TPro  8.1<H>  /  Alb  4.7  /  TBili  0.3  /  DBili  x   /  AST  39  /  ALT  18  /  AlkPhos  58        PT/INR - ( 2020 10:45 )   PT: 11.20 sec;   INR: 0.97 ratio         PTT - ( 2020 10:45 )  PTT:28.1 sec  CARDIAC MARKERS ( 2020 20:22 )  x     / 0.03 ng/mL / 286 U/L / x     / x        DVT PTX: Hepsq     GI PTX: PPI    ***Tubes/Lines/Drains  ***  Peripheral IV  Urinary Catheter		    REVIEW OF SYSTEMS    [x ] A ten-point review of systems was otherwise negative except as noted.  [ ] Due to altered mental status/intubation, subjective information were not able to be obtained from the patient. History was obtained, to the extent possible, from review of the chart and collateral sources of information.

## 2020-06-28 NOTE — CONSULT NOTE ADULT - CONSULT REASON
+ Colonic perforation s/p subtotal colectomy/takedown of previous colostomy, abd open upgraded for close monitoring in the setting of intraabdominal sepsis

## 2020-06-28 NOTE — ED ADULT TRIAGE NOTE - CHIEF COMPLAINT QUOTE
pt c/o sudden sharp epigastric pain after going to bathroom PTA. Pt states he has hx of bowel rupture Nov 2019 and has ostomy bag to L abdomen and pain is similar.

## 2020-06-28 NOTE — ED PROVIDER NOTE - PROGRESS NOTE DETAILS
Discussed case w/ surgery Discussed ct results w/ surgery.  aware. CT back - per surgery admit to  Gave - surgery to take to OR STAT

## 2020-06-28 NOTE — H&P ADULT - NSICDXPASTMEDICALHX_GEN_ALL_CORE_FT
PAST MEDICAL HISTORY:  Aneurysm artery, celiac s/p coiling 4/2010    Dislocations and subluxations     EDS (Melva-Danlos syndrome)     Epilepsy     HTN (hypertension)     Keratoconus

## 2020-06-28 NOTE — CONSULT NOTE ADULT - ASSESSMENT
ASSESSMENT/PLAN: 43yo Male with h/o Melva Danlos, multiple aneurysms, sigmoid perforation s/p Caraballo's procedure 11/2019 admitted to SICU for abdominal sepsis 2/2 colonic perforation.      Neurologic:  Pain:    Respiratory:    Cardiovascular:  H/o HTN:    Gastrointestinal/Nutrition:  Sigmoid perforation s/p Caraballo's procedure 11/2019    Genitourinary/Renal:  U/O:  BUN/Cr:  Lytes:  Lactate: 3.4 --> 4.6    Hematologic:  Hemoglobin:  DVT PPX:    Infectious Disease:  WBC:  Temp:  Ancef    Endocrine:  No dx    Disposition: SICU

## 2020-06-28 NOTE — H&P ADULT - ASSESSMENT
A/P: 44 year old male with history of hartmanns procedure 11/2019 presenting with severe epigastric and RUQ pain x 1 day, elevated lactate, imaging consistent with colonic perforation/free air    -added on for OR  -getting fluid boluses  -repeat labs sent    d/w Dr. Lane at bedside A/P: 44 year old male with history of hartmanns procedure 11/2019 presenting with severe epigastric and RUQ pain x 1 day, elevated lactate, imaging consistent with colonic perforation/free air    -added on for OR  -getting fluid boluses  -repeat labs sent    d/w Dr. Lane at bedside     Senior Resident Note  Pt seen and examined  Above note has been reviewed and edited  Plan d/w patient and Dr Lane  44M with PMH of EDS, and PSH of christina procedure for perforated sigmoid in 2019, now presenting with 1 day severe abdominal pain, tachy in ED and febrile CT shows perforation and free air. Going to OR for ex lap, possible bowel resection, possible ostomy   Jaun Pickard

## 2020-06-28 NOTE — CONSULT NOTE ADULT - ASSESSMENT
A/P: 44 year old male with history of hartmanns procedure 11/2019 presenting with severe epigastric and RUQ pain x 1 day, elevated lactate, awaiting imaging    -fluid resuscitation  -repeat BMP  -close monitoring   -CT A/P pending  -will follow up    d/w Dr. Verduzco

## 2020-06-28 NOTE — CONSULT NOTE ADULT - SUBJECTIVE AND OBJECTIVE BOX
HPI: 44 year old male pmhx as below presenting with 1 day of severe epigastric/RUQ pain. Patient seen and examined, states that he had a normal bowel movement this morning and now has severe abdominal pain since 9am. He admits to nausea, denies vomiting. States his ostomy has been functioning normally.     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 Loredo, Vascular: Dr Ramirez    PAST MEDICAL & SURGICAL HISTORY:  Dislocations and subluxations  Aneurysm artery, celiac  s/p celiac artery embolization  s/p b/l iliac artery embolization   Keratoconus  HTN (hypertension)  Epilepsy  EDS (Melva-Danlos syndrome)  ex-lap, Hartmanns procedure 11/1/2019      MEDICATIONS  (STANDING):  HYDROmorphone  Injectable 1 milliGRAM(s) IV Push Once  lactated ringers Bolus 1000 milliLiter(s) IV Bolus once    Allergies  moxifloxacin (Hives)  penicillin (Unknown)      REVIEW OF SYSTEMS  [x] A ten-point review of systems was otherwise negative except as noted.  [ ] Due to altered mental status/intubation, subjective information were not able to be obtained from the patient. History was obtained, to the extent possible, from review of the chart and collateral sources of information.      Vital Signs Last 24 Hrs  T(C): 36.8 (28 Jun 2020 10:11), Max: 36.8 (28 Jun 2020 10:11)  T(F): 98.2 (28 Jun 2020 10:11), Max: 98.2 (28 Jun 2020 10:11)  HR: 107 (28 Jun 2020 10:11) (107 - 107)  BP: 135/98 (28 Jun 2020 10:11) (135/98 - 135/98)  RR: 18 (28 Jun 2020 10:11) (18 - 18)  SpO2: 99% (28 Jun 2020 10:11) (99% - 99%)    PHYSICAL EXAM:  GENERAL: A&O, anxious 2/2 pain  CHEST/LUNG: Clear to auscultation bilaterally  HEART: Regular rate and rhythm  ABDOMEN: Soft, Nondistended, Epigastric and RUQ tenderness  EXTREMITIES:  No clubbing, cyanosis, or edema      LABS:                       15.0   5.33  )-----------( 287      ( 28 Jun 2020 10:45 )             43.3       Auto Neutrophil %: 59.2 % (06-28-20 @ 10:45)  Auto Immature Granulocyte %: 0.6 % (06-28-20 @ 10:45)    06-28    133<L>  |  97<L>  |  16  ----------------------------<  121<H>  5.3<H>   |  21  |  0.9      Calcium, Total Serum: 9.4 mg/dL (06-28-20 @ 10:45)      LFTs:             8.1  | 0.3  | 39       ------------------[58      ( 28 Jun 2020 10:45 )  4.7  | x    | 18          Lipase:32         Lactate, Blood: 3.4 mmol/L (06-28-20 @ 10:45)      Coags:     11.20  ----< 0.97    ( 28 Jun 2020 10:45 )     28.1         RADIOLOGY & ADDITIONAL STUDIES:    < from: Xray Kidney Ureter Bladder (06.28.20 @ 11:12) >  IMPRESSION:    1.  Post coil embolization of celiac axis aneurysm.    2.  Paucity of bowel gas, limited to the right upper quadrant.  < end of copied text >      CT A/P PENDING

## 2020-06-28 NOTE — ED ADULT NURSE NOTE - OBJECTIVE STATEMENT
Patient c/o abdominal pain since this morning, patient states he has a sharp pain. Patient had obstruction in November- leading to LUQ Colostomy bag. Denies vomiting/fever/chills/SOB/CP/urinary symptoms. On assessment abdomen nondistended, tender, soft. No s/s of distress noted.

## 2020-06-28 NOTE — ED PROVIDER NOTE - OBJECTIVE STATEMENT
44 y.o male w/ hx of Melva- Danlos, s/p celiac artery embolization, iliac artery aneurysm bilaterally, HTN, seizures, diverticulitis, peritonitis s/p gem procedure by Luba presents to the ED for evaluation of abd pain.  At 0900 hrs acute onset abd pain, sharp, upper abd pain, moderate severity, constant, no radiation of pain, associated w/ nausea, no vomiting. Prior to today no abd pain.  Denies fever, chills, chest pain, dyspnea, back pain, urinary sxs, testicular pain.

## 2020-06-28 NOTE — CONSULT NOTE ADULT - ASSESSMENT
ASSESSMENT/PLAN: 44yMale w/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 Upgraded to SICU for close monitoring of potentially worsening intra abdominal sepsis.      Neurologic:  Pain control/Sedation:  - Fent gtt/precedex    Hx of Epilepsy:  - Holding home Keppra, Zyprexa, Trileptal while NPO    Respiratory:  Intubated: 2/2 RTOR on 6/29  - /14/40/5  - Am Abg/CXR    Cardiovascular:  Post op hypotension/tachycardia: likely 2/2 hypovolemia vs sepsis  - Fluid resuscitation  - Monitor closely, maintain MAP >65  - F/u Cardiac enzymes x3  - F/u post op EKG    Hx of HTN:  - Holding home lisinopril    Gastrointestinal/Nutrition:  Colonic perforation: s/p subtotal colectomy/takedown of previous colostomy, abd open, + abthera vac  - Serial abd exams  - Monitor Vac op   - RTOR in am     Diet :  - NPO, strict    Prophylaxis:  - PPI    Genitourinary/Renal:  Lactic acidosis: likely 2/2 hypovolemia vs sepsis  - Fluid resuscitation/trend  - LR@100  - Salazar in place, strict I/Os    Electrolyte derangements:  - Trend and replete     Hematologic/vasc:  Melva danlos type 4:  - Monitor for post op oozing  - Trend Hg, transfuse if <7    Celiac artery aneurysm s/p coiling (2010):  - Holding home ASA    Infectious Disease:  Intra-abdominal sepsis:  - Aztreonam/Flagyl  - Monitor for S/S of worsening infection     Endocrine:  - No hx of DM  - ISS while NPO    Disposition: SICU ASSESSMENT/PLAN: 44yMale w/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 Upgraded to SICU for close monitoring of potentially worsening intra abdominal sepsis.      Neurologic:  Pain control/Sedation:  - Fent gtt/precedex    Hx of Epilepsy:  - Holding home Keppra, Zyprexa, Trileptal while NPO    Respiratory:  Intubated: 2/2 RTOR on 6/29  - /14/40/5  - Am Abg/CXR    Cardiovascular:  Post op hypotension/tachycardia: likely 2/2 hypovolemia vs sepsis  - Fluid resuscitation  - Monitor closely, maintain MAP >65  - F/u Cardiac enzymes x3  - F/u post op EKG    Hx of HTN:  - Holding home lisinopril    Gastrointestinal/Nutrition:  Colonic perforation: s/p subtotal colectomy/takedown of previous colostomy, abd open, + abthera vac  - Serial abd exams  - Monitor Vac op   - RTOR in am     Diet :  - NPO, strict    Prophylaxis:  - PPI    Genitourinary/Renal:  Lactic acidosis: likely 2/2 hypovolemia vs sepsis  - Fluid resuscitation/trend  - LR@100  - Salazar in place, strict I/Os    Electrolyte derangements:  - Trend and replete     Hematologic/vasc:  Melva danlos type 4:  - Monitor for post op oozing  - Trend Hg, transfuse if <7    Celiac artery aneurysm s/p coiling (2010):  - Holding home ASA    Infectious Disease:  Intra-abdominal sepsis:  - Aztreonam/Flagyl/Clinda  - Monitor for S/S of worsening infection     Endocrine:  - No hx of DM  - ISS while NPO    Disposition: SICU ASSESSMENT/PLAN: 44yMale w/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 Upgraded to SICU for close monitoring of potentially worsening intra abdominal sepsis.      Neurologic:  Pain control/Sedation:  - Fent gtt/precedex    Hx of Epilepsy:  - Holding home Keppra, Zyprexa, Trileptal while NPO    Respiratory:  Intubated: 2/2 RTOR on 6/29  - /14/40/5  - Am Abg/CXR    Cardiovascular:  Post op hypotension/tachycardia: likely 2/2 hypovolemia vs sepsis  - Fluid resuscitation  - Monitor closely, maintain MAP >65  - Echo 11/2019: EF 55-60 mild MR/mild TR  - F/u Cardiac enzymes x3  - F/u post op EKG    Hx of HTN:  - Holding home lisinopril    Gastrointestinal/Nutrition:  Colonic perforation: s/p subtotal colectomy/takedown of previous colostomy, abd open, + abthera vac  - Serial abd exams  - Monitor Vac op   - RTOR in am     Diet :  - NPO, strict    Prophylaxis:  - PPI    Genitourinary/Renal:  Lactic acidosis: likely 2/2 hypovolemia vs sepsis  - Fluid resuscitation/trend  - LR@100  - Salazar in place, strict I/Os    Electrolyte derangements:  - Trend and replete     Hematologic/vasc:  Melva danlos type 4:  - Monitor for post op oozing  - Trend Hg, transfuse if <7    Celiac artery aneurysm s/p coiling (2010):  - Holding home ASA    Infectious Disease:  Intra-abdominal sepsis:  - Aztreonam/Flagyl/Clinda  - Monitor for S/S of worsening infection     Endocrine:  - No hx of DM  - ISS while NPO    Disposition: SICU

## 2020-06-28 NOTE — ED PROVIDER NOTE - ATTENDING CONTRIBUTION TO CARE
44 year old male, pmhx as documented above, presenting with sudden onset abdominal pain since this AM described as sharp, diffuse, non-radiating, 10/10 without palliative or provocative factors. States he has had worsening nausea as well without vomiting. Otherwise denies fevers, diarrhea, blood in stool, urinary symptoms, or any other complaints.    Vital Signs: I have reviewed the initial vital signs.  Constitutional: Well-nourished, appears stated age, (+) severe painful distress, screaming in pain  HEENT: Airway patent, moist MM, no erythema/swelling/deformity of oral structures. EOMI, PERRLA.  CV: regular rate, regular rhythm, well-perfused extremities, 2+ b/l DP and radial pulses equal.  Lungs: BCTA, no increased WOB.  ABD: (+) diffusely tender abdomen, no guarding or rebound, no pulsatile mass, no hernias.   MSK: Neck supple, nontender, nl ROM, no stepoff. Chest nontender. Back nontender in TLS spine or to b/l bony structures or flanks. Ext nontender, nl rom, no deformity.   INTEG: Skin warm, dry, no rash.  NEURO: A&Ox3, normal strength, nl sensation throughout, normal speech.   PSYCH: Calm, cooperative, normal affect and interaction.    Will obtain emergent upright CXR, labs, CT abd/pelvis, consult surgery, IVF, pain control, re-eval.

## 2020-06-28 NOTE — CONSULT NOTE ADULT - SUBJECTIVE AND OBJECTIVE BOX
SICU Consultation Note  =====================================================  HPI:  HPI: 44 year old male with h/o melva danlos syndrome, epilepsy, HTN, celiac aneurysm s/p coiling 2010, b/l carotid and iliac artery aneurysms, sigmoid colon perforation s/p Caraballo's procedure 2019 (not reversed), presented to ED with 1 day of severe epigastric/RUQ pain. Patient seen and examined, states that he had a normal bowel movement this morning and now has severe abdominal pain since 9am. He admits to nausea, denies vomiting. States his ostomy has been functioning normally.     Pt received CT abd/pelv, showin.  Intraperitoneal free air with fecal-like material in the mid abdomen and discontinuity of the descending colonic wall, compatible with a colonic perforation.  2.  Small volume ascites and diffuse mesenteric edema.  3.  Transverse colonic and small bowel wall thickening, likely reactive from surrounding inflammation.  4.  Additional findings are stable since 2019.    Labs remarkable for lactate 3.4 --> 4.6  VS -120, normotensive, afebrile    Pt given cirpo, flagyl, 3L LR, taken to OR    Surgery Information  OR time:      EBL:       UO:             IV Fluids:       Blood Products:             PAST MEDICAL & SURGICAL HISTORY:  Dislocations and subluxations  Aneurysm artery, celiac: s/p coiling 2010  Keratoconus  HTN (hypertension)  Epilepsy  EDS (Melva-Danlos syndrome)  Status post Kerri's procedure: 2020 patient presented with abdominal pain, found to have free air on imaging and taken to OR emergently, findings included perforated sigmoid and feculent peritonitis, he underwent a sigmoid resection and colostomy creation       Allergies    moxifloxacin (Hives)  penicillin (Unknown)    Intolerances      SH:  Home Medications:  Aspir 81 oral delayed release tablet: orally once a day (2020 14:40)  Keppra 750 mg oral tablet: 2 tab(s) orally 2 times a day (2020 14:40)  LISINOPRIL   TAB 30MG:  (2020 14:40)  Multiple Vitamins oral tablet: 1 tab(s) orally once a day (2020 14:40)  OXCARBAZEPIN TAB 600M milligram(s) orally once a day (at bedtime) (2020 14:44)  Trileptal 300 mg oral tablet: 300 milligram(s) orally once a day in the morning (2020 14:44)    Advanced Directives: Full Code     ROS:  [ ] A ten-point review of systems was otherwise negative except as noted.  [ ] Due to altered mental status/intubation, subjective information were not able to be obtained from the patient. History was obtained, to the extent possible, from review of the chart and collateral sources of information.      CURRENT MEDICATIONS:   --------------------------------------------------------------------------------------  Neurologic Medications    Respiratory Medications    Cardiovascular Medications    Gastrointestinal Medications    Genitourinary Medications    Hematologic/Oncologic Medications    Antimicrobial/Immunologic Medications    Endocrine/Metabolic Medications    Topical/Other Medications    --------------------------------------------------------------------------------------    Vital Signs Last 24 Hrs  T(C): 37.6 (2020 14:43), Max: 37.6 (2020 14:39)  T(F): 99.7 (2020 14:43), Max: 99.7 (2020 14:39)  HR: 120 (2020 14:39) (107 - 120)  BP: 112/71 (2020 14:43) (112/71 - 135/98)  BP(mean): --  RR: 18 (2020 14:43) (18 - 18)  SpO2: 99% (2020 14:43) (99% - 99%)  I&O's Detail    2020 07:01  -  2020 16:52  --------------------------------------------------------  IN:    Sodium Chloride 0.9% IV Bolus: 1000 mL  Total IN: 1000 mL    OUT:  Total OUT: 0 mL    Total NET: 1000 mL        I&O's Summary    2020 07:01  -  2020 16:52  --------------------------------------------------------  IN: 1000 mL / OUT: 0 mL / NET: 1000 mL        LABS:                          15.0   5.33  )-----------( 287      ( 2020 10:45 )             43.3     06-28    132<L>  |  98  |  14  ----------------------------<  152<H>  5.2<H>   |  18  |  0.9    Ca    8.7      2020 14:24    TPro  8.1<H>  /  Alb  4.7  /  TBili  0.3  /  DBili  x   /  AST  39  /  ALT  18  /  AlkPhos  58  06-28    LIVER FUNCTIONS - ( 2020 10:45 )  Alb: 4.7 g/dL / Pro: 8.1 g/dL / ALK PHOS: 58 U/L / ALT: 18 U/L / AST: 39 U/L / GGT: x           PT/INR - ( 2020 10:45 )   PT: 11.20 sec;   INR: 0.97 ratio         PTT - ( 2020 10:45 )  PTT:28.1 sec        EXAM:  General/Neuro  GCS:   Exam: Normal, NAD, alert, oriented x 3, no focal deficits. PERRLA  ***    Respiratory  Exam: Lungs clear to auscultation, Normal expansion/effort.  ***  [] Tracheostomy   [] Intubated  Mechanical Ventilation:     Cardiovascular  Exam: S1, S2.  Regular rate and rhythm.   Cardiac Rhythm: Normal Sinus Rhythm  ECHO:     GI  Exam: Abdomen soft, Non-tender, Non-distended.  Gastrostomy / Jejunostomy tube in place.  Nasogastric tube in place.  Colostomy / Ileostomy.  ***  Wound:   ***  Current Diet:  NPO***    Extremities  Exam: Extremities warm, pink, well-perfused.   Peripheral edema    Derm:  Exam: Good skin turgor, no skin breakdown.      :   Exam: Salazar catheter in place.     Tubes/Lines/Drains  ***  [x] Peripheral IV  [] Central Venous Line     	[] R	[] L	[] IJ	[] Fem	[] SC        Type:	    Date Placed:   [] Arterial Line		[] R	[] L	[] Fem	[] Rad	[] Ax	Date Placed:   [] PICC:         	[] Midline		[] Mediport           [] Urinary Catheter		Date Placed:

## 2020-06-28 NOTE — ED ADULT NURSE REASSESSMENT NOTE - NS ED NURSE REASSESS COMMENT FT1
Patient c/o abdominal pain unresolved with pain medications. Patient vomiting brown emesis, ROCKY Will made aware. ROCKY Will at bedside assessing patient- NG Tube placed. Patient tolerated procedure well, no s/s of adverse reactions noted. Will continue to monitor.

## 2020-06-28 NOTE — H&P ADULT - NSHPPHYSICALEXAM_GEN_ALL_CORE
Vital Signs Last 24 Hrs  T(C): 36.8 (28 Jun 2020 10:11), Max: 36.8 (28 Jun 2020 10:11)  T(F): 98.2 (28 Jun 2020 10:11), Max: 98.2 (28 Jun 2020 10:11)  HR: 107 (28 Jun 2020 10:11) (107 - 107)  BP: 135/98 (28 Jun 2020 10:11) (135/98 - 135/98)  RR: 18 (28 Jun 2020 10:11) (18 - 18)  SpO2: 99% (28 Jun 2020 10:11) (99% - 99%)      PHYSICAL EXAM:  GENERAL: A&O, anxious 2/2 pain  CHEST/LUNG: Clear to auscultation bilaterally  HEART: Regular rate and rhythm  ABDOMEN: Soft, Nondistended, Epigastric and RUQ tenderness  EXTREMITIES:  No clubbing, cyanosis, or edema

## 2020-06-28 NOTE — ED PROVIDER NOTE - PHYSICAL EXAMINATION
CONST: mild distress.   EYES: Sclera and conjunctiva clear.  CARD: Normal S1 S2; Normal rate and rhythm  RESP: Equal BS B/L, No wheezes, rhonchi or rales. No distress  GI: Soft, diffuse TTP, + guarding, no rebound, no CVA tenderness, non-distended.  MS: Normal ROM in all extremities. No edema of lower extremities, no calf pain, radial pulses 2+ bilaterally  SKIN: Warm, dry, no acute rashes. Good turgor  NEURO: A&Ox3, No focal deficits. Strength 5/5 with no sensory deficits

## 2020-06-28 NOTE — H&P ADULT - HISTORY OF PRESENT ILLNESS
HPI: 44 year old male pmhx as below presenting with 1 day of severe epigastric/RUQ pain. Patient seen and examined, states that he had a normal bowel movement this morning and now has severe abdominal pain since 9am. He admits to nausea, denies vomiting. States his ostomy has been functioning normally.     11/1/2020 patient presented with abdominal pain, found to have free air on imaging and taken to OR emergently, findings included perforated sigmoid and feculent peritonitis, he underwent a sigmoid resection and colostomy creation     Melva-Danlos syndrome followed at [facility] GI: Dr. Loredo, Vascular: Dr Ramirez

## 2020-06-29 ENCOUNTER — APPOINTMENT (OUTPATIENT)
Dept: SURGERY | Facility: CLINIC | Age: 45
End: 2020-06-29

## 2020-06-29 LAB
A1C WITH ESTIMATED AVERAGE GLUCOSE RESULT: 5.3 % — SIGNIFICANT CHANGE UP (ref 4–5.6)
ANION GAP SERPL CALC-SCNC: 11 MMOL/L — SIGNIFICANT CHANGE UP (ref 7–14)
ANION GAP SERPL CALC-SCNC: 12 MMOL/L — SIGNIFICANT CHANGE UP (ref 7–14)
ANION GAP SERPL CALC-SCNC: 15 MMOL/L — HIGH (ref 7–14)
ANION GAP SERPL CALC-SCNC: 9 MMOL/L — SIGNIFICANT CHANGE UP (ref 7–14)
APTT BLD: 26.9 SEC — LOW (ref 27–39.2)
APTT BLD: 40.1 SEC — HIGH (ref 27–39.2)
BASE EXCESS BLDA CALC-SCNC: -3.6 MMOL/L — LOW (ref -2–2)
BASE EXCESS BLDA CALC-SCNC: -5.6 MMOL/L — LOW (ref -2–2)
BASE EXCESS BLDV CALC-SCNC: -6.1 MMOL/L — LOW (ref -2–2)
BASOPHILS # BLD AUTO: 0.01 K/UL — SIGNIFICANT CHANGE UP (ref 0–0.2)
BASOPHILS NFR BLD AUTO: 0.2 % — SIGNIFICANT CHANGE UP (ref 0–1)
BUN SERPL-MCNC: 18 MG/DL — SIGNIFICANT CHANGE UP (ref 10–20)
BUN SERPL-MCNC: 18 MG/DL — SIGNIFICANT CHANGE UP (ref 10–20)
BUN SERPL-MCNC: 19 MG/DL — SIGNIFICANT CHANGE UP (ref 10–20)
BUN SERPL-MCNC: 19 MG/DL — SIGNIFICANT CHANGE UP (ref 10–20)
CALCIUM SERPL-MCNC: 6 MG/DL — LOW (ref 8.5–10.1)
CALCIUM SERPL-MCNC: 6.8 MG/DL — LOW (ref 8.5–10.1)
CALCIUM SERPL-MCNC: 7.2 MG/DL — LOW (ref 8.5–10.1)
CALCIUM SERPL-MCNC: 7.6 MG/DL — LOW (ref 8.5–10.1)
CHLORIDE SERPL-SCNC: 102 MMOL/L — SIGNIFICANT CHANGE UP (ref 98–110)
CHLORIDE SERPL-SCNC: 104 MMOL/L — SIGNIFICANT CHANGE UP (ref 98–110)
CHLORIDE SERPL-SCNC: 104 MMOL/L — SIGNIFICANT CHANGE UP (ref 98–110)
CHLORIDE SERPL-SCNC: 110 MMOL/L — SIGNIFICANT CHANGE UP (ref 98–110)
CK MB CFR SERPL CALC: 30.8 NG/ML — HIGH (ref 0.6–6.3)
CK MB CFR SERPL CALC: 33.4 NG/ML — HIGH (ref 0.6–6.3)
CK MB CFR SERPL CALC: 37.8 NG/ML — HIGH (ref 0.6–6.3)
CK SERPL-CCNC: 1085 U/L — HIGH (ref 0–225)
CK SERPL-CCNC: 1181 U/L — HIGH (ref 0–225)
CK SERPL-CCNC: 975 U/L — HIGH (ref 0–225)
CO2 SERPL-SCNC: 16 MMOL/L — LOW (ref 17–32)
CO2 SERPL-SCNC: 16 MMOL/L — LOW (ref 17–32)
CO2 SERPL-SCNC: 19 MMOL/L — SIGNIFICANT CHANGE UP (ref 17–32)
CO2 SERPL-SCNC: 22 MMOL/L — SIGNIFICANT CHANGE UP (ref 17–32)
CREAT SERPL-MCNC: 0.9 MG/DL — SIGNIFICANT CHANGE UP (ref 0.7–1.5)
CREAT SERPL-MCNC: 1.2 MG/DL — SIGNIFICANT CHANGE UP (ref 0.7–1.5)
EOSINOPHIL # BLD AUTO: 0 K/UL — SIGNIFICANT CHANGE UP (ref 0–0.7)
EOSINOPHIL NFR BLD AUTO: 0 % — SIGNIFICANT CHANGE UP (ref 0–8)
ESTIMATED AVERAGE GLUCOSE: 105 MG/DL — SIGNIFICANT CHANGE UP (ref 68–114)
GAS PNL BLDA: SIGNIFICANT CHANGE UP
GLUCOSE BLDC GLUCOMTR-MCNC: 106 MG/DL — HIGH (ref 70–99)
GLUCOSE BLDC GLUCOMTR-MCNC: 123 MG/DL — HIGH (ref 70–99)
GLUCOSE BLDC GLUCOMTR-MCNC: 80 MG/DL — SIGNIFICANT CHANGE UP (ref 70–99)
GLUCOSE BLDC GLUCOMTR-MCNC: 80 MG/DL — SIGNIFICANT CHANGE UP (ref 70–99)
GLUCOSE BLDC GLUCOMTR-MCNC: 94 MG/DL — SIGNIFICANT CHANGE UP (ref 70–99)
GLUCOSE SERPL-MCNC: 101 MG/DL — HIGH (ref 70–99)
GLUCOSE SERPL-MCNC: 106 MG/DL — HIGH (ref 70–99)
GLUCOSE SERPL-MCNC: 128 MG/DL — HIGH (ref 70–99)
GLUCOSE SERPL-MCNC: 95 MG/DL — SIGNIFICANT CHANGE UP (ref 70–99)
HCO3 BLDA-SCNC: 17 MMOL/L — LOW (ref 23–27)
HCO3 BLDA-SCNC: 21 MMOL/L — LOW (ref 23–27)
HCO3 BLDV-SCNC: 21 MMOL/L — LOW (ref 22–29)
HCT VFR BLD CALC: 30.3 % — LOW (ref 42–52)
HCT VFR BLD CALC: 32.7 % — LOW (ref 42–52)
HCT VFR BLD CALC: 39.7 % — LOW (ref 42–52)
HGB BLD-MCNC: 10.2 G/DL — LOW (ref 14–18)
HGB BLD-MCNC: 11.5 G/DL — LOW (ref 14–18)
HGB BLD-MCNC: 13.4 G/DL — LOW (ref 14–18)
HOROWITZ INDEX BLDA+IHG-RTO: 40 — SIGNIFICANT CHANGE UP
IMM GRANULOCYTES NFR BLD AUTO: 0.6 % — HIGH (ref 0.1–0.3)
INR BLD: 1.71 RATIO — HIGH (ref 0.65–1.3)
INR BLD: 1.85 RATIO — HIGH (ref 0.65–1.3)
LACTATE BLDV-MCNC: 3.3 MMOL/L — HIGH (ref 0.5–1.6)
LACTATE SERPL-SCNC: 1.8 MMOL/L — SIGNIFICANT CHANGE UP (ref 0.7–2)
LYMPHOCYTES # BLD AUTO: 0.87 K/UL — LOW (ref 1.2–3.4)
LYMPHOCYTES # BLD AUTO: 14.1 % — LOW (ref 20.5–51.1)
MAGNESIUM SERPL-MCNC: 1.6 MG/DL — LOW (ref 1.8–2.4)
MAGNESIUM SERPL-MCNC: 2.2 MG/DL — SIGNIFICANT CHANGE UP (ref 1.8–2.4)
MAGNESIUM SERPL-MCNC: 2.3 MG/DL — SIGNIFICANT CHANGE UP (ref 1.8–2.4)
MCHC RBC-ENTMCNC: 29.7 PG — SIGNIFICANT CHANGE UP (ref 27–31)
MCHC RBC-ENTMCNC: 30.2 PG — SIGNIFICANT CHANGE UP (ref 27–31)
MCHC RBC-ENTMCNC: 31.3 PG — HIGH (ref 27–31)
MCHC RBC-ENTMCNC: 33.7 G/DL — SIGNIFICANT CHANGE UP (ref 32–37)
MCHC RBC-ENTMCNC: 33.8 G/DL — SIGNIFICANT CHANGE UP (ref 32–37)
MCHC RBC-ENTMCNC: 35.2 G/DL — SIGNIFICANT CHANGE UP (ref 32–37)
MCV RBC AUTO: 88.1 FL — SIGNIFICANT CHANGE UP (ref 80–94)
MCV RBC AUTO: 89.1 FL — SIGNIFICANT CHANGE UP (ref 80–94)
MCV RBC AUTO: 89.6 FL — SIGNIFICANT CHANGE UP (ref 80–94)
MONOCYTES # BLD AUTO: 0.44 K/UL — SIGNIFICANT CHANGE UP (ref 0.1–0.6)
MONOCYTES NFR BLD AUTO: 7.1 % — SIGNIFICANT CHANGE UP (ref 1.7–9.3)
NEUTROPHILS # BLD AUTO: 4.82 K/UL — SIGNIFICANT CHANGE UP (ref 1.4–6.5)
NEUTROPHILS NFR BLD AUTO: 78 % — HIGH (ref 42.2–75.2)
NRBC # BLD: 0 /100 WBCS — SIGNIFICANT CHANGE UP (ref 0–0)
PCO2 BLDA: 26 MMHG — LOW (ref 38–42)
PCO2 BLDA: 34 MMHG — LOW (ref 38–42)
PCO2 BLDV: 45 MMHG — SIGNIFICANT CHANGE UP (ref 41–51)
PH BLDA: 7.39 — SIGNIFICANT CHANGE UP (ref 7.38–7.42)
PH BLDA: 7.42 — SIGNIFICANT CHANGE UP (ref 7.38–7.42)
PH BLDV: 7.27 — SIGNIFICANT CHANGE UP (ref 7.26–7.43)
PHOSPHATE SERPL-MCNC: 3.7 MG/DL — SIGNIFICANT CHANGE UP (ref 2.1–4.9)
PHOSPHATE SERPL-MCNC: 3.9 MG/DL — SIGNIFICANT CHANGE UP (ref 2.1–4.9)
PHOSPHATE SERPL-MCNC: 4.6 MG/DL — SIGNIFICANT CHANGE UP (ref 2.1–4.9)
PLATELET # BLD AUTO: 213 K/UL — SIGNIFICANT CHANGE UP (ref 130–400)
PLATELET # BLD AUTO: 220 K/UL — SIGNIFICANT CHANGE UP (ref 130–400)
PLATELET # BLD AUTO: 237 K/UL — SIGNIFICANT CHANGE UP (ref 130–400)
PO2 BLDA: 112 MMHG — HIGH (ref 78–95)
PO2 BLDA: 192 MMHG — HIGH (ref 78–95)
PO2 BLDV: 26 MMHG — SIGNIFICANT CHANGE UP (ref 20–40)
POTASSIUM SERPL-MCNC: 3.7 MMOL/L — SIGNIFICANT CHANGE UP (ref 3.5–5)
POTASSIUM SERPL-MCNC: 5.5 MMOL/L — HIGH (ref 3.5–5)
POTASSIUM SERPL-MCNC: 5.5 MMOL/L — HIGH (ref 3.5–5)
POTASSIUM SERPL-MCNC: 5.7 MMOL/L — HIGH (ref 3.5–5)
POTASSIUM SERPL-SCNC: 3.7 MMOL/L — SIGNIFICANT CHANGE UP (ref 3.5–5)
POTASSIUM SERPL-SCNC: 5.5 MMOL/L — HIGH (ref 3.5–5)
POTASSIUM SERPL-SCNC: 5.5 MMOL/L — HIGH (ref 3.5–5)
POTASSIUM SERPL-SCNC: 5.7 MMOL/L — HIGH (ref 3.5–5)
PROTHROM AB SERPL-ACNC: 19.7 SEC — HIGH (ref 9.95–12.87)
PROTHROM AB SERPL-ACNC: 21.3 SEC — HIGH (ref 9.95–12.87)
RBC # BLD: 3.44 M/UL — LOW (ref 4.7–6.1)
RBC # BLD: 3.67 M/UL — LOW (ref 4.7–6.1)
RBC # BLD: 4.43 M/UL — LOW (ref 4.7–6.1)
RBC # FLD: 12.5 % — SIGNIFICANT CHANGE UP (ref 11.5–14.5)
RBC # FLD: 12.9 % — SIGNIFICANT CHANGE UP (ref 11.5–14.5)
RBC # FLD: 13 % — SIGNIFICANT CHANGE UP (ref 11.5–14.5)
SAO2 % BLDA: 100 % — HIGH (ref 94–98)
SAO2 % BLDA: 100 % — HIGH (ref 94–98)
SAO2 % BLDV: 41 % — SIGNIFICANT CHANGE UP
SODIUM SERPL-SCNC: 132 MMOL/L — LOW (ref 135–146)
SODIUM SERPL-SCNC: 135 MMOL/L — SIGNIFICANT CHANGE UP (ref 135–146)
SODIUM SERPL-SCNC: 135 MMOL/L — SIGNIFICANT CHANGE UP (ref 135–146)
SODIUM SERPL-SCNC: 138 MMOL/L — SIGNIFICANT CHANGE UP (ref 135–146)
TROPONIN T SERPL-MCNC: 0.02 NG/ML — HIGH
WBC # BLD: 10.67 K/UL — SIGNIFICANT CHANGE UP (ref 4.8–10.8)
WBC # BLD: 11.29 K/UL — HIGH (ref 4.8–10.8)
WBC # BLD: 6.18 K/UL — SIGNIFICANT CHANGE UP (ref 4.8–10.8)
WBC # FLD AUTO: 10.67 K/UL — SIGNIFICANT CHANGE UP (ref 4.8–10.8)
WBC # FLD AUTO: 11.29 K/UL — HIGH (ref 4.8–10.8)
WBC # FLD AUTO: 6.18 K/UL — SIGNIFICANT CHANGE UP (ref 4.8–10.8)

## 2020-06-29 PROCEDURE — 71045 X-RAY EXAM CHEST 1 VIEW: CPT | Mod: 26

## 2020-06-29 PROCEDURE — 49002 REOPENING OF ABDOMEN: CPT | Mod: 58,59

## 2020-06-29 PROCEDURE — 99291 CRITICAL CARE FIRST HOUR: CPT | Mod: 24

## 2020-06-29 PROCEDURE — 44310 ILEOSTOMY/JEJUNOSTOMY: CPT | Mod: 58

## 2020-06-29 RX ORDER — MEROPENEM 1 G/30ML
1000 INJECTION INTRAVENOUS EVERY 8 HOURS
Refills: 0 | Status: DISCONTINUED | OUTPATIENT
Start: 2020-06-29 | End: 2020-06-30

## 2020-06-29 RX ORDER — DEXTROSE 50 % IN WATER 50 %
25 SYRINGE (ML) INTRAVENOUS ONCE
Refills: 0 | Status: DISCONTINUED | OUTPATIENT
Start: 2020-06-29 | End: 2020-06-29

## 2020-06-29 RX ORDER — HYDROMORPHONE HYDROCHLORIDE 2 MG/ML
1 INJECTION INTRAMUSCULAR; INTRAVENOUS; SUBCUTANEOUS
Refills: 0 | Status: DISCONTINUED | OUTPATIENT
Start: 2020-06-29 | End: 2020-06-29

## 2020-06-29 RX ORDER — PANTOPRAZOLE SODIUM 20 MG/1
40 TABLET, DELAYED RELEASE ORAL DAILY
Refills: 0 | Status: DISCONTINUED | OUTPATIENT
Start: 2020-06-29 | End: 2020-06-30

## 2020-06-29 RX ORDER — SODIUM CHLORIDE 9 MG/ML
1000 INJECTION, SOLUTION INTRAVENOUS ONCE
Refills: 0 | Status: COMPLETED | OUTPATIENT
Start: 2020-06-29 | End: 2020-06-29

## 2020-06-29 RX ORDER — DEXTROSE 50 % IN WATER 50 %
12.5 SYRINGE (ML) INTRAVENOUS ONCE
Refills: 0 | Status: DISCONTINUED | OUTPATIENT
Start: 2020-06-29 | End: 2020-06-29

## 2020-06-29 RX ORDER — SODIUM CHLORIDE 9 MG/ML
250 INJECTION, SOLUTION INTRAVENOUS ONCE
Refills: 0 | Status: COMPLETED | OUTPATIENT
Start: 2020-06-29 | End: 2020-06-29

## 2020-06-29 RX ORDER — FENTANYL CITRATE 50 UG/ML
25 INJECTION INTRAVENOUS ONCE
Refills: 0 | Status: DISCONTINUED | OUTPATIENT
Start: 2020-06-29 | End: 2020-06-29

## 2020-06-29 RX ORDER — NOREPINEPHRINE BITARTRATE/D5W 8 MG/250ML
0.01 PLASTIC BAG, INJECTION (ML) INTRAVENOUS
Qty: 8 | Refills: 0 | Status: DISCONTINUED | OUTPATIENT
Start: 2020-06-29 | End: 2020-06-29

## 2020-06-29 RX ORDER — CHLORHEXIDINE GLUCONATE 213 G/1000ML
1 SOLUTION TOPICAL DAILY
Refills: 0 | Status: DISCONTINUED | OUTPATIENT
Start: 2020-06-29 | End: 2020-06-29

## 2020-06-29 RX ORDER — SODIUM CHLORIDE 9 MG/ML
1000 INJECTION, SOLUTION INTRAVENOUS
Refills: 0 | Status: DISCONTINUED | OUTPATIENT
Start: 2020-06-29 | End: 2020-07-01

## 2020-06-29 RX ORDER — SODIUM CHLORIDE 9 MG/ML
500 INJECTION, SOLUTION INTRAVENOUS ONCE
Refills: 0 | Status: COMPLETED | OUTPATIENT
Start: 2020-06-29 | End: 2020-06-30

## 2020-06-29 RX ORDER — GLUCAGON INJECTION, SOLUTION 0.5 MG/.1ML
1 INJECTION, SOLUTION SUBCUTANEOUS ONCE
Refills: 0 | Status: DISCONTINUED | OUTPATIENT
Start: 2020-06-29 | End: 2020-06-29

## 2020-06-29 RX ORDER — HYDROMORPHONE HYDROCHLORIDE 2 MG/ML
0.25 INJECTION INTRAMUSCULAR; INTRAVENOUS; SUBCUTANEOUS EVERY 4 HOURS
Refills: 0 | Status: DISCONTINUED | OUTPATIENT
Start: 2020-06-29 | End: 2020-06-29

## 2020-06-29 RX ORDER — HEPARIN SODIUM 5000 [USP'U]/ML
5000 INJECTION INTRAVENOUS; SUBCUTANEOUS EVERY 8 HOURS
Refills: 0 | Status: DISCONTINUED | OUTPATIENT
Start: 2020-06-29 | End: 2020-07-03

## 2020-06-29 RX ORDER — ACETAMINOPHEN 500 MG
650 TABLET ORAL EVERY 6 HOURS
Refills: 0 | Status: DISCONTINUED | OUTPATIENT
Start: 2020-06-29 | End: 2020-07-02

## 2020-06-29 RX ORDER — MORPHINE SULFATE 50 MG/1
2 CAPSULE, EXTENDED RELEASE ORAL EVERY 4 HOURS
Refills: 0 | Status: DISCONTINUED | OUTPATIENT
Start: 2020-06-29 | End: 2020-06-29

## 2020-06-29 RX ORDER — METRONIDAZOLE 500 MG
500 TABLET ORAL ONCE
Refills: 0 | Status: DISCONTINUED | OUTPATIENT
Start: 2020-06-29 | End: 2020-06-29

## 2020-06-29 RX ORDER — MAGNESIUM SULFATE 500 MG/ML
2 VIAL (ML) INJECTION ONCE
Refills: 0 | Status: COMPLETED | OUTPATIENT
Start: 2020-06-29 | End: 2020-06-29

## 2020-06-29 RX ORDER — AZTREONAM 2 G
VIAL (EA) INJECTION
Refills: 0 | Status: DISCONTINUED | OUTPATIENT
Start: 2020-06-29 | End: 2020-06-29

## 2020-06-29 RX ORDER — CHLORHEXIDINE GLUCONATE 213 G/1000ML
15 SOLUTION TOPICAL EVERY 12 HOURS
Refills: 0 | Status: DISCONTINUED | OUTPATIENT
Start: 2020-06-29 | End: 2020-06-29

## 2020-06-29 RX ORDER — ONDANSETRON 8 MG/1
4 TABLET, FILM COATED ORAL ONCE
Refills: 0 | Status: DISCONTINUED | OUTPATIENT
Start: 2020-06-29 | End: 2020-07-03

## 2020-06-29 RX ORDER — METRONIDAZOLE 500 MG
500 TABLET ORAL EVERY 8 HOURS
Refills: 0 | Status: DISCONTINUED | OUTPATIENT
Start: 2020-06-29 | End: 2020-06-29

## 2020-06-29 RX ORDER — CALCIUM GLUCONATE 100 MG/ML
1 VIAL (ML) INTRAVENOUS ONCE
Refills: 0 | Status: COMPLETED | OUTPATIENT
Start: 2020-06-29 | End: 2020-06-29

## 2020-06-29 RX ORDER — METRONIDAZOLE 500 MG
TABLET ORAL
Refills: 0 | Status: DISCONTINUED | OUTPATIENT
Start: 2020-06-29 | End: 2020-06-29

## 2020-06-29 RX ORDER — AZTREONAM 2 G
2000 VIAL (EA) INJECTION EVERY 8 HOURS
Refills: 0 | Status: DISCONTINUED | OUTPATIENT
Start: 2020-06-29 | End: 2020-06-29

## 2020-06-29 RX ORDER — MEROPENEM 1 G/30ML
1000 INJECTION INTRAVENOUS EVERY 12 HOURS
Refills: 0 | Status: DISCONTINUED | OUTPATIENT
Start: 2020-06-29 | End: 2020-06-29

## 2020-06-29 RX ORDER — CHLORHEXIDINE GLUCONATE 213 G/1000ML
15 SOLUTION TOPICAL
Refills: 0 | Status: DISCONTINUED | OUTPATIENT
Start: 2020-06-29 | End: 2020-06-29

## 2020-06-29 RX ORDER — HYDROMORPHONE HYDROCHLORIDE 2 MG/ML
30 INJECTION INTRAMUSCULAR; INTRAVENOUS; SUBCUTANEOUS
Refills: 0 | Status: DISCONTINUED | OUTPATIENT
Start: 2020-06-29 | End: 2020-07-02

## 2020-06-29 RX ORDER — SODIUM CHLORIDE 9 MG/ML
1000 INJECTION, SOLUTION INTRAVENOUS
Refills: 0 | Status: DISCONTINUED | OUTPATIENT
Start: 2020-06-29 | End: 2020-06-29

## 2020-06-29 RX ORDER — SODIUM CHLORIDE 9 MG/ML
250 INJECTION INTRAMUSCULAR; INTRAVENOUS; SUBCUTANEOUS ONCE
Refills: 0 | Status: COMPLETED | OUTPATIENT
Start: 2020-06-29 | End: 2020-06-29

## 2020-06-29 RX ORDER — AZTREONAM 2 G
2000 VIAL (EA) INJECTION ONCE
Refills: 0 | Status: DISCONTINUED | OUTPATIENT
Start: 2020-06-29 | End: 2020-06-29

## 2020-06-29 RX ORDER — HYDROMORPHONE HYDROCHLORIDE 2 MG/ML
0.5 INJECTION INTRAMUSCULAR; INTRAVENOUS; SUBCUTANEOUS EVERY 4 HOURS
Refills: 0 | Status: DISCONTINUED | OUTPATIENT
Start: 2020-06-29 | End: 2020-06-29

## 2020-06-29 RX ORDER — DEXTROSE 50 % IN WATER 50 %
50 SYRINGE (ML) INTRAVENOUS ONCE
Refills: 0 | Status: COMPLETED | OUTPATIENT
Start: 2020-06-29 | End: 2020-06-29

## 2020-06-29 RX ORDER — DEXTROSE 50 % IN WATER 50 %
15 SYRINGE (ML) INTRAVENOUS ONCE
Refills: 0 | Status: DISCONTINUED | OUTPATIENT
Start: 2020-06-29 | End: 2020-06-29

## 2020-06-29 RX ORDER — INSULIN HUMAN 100 [IU]/ML
INJECTION, SOLUTION SUBCUTANEOUS EVERY 4 HOURS
Refills: 0 | Status: DISCONTINUED | OUTPATIENT
Start: 2020-06-29 | End: 2020-06-29

## 2020-06-29 RX ORDER — CHLORHEXIDINE GLUCONATE 213 G/1000ML
1 SOLUTION TOPICAL
Refills: 0 | Status: DISCONTINUED | OUTPATIENT
Start: 2020-06-29 | End: 2020-07-03

## 2020-06-29 RX ORDER — SODIUM CHLORIDE 9 MG/ML
250 INJECTION INTRAMUSCULAR; INTRAVENOUS; SUBCUTANEOUS ONCE
Refills: 0 | Status: COMPLETED | OUTPATIENT
Start: 2020-06-29 | End: 2020-06-30

## 2020-06-29 RX ORDER — FENTANYL CITRATE 50 UG/ML
0.47 INJECTION INTRAVENOUS
Qty: 2500 | Refills: 0 | Status: DISCONTINUED | OUTPATIENT
Start: 2020-06-29 | End: 2020-06-29

## 2020-06-29 RX ORDER — INSULIN HUMAN 100 [IU]/ML
10 INJECTION, SOLUTION SUBCUTANEOUS ONCE
Refills: 0 | Status: COMPLETED | OUTPATIENT
Start: 2020-06-29 | End: 2020-06-29

## 2020-06-29 RX ORDER — LEVETIRACETAM 250 MG/1
1500 TABLET, FILM COATED ORAL EVERY 12 HOURS
Refills: 0 | Status: DISCONTINUED | OUTPATIENT
Start: 2020-06-29 | End: 2020-06-30

## 2020-06-29 RX ORDER — DEXMEDETOMIDINE HYDROCHLORIDE IN 0.9% SODIUM CHLORIDE 4 UG/ML
0.23 INJECTION INTRAVENOUS
Qty: 200 | Refills: 0 | Status: DISCONTINUED | OUTPATIENT
Start: 2020-06-29 | End: 2020-06-29

## 2020-06-29 RX ADMIN — MEROPENEM 100 MILLIGRAM(S): 1 INJECTION INTRAVENOUS at 15:44

## 2020-06-29 RX ADMIN — SODIUM CHLORIDE 1000 MILLILITER(S): 9 INJECTION, SOLUTION INTRAVENOUS at 09:50

## 2020-06-29 RX ADMIN — Medication 1.36 MICROGRAM(S)/KG/MIN: at 05:16

## 2020-06-29 RX ADMIN — HYDROMORPHONE HYDROCHLORIDE 0.5 MILLIGRAM(S): 2 INJECTION INTRAMUSCULAR; INTRAVENOUS; SUBCUTANEOUS at 16:29

## 2020-06-29 RX ADMIN — HEPARIN SODIUM 5000 UNIT(S): 5000 INJECTION INTRAVENOUS; SUBCUTANEOUS at 05:21

## 2020-06-29 RX ADMIN — INSULIN HUMAN 10 UNIT(S): 100 INJECTION, SOLUTION SUBCUTANEOUS at 07:01

## 2020-06-29 RX ADMIN — SODIUM CHLORIDE 1500 MILLILITER(S): 9 INJECTION, SOLUTION INTRAVENOUS at 04:10

## 2020-06-29 RX ADMIN — MEROPENEM 100 MILLIGRAM(S): 1 INJECTION INTRAVENOUS at 21:22

## 2020-06-29 RX ADMIN — Medication 50 MILLILITER(S): at 18:35

## 2020-06-29 RX ADMIN — FENTANYL CITRATE 25 MICROGRAM(S): 50 INJECTION INTRAVENOUS at 01:41

## 2020-06-29 RX ADMIN — Medication 100 MILLIGRAM(S): at 05:16

## 2020-06-29 RX ADMIN — POTASSIUM PHOSPHATE, MONOBASIC POTASSIUM PHOSPHATE, DIBASIC 63.75 MILLIMOLE(S): 236; 224 INJECTION, SOLUTION INTRAVENOUS at 01:00

## 2020-06-29 RX ADMIN — Medication 50 GRAM(S): at 18:15

## 2020-06-29 RX ADMIN — LEVETIRACETAM 460 MILLIGRAM(S): 250 TABLET, FILM COATED ORAL at 15:44

## 2020-06-29 RX ADMIN — Medication 50 MILLILITER(S): at 07:02

## 2020-06-29 RX ADMIN — SODIUM CHLORIDE 3000 MILLILITER(S): 9 INJECTION, SOLUTION INTRAVENOUS at 01:45

## 2020-06-29 RX ADMIN — HEPARIN SODIUM 5000 UNIT(S): 5000 INJECTION INTRAVENOUS; SUBCUTANEOUS at 21:22

## 2020-06-29 RX ADMIN — INSULIN HUMAN 10 UNIT(S): 100 INJECTION, SOLUTION SUBCUTANEOUS at 18:35

## 2020-06-29 RX ADMIN — SODIUM CHLORIDE 3000 MILLILITER(S): 9 INJECTION INTRAMUSCULAR; INTRAVENOUS; SUBCUTANEOUS at 15:30

## 2020-06-29 RX ADMIN — Medication 100 GRAM(S): at 07:33

## 2020-06-29 RX ADMIN — SODIUM CHLORIDE 6000 MILLILITER(S): 9 INJECTION, SOLUTION INTRAVENOUS at 01:06

## 2020-06-29 RX ADMIN — Medication 100 GRAM(S): at 00:00

## 2020-06-29 RX ADMIN — SODIUM CHLORIDE 125 MILLILITER(S): 9 INJECTION, SOLUTION INTRAVENOUS at 21:19

## 2020-06-29 RX ADMIN — HYDROMORPHONE HYDROCHLORIDE 30 MILLILITER(S): 2 INJECTION INTRAMUSCULAR; INTRAVENOUS; SUBCUTANEOUS at 21:19

## 2020-06-29 NOTE — BRIEF OPERATIVE NOTE - NSICDXBRIEFPOSTOP_GEN_ALL_CORE_FT
POST-OP DIAGNOSIS:  Perforation of colon 28-Jun-2020 18:41:51  Luigi Lopez
POST-OP DIAGNOSIS:  Perforation of colon 28-Jun-2020 18:41:51  Luigi Lopez

## 2020-06-29 NOTE — PROGRESS NOTE ADULT - SUBJECTIVE AND OBJECTIVE BOX
GENERAL SURGERY PROGRESS NOTE     FAZAL CASSIDYy  Male  Hospital day :1d  POD:  Procedure: Colectomy, subtotal, open    OVERNIGHT EVENTS: s/p ex lap, subtotal colectomy, take down of previous ostomy, operative course c/b profuse bleeding 2/2 PMH of EDS, hypotensive and tachycardic post op, responded to fluid resuscitation 1.25L given so far, remains intubated and sedated    T(F): 96 (06-28-20 @ 22:00), Max: 99.7 (06-28-20 @ 14:39)  HR: 109 (06-29-20 @ 00:10) (107 - 129)  BP: 101/47 (06-28-20 @ 22:00) (69/46 - 135/98)  RR: 16 (06-28-20 @ 23:00) (14 - 22)  SpO2: 100% (06-29-20 @ 00:10) (98% - 100%)    DIET/FLUIDS: lactated ringers. 1000 milliLiter(s) IV Continuous <Continuous>  potassium phosphate IVPB 15 milliMole(s) IV Intermittent once      URINE:    Indwelling Urethral Catheter:     Connect To:  Straight Drainage/Gravity    Indication:  Urine Output Monitoring in Critically Ill (06-28-20 @ 22:02)    GI proph:  pantoprazole  Injectable 40 milliGRAM(s) IV Push daily    AC/ proph: heparin   Injectable 5000 Unit(s) SubCutaneous every 8 hours    ABx: aztreonam  IVPB 2000 milliGRAM(s) IV Intermittent every 8 hours  aztreonam  IVPB      clindamycin IVPB 900 milliGRAM(s) IV Intermittent every 8 hours  metroNIDAZOLE  IVPB      metroNIDAZOLE  IVPB 500 milliGRAM(s) IV Intermittent every 8 hours      PHYSICAL EXAM:  GENERAL: intubated and sedated  ABDOMEN: Abthera vac over laparotomy site, previous stoma site covered with VAC sponge, sanguinous output in VAC      LABS  Labs:  CAPILLARY BLOOD GLUCOSE      POCT Blood Glucose.: 124 mg/dL (28 Jun 2020 18:55)  POCT Blood Glucose.: 134 mg/dL (28 Jun 2020 17:21)                          16.0   1.78  )-----------( 262      ( 28 Jun 2020 20:22 )             47.9       Band Neutrophils %: 0.9 % (06-28-20 @ 20:22)  Auto Neutrophil %: 59.2 % (06-28-20 @ 10:45)  Auto Immature Granulocyte %: 0.6 % (06-28-20 @ 10:45)    06-28    134<L>  |  98  |  13  ----------------------------<  165<H>  3.8   |  20  |  1.0      Calcium, Total Serum: 7.9 mg/dL (06-28-20 @ 20:22)      LFTs:             8.1  | 0.3  | 39       ------------------[58      ( 28 Jun 2020 10:45 )  4.7  | x    | 18          Lipase:32     Amylase:x         Blood Gas Arterial, Lactate: 2.3 mmoL/L (06-28-20 @ 20:42)  Lactate, Blood: 4.6 mmol/L (06-28-20 @ 14:24)  Lactate, Blood: 3.4 mmol/L (06-28-20 @ 10:45)    ABG - ( 28 Jun 2020 20:42 )  pH: 7.36  /  pCO2: 36    /  pO2: 271   / HCO3: 21    / Base Excess: -4.1  /  SaO2: 99                Coags:     12.90  ----< 1.12    ( 28 Jun 2020 21:42 )     26.4        CARDIAC MARKERS ( 28 Jun 2020 20:22 )  x     / 0.03 ng/mL / 286 U/L / x     / x

## 2020-06-29 NOTE — CONSULT NOTE ADULT - ASSESSMENT
Patient is s/p End ileostomy, Reexploration of abdomen with irrigation or drainage and closure 29-Jun-2020, s/p extubation post-operatively now requiring PCA for pain management

## 2020-06-29 NOTE — CHART NOTE - NSCHARTNOTEFT_GEN_A_CORE
PACU ANESTHESIA ADMISSION NOTE      Procedure: Re-exploration of colectomy; creation of ostomy    Post op diagnosis:  Perforation of colon      __x__  Intubated  TV:_500_____       Rate: __12____      FiO2: ___100___    ____  Patent Airway    __x__  Full return of protective reflexes    __x__  Full recovery from anesthesia / back to baseline     Vitals:   T:   98.2F        R:   16               BP:     100/56             Sat:     100              P: 98      Mental Status:  ____ Awake   _____ Alert   _____ Drowsy   ___x__ Sedated    Nausea/Vomiting:  _x___ NO  ______Yes,   See Post - Op Orders          Pain Scale (0-10):  _0/10____    Treatment: ____ None    ____ See Post - Op/PCA Orders    Post - Operative Fluids:   ____ Oral   __x__ See Post - Op Orders    Plan: Discharge:   ____Home       _____Floor     ___x__Critical Care    _____  Other:_________________    Comments: pt tolerated procedure well, no anesthesia related complications. Care of pt endorsed to ICU, report given to ICU RN.

## 2020-06-29 NOTE — PROGRESS NOTE ADULT - ASSESSMENT
ASSESSMENT/PLAN: 44yMale w/pertinent past hx of Melva Danlos type 4, perforation of sigmoid colon s/p Kerri's (11/2019) is currently POD 0 s/p subtotal colectomy/takedown of previous colostomy for perforated colon w/intraop findings of feculent peritonitis. Upgraded to SICU for close monitoring of potentially worsening intra abdominal sepsis.      Neurologic:  Pain control/Sedation:  - Fent gtt/ Precedex    Hx of Epilepsy:  - Holding home Keppra, Zyprexa, Trileptal while NPO    Respiratory:  Intubated: 2/2 RTOR on 6/29  - /14/40/5- 7.42/26/192/17/-5.6 lac 3.5  - Am Abg/CXR    Cardiovascular:  Post op hypotension/tachycardia: likely 2/2 hypovolemia vs sepsis  - Fluid resuscitation- recieved 1750 ml overnight  - Peripheral levophed-   - Monitor closely, maintain MAP >65  - Echo 11/2019: EF 55-60 mild MR/mild TR  - F/u Cardiac enzymes x3  - F/u post op EKG    Hx of HTN:  - Holding home lisinopril    Gastrointestinal/Nutrition:  Colonic perforation: s/p subtotal colectomy/takedown of previous colostomy, abd open, + abthera vac  - Serial abd exams  - Monitor Vac op   - RTOR in am     Diet :  - NPO, strict    Prophylaxis:  - PPI    Genitourinary/Renal:  Lactic acidosis: likely 2/2 hypovolemia vs sepsis  - Fluid resuscitation/trend  - LR@100  - Salazar in place, strict I/Os    Electrolyte derangements:  - Trend and replete     Hematologic/vasc:  Melva danlos type 4:  - Monitor for post op oozing  - Trend Hg, transfuse if <7    Celiac artery aneurysm s/p coiling (2010):  - Holding home ASA    Infectious Disease:  Intra-abdominal sepsis:  - Aztreonam/Flagyl/Clinda  - Monitor for S/S of worsening infection     Endocrine:  - No hx of DM  - ISS while NPO    Disposition: SICU ASSESSMENT/PLAN: 44yMale w/pertinent past hx of Melva Danlos type 4, perforation of sigmoid colon s/p Kerri's (11/2019) is currently POD 0 s/p subtotal colectomy/takedown of previous colostomy for perforated colon w/intraop findings of feculent peritonitis. Upgraded to SICU for close monitoring of potentially worsening intra abdominal sepsis.      Neurologic:  Pain control/Sedation:  - Fent gtt/ Precedex    Hx of Epilepsy:  - Holding home Keppra, Zyprexa, Trileptal while NPO    Respiratory:  Intubated: 2/2 RTOR on 6/29  - /14/40/5- 7.42/26/192/17/-5.6 lac 3.5  - Am Abg/CXR    Cardiovascular:  Post op hypotension/tachycardia: likely 2/2 hypovolemia vs sepsis  - Fluid resuscitation- recieved 1750 ml overnight  - Peripheral levophed- started at 5am   - Monitor closely, maintain MAP >60-65  - Echo 11/2019: EF 55-60 mild MR/mild TR  - F/u Cardiac enzymes x3  - F/u post op EKG    Hx of HTN:  - Holding home lisinopril    Gastrointestinal/Nutrition:  Colonic perforation: s/p subtotal colectomy/takedown of previous colostomy, abd open, + abthera vac  - Serial abd exams  - Monitor Vac op   - RTOR in am     Diet :  - NPO, strict    Prophylaxis:  - PPI    Genitourinary/Renal:  Lactic acidosis: likely 2/2 hypovolemia vs sepsis  - Fluid resuscitation/trend 2.3-->3.5  - LR@100  - Salazar in place, strict I/Os    Electrolyte derangements:  - Trend and replete     Hematologic/vasc:  Melva danlos type 4:  - Monitor for post op oozing  - Trend Hg, transfuse if <7    Celiac artery aneurysm s/p coiling (2010):  - Holding home ASA    Infectious Disease:  Intra-abdominal sepsis:  - Aztreonam/Flagyl/Clinda  - Monitor for S/S of worsening infection     Endocrine:  - No hx of DM  - ISS while NPO    Disposition: SICU ASSESSMENT/PLAN: 44yMale w/pertinent past hx of Melva Danlos type 4, perforation of sigmoid colon s/p Kerri's (11/2019) is currently POD 0 s/p subtotal colectomy/takedown of previous colostomy for perforated colon w/intraop findings of feculent peritonitis. Upgraded to SICU for close monitoring of potentially worsening intra abdominal sepsis.      Neurologic:  Pain control/Sedation:  - Fent gtt at 0.5  - precedex at 0.11    Hx of Epilepsy:  - Holding home Zyprexa, Trileptal while NPO  - on IV keppra (home med)    Respiratory:  Intubated: 2/2 RTOR on 6/29  - /14/40/5  - Blood Gas Profile - Arterial in AM (06.29.20 @ 04:31)    pH, Arterial: 7.42    pCO2, Arterial: 26 mmHg    pO2, Arterial: 192 mmHg    HCO3, Arterial: 17 mmoL/L    Base Excess, Arterial: -5.6 mmoL/L    Oxygen Saturation, Arterial: 100 %    FIO2, Arterial: 40    Blood Gas Source Arterial: Arterial    Blood Gas Arterial, Lactate: 3.5:     Cardiovascular:  Post op hypotension/tachycardia 2/2 septic shock  - levophed at 0.05 through PIV  - Fluid resuscitation  - Monitor closely, maintain MAP >65  - Echo 11/2019: EF 55-60 mild MR/mild TR  - troponin 0.03 > 0.02 > pending  - post-op EKG: sinus tachycarcia, qtc 390    Hx of HTN:  - Holding home lisinopril    Gastrointestinal/Nutrition:  Colonic perforation: s/p subtotal colectomy/takedown of previous colostomy, abd open, + abthera vac  - NGT to LCWS  - plan for RTOR in am    Diet:  - NPO, strict    Prophylaxis:  - PPI    Genitourinary/Renal:  Lactic acidosis: likely 2/2 septic shock  - lactate 3.4 > 4.6 > 2.3 > 3.5  - LR@125; receiving 1L bolus  - Salazar in place, strict I/Os, ~40cc/hr    Electrolyte derangements:  - Trend and replete  - hyperkalemic to 5.7 insulin, D50, calcium gluconate, repeat 3.7  Basic Metabolic Panel (06.29.20 @ 07:55)    Sodium, Serum: 138 mmol/L    Potassium, Serum: 3.7 mmol/L    Chloride, Serum: 110 mmol/L    Carbon Dioxide, Serum: 16 mmol/L    Anion Gap, Serum: 12 mmol/L    Blood Urea Nitrogen, Serum: 18 mg/dL    Creatinine, Serum: 0.9 mg/dL    Glucose, Serum: 128 mg/dL    Calcium, Total Serum: 6.0 mg/dL    Magnesium, Serum: 2.2 mg/dL (06.29.20 @ 04:40)    Phosphorus Level, Serum: 4.6 mg/dL (06.29.20 @ 04:40)    Hematologic/vasc:  Melva Danlos type 4:  - Monitor for post op oozing  - Trend Hg, transfuse if <7  - Hemoglobin 15 > 16 > 13.4  - no [place] d/t spontaneous arterial rupture in ED 4    Celiac artery aneurysm s/p coiling (2010):  - Holding home ASA    Infectious Disease:  Intra-abdominal sepsis:  - Aztreonam/Flagyl/Clinda  - Monitor for S/S of worsening infection     Endocrine:  - No hx of DM  - ISS while NPO  - -123

## 2020-06-29 NOTE — BRIEF OPERATIVE NOTE - NSICDXBRIEFPROCEDURE_GEN_ALL_CORE_FT
PROCEDURES:  Colectomy, subtotal, open 28-Jun-2020 18:41:15  Luigi Lopez
PROCEDURES:  End ileostomy 29-Jun-2020 14:07:49  Reese Da Silva  Reexploration of abdomen with irrigation or drainage and closure 29-Jun-2020 14:07:30  Reese Da Silva

## 2020-06-29 NOTE — CONSULT NOTE ADULT - ATTENDING COMMENTS
s/p exp lap, subtotal colectomy and placement of abthera   admit to sicu   keep intubated   for second look in AM   IV antibiotics   continue resuscitation
Will start dilaudid PCA per team request.
colonic perf s/p subtotal collectomy   intubated and sedated   for RTOR   continue vent support ID consult

## 2020-06-29 NOTE — PROGRESS NOTE ADULT - SUBJECTIVE AND OBJECTIVE BOX
FAZAL CASSIDY  [id number]  44y Male    Indication for ICU admission: + Colonic perforation s/p subtotal colectomy/takedown of previous colostomy, abd open upgraded for close monitoring in the setting of intraabdominal sepsis    Admit Date:    ICU Date:    OR Date:      moxifloxacin (Hives)  penicillin (Unknown)    PAST MEDICAL & SURGICAL HISTORY:  Dislocations and subluxations  Aneurysm artery, celiac: s/p coiling 2010  Keratoconus  HTN (hypertension)  Epilepsy  EDS (Melva-Danlos syndrome)  Status post Kerri's procedure: 2019    Home Medications:  Aspir 81 oral delayed release tablet: orally once a day (2020 14:40)  Keppra 750 mg oral tablet: 2 tab(s) orally 2 times a day (2020 14:40)  LISINOPRIL   TAB 30MG:  (2020 14:40)  Multiple Vitamins oral tablet: 1 tab(s) orally once a day (2020 14:40)  OXCARBAZEPIN TAB 600M milligram(s) orally once a day (at bedtime) (2020 14:44)  Trileptal 300 mg oral tablet: 300 milligram(s) orally once a day in the morning (2020 14:44)    24HRS EVENT: Continuously hypotensive post op with MAPS  in 50s ,and +tachycardia, ,   nicom responsive x1 (16%) however transient clinical response to fluids, MAPS increase after getting fluids but then decrease shortly after. Recieved total 1750ml overnight.   Despite low MAPS patient remains alert, following commands, dopplerable radial/brachial pulses, good UO, tachycardia decreased from 130 to 108. periphral levophed started at 5am  .Post op qtc noted to be 565--> am ekg with decrease in qtc to 390    Neurologic:  Pain control/Sedation:  - Fent gtt/precedex    Hx of Epilepsy:  - Holding home Keppra, Zyprexa, Trileptal while NPO    Respiratory:  Intubated: 2/2 RTOR on   - /14/40/5  - Am Abg/CXR    Cardiovascular:  Post op hypotension/tachycardia: likely 2/2 hypovolemia vs sepsis  - Fluid resuscitation  - Monitor closely, maintain MAP >65  - Echo 2019: EF 55-60 mild MR/mild TR  - F/u Cardiac enzymes x3  - F/u post op EKG    Hx of HTN:  - Holding home lisinopril    Gastrointestinal/Nutrition:  Colonic perforation: s/p subtotal colectomy/takedown of previous colostomy, abd open, + abthera vac  - Serial abd exams  - Monitor Vac op   - RTOR in am     Diet :  - NPO, strict    Prophylaxis:  - PPI    Genitourinary/Renal:  Lactic acidosis: likely 2/2 hypovolemia vs sepsis  - Fluid resuscitation/trend  - LR@100  - Salazar in place, strict I/Os    Electrolyte derangements:  - Trend and replete     Hematologic/vasc:  Melva danlos type 4:  - Monitor for post op oozing  - Trend Hg, transfuse if <7    Celiac artery aneurysm s/p coiling ():  - Holding home ASA    Infectious Disease:  Intra-abdominal sepsis:  - Aztreonam/Flagyl/Clinda  - Monitor for S/S of worsening infection     Endocrine:  - No hx of DM  - ISS while NPO    Disposition: SICU      DVT PTX: hep sq    GI PTX: PPI    ***Tubes/Lines/Drains  ***  Peripheral IV           Urinary Catheter		Indication: Strict I&O    Date Placed:       REVIEW OF SYSTEMS    [x ] A ten-point review of systems was otherwise negative except as noted.  [ ] Due to altered mental status/intubation, subjective information were not able to be obtained from the patient. History was obtained, to the extent possible, from review of the chart and collateral sources of information. FAZAL CASSIDY  [id number]  44y Male    Indication for ICU admission: + Colonic perforation s/p subtotal colectomy/takedown of previous colostomy, abd open upgraded for close monitoring in the setting of intraabdominal sepsis    Admit Date:    ICU Date:    OR Date:      moxifloxacin (Hives)  penicillin (Unknown)    PAST MEDICAL & SURGICAL HISTORY:  Dislocations and subluxations  Aneurysm artery, celiac: s/p coiling 2010  Keratoconus  HTN (hypertension)  Epilepsy  EDS (Melva-Danlos syndrome)  Status post Kerri's procedure: 2019    Home Medications:  Aspir 81 oral delayed release tablet: orally once a day (2020 14:40)  Keppra 750 mg oral tablet: 2 tab(s) orally 2 times a day (2020 14:40)  LISINOPRIL   TAB 30MG:  (2020 14:40)  Multiple Vitamins oral tablet: 1 tab(s) orally once a day (2020 14:40)  OXCARBAZEPIN TAB 600M milligram(s) orally once a day (at bedtime) (2020 14:44)  Trileptal 300 mg oral tablet: 300 milligram(s) orally once a day in the morning (2020 14:44)    24HRS EVENT: Continuously hypotensive post op with MAPS  in 50s ,and +tachycardia, ,   nicom responsive x1 (16%) however transient clinical response to fluids, MAPS increase after getting fluids but then decrease shortly after. Recieved total 1750ml overnight.   Despite low MAPS patient remains alert, following commands, dopplerable radial/brachial pulses, good UO, tachycardia decreased from 130 to 108. periphral levophed started at 5am  .Post op qtc noted to be 565--> am ekg with decrease in qtc to 390    Neurologic:  Pain control/Sedation:  - Fent gtt/precedex    Hx of Epilepsy:  - Holding home Keppra, Zyprexa, Trileptal while NPO    Respiratory:  Intubated: 2/2 RTOR on   - /14/40/5  - Blood Gas Profile - Arterial in AM (06.29.20 @ 04:31)    pH, Arterial: 7.42    pCO2, Arterial: 26 mmHg    pO2, Arterial: 192 mmHg    HCO3, Arterial: 17 mmoL/L    Base Excess, Arterial: -5.6 mmoL/L    Oxygen Saturation, Arterial: 100 %    FIO2, Arterial: 40    Blood Gas Source Arterial: Arterial    Blood Gas Arterial, Lactate: 3.5:   - Am Abg/CXR    Cardiovascular:  Post op hypotension/tachycardia: likely 2/2 hypovolemia vs sepsis  - Fluid resuscitation  - Monitor closely, maintain MAP >65  - Echo 2019: EF 55-60 mild MR/mild TR  - troponin 0.02 > 0.02 > **  - F/u post op EKG     Hx of HTN:  - Holding home lisinopril    Gastrointestinal/Nutrition:  Colonic perforation: s/p subtotal colectomy/takedown of previous colostomy, abd open, + abthera vac  - NGT in place  - Serial abd exams  - Monitor Vac op   - RTOR in am     Diet :  - NPO, strict    Prophylaxis:  - PPI    Genitourinary/Renal:  Lactic acidosis: likely 2/2 hypovolemia vs sepsis  - Fluid resuscitation/trend  - LR@100  - Salazar in place, strict I/Os, ~40cc/hr    Electrolyte derangements:  - Trend and replete  Basic Metabolic Panel in AM (20 @ 04:40)    Sodium, Serum: 135 mmol/L    Potassium, Serum: 5.7 mmol/L -- insulin, D50, calcium gluconate, repeat sent    Chloride, Serum: 104 mmol/L    Carbon Dioxide, Serum: 16 mmol/L    Anion Gap, Serum: 15 mmol/L    Blood Urea Nitrogen, Serum: 19 mg/dL    Creatinine, Serum: 1.2 mg/dL    Glucose, Serum: 106 mg/dL    Calcium, Total Serum: 7.6 mg/dL    Magnesium, Serum: 2.2 mg/dL     Phosphorus Level, Serum: 4.6 mg/dL    Hematologic/vasc:  Melva Danlos type 4:  - Monitor for post op oozing  - Trend Hg, transfuse if <7  - Hemoglobin 15 > 16 > 13.4    Celiac artery aneurysm s/p coiling ():  - Holding home ASA    Infectious Disease:  Intra-abdominal sepsis:  - Aztreonam/Flagyl/Clinda  - Monitor for S/S of worsening infection     Endocrine:  - No hx of DM  - ISS while NPO  - -123    Disposition: SICU  DVT PTX: hep sq  GI PTX: PPI    ***Tubes/Lines/Drains  ***  Peripheral IV         Urinary Catheter		Indication: Strict I&O    Date Placed:       REVIEW OF SYSTEMS    [x ] A ten-point review of systems was otherwise negative except as noted.  [ ] Due to altered mental status/intubation, subjective information were not able to be obtained from the patient. History was obtained, to the extent possible, from review of the chart and collateral sources of information.      HD: 1d  Daily Height in cm: 185.42 (2020 22:00)    Daily     Diet, NPO (20 @ 18:54)      CURRENT MEDS:  Neurologic Medications  dexMEDEtomidine Infusion 0.25 MICROgram(s)/kG/Hr IV Continuous <Continuous>  fentaNYL   Infusion. 0.5 MICROgram(s)/kG/Hr IV Continuous <Continuous>    Respiratory Medications    Cardiovascular Medications  norepinephrine Infusion 0.01 MICROgram(s)/kG/Min IV Continuous <Continuous>    Gastrointestinal Medications  dextrose 5%. 1000 milliLiter(s) IV Continuous <Continuous>  lactated ringers. 1000 milliLiter(s) IV Continuous <Continuous>  pantoprazole  Injectable 40 milliGRAM(s) IV Push daily    Genitourinary Medications    Hematologic/Oncologic Medications  heparin   Injectable 5000 Unit(s) SubCutaneous every 8 hours    Antimicrobial/Immunologic Medications  aztreonam  IVPB 2000 milliGRAM(s) IV Intermittent every 8 hours  aztreonam  IVPB      clindamycin IVPB 900 milliGRAM(s) IV Intermittent every 8 hours  metroNIDAZOLE  IVPB      metroNIDAZOLE  IVPB 500 milliGRAM(s) IV Intermittent every 8 hours    Endocrine/Metabolic Medications  dextrose 40% Gel 15 Gram(s) Oral once PRN Blood Glucose LESS THAN 70 milliGRAM(s)/deciliter  dextrose 50% Injectable 12.5 Gram(s) IV Push once  dextrose 50% Injectable 25 Gram(s) IV Push once  dextrose 50% Injectable 25 Gram(s) IV Push once  glucagon  Injectable 1 milliGRAM(s) IntraMuscular once PRN Glucose LESS THAN 70 milligrams/deciliter  insulin regular  human corrective regimen sliding scale   IV Push every 4 hours    Topical/Other Medications  chlorhexidine 0.12% Liquid 15 milliLiter(s) Oral Mucosa two times a day  chlorhexidine 4% Liquid 1 Application(s) Topical daily      ICU Vital Signs Last 24 Hrs  T(C): 36.6 (2020 08:00), Max: 37.6 (2020 14:39)  T(F): 97.9 (2020 08:00), Max: 99.7 (2020 14:39)  HR: 104 (2020 08:00) (100 - 129)  BP: 99/61 (2020 08:00) (52/40 - 135/98)  BP(mean): 73 (2020 08:00) (42 - 110)  ABP: --  ABP(mean): --  RR: 11 (2020 08:00) (11 - 22)  SpO2: 100% (2020 08:00) (60% - 100%)      Adult Advanced Hemodynamics Last 24 Hrs  CVP(mm Hg): --  CVP(cm H2O): --  CO: --  CI: --  PA: --  PA(mean): --  PCWP: --  SVR: --  SVRI: --  PVR: --  PVRI: --    Mode: AC/ CMV (Assist Control/ Continuous Mandatory Ventilation)  RR (machine): 16  TV (machine): 450  FiO2: 40  PEEP: 5  ITime: 1  MAP: 7  PIP: 15    ABG - ( 2020 04:31 )  pH, Arterial: 7.42  pH, Blood: x     /  pCO2: 26    /  pO2: 192   / HCO3: 17    / Base Excess: -5.6  /  SaO2: 100                 I&O's Summary    :  -  2020 07:00  --------------------------------------------------------  IN: 4079.9 mL / OUT: 1480 mL / NET: 2599.9 mL    :  -  2020 08:18  --------------------------------------------------------  IN: 192.6 mL / OUT: 60 mL / NET: 132.6 mL      I&O's Detail    2020 07:  -  2020 07:00  --------------------------------------------------------  IN:    dexmedetomidine Infusion: 25.7 mL    fentaNYL  Infusion: 142.8 mL    fentaNYL Infusion.: 32.4 mL    IV PiggyBack: 400 mL    Lactated Ringers IV Bolus: 1250 mL    lactated ringers.: 1000 mL    lactated ringers.: 100 mL    lactated ringers.: 100 mL    norepinephrine Infusion: 29 mL    Sodium Chloride 0.9% IV Bolus: 1000 mL  Total IN: 4079.9 mL    OUT:    Indwelling Catheter - Urethral: 430 mL    Nasoenteral Tube: 150 mL    VAC (Vacuum Assisted Closure) System: 900 mL  Total OUT: 1480 mL    Total NET: 2599.9 mL      2020 07:01  -  2020 08:18  --------------------------------------------------------  IN:    dexmedetomidine Infusion: 2 mL    fentaNYL Infusion.: 3.6 mL    IV PiggyBack: 50 mL    lactated ringers.: 125 mL    norepinephrine Infusion: 12 mL  Total IN: 192.6 mL    OUT:    Indwelling Catheter - Urethral: 60 mL  Total OUT: 60 mL    Total NET: 132.6 mL          PHYSICAL EXAM:    General/Neuro  RASS:             GCS:     = E   / V   / M      Deficits:                             alert & oriented x 3, no focal deficits  Pupils:    Lungs:      clear to auscultation, Normal expansion/effort.     Cardiovascular : S1, S2.  Regular rate and rhythm.  Peripheral edema   Cardiac Rhythm: Normal Sinus Rhythm    GI: Abdomen soft, Non-tender, Non-distended.    Gastrostomy / Jejunostomy tube in place.  Nasogastric tube in place.  Colostomy / Ileostomy.    Wound:    Extremities: Extremities warm, pink, well-perfused. Pulses:Rt     Lt    Derm: Good skin turgor, no skin breakdown.      :       Salazar catheter in place.      CXR:     LABS:    Labs:  CAPILLARY BLOOD GLUCOSE      POCT Blood Glucose.: 106 mg/dL (2020 05:20)  POCT Blood Glucose.: 123 mg/dL (2020 02:44)  POCT Blood Glucose.: 124 mg/dL (2020 18:55)  POCT Blood Glucose.: 134 mg/dL (2020 17:21)                          13.4   6.18  )-----------( 237      ( 2020 04:40 )             39.7       Auto Neutrophil %: 78.0 % (20 @ 04:40)  Auto Immature Granulocyte %: 0.6 % (20 @ 04:40)  Band Neutrophils %: 0.9 % (20 @ 20:22)  Auto Neutrophil %: 59.2 % (20 @ 10:45)  Auto Immature Granulocyte %: 0.6 % (20 @ 10:45)        135  |  104  |  19  ----------------------------<  106<H>  5.7<H>   |  16<L>  |  1.2      Calcium, Total Serum: 7.6 mg/dL (20 @ 04:40)      LFTs:             8.1  | 0.3  | 39       ------------------[58      ( 2020 10:45 )  4.7  | x    | 18          Lipase:32     Amylase:x         Blood Gas Arterial, Lactate: 3.5 mmoL/L (20 @ 04:31)  Blood Gas Arterial, Lactate: 2.3 mmoL/L (20 @ 20:42)  Lactate, Blood: 4.6 mmol/L (20 @ 14:24)  Lactate, Blood: 3.4 mmol/L (20 @ 10:45)    ABG - ( 2020 04:31 )  pH: 7.42  /  pCO2: 26    /  pO2: 192   / HCO3: 17    / Base Excess: -5.6  /  SaO2: 100             ABG - ( 2020 20:42 )  pH: 7.36  /  pCO2: 36    /  pO2: 271   / HCO3: 21    / Base Excess: -4.1  /  SaO2: 99                Coags:     12.90  ----< 1.12    ( 2020 21:42 )     26.4        CARDIAC MARKERS ( 2020 04:40 )  x     / 0.02 ng/mL / 975 U/L / x     / 30.8 ng/mL  CARDIAC MARKERS ( 2020 20:22 )  x     / 0.03 ng/mL / 286 U/L / x     / x FAZAL CASSIDY  [id number]  44y Male    Indication for ICU admission: + Colonic perforation s/p subtotal colectomy/takedown of previous colostomy, abd open upgraded for close monitoring in the setting of intraabdominal sepsis    Admit Date:    ICU Date:    OR Date:      moxifloxacin (Hives)  penicillin (Unknown)    PAST MEDICAL & SURGICAL HISTORY:  Dislocations and subluxations  Aneurysm artery, celiac: s/p coiling 2010  Keratoconus  HTN (hypertension)  Epilepsy  EDS (Melva-Danlos syndrome)  Status post Kerri's procedure: 2019    Home Medications:  Aspir 81 oral delayed release tablet: orally once a day (2020 14:40)  Keppra 750 mg oral tablet: 2 tab(s) orally 2 times a day (2020 14:40)  LISINOPRIL   TAB 30MG:  (2020 14:40)  Multiple Vitamins oral tablet: 1 tab(s) orally once a day (2020 14:40)  OXCARBAZEPIN TAB 600M milligram(s) orally once a day (at bedtime) (2020 14:44)  Trileptal 300 mg oral tablet: 300 milligram(s) orally once a day in the morning (2020 14:44)    24HRS EVENT: Continuously hypotensive post op with MAPS  in 50s ,and +tachycardia, ,   nicom responsive x1 (16%) however transient clinical response to fluids, MAPS increase after getting fluids but then decrease shortly after. Recieved total 1750ml overnight.   Despite low MAPS patient remains alert, following commands, dopplerable radial/brachial pulses, good UO, tachycardia decreased from 130 to 108. periphral levophed started at 5am  .Post op qtc noted to be 565--> am ekg with decrease in qtc to 390    Neurologic:  Pain control/Sedation:  - Fent gtt/precedex    Hx of Epilepsy:  - Holding home Keppra, Zyprexa, Trileptal while NPO    Respiratory:  Intubated: 2/2 RTOR on   - /14/40/5  - Blood Gas Profile - Arterial in AM (06.29.20 @ 04:31)    pH, Arterial: 7.42    pCO2, Arterial: 26 mmHg    pO2, Arterial: 192 mmHg    HCO3, Arterial: 17 mmoL/L    Base Excess, Arterial: -5.6 mmoL/L    Oxygen Saturation, Arterial: 100 %    FIO2, Arterial: 40    Blood Gas Source Arterial: Arterial    Blood Gas Arterial, Lactate: 3.5:   - Am Abg/CXR    Cardiovascular:  Post op hypotension/tachycardia: likely 2/2 hypovolemia vs sepsis  - levophed  - Fluid resuscitation  - Monitor closely, maintain MAP >65  - Echo 2019: EF 55-60 mild MR/mild TR  - troponin 0.02 > 0.02 > **  - F/u post op EKG     Hx of HTN:  - Holding home lisinopril    Gastrointestinal/Nutrition:  Colonic perforation: s/p subtotal colectomy/takedown of previous colostomy, abd open, + abthera vac  - NGT in place  - Serial abd exams  - Monitor Vac op   - RTOR in am     Diet :  - NPO, strict    Prophylaxis:  - PPI    Genitourinary/Renal:  Lactic acidosis: likely 2/2 hypovolemia vs sepsis  - Fluid resuscitation/trend  - LR@100  - Salazar in place, strict I/Os, ~40cc/hr    Electrolyte derangements:  - Trend and replete  Basic Metabolic Panel in AM (20 @ 04:40)    Sodium, Serum: 135 mmol/L    Potassium, Serum: 5.7 mmol/L -- insulin, D50, calcium gluconate, repeat sent    Chloride, Serum: 104 mmol/L    Carbon Dioxide, Serum: 16 mmol/L    Anion Gap, Serum: 15 mmol/L    Blood Urea Nitrogen, Serum: 19 mg/dL    Creatinine, Serum: 1.2 mg/dL    Glucose, Serum: 106 mg/dL    Calcium, Total Serum: 7.6 mg/dL    Magnesium, Serum: 2.2 mg/dL     Phosphorus Level, Serum: 4.6 mg/dL    Hematologic/vasc:  Melva Danlos type 4:  - Monitor for post op oozing  - Trend Hg, transfuse if <7  - Hemoglobin 15 > 16 > 13.4    Celiac artery aneurysm s/p coiling ():  - Holding home ASA    Infectious Disease:  Intra-abdominal sepsis:  - Aztreonam/Flagyl/Clinda  - Monitor for S/S of worsening infection     Endocrine:  - No hx of DM  - ISS while NPO  - -123    Disposition: SICU  DVT PTX: hep sq  GI PTX: PPI    ***Tubes/Lines/Drains  ***  Peripheral IV         Urinary Catheter		Indication: Strict I&O    Date Placed:       REVIEW OF SYSTEMS    [x ] A ten-point review of systems was otherwise negative except as noted.  [ ] Due to altered mental status/intubation, subjective information were not able to be obtained from the patient. History was obtained, to the extent possible, from review of the chart and collateral sources of information.      HD: 1d  Daily Height in cm: 185.42 (2020 22:00)    Daily     Diet, NPO (20 @ 18:54)      CURRENT MEDS:  Neurologic Medications  dexMEDEtomidine Infusion 0.25 MICROgram(s)/kG/Hr IV Continuous <Continuous>  fentaNYL   Infusion. 0.5 MICROgram(s)/kG/Hr IV Continuous <Continuous>    Respiratory Medications    Cardiovascular Medications  norepinephrine Infusion 0.01 MICROgram(s)/kG/Min IV Continuous <Continuous>    Gastrointestinal Medications  dextrose 5%. 1000 milliLiter(s) IV Continuous <Continuous>  lactated ringers. 1000 milliLiter(s) IV Continuous <Continuous>  pantoprazole  Injectable 40 milliGRAM(s) IV Push daily    Genitourinary Medications    Hematologic/Oncologic Medications  heparin   Injectable 5000 Unit(s) SubCutaneous every 8 hours    Antimicrobial/Immunologic Medications  aztreonam  IVPB 2000 milliGRAM(s) IV Intermittent every 8 hours  aztreonam  IVPB      clindamycin IVPB 900 milliGRAM(s) IV Intermittent every 8 hours  metroNIDAZOLE  IVPB      metroNIDAZOLE  IVPB 500 milliGRAM(s) IV Intermittent every 8 hours    Endocrine/Metabolic Medications  dextrose 40% Gel 15 Gram(s) Oral once PRN Blood Glucose LESS THAN 70 milliGRAM(s)/deciliter  dextrose 50% Injectable 12.5 Gram(s) IV Push once  dextrose 50% Injectable 25 Gram(s) IV Push once  dextrose 50% Injectable 25 Gram(s) IV Push once  glucagon  Injectable 1 milliGRAM(s) IntraMuscular once PRN Glucose LESS THAN 70 milligrams/deciliter  insulin regular  human corrective regimen sliding scale   IV Push every 4 hours    Topical/Other Medications  chlorhexidine 0.12% Liquid 15 milliLiter(s) Oral Mucosa two times a day  chlorhexidine 4% Liquid 1 Application(s) Topical daily      ICU Vital Signs Last 24 Hrs  T(C): 36.6 (2020 08:00), Max: 37.6 (2020 14:39)  T(F): 97.9 (2020 08:00), Max: 99.7 (2020 14:39)  HR: 104 (2020 08:00) (100 - 129)  BP: 99/61 (2020 08:00) (52/40 - 135/98)  BP(mean): 73 (2020 08:00) (42 - 110)  ABP: --  ABP(mean): --  RR: 11 (2020 08:00) (11 - 22)  SpO2: 100% (2020 08:00) (60% - 100%)      Adult Advanced Hemodynamics Last 24 Hrs  CVP(mm Hg): --  CVP(cm H2O): --  CO: --  CI: --  PA: --  PA(mean): --  PCWP: --  SVR: --  SVRI: --  PVR: --  PVRI: --    Mode: AC/ CMV (Assist Control/ Continuous Mandatory Ventilation)  RR (machine): 16  TV (machine): 450  FiO2: 40  PEEP: 5  ITime: 1  MAP: 7  PIP: 15    ABG - ( 2020 04:31 )  pH, Arterial: 7.42  pH, Blood: x     /  pCO2: 26    /  pO2: 192   / HCO3: 17    / Base Excess: -5.6  /  SaO2: 100                 I&O's Summary    :  -  2020 07:00  --------------------------------------------------------  IN: 4079.9 mL / OUT: 1480 mL / NET: 2599.9 mL    :  -  2020 08:18  --------------------------------------------------------  IN: 192.6 mL / OUT: 60 mL / NET: 132.6 mL      I&O's Detail    :  -  2020 07:00  --------------------------------------------------------  IN:    dexmedetomidine Infusion: 25.7 mL    fentaNYL  Infusion: 142.8 mL    fentaNYL Infusion.: 32.4 mL    IV PiggyBack: 400 mL    Lactated Ringers IV Bolus: 1250 mL    lactated ringers.: 1000 mL    lactated ringers.: 100 mL    lactated ringers.: 100 mL    norepinephrine Infusion: 29 mL    Sodium Chloride 0.9% IV Bolus: 1000 mL  Total IN: 4079.9 mL    OUT:    Indwelling Catheter - Urethral: 430 mL    Nasoenteral Tube: 150 mL    VAC (Vacuum Assisted Closure) System: 900 mL  Total OUT: 1480 mL    Total NET: 2599.9 mL      2020 07:01  -  2020 08:18  --------------------------------------------------------  IN:    dexmedetomidine Infusion: 2 mL    fentaNYL Infusion.: 3.6 mL    IV PiggyBack: 50 mL    lactated ringers.: 125 mL    norepinephrine Infusion: 12 mL  Total IN: 192.6 mL    OUT:    Indwelling Catheter - Urethral: 60 mL  Total OUT: 60 mL    Total NET: 132.6 mL          PHYSICAL EXAM:    General/Neuro  RASS:             GCS:     = E   / V   / M      Deficits:                             alert & oriented x 3, no focal deficits  Pupils:    Lungs:      clear to auscultation, Normal expansion/effort.     Cardiovascular : S1, S2.  Regular rate and rhythm.  Peripheral edema   Cardiac Rhythm: Normal Sinus Rhythm    GI: Abdomen soft, Non-tender, Non-distended.    Gastrostomy / Jejunostomy tube in place.  Nasogastric tube in place.  Colostomy / Ileostomy.    Wound:    Extremities: Extremities warm, pink, well-perfused. Pulses:Rt     Lt    Derm: Good skin turgor, no skin breakdown.      :       Salazar catheter in place.      CXR:     LABS:    Labs:  CAPILLARY BLOOD GLUCOSE      POCT Blood Glucose.: 106 mg/dL (2020 05:20)  POCT Blood Glucose.: 123 mg/dL (2020 02:44)  POCT Blood Glucose.: 124 mg/dL (2020 18:55)  POCT Blood Glucose.: 134 mg/dL (2020 17:21)                          13.4   6.18  )-----------( 237      ( 2020 04:40 )             39.7       Auto Neutrophil %: 78.0 % (20 @ 04:40)  Auto Immature Granulocyte %: 0.6 % (20 @ 04:40)  Band Neutrophils %: 0.9 % (20 @ 20:22)  Auto Neutrophil %: 59.2 % (20 @ 10:45)  Auto Immature Granulocyte %: 0.6 % (20 @ 10:45)        135  |  104  |  19  ----------------------------<  106<H>  5.7<H>   |  16<L>  |  1.2      Calcium, Total Serum: 7.6 mg/dL (20 @ 04:40)      LFTs:             8.1  | 0.3  | 39       ------------------[58      ( 2020 10:45 )  4.7  | x    | 18          Lipase:32     Amylase:x         Blood Gas Arterial, Lactate: 3.5 mmoL/L (20 @ 04:31)  Blood Gas Arterial, Lactate: 2.3 mmoL/L (20 @ 20:42)  Lactate, Blood: 4.6 mmol/L (20 @ 14:24)  Lactate, Blood: 3.4 mmol/L (20 @ 10:45)    ABG - ( 2020 04:31 )  pH: 7.42  /  pCO2: 26    /  pO2: 192   / HCO3: 17    / Base Excess: -5.6  /  SaO2: 100             ABG - ( 2020 20:42 )  pH: 7.36  /  pCO2: 36    /  pO2: 271   / HCO3: 21    / Base Excess: -4.1  /  SaO2: 99                Coags:     12.90  ----< 1.12    ( 2020 21:42 )     26.4        CARDIAC MARKERS ( 2020 04:40 )  x     / 0.02 ng/mL / 975 U/L / x     / 30.8 ng/mL  CARDIAC MARKERS ( 2020 20:22 )  x     / 0.03 ng/mL / 286 U/L / x     / x FAZAL CASSIDY  [id number]  44y Male    Indication for ICU admission: + Colonic perforation s/p subtotal colectomy/takedown of previous colostomy, abd open upgraded for close monitoring in the setting of intraabdominal sepsis    Admit Date:    ICU Date:    OR Date:      moxifloxacin (Hives)  penicillin (Unknown)    PAST MEDICAL & SURGICAL HISTORY:  Dislocations and subluxations  Aneurysm artery, celiac: s/p coiling 2010  Keratoconus  HTN (hypertension)  Epilepsy  EDS (Melva-Danlos syndrome)  Status post Kerri's procedure: 2019    Home Medications:  Aspir 81 oral delayed release tablet: orally once a day (2020 14:40)  Keppra 750 mg oral tablet: 2 tab(s) orally 2 times a day (2020 14:40)  LISINOPRIL   TAB 30MG:  (2020 14:40)  Multiple Vitamins oral tablet: 1 tab(s) orally once a day (2020 14:40)  OXCARBAZEPIN TAB 600M milligram(s) orally once a day (at bedtime) (2020 14:44)  Trileptal 300 mg oral tablet: 300 milligram(s) orally once a day in the morning (2020 14:44)    24HRS EVENT: Continuously hypotensive post op with MAPS  in 50s ,and +tachycardia, ,   nicom responsive x1 (16%) however transient clinical response to fluids, MAPS increase after getting fluids but then decrease shortly after. Recieved total 1750ml overnight.   Despite low MAPS patient remains alert, following commands, dopplerable radial/brachial pulses, good UO, tachycardia decreased from 130 to 108. periphral levophed started at 5am  .Post op qtc noted to be 565--> am ekg with decrease in qtc to 390    Neurologic:  Pain control/Sedation:  - Fent gtt at 0.5  - precedex at 0.11    Hx of Epilepsy:  - Holding home Keppra, Zyprexa, Trileptal while NPO    Respiratory:  Intubated: 2/2 RTOR on   - /14/40/5  - Blood Gas Profile - Arterial in AM (06..20 @ 04:31)    pH, Arterial: 7.42    pCO2, Arterial: 26 mmHg    pO2, Arterial: 192 mmHg    HCO3, Arterial: 17 mmoL/L    Base Excess, Arterial: -5.6 mmoL/L    Oxygen Saturation, Arterial: 100 %    FIO2, Arterial: 40    Blood Gas Source Arterial: Arterial    Blood Gas Arterial, Lactate: 3.5:   - Am Abg/CXR    Cardiovascular:  Post op hypotension/tachycardia: likely 2/2 hypovolemia vs sepsis  - levophed at 0.05  - Fluid resuscitation  - Monitor closely, maintain MAP >65  - Echo 2019: EF 55-60 mild MR/mild TR  - troponin 0.02 > 0.02 > **  - F/u post op EKG     Hx of HTN:  - Holding home lisinopril    Gastrointestinal/Nutrition:  Colonic perforation: s/p subtotal colectomy/takedown of previous colostomy, abd open, + abthera vac  - NGT in place  - Serial abd exams  - Monitor Vac op   - RTOR in am     Diet :  - NPO, strict    Prophylaxis:  - PPI    Genitourinary/Renal:  Lactic acidosis: likely 2/2 hypovolemia vs sepsis  - Fluid resuscitation/trend  - LR@100  - Salazar in place, strict I/Os, ~40cc/hr    Electrolyte derangements:  - Trend and replete  Basic Metabolic Panel in AM (20 @ 04:40)    Sodium, Serum: 135 mmol/L    Potassium, Serum: 5.7 mmol/L -- insulin, D50, calcium gluconate, repeat sent    Chloride, Serum: 104 mmol/L    Carbon Dioxide, Serum: 16 mmol/L    Anion Gap, Serum: 15 mmol/L    Blood Urea Nitrogen, Serum: 19 mg/dL    Creatinine, Serum: 1.2 mg/dL    Glucose, Serum: 106 mg/dL    Calcium, Total Serum: 7.6 mg/dL    Magnesium, Serum: 2.2 mg/dL     Phosphorus Level, Serum: 4.6 mg/dL    Hematologic/vasc:  Melva Danlos type 4:  - Monitor for post op oozing  - Trend Hg, transfuse if <7  - Hemoglobin 15 > 16 > 13.4    Celiac artery aneurysm s/p coiling ():  - Holding home ASA    Infectious Disease:  Intra-abdominal sepsis:  - Aztreonam/Flagyl/Clinda  - Monitor for S/S of worsening infection     Endocrine:  - No hx of DM  - ISS while NPO  - -123    Disposition: SICU  DVT PTX: hep sq  GI PTX: PPI    ***Tubes/Lines/Drains  ***  Peripheral IV         Urinary Catheter		Indication: Strict I&O    Date Placed:     REVIEW OF SYSTEMS    [x ] A ten-point review of systems was otherwise negative except as noted.  [ ] Due to altered mental status/intubation, subjective information were not able to be obtained from the patient. History was obtained, to the extent possible, from review of the chart and collateral sources of information.      HD: 1d  Daily Height in cm: 185.42 (2020 22:00)      Diet, NPO (20 @ 18:54)    CURRENT MEDS:  Neurologic Medications  dexMEDEtomidine Infusion 0.25 MICROgram(s)/kG/Hr IV Continuous <Continuous>  fentaNYL   Infusion. 0.5 MICROgram(s)/kG/Hr IV Continuous <Continuous>    Respiratory Medications    Cardiovascular Medications  norepinephrine Infusion 0.01 MICROgram(s)/kG/Min IV Continuous <Continuous>    Gastrointestinal Medications  dextrose 5%. 1000 milliLiter(s) IV Continuous <Continuous>  lactated ringers. 1000 milliLiter(s) IV Continuous <Continuous>  pantoprazole  Injectable 40 milliGRAM(s) IV Push daily    Genitourinary Medications    Hematologic/Oncologic Medications  heparin   Injectable 5000 Unit(s) SubCutaneous every 8 hours    Antimicrobial/Immunologic Medications  aztreonam  IVPB 2000 milliGRAM(s) IV Intermittent every 8 hours  aztreonam  IVPB      clindamycin IVPB 900 milliGRAM(s) IV Intermittent every 8 hours  metroNIDAZOLE  IVPB      metroNIDAZOLE  IVPB 500 milliGRAM(s) IV Intermittent every 8 hours    Endocrine/Metabolic Medications  dextrose 40% Gel 15 Gram(s) Oral once PRN Blood Glucose LESS THAN 70 milliGRAM(s)/deciliter  dextrose 50% Injectable 12.5 Gram(s) IV Push once  dextrose 50% Injectable 25 Gram(s) IV Push once  dextrose 50% Injectable 25 Gram(s) IV Push once  glucagon  Injectable 1 milliGRAM(s) IntraMuscular once PRN Glucose LESS THAN 70 milligrams/deciliter  insulin regular  human corrective regimen sliding scale   IV Push every 4 hours    Topical/Other Medications  chlorhexidine 0.12% Liquid 15 milliLiter(s) Oral Mucosa two times a day  chlorhexidine 4% Liquid 1 Application(s) Topical daily      ICU Vital Signs Last 24 Hrs  T(C): 36.6 (2020 08:00), Max: 37.6 (2020 14:39)  T(F): 97.9 (2020 08:00), Max: 99.7 (2020 14:39)  HR: 104 (2020 08:00) (100 - 129)  BP: 99/61 (2020 08:) (52/40 - 135/98)  BP(mean): 73 (:) (42 - 110)  RR: 11 (:) (11 - 22)  SpO2: 100% (:) (60% - 100%)    Mode: AC/ CMV (Assist Control/ Continuous Mandatory Ventilation)  RR (machine): 16  TV (machine): 450  FiO2: 40  PEEP: 5  ITime: 1  MAP: 7  PIP: 15    ABG - ( 2020 04:31 )  pH, Arterial: 7.42  pH, Blood: x     /  pCO2: 26    /  pO2: 192   / HCO3: 17    / Base Excess: -5.6  /  SaO2: 100       I&O's Summary    :  -  2020 07:00  --------------------------------------------------------  IN: 4079.9 mL / OUT: 1480 mL / NET: 2599.9 mL    :  -  2020 08:18  --------------------------------------------------------  IN: 192.6 mL / OUT: 60 mL / NET: 132.6 mL    I&O's Detail    :  -  2020 07:00  --------------------------------------------------------  IN:    dexmedetomidine Infusion: 25.7 mL    fentaNYL  Infusion: 142.8 mL    fentaNYL Infusion.: 32.4 mL    IV PiggyBack: 400 mL    Lactated Ringers IV Bolus: 1250 mL    lactated ringers.: 1000 mL    lactated ringers.: 100 mL    lactated ringers.: 100 mL    norepinephrine Infusion: 29 mL    Sodium Chloride 0.9% IV Bolus: 1000 mL  Total IN: 4079.9 mL    OUT:    Indwelling Catheter - Urethral: 430 mL    Nasoenteral Tube: 150 mL    VAC (Vacuum Assisted Closure) System: 900 mL  Total OUT: 1480 mL    Total NET: 2599.9 mL      2020 07:01  -  2020 08:18  --------------------------------------------------------  IN:    dexmedetomidine Infusion: 2 mL    fentaNYL Infusion.: 3.6 mL    IV PiggyBack: 50 mL    lactated ringers.: 125 mL    norepinephrine Infusion: 12 mL  Total IN: 192.6 mL    OUT:    Indwelling Catheter - Urethral: 60 mL  Total OUT: 60 mL    Total NET: 132.6 mL    PHYSICAL EXAM:    General/Neuro  RASS:             GCS:     = E   / V   / M      Deficits:                             alert & oriented x 3, no focal deficits  Pupils:    Lungs:      clear to auscultation, Normal expansion/effort.     Cardiovascular : S1, S2.  Regular rate and rhythm.  Peripheral edema   Cardiac Rhythm: Normal Sinus Rhythm    GI: Abdomen soft, Non-tender, Non-distended.    Gastrostomy / Jejunostomy tube in place.  Nasogastric tube in place.  Colostomy / Ileostomy.    Wound:    Extremities: Extremities warm, pink, well-perfused. Pulses:Rt     Lt    Derm: Good skin turgor, no skin breakdown.      :       Salazar catheter in place.    LABS:    POCT Blood Glucose.: 106 mg/dL (2020 05:20)  POCT Blood Glucose.: 123 mg/dL (2020 02:44)  POCT Blood Glucose.: 124 mg/dL (2020 18:55)  POCT Blood Glucose.: 134 mg/dL (2020 17:21)                        13.4   6.18  )-----------( 237      ( 2020 04:40 )             39.7       Auto Neutrophil %: 78.0 % (20 @ 04:40)  Auto Immature Granulocyte %: 0.6 % (20 @ 04:40)  Band Neutrophils %: 0.9 % (20 @ 20:22)  Auto Neutrophil %: 59.2 % (20 @ 10:45)  Auto Immature Granulocyte %: 0.6 % (20 @ 10:45)        135  |  104  |  19  ----------------------------<  106<H>  5.7<H>   |  16<L>  |  1.2    Calcium, Total Serum: 7.6 mg/dL (20 @ 04:40)    LFTs:             8.1  | 0.3  | 39       ------------------[58      ( 2020 10:45 )  4.7  | x    | 18          Lipase:32     Amylase:x         Blood Gas Arterial, Lactate: 3.5 mmoL/L (20 @ 04:31)  Blood Gas Arterial, Lactate: 2.3 mmoL/L (20 @ 20:42)  Lactate, Blood: 4.6 mmol/L (20 @ 14:24)  Lactate, Blood: 3.4 mmol/L (20 @ 10:45)    ABG - ( 2020 04:31 )  pH: 7.42  /  pCO2: 26    /  pO2: 192   / HCO3: 17    / Base Excess: -5.6  /  SaO2: 100       ABG - ( 2020 20:42 )  pH: 7.36  /  pCO2: 36    /  pO2: 271   / HCO3: 21    / Base Excess: -4.1  /  SaO2: 99        Coags:     12.90  ----< 1.12    ( 2020 21:42 )     26.4      CARDIAC MARKERS ( 2020 04:40 )  x     / 0.02 ng/mL / 975 U/L / x     / 30.8 ng/mL  CARDIAC MARKERS ( 2020 20:22 )  x     / 0.03 ng/mL / 286 U/L / x     / x FAZAL CASSIDY  [id number]  44y Male    Indication for ICU admission: + Colonic perforation s/p subtotal colectomy/takedown of previous colostomy, abd open upgraded for close monitoring in the setting of intraabdominal sepsis    Admit Date:    ICU Date:    OR Date:      moxifloxacin (Hives)  penicillin (Unknown)    PAST MEDICAL & SURGICAL HISTORY:  Dislocations and subluxations  Aneurysm artery, celiac: s/p coiling 2010  Keratoconus  HTN (hypertension)  Epilepsy  EDS (Melva-Danlos syndrome)  Status post Kerri's procedure: 2019    Home Medications:  Aspir 81 oral delayed release tablet: orally once a day (2020 14:40)  Keppra 750 mg oral tablet: 2 tab(s) orally 2 times a day (2020 14:40)  LISINOPRIL   TAB 30MG:  (2020 14:40)  Multiple Vitamins oral tablet: 1 tab(s) orally once a day (2020 14:40)  OXCARBAZEPIN TAB 600M milligram(s) orally once a day (at bedtime) (2020 14:44)  Trileptal 300 mg oral tablet: 300 milligram(s) orally once a day in the morning (2020 14:44)    24HRS EVENT: Continuously hypotensive post op with MAPS  in 50s ,and +tachycardia.   Nicom responsive x1 (16%) however transient clinical response to fluids, MAPS increase after getting fluids but then decrease shortly after. Recieved total 1750ml overnight.   Despite low MAPS patient remains alert, following commands, dopplerable radial/brachial pulses, good UO, tachycardia decreased from 130 to 108. periphral levophed started at 5am  Post op qtc noted to be 565--> am ekg with decrease in qtc to 390.    Neurologic:  Pain control/Sedation:  - Fent gtt at 0.5  - precedex at 0.11    Hx of Epilepsy:  - Holding home Zyprexa, Trileptal while NPO  - on IV keppra (home med)    Respiratory:  Intubated: 2/2 RTOR on   - /14/40/5  - Blood Gas Profile - Arterial in AM (06.20 @ 04:31)    pH, Arterial: 7.42    pCO2, Arterial: 26 mmHg    pO2, Arterial: 192 mmHg    HCO3, Arterial: 17 mmoL/L    Base Excess, Arterial: -5.6 mmoL/L    Oxygen Saturation, Arterial: 100 %    FIO2, Arterial: 40    Blood Gas Source Arterial: Arterial    Blood Gas Arterial, Lactate: 3.5:     Cardiovascular:  Post op hypotension/tachycardia 2/2 septic shock  - levophed at 0.05 through PIV  - Fluid resuscitation  - Monitor closely, maintain MAP >65  - Echo 2019: EF 55-60 mild MR/mild TR  - troponin 0.03 > 0.02 > pending  - post-op EKG: sinus tachycarcia, qtc 390    Hx of HTN:  - Holding home lisinopril    Gastrointestinal/Nutrition:  Colonic perforation: s/p subtotal colectomy/takedown of previous colostomy, abd open, + abthera vac  - NGT to LCWS  - plan for RTOR in am    Diet:  - NPO, strict    Prophylaxis:  - PPI    Genitourinary/Renal:  Lactic acidosis: likely 2/2 septic shock  - lactate 3.4 > 4.6 > 2.3 > 3.5  - LR@125; receiving 1L bolus  - Salazar in place, strict I/Os, ~40cc/hr    Electrolyte derangements:  - Trend and replete  - hyperkalemic to 5.7 insulin, D50, calcium gluconate, repeat 3.7  Basic Metabolic Panel (20 @ 07:55)    Sodium, Serum: 138 mmol/L    Potassium, Serum: 3.7 mmol/L    Chloride, Serum: 110 mmol/L    Carbon Dioxide, Serum: 16 mmol/L    Anion Gap, Serum: 12 mmol/L    Blood Urea Nitrogen, Serum: 18 mg/dL    Creatinine, Serum: 0.9 mg/dL    Glucose, Serum: 128 mg/dL    Calcium, Total Serum: 6.0 mg/dL    Magnesium, Serum: 2.2 mg/dL (20 @ 04:40)    Phosphorus Level, Serum: 4.6 mg/dL (20 @ 04:40)    Hematologic/vasc:  Melva Danlos type 4:  - Monitor for post op oozing  - Trend Hg, transfuse if <7  - Hemoglobin 15 > 16 > 13.4  - no [place] d/t spontaneous arterial rupture in ED 4    Celiac artery aneurysm s/p coiling ():  - Holding home ASA    Infectious Disease:  Intra-abdominal sepsis:  - Aztreonam/Flagyl/Clinda  - Monitor for S/S of worsening infection     Endocrine:  - No hx of DM  - ISS while NPO  - -123    Disposition: SICU  DVT PTX: hep sq  GI PTX: PPI    ***Tubes/Lines/Drains  ***  Peripheral IV  x 3  Urinary Catheter		Indication: Strict I&O    Date Placed:     REVIEW OF SYSTEMS    [x] A ten-point review of systems was otherwise negative except as noted.  [ ] Due to altered mental status/intubation, subjective information were not able to be obtained from the patient. History was obtained, to the extent possible, from review of the chart and collateral sources of information.    HD: 1d  Daily Height in cm: 185.42 (2020 22:00)      Diet, NPO (20 @ 18:54)    CURRENT MEDS:  Neurologic Medications  dexMEDEtomidine Infusion 0.25 MICROgram(s)/kG/Hr IV Continuous <Continuous>  fentaNYL   Infusion. 0.5 MICROgram(s)/kG/Hr IV Continuous <Continuous>    Respiratory Medications    Cardiovascular Medications  norepinephrine Infusion 0.01 MICROgram(s)/kG/Min IV Continuous <Continuous>    Gastrointestinal Medications  dextrose 5%. 1000 milliLiter(s) IV Continuous <Continuous>  lactated ringers. 1000 milliLiter(s) IV Continuous <Continuous>  pantoprazole  Injectable 40 milliGRAM(s) IV Push daily    Genitourinary Medications    Hematologic/Oncologic Medications  heparin   Injectable 5000 Unit(s) SubCutaneous every 8 hours    Antimicrobial/Immunologic Medications  aztreonam  IVPB 2000 milliGRAM(s) IV Intermittent every 8 hours  aztreonam  IVPB      clindamycin IVPB 900 milliGRAM(s) IV Intermittent every 8 hours  metroNIDAZOLE  IVPB      metroNIDAZOLE  IVPB 500 milliGRAM(s) IV Intermittent every 8 hours    Endocrine/Metabolic Medications  dextrose 40% Gel 15 Gram(s) Oral once PRN Blood Glucose LESS THAN 70 milliGRAM(s)/deciliter  dextrose 50% Injectable 12.5 Gram(s) IV Push once  dextrose 50% Injectable 25 Gram(s) IV Push once  dextrose 50% Injectable 25 Gram(s) IV Push once  glucagon  Injectable 1 milliGRAM(s) IntraMuscular once PRN Glucose LESS THAN 70 milligrams/deciliter  insulin regular  human corrective regimen sliding scale   IV Push every 4 hours    Topical/Other Medications  chlorhexidine 0.12% Liquid 15 milliLiter(s) Oral Mucosa two times a day  chlorhexidine 4% Liquid 1 Application(s) Topical daily    ICU Vital Signs Last 24 Hrs  T(C): 36.6 (2020 08:00), Max: 37.6 (2020 14:39)  T(F): 97.9 (2020 08:00), Max: 99.7 (2020 14:39)  HR: 104 (2020 08:) (100 - 129)  BP: 99/61 (2020 08:00) (52/40 - 135/98)  BP(mean): 73 (:) (42 - 110)  RR: 11 (:) (11 - 22)  SpO2: 100% (:) (60% - 100%)    Mode: AC/ CMV (Assist Control/ Continuous Mandatory Ventilation)  RR (machine): 16  TV (machine): 450  FiO2: 40  PEEP: 5  ITime: 1  MAP: 7  PIP: 15    ABG - ( 2020 04:31 )  pH, Arterial: 7.42  pH, Blood: x     /  pCO2: 26    /  pO2: 192   / HCO3: 17    / Base Excess: -5.6  /  SaO2: 100       I&O's Summary    :  -  2020 07:00  --------------------------------------------------------  IN: 4079.9 mL / OUT: 1480 mL / NET: 2599.9 mL    :  -  2020 08:18  --------------------------------------------------------  IN: 192.6 mL / OUT: 60 mL / NET: 132.6 mL    I&O's Detail    :  -  2020 07:00  --------------------------------------------------------  IN:    dexmedetomidine Infusion: 25.7 mL    fentaNYL  Infusion: 142.8 mL    fentaNYL Infusion.: 32.4 mL    IV PiggyBack: 400 mL    Lactated Ringers IV Bolus: 1250 mL    lactated ringers.: 1000 mL    lactated ringers.: 100 mL    lactated ringers.: 100 mL    norepinephrine Infusion: 29 mL    Sodium Chloride 0.9% IV Bolus: 1000 mL  Total IN: 4079.9 mL    OUT:    Indwelling Catheter - Urethral: 430 mL    Nasoenteral Tube: 150 mL    VAC (Vacuum Assisted Closure) System: 900 mL  Total OUT: 1480 mL    Total NET: 2599.9 mL      2020 07:01  -  2020 08:18  --------------------------------------------------------  IN:    dexmedetomidine Infusion: 2 mL    fentaNYL Infusion.: 3.6 mL    IV PiggyBack: 50 mL    lactated ringers.: 125 mL    norepinephrine Infusion: 12 mL  Total IN: 192.6 mL    OUT:    Indwelling Catheter - Urethral: 60 mL  Total OUT: 60 mL    Total NET: 132.6 mL    PHYSICAL EXAM:    General/Neuro: A&O, RASS 0    Lungs: intubated, 40/5    Cardiovascular : S1, S2, mildly tachycardic, MAP > 65    GI: wound vac in place, sponges over midline incision and colostomy site, soft, non-distended, appropriately TTP    Extremities: Extremities cool, dopplerable radial/brachial pulses    Derm: Good skin turgor, no skin breakdown    : Salazar catheter in place    LABS:    POCT Blood Glucose.: 80 mg/dL (2020 09:43)  POCT Blood Glucose.: 106 mg/dL (2020 05:20)  POCT Blood Glucose.: 123 mg/dL (2020 02:44)  POCT Blood Glucose.: 124 mg/dL (2020 18:55)  POCT Blood Glucose.: 134 mg/dL (2020 17:21)                        13.4   6.18  )-----------( 237      ( 2020 04:40 )             39.7       Auto Neutrophil %: 78.0 % (06-29-20 @ 04:40)  Auto Immature Granulocyte %: 0.6 % (20 @ 04:40)  Band Neutrophils %: 0.9 % (20 @ 20:22)  Auto Neutrophil %: 59.2 % (20 @ 10:45)  Auto Immature Granulocyte %: 0.6 % (20 @ 10:45)        138  |  110  |  18  ----------------------------<  128<H>  3.7   |  16<L>  |  0.9    Calcium, Total Serum: 6.0 mg/dL (20 @ 07:55)    LFTs:             8.1  | 0.3  | 39       ------------------[58      ( 2020 10:45 )  4.7  | x    | 18          Lipase:32       Blood Gas Arterial, Lactate: 3.5 mmoL/L (20 @ 04:31)  Blood Gas Arterial, Lactate: 2.3 mmoL/L (20 @ 20:42)  Lactate, Blood: 4.6 mmol/L (20 @ 14:24)  Lactate, Blood: 3.4 mmol/L (20 @ 10:45)    ABG - ( 2020 04:31 )  pH: 7.42  /  pCO2: 26    /  pO2: 192   / HCO3: 17    / Base Excess: -5.6  /  SaO2: 100       ABG - ( 2020 20:42 )  pH: 7.36  /  pCO2: 36    /  pO2: 271   / HCO3: 21    / Base Excess: -4.1  /  SaO2: 99        Coags:     12.90  ----< 1.12    ( 2020 21:42 )     26.4      CARDIAC MARKERS ( 2020 04:40 )  x     / 0.02 ng/mL / 975 U/L / x     / 30.8 ng/mL  CARDIAC MARKERS ( 2020 20:22 )  x     / 0.03 ng/mL / 286 U/L / x     / x FAZAL CASSIDY  [id number]  44y Male    Indication for ICU admission: + Colonic perforation s/p subtotal colectomy/takedown of previous colostomy, abd open upgraded for close monitoring in the setting of intraabdominal sepsis    Admit Date:    ICU Date:    OR Date:      moxifloxacin (Hives)  penicillin (Unknown)    PAST MEDICAL & SURGICAL HISTORY:  Dislocations and subluxations  Aneurysm artery, celiac: s/p coiling 2010  Keratoconus  HTN (hypertension)  Epilepsy  EDS (Melva-Danlos syndrome)  Status post Kerri's procedure: 2019    Home Medications:  Aspir 81 oral delayed release tablet: orally once a day (2020 14:40)  Keppra 750 mg oral tablet: 2 tab(s) orally 2 times a day (2020 14:40)  LISINOPRIL   TAB 30MG:  (2020 14:40)  Multiple Vitamins oral tablet: 1 tab(s) orally once a day (2020 14:40)  OXCARBAZEPIN TAB 600M milligram(s) orally once a day (at bedtime) (2020 14:44)  Trileptal 300 mg oral tablet: 300 milligram(s) orally once a day in the morning (2020 14:44)    24HRS EVENT: Continuously hypotensive post op with MAPS  in 50s ,and +tachycardia.   Nicom responsive x1 (16%) however transient clinical response to fluids, MAPS increase after getting fluids but then decrease shortly after. Recieved total 1750ml overnight.   Despite low MAPS patient remains alert, following commands, dopplerable radial/brachial pulses, good UO, tachycardia decreased from 130 to 108. periphral levophed started at 5am  Post op qtc noted to be 565--> am ekg with decrease in qtc to 390.    Neurologic:  Pain control/Sedation:  - Fent gtt at 0.5  - precedex at 0.11    Hx of Epilepsy:  - Holding home Zyprexa, Trileptal while NPO  - had been holding home keppra d/t prolonged qTC overnight, restarting IV keppra    Respiratory:  Intubated: 2/2 RTOR on   - /14/40/5  - Blood Gas Profile - Arterial in AM (06.20 @ 04:31)    pH, Arterial: 7.42    pCO2, Arterial: 26 mmHg    pO2, Arterial: 192 mmHg    HCO3, Arterial: 17 mmoL/L    Base Excess, Arterial: -5.6 mmoL/L    Oxygen Saturation, Arterial: 100 %    FIO2, Arterial: 40    Blood Gas Source Arterial: Arterial    Blood Gas Arterial, Lactate: 3.5:     Cardiovascular:  Post op hypotension/tachycardia 2/2 septic shock  - levophed at 0.05 through PIV  - Fluid resuscitation  - Monitor closely, maintain MAP >65  - Echo 2019: EF 55-60 mild MR/mild TR  - troponin 0.03 > 0.02 > pending  - post-op EKG: sinus tachycarcia, qtc 390    Hx of HTN:  - Holding home lisinopril    Gastrointestinal/Nutrition:  Colonic perforation: s/p subtotal colectomy/takedown of previous colostomy, abd open, + abthera vac  - NGT to LCWS  - plan for RTOR in am    Diet:  - NPO, strict    Prophylaxis:  - PPI    Genitourinary/Renal:  Lactic acidosis: likely 2/2 septic shock  - lactate 3.4 > 4.6 > 2.3 > 3.5  - LR@125; receiving 1L bolus  - Salazar in place, strict I/Os, ~40cc/hr    Electrolyte derangements:  - Trend and replete  - hyperkalemic to 5.7 insulin, D50, calcium gluconate, repeat 3.7  Basic Metabolic Panel (20 @ 07:55)    Sodium, Serum: 138 mmol/L    Potassium, Serum: 3.7 mmol/L    Chloride, Serum: 110 mmol/L    Carbon Dioxide, Serum: 16 mmol/L    Anion Gap, Serum: 12 mmol/L    Blood Urea Nitrogen, Serum: 18 mg/dL    Creatinine, Serum: 0.9 mg/dL    Glucose, Serum: 128 mg/dL    Calcium, Total Serum: 6.0 mg/dL    Magnesium, Serum: 2.2 mg/dL (20 @ 04:40)    Phosphorus Level, Serum: 4.6 mg/dL (20 @ 04:40)    Hematologic/vasc:  Melva Danlos type 4:  - Monitor for post op oozing  - Trend Hg, transfuse if <7  - Hemoglobin 15 > 16 > 13.4  - no [place] d/t spontaneous arterial rupture in ED 4    Celiac artery aneurysm s/p coiling ():  - Holding home ASA    Infectious Disease:  Intra-abdominal sepsis:  - Aztreonam/Flagyl/Clinda  - Monitor for S/S of worsening infection     Endocrine:  - No hx of DM  - ISS while NPO  - -123    Disposition: SICU  DVT PTX: hep sq  GI PTX: PPI    ***Tubes/Lines/Drains  ***  Peripheral IV  x 3  Urinary Catheter		Indication: Strict I&O    Date Placed:     REVIEW OF SYSTEMS    [x] A ten-point review of systems was otherwise negative except as noted.  [ ] Due to altered mental status/intubation, subjective information were not able to be obtained from the patient. History was obtained, to the extent possible, from review of the chart and collateral sources of information.    HD: 1d  Daily Height in cm: 185.42 (2020 22:00)      Diet, NPO (20 @ 18:54)    CURRENT MEDS:  Neurologic Medications  dexMEDEtomidine Infusion 0.25 MICROgram(s)/kG/Hr IV Continuous <Continuous>  fentaNYL   Infusion. 0.5 MICROgram(s)/kG/Hr IV Continuous <Continuous>    Respiratory Medications    Cardiovascular Medications  norepinephrine Infusion 0.01 MICROgram(s)/kG/Min IV Continuous <Continuous>    Gastrointestinal Medications  dextrose 5%. 1000 milliLiter(s) IV Continuous <Continuous>  lactated ringers. 1000 milliLiter(s) IV Continuous <Continuous>  pantoprazole  Injectable 40 milliGRAM(s) IV Push daily    Genitourinary Medications    Hematologic/Oncologic Medications  heparin   Injectable 5000 Unit(s) SubCutaneous every 8 hours    Antimicrobial/Immunologic Medications  aztreonam  IVPB 2000 milliGRAM(s) IV Intermittent every 8 hours  aztreonam  IVPB      clindamycin IVPB 900 milliGRAM(s) IV Intermittent every 8 hours  metroNIDAZOLE  IVPB      metroNIDAZOLE  IVPB 500 milliGRAM(s) IV Intermittent every 8 hours    Endocrine/Metabolic Medications  dextrose 40% Gel 15 Gram(s) Oral once PRN Blood Glucose LESS THAN 70 milliGRAM(s)/deciliter  dextrose 50% Injectable 12.5 Gram(s) IV Push once  dextrose 50% Injectable 25 Gram(s) IV Push once  dextrose 50% Injectable 25 Gram(s) IV Push once  glucagon  Injectable 1 milliGRAM(s) IntraMuscular once PRN Glucose LESS THAN 70 milligrams/deciliter  insulin regular  human corrective regimen sliding scale   IV Push every 4 hours    Topical/Other Medications  chlorhexidine 0.12% Liquid 15 milliLiter(s) Oral Mucosa two times a day  chlorhexidine 4% Liquid 1 Application(s) Topical daily    ICU Vital Signs Last 24 Hrs  T(C): 36.6 (2020 08:00), Max: 37.6 (2020 14:39)  T(F): 97.9 (2020 08:00), Max: 99.7 (2020 14:39)  HR: 104 (2020 08:00) (100 - 129)  BP: 99/61 (2020 08:00) (52/40 - 135/98)  BP(mean): 73 (:00) (42 - 110)  RR: 11 (:) (11 - 22)  SpO2: 100% (:) (60% - 100%)    Mode: AC/ CMV (Assist Control/ Continuous Mandatory Ventilation)  RR (machine): 16  TV (machine): 450  FiO2: 40  PEEP: 5  ITime: 1  MAP: 7  PIP: 15    ABG - ( 2020 04:31 )  pH, Arterial: 7.42  pH, Blood: x     /  pCO2: 26    /  pO2: 192   / HCO3: 17    / Base Excess: -5.6  /  SaO2: 100       I&O's Summary    :  -  2020 07:00  --------------------------------------------------------  IN: 4079.9 mL / OUT: 1480 mL / NET: 2599.9 mL    :  -  2020 08:18  --------------------------------------------------------  IN: 192.6 mL / OUT: 60 mL / NET: 132.6 mL    I&O's Detail    :  -  2020 07:00  --------------------------------------------------------  IN:    dexmedetomidine Infusion: 25.7 mL    fentaNYL  Infusion: 142.8 mL    fentaNYL Infusion.: 32.4 mL    IV PiggyBack: 400 mL    Lactated Ringers IV Bolus: 1250 mL    lactated ringers.: 1000 mL    lactated ringers.: 100 mL    lactated ringers.: 100 mL    norepinephrine Infusion: 29 mL    Sodium Chloride 0.9% IV Bolus: 1000 mL  Total IN: 4079.9 mL    OUT:    Indwelling Catheter - Urethral: 430 mL    Nasoenteral Tube: 150 mL    VAC (Vacuum Assisted Closure) System: 900 mL  Total OUT: 1480 mL    Total NET: 2599.9 mL      2020 07:01  -  2020 08:18  --------------------------------------------------------  IN:    dexmedetomidine Infusion: 2 mL    fentaNYL Infusion.: 3.6 mL    IV PiggyBack: 50 mL    lactated ringers.: 125 mL    norepinephrine Infusion: 12 mL  Total IN: 192.6 mL    OUT:    Indwelling Catheter - Urethral: 60 mL  Total OUT: 60 mL    Total NET: 132.6 mL    PHYSICAL EXAM:    General/Neuro: A&O, RASS 0    Lungs: intubated, 40/5    Cardiovascular : S1, S2, mildly tachycardic, MAP > 65    GI: wound vac in place, sponges over midline incision and colostomy site, soft, non-distended, appropriately TTP    Extremities: Extremities cool, dopplerable radial/brachial pulses    Derm: Good skin turgor, no skin breakdown    : Salazar catheter in place    LABS:    POCT Blood Glucose.: 80 mg/dL (2020 09:43)  POCT Blood Glucose.: 106 mg/dL (2020 05:20)  POCT Blood Glucose.: 123 mg/dL (2020 02:44)  POCT Blood Glucose.: 124 mg/dL (2020 18:55)  POCT Blood Glucose.: 134 mg/dL (2020 17:21)                        13.4   6.18  )-----------( 237      ( 2020 04:40 )             39.7       Auto Neutrophil %: 78.0 % (20 @ 04:40)  Auto Immature Granulocyte %: 0.6 % (20 @ 04:40)  Band Neutrophils %: 0.9 % (20 @ 20:22)  Auto Neutrophil %: 59.2 % (20 @ 10:45)  Auto Immature Granulocyte %: 0.6 % (20 @ 10:45)        138  |  110  |  18  ----------------------------<  128<H>  3.7   |  16<L>  |  0.9    Calcium, Total Serum: 6.0 mg/dL (20 @ 07:55)    LFTs:             8.1  | 0.3  | 39       ------------------[58      ( 2020 10:45 )  4.7  | x    | 18          Lipase:32       Blood Gas Arterial, Lactate: 3.5 mmoL/L (20 @ 04:31)  Blood Gas Arterial, Lactate: 2.3 mmoL/L (20 @ 20:42)  Lactate, Blood: 4.6 mmol/L (20 @ 14:24)  Lactate, Blood: 3.4 mmol/L (20 @ 10:45)    ABG - ( 2020 04:31 )  pH: 7.42  /  pCO2: 26    /  pO2: 192   / HCO3: 17    / Base Excess: -5.6  /  SaO2: 100       ABG - ( 2020 20:42 )  pH: 7.36  /  pCO2: 36    /  pO2: 271   / HCO3: 21    / Base Excess: -4.1  /  SaO2: 99        Coags:     12.90  ----< 1.12    ( 2020 21:42 )     26.4      CARDIAC MARKERS ( 2020 04:40 )  x     / 0.02 ng/mL / 975 U/L / x     / 30.8 ng/mL  CARDIAC MARKERS ( 2020 20:22 )  x     / 0.03 ng/mL / 286 U/L / x     / x

## 2020-06-29 NOTE — CONSULT NOTE ADULT - SUBJECTIVE AND OBJECTIVE BOX
Chief Complaint:     44y male pmhx as below presenting with 1 day of severe epigastric/RUQ pain. Patient seen and examined, states that he had a normal bowel movement this morning and now has severe abdominal pain since 9am. He admits to nausea, denies vomiting. Ostomy functioning normally. Free air noted. Patient is s/p End ileostomy, Reexploration of abdomen with irrigation or drainage and closure 29-Jun-2020. Discussed with team, requesting PCA.         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  Keratoconus  HTN (hypertension)  Epilepsy  EDS (Melva-Danlos syndrome)  Status post Kerri's procedure: 11/2019        SOCIAL HISTORY:  Denies Smoking, Alcohol, or Drug Use    moxifloxacin (Hives)  penicillin (Unknown)      Current PAIN MEDICATIONS:  HYDROmorphone  Injectable 0.25 milliGRAM(s) IV Push every 4 hours PRN  HYDROmorphone  Injectable 0.5 milliGRAM(s) IV Push every 4 hours PRN  levETIRAcetam  IVPB 1500 milliGRAM(s) IV Intermittent every 12 hours  ondansetron Injectable 4 milliGRAM(s) IV Push once PRN    Heme:  heparin   Injectable 5000 Unit(s) SubCutaneous every 8 hours    Antibiotics:  meropenem  IVPB 1000 milliGRAM(s) IV Intermittent every 8 hours    Cardiovascular:    GI:  pantoprazole  Injectable 40 milliGRAM(s) IV Push daily    Endocrine:    All Other Medications:  chlorhexidine 0.12% Liquid 15 milliLiter(s) Oral Mucosa every 12 hours  chlorhexidine 4% Liquid 1 Application(s) Topical <User Schedule>  lactated ringers. 1000 milliLiter(s) IV Continuous <Continuous>      Vital Signs Last 24 Hrs  T(C): 36.2 (29 Jun 2020 16:00), Max: 36.6 (28 Jun 2020 18:48)  T(F): 97.1 (29 Jun 2020 16:00), Max: 97.9 (29 Jun 2020 08:00)  HR: 110 (29 Jun 2020 18:30) (92 - 129)  BP: 98/70 (29 Jun 2020 18:00) (52/40 - 133/93)  BP(mean): 83 (29 Jun 2020 18:00) (42 - 110)  RR: 7 (29 Jun 2020 18:30) (6 - 30)  SpO2: 99% (29 Jun 2020 18:30) (60% - 100%)      06-28-20 @ 07:01  -  06-29-20 @ 07:00  --------------------------------------------------------  IN: 4079.9 mL / OUT: 1480 mL / NET: 2599.9 mL    06-29-20 @ 07:01  -  06-29-20 @ 18:47  --------------------------------------------------------  IN: 3846.4 mL / OUT: 740 mL / NET: 3106.4 mL        LABS:                          11.5   10.67 )-----------( 220      ( 29 Jun 2020 15:30 )             32.7     06-29    132<L>  |  102  |  19  ----------------------------<  95  5.5<H>   |  19  |  0.9    Ca    6.8<L>      29 Jun 2020 15:30  Phos  3.9     06-29  Mg     1.6     06-29    TPro  8.1<H>  /  Alb  4.7  /  TBili  0.3  /  DBili  x   /  AST  39  /  ALT  18  /  AlkPhos  58  06-28    PT/INR - ( 29 Jun 2020 15:30 )   PT: 19.70 sec;   INR: 1.71 ratio         PTT - ( 29 Jun 2020 15:30 )  PTT:40.1 sec      RADIOLOGY:  EXAM:  XR CHEST PORTABLE ROUTINE 1V            PROCEDURE DATE:  06/29/2020            INTERPRETATION:  Clinical History / Reason for exam: Respiratory failure.    Comparison : Chest radiograph prior day.    Technique/Positioning: Frontal portable.    Findings:    Support devices: Patient is intubated. Enteric catheter extends below the hemidiaphragm. Telemetry leads overlie the thorax.    Cardiac/mediastinum/hilum: Unremarkable.    Lung parenchyma/Pleura: Within normal limits.    Skeleton/soft tissues: Unremarkable.    Impression:      No radiographic evidence of acute cardiopulmonary disease.    Support devices as described.    DAKOTA RAMIREZ M.D., ATTENDING RADIOLOGIST  This document has been electronically signed. Jun 29 2020  8:28AM          Drug Screen:        [ ]  [place]  Reviewed on 6/29/20, 6:50P  Patient Name: Felix HughesBirth Date: 1975  Address: [address] [place] [id number]: Male  Rx Written	Rx Dispensed	Drug	Quantity	Days Supply	Prescriber Name  11/07/2019	11/08/2019	oxycodone hcl 5 mg tablet	20	5	[facility]  Payment Method Insurance  Dispenser [facility] #[id number]  09/11/2019	09/11/2019	tramadol hcl 50 mg tablet	15	5	[facility]uffy  Payment Method Insurance  Dispenser [facility] #[id number]

## 2020-06-29 NOTE — PRE-ANESTHESIA EVALUATION ADULT - NSANTHADDINFOFT_GEN_ALL_CORE
risks, benefits, alternatives discussed with the patient's wife (as he is intubated and sedated) and she is agreeable to the plan

## 2020-06-29 NOTE — CHART NOTE - NSCHARTNOTEFT_GEN_A_CORE
43 y/o male with Ehler's Danlo syndrome Type IV now S/p Exploratory laparotomy for perforated colon with fecal peritonitsis.  Patient had 1.2 liter blood loss intra op, got 3.7 liter and required pushes on neosynephrine during the procedure    About 1 hour post op we were called for tachycardia and hypotension  126 HR, BP 80/50.  Dr. Lane was at bedside- did not want an arterial line secondary to the risk of arterial rupture and planned to avoid pressors.    Lactate was 2.6, BE -4.5  Gave fluids as follows:  250 ml  500 ml LR  500 ml LR  250 ml LR with Nicom- not fluid responsive on NICOM, but clinically responsive to fluids   250 ml LR with NICOM- 16%, fluid reponsive- MAP increased to 69  Patient transiently responded to each fluid bolus with increase in MAP an decrease in HR.    BP were taken with manual cuffs several times through out the [facility] with BP ranges of 80/40 -90-50, HR is currently 112.    AM lactate 3.5 (2.6)  BE -5.6 (-4.7)    UO 25 ml/hr (0.35 cc/kg body weight)    Above was discussed with Dr. Lane- will start low dose levophed peripherally  Will require a cental line if dose above 0.5 mcg/kg/min is required.

## 2020-06-29 NOTE — PROGRESS NOTE ADULT - ASSESSMENT
Assessment:  44yMale patient S/P subtotal colectomy , take down of previous colostomy, abdomen left open , abthera vac placed for splenic flexure perforation with feculent peritonitis. Operative course complicated by profuse bleed 2/2 hx of EDS. Hypotensive and tachycardic in ICU, fluid responsive, total 1.25L given.    Plan:  - SICU  - RTOR 6/29 planned  - pain control  - remain intubated  - continue fluid resuscitation

## 2020-06-29 NOTE — CHART NOTE - NSCHARTNOTEFT_GEN_A_CORE
Post Operative Note  Patient: FAZAL CASSIDY 44y (1975) Male   MRN: [id number]  Location: [facility] [address]  Visit: 06-28-20 Inpatient  Date: 06-29-20 @ 01:10    Procedure: S/P subtotal colectomy , take down of previous colostomy, abdomen left open, abthera vac placed    Subjective:   Intubated and sedated, unable to assess subjective.    Objective:  Vitals: T(F): 96 (06-28-20 @ 22:00), Max: 99.7 (06-28-20 @ 14:39)  HR: 109 (06-29-20 @ 00:10)  BP: 101/47 (06-28-20 @ 22:00) (69/46 - 135/98)  RR: 16 (06-28-20 @ 23:00)  SpO2: 100% (06-29-20 @ 00:10)  Vent Settings: Mode: AC/ CMV (Assist Control/ Continuous Mandatory Ventilation), RR (machine): 16, TV (machine): 450, FiO2: 40, PEEP: 5, MAP: 7, PIP: 15    In:   06-28-20 @ 07:01  -  06-29-20 @ 01:10  --------------------------------------------------------  IN: 2886.7 mL      IV Fluids: lactated ringers. 1000 milliLiter(s) (125 mL/Hr) IV Continuous <Continuous>  potassium phosphate IVPB 15 milliMole(s) IV Intermittent once      Out:   06-28-20 @ 07:01  -  06-29-20 @ 01:10  --------------------------------------------------------  OUT: 230 mL      EBL:     Voided Urine:   06-28-20 @ 07:01  -  06-29-20 @ 01:10  --------------------------------------------------------  OUT: 230 mL      Salazar Catheter: yes no   Drains:   NADIRA:    ,   Chest Tube:      NG Tube:   06-28-20 @ 07:01  -  06-29-20 @ 01:10  --------------------------------------------------------  OUT: 100 mL        Physical Examination:  General Appearance: intubated and sedated  HEENT: EOMI  Abdomen:  midline incision with abthera vac, previous ostomy site covered with VAC sponge    Medications: [Standing]  aztreonam  IVPB 2000 milliGRAM(s) IV Intermittent every 8 hours  aztreonam  IVPB      clindamycin IVPB 900 milliGRAM(s) IV Intermittent every 8 hours  dexMEDEtomidine Infusion 0.25 MICROgram(s)/kG/Hr IV Continuous <Continuous>  fentaNYL    Injectable 25 MICROGram(s) IV Push once  fentaNYL   Infusion. 0.5 MICROgram(s)/kG/Hr IV Continuous <Continuous>  heparin   Injectable 5000 Unit(s) SubCutaneous every 8 hours  lactated ringers. 1000 milliLiter(s) IV Continuous <Continuous>  metroNIDAZOLE  IVPB      metroNIDAZOLE  IVPB 500 milliGRAM(s) IV Intermittent every 8 hours  pantoprazole  Injectable 40 milliGRAM(s) IV Push daily  potassium phosphate IVPB 15 milliMole(s) IV Intermittent once    Medications: [PRN]  aztreonam  IVPB 2000 milliGRAM(s) IV Intermittent every 8 hours  aztreonam  IVPB      clindamycin IVPB 900 milliGRAM(s) IV Intermittent every 8 hours  dexMEDEtomidine Infusion 0.25 MICROgram(s)/kG/Hr IV Continuous <Continuous>  fentaNYL    Injectable 25 MICROGram(s) IV Push once  fentaNYL   Infusion. 0.5 MICROgram(s)/kG/Hr IV Continuous <Continuous>  heparin   Injectable 5000 Unit(s) SubCutaneous every 8 hours  lactated ringers. 1000 milliLiter(s) IV Continuous <Continuous>  metroNIDAZOLE  IVPB      metroNIDAZOLE  IVPB 500 milliGRAM(s) IV Intermittent every 8 hours  pantoprazole  Injectable 40 milliGRAM(s) IV Push daily  potassium phosphate IVPB 15 milliMole(s) IV Intermittent once    Labs:                        16.0   1.78  )-----------( 262      ( 28 Jun 2020 20:22 )             47.9     06-28    134<L>  |  98  |  13  ----------------------------<  165<H>  3.8   |  20  |  1.0    Ca    7.9<L>      28 Jun 2020 20:22  Phos  3.3     06-28  Mg     1.2     06-28    TPro  8.1<H>  /  Alb  4.7  /  TBili  0.3  /  DBili  x   /  AST  39  /  ALT  18  /  AlkPhos  58  06-28    PT/INR - ( 28 Jun 2020 21:42 )   PT: 12.90 sec;   INR: 1.12 ratio         PTT - ( 28 Jun 2020 21:42 )  PTT:26.4 sec  CARDIAC MARKERS ( 28 Jun 2020 20:22 )  x     / 0.03 ng/mL / 286 U/L / x     / x          Imaging:  No post-op imaging studies    Assessment:  44yMale patient S/P subtotal colectomy , take down of previous colostomy, abdomen left open , abthera vac placed for splenic flexure perforation with feculent peritonitis. Operative course complicated by profuse bleed 2/2 hx of EDS. Hypotensive and tachycardic in ICU, fluid responsive, total 1.25L given.    Plan:  - SICU  - RTOR 6/29 planned  - pain control  - remain intubated  - fluid resuscitation    Date/Time: 06-29-20 @ 01:10

## 2020-06-29 NOTE — PRE-ANESTHESIA EVALUATION ADULT - NSANTHPMHFT_GEN_ALL_CORE
celiac aneurysm s/p coiling 4/2010, bilateral carotid and iliac artery aneurysms, now s/p Colonic perforation s/p subtotal colectomy/takedown of previous colostomy 6/28/2020

## 2020-06-29 NOTE — BRIEF OPERATIVE NOTE - OPERATION/FINDINGS
Feculent peritonitis, dense adhesions, large perforation of descending colon at splenic flexure, subtotal colectomy, take down of colostomy, abdomen left open.
abd washout, hemostasis, Evicel used. Seprafilm placed. abd fascia closure, PREVENA vac placed. end ileostomy placed

## 2020-06-30 LAB
ANION GAP SERPL CALC-SCNC: 9 MMOL/L — SIGNIFICANT CHANGE UP (ref 7–14)
BUN SERPL-MCNC: 16 MG/DL — SIGNIFICANT CHANGE UP (ref 10–20)
CALCIUM SERPL-MCNC: 7.3 MG/DL — LOW (ref 8.5–10.1)
CHLORIDE SERPL-SCNC: 104 MMOL/L — SIGNIFICANT CHANGE UP (ref 98–110)
CK MB CFR SERPL CALC: 20.3 NG/ML — HIGH (ref 0.6–6.3)
CK SERPL-CCNC: 970 U/L — HIGH (ref 0–225)
CO2 SERPL-SCNC: 24 MMOL/L — SIGNIFICANT CHANGE UP (ref 17–32)
CREAT SERPL-MCNC: 0.8 MG/DL — SIGNIFICANT CHANGE UP (ref 0.7–1.5)
GLUCOSE BLDC GLUCOMTR-MCNC: 75 MG/DL — SIGNIFICANT CHANGE UP (ref 70–99)
GLUCOSE SERPL-MCNC: 81 MG/DL — SIGNIFICANT CHANGE UP (ref 70–99)
POTASSIUM SERPL-MCNC: 4.8 MMOL/L — SIGNIFICANT CHANGE UP (ref 3.5–5)
POTASSIUM SERPL-SCNC: 4.8 MMOL/L — SIGNIFICANT CHANGE UP (ref 3.5–5)
SODIUM SERPL-SCNC: 137 MMOL/L — SIGNIFICANT CHANGE UP (ref 135–146)
SURGICAL PATHOLOGY STUDY: SIGNIFICANT CHANGE UP
TROPONIN T SERPL-MCNC: 0.02 NG/ML — HIGH

## 2020-06-30 PROCEDURE — 71045 X-RAY EXAM CHEST 1 VIEW: CPT | Mod: 26

## 2020-06-30 PROCEDURE — 99233 SBSQ HOSP IP/OBS HIGH 50: CPT | Mod: 57

## 2020-06-30 RX ORDER — METRONIDAZOLE 500 MG
500 TABLET ORAL ONCE
Refills: 0 | Status: COMPLETED | OUTPATIENT
Start: 2020-06-30 | End: 2020-06-30

## 2020-06-30 RX ORDER — PANTOPRAZOLE SODIUM 20 MG/1
40 TABLET, DELAYED RELEASE ORAL
Refills: 0 | Status: DISCONTINUED | OUTPATIENT
Start: 2020-06-30 | End: 2020-07-03

## 2020-06-30 RX ORDER — LEVETIRACETAM 250 MG/1
1500 TABLET, FILM COATED ORAL
Refills: 0 | Status: DISCONTINUED | OUTPATIENT
Start: 2020-06-30 | End: 2020-07-03

## 2020-06-30 RX ORDER — OXCARBAZEPINE 300 MG/1
600 TABLET, FILM COATED ORAL
Refills: 0 | Status: DISCONTINUED | OUTPATIENT
Start: 2020-06-30 | End: 2020-07-03

## 2020-06-30 RX ORDER — OXCARBAZEPINE 300 MG/1
300 TABLET, FILM COATED ORAL
Refills: 0 | Status: DISCONTINUED | OUTPATIENT
Start: 2020-06-30 | End: 2020-07-03

## 2020-06-30 RX ORDER — MAGNESIUM SULFATE 500 MG/ML
2 VIAL (ML) INJECTION ONCE
Refills: 0 | Status: DISCONTINUED | OUTPATIENT
Start: 2020-06-30 | End: 2020-06-30

## 2020-06-30 RX ORDER — SODIUM CHLORIDE 9 MG/ML
500 INJECTION, SOLUTION INTRAVENOUS ONCE
Refills: 0 | Status: COMPLETED | OUTPATIENT
Start: 2020-06-30 | End: 2020-06-30

## 2020-06-30 RX ORDER — INSULIN HUMAN 100 [IU]/ML
10 INJECTION, SOLUTION SUBCUTANEOUS ONCE
Refills: 0 | Status: COMPLETED | OUTPATIENT
Start: 2020-06-30 | End: 2020-06-30

## 2020-06-30 RX ORDER — OXCARBAZEPINE 300 MG/1
300 TABLET, FILM COATED ORAL ONCE
Refills: 0 | Status: COMPLETED | OUTPATIENT
Start: 2020-06-30 | End: 2020-06-30

## 2020-06-30 RX ORDER — METRONIDAZOLE 500 MG
TABLET ORAL
Refills: 0 | Status: DISCONTINUED | OUTPATIENT
Start: 2020-06-30 | End: 2020-07-03

## 2020-06-30 RX ORDER — MEROPENEM 1 G/30ML
2000 INJECTION INTRAVENOUS EVERY 8 HOURS
Refills: 0 | Status: DISCONTINUED | OUTPATIENT
Start: 2020-06-30 | End: 2020-07-03

## 2020-06-30 RX ORDER — ASPIRIN/CALCIUM CARB/MAGNESIUM 324 MG
81 TABLET ORAL DAILY
Refills: 0 | Status: DISCONTINUED | OUTPATIENT
Start: 2020-06-30 | End: 2020-07-03

## 2020-06-30 RX ORDER — SODIUM CHLORIDE 9 MG/ML
250 INJECTION INTRAMUSCULAR; INTRAVENOUS; SUBCUTANEOUS ONCE
Refills: 0 | Status: COMPLETED | OUTPATIENT
Start: 2020-06-30 | End: 2020-06-30

## 2020-06-30 RX ORDER — METRONIDAZOLE 500 MG
500 TABLET ORAL EVERY 8 HOURS
Refills: 0 | Status: DISCONTINUED | OUTPATIENT
Start: 2020-06-30 | End: 2020-07-03

## 2020-06-30 RX ORDER — DEXTROSE 50 % IN WATER 50 %
50 SYRINGE (ML) INTRAVENOUS ONCE
Refills: 0 | Status: COMPLETED | OUTPATIENT
Start: 2020-06-30 | End: 2020-06-30

## 2020-06-30 RX ADMIN — Medication 81 MILLIGRAM(S): at 12:15

## 2020-06-30 RX ADMIN — INSULIN HUMAN 10 UNIT(S): 100 INJECTION, SOLUTION SUBCUTANEOUS at 00:19

## 2020-06-30 RX ADMIN — OXCARBAZEPINE 300 MILLIGRAM(S): 300 TABLET, FILM COATED ORAL at 20:05

## 2020-06-30 RX ADMIN — Medication 650 MILLIGRAM(S): at 12:17

## 2020-06-30 RX ADMIN — SODIUM CHLORIDE 125 MILLILITER(S): 9 INJECTION, SOLUTION INTRAVENOUS at 14:18

## 2020-06-30 RX ADMIN — Medication 100 MILLIGRAM(S): at 12:13

## 2020-06-30 RX ADMIN — SODIUM CHLORIDE 1500 MILLILITER(S): 9 INJECTION INTRAMUSCULAR; INTRAVENOUS; SUBCUTANEOUS at 05:33

## 2020-06-30 RX ADMIN — Medication 50 MILLILITER(S): at 00:19

## 2020-06-30 RX ADMIN — SODIUM CHLORIDE 500 MILLILITER(S): 9 INJECTION, SOLUTION INTRAVENOUS at 05:51

## 2020-06-30 RX ADMIN — HEPARIN SODIUM 5000 UNIT(S): 5000 INJECTION INTRAVENOUS; SUBCUTANEOUS at 05:03

## 2020-06-30 RX ADMIN — OXCARBAZEPINE 300 MILLIGRAM(S): 300 TABLET, FILM COATED ORAL at 12:14

## 2020-06-30 RX ADMIN — SODIUM CHLORIDE 1500 MILLILITER(S): 9 INJECTION INTRAMUSCULAR; INTRAVENOUS; SUBCUTANEOUS at 00:04

## 2020-06-30 RX ADMIN — MEROPENEM 100 MILLIGRAM(S): 1 INJECTION INTRAVENOUS at 05:03

## 2020-06-30 RX ADMIN — Medication 650 MILLIGRAM(S): at 17:19

## 2020-06-30 RX ADMIN — HEPARIN SODIUM 5000 UNIT(S): 5000 INJECTION INTRAVENOUS; SUBCUTANEOUS at 13:40

## 2020-06-30 RX ADMIN — HEPARIN SODIUM 5000 UNIT(S): 5000 INJECTION INTRAVENOUS; SUBCUTANEOUS at 21:05

## 2020-06-30 RX ADMIN — Medication 100 MILLIGRAM(S): at 21:04

## 2020-06-30 RX ADMIN — OXCARBAZEPINE 600 MILLIGRAM(S): 300 TABLET, FILM COATED ORAL at 21:05

## 2020-06-30 RX ADMIN — PANTOPRAZOLE SODIUM 40 MILLIGRAM(S): 20 TABLET, DELAYED RELEASE ORAL at 12:14

## 2020-06-30 RX ADMIN — MEROPENEM 25 MILLIGRAM(S): 1 INJECTION INTRAVENOUS at 13:52

## 2020-06-30 RX ADMIN — LEVETIRACETAM 1500 MILLIGRAM(S): 250 TABLET, FILM COATED ORAL at 17:18

## 2020-06-30 RX ADMIN — SODIUM CHLORIDE 125 MILLILITER(S): 9 INJECTION, SOLUTION INTRAVENOUS at 20:15

## 2020-06-30 RX ADMIN — SODIUM CHLORIDE 500 MILLILITER(S): 9 INJECTION, SOLUTION INTRAVENOUS at 00:04

## 2020-06-30 RX ADMIN — LEVETIRACETAM 460 MILLIGRAM(S): 250 TABLET, FILM COATED ORAL at 05:05

## 2020-06-30 RX ADMIN — MEROPENEM 25 MILLIGRAM(S): 1 INJECTION INTRAVENOUS at 22:21

## 2020-06-30 NOTE — CHART NOTE - NSCHARTNOTEFT_GEN_A_CORE
SICU DOWNGRADE NOTE    44y Male transferred to floor from SICU.     44M w/ past history of Melva Danlos 4, previous hospitalization in 11/2019 for perforation of sigmoid colon s/p Kerri's, epilepsy, HTN, celiac aneurysm s/p coiling 4/2010, b/l carotid and iliac artery aneurysms? He presented on 6/28 w/severe abd pain , +nausea x1 day, with CT findings of a colonic perforation. He was taken to the OR emergently on 6/28 for a subtotal colectomy and takedown of colostomy with intraop findings of feculent peritonitis, large perforation of descending colon at splenic flexure, no defect at small bowel. The abdomen was left open with abthera vac in place. Patient experienced significant "oozing" intra-op 2/2 to his underlying connective tissue disorder. Started on clindamycin, aztreonam, and flagyl. Patient remained HDS and no transfusions needed. Intraop course otherwise uncomplicated. Patient remained intubated w/ plans for take back to OR on 6/29. He was transferred to PACU in stable but guarded condition. Overnight, pt was continuously hypotensive with MAPS in 50s and tachycardic to 130s. He received 1750 in IVF overnight. Throughout the night, the pt remained alert and following commands. UOP was adequate and tachycardia improved to 108.     6/29: Decision was made to start peripheral levophed on at 5am, which was weaned off before take back on 6/29 for a washout, closure, and end ileostomy. Intraoperatively they used evicel for oozing, closed fascia, and packed old colostomy site with 1/2 inch packing. Prevena vac was placed over midline incision. ID recommended stopping clindamycin, aztreonam, and flagyl as well as starting meropenem. Lactate was persistently elevated, so fluid resuscitation was continued. Pt successfully extubated to NC. Dilaudid PCA pump started for pain control.     6/30: NGT and austin removed. Sips and chips as well as PO medications started. Per ID, meropenem increased to 2g over 4H and flagyl 500 Q8H. Home oxcarbazepine restarted.     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  Keratoconus  HTN (hypertension)  Epilepsy  EDS (Melva-Danlos syndrome)  Status post Kerri's procedure: 11/2019    Allergies: moxifloxacin (Hives)  penicillin (Unknown)  -------------------------------------------------------------------------------------------    OBJECTIVE:  -------------------------------------------------------------------------------------------  Vitals:  T(C): 35.8 (06-30-20 @ 08:00), Max: 36.8 (06-30-20 @ 04:00)  HR: 98 (06-30-20 @ 11:00) (88 - 122)  BP: 110/56 (06-30-20 @ 11:00) (81/62 - 117/72)  RR: 21 (06-30-20 @ 11:00) (6 - 30)  SpO2: 100% (06-30-20 @ 11:00) (93% - 100%)  Wt(kg): --    -------------------------------------------------------------------------------------------  I&O's Summary    29 Jun 2020 07:01  -  30 Jun 2020 07:00  --------------------------------------------------------  IN: 6946.4 mL / OUT: 1570 mL / NET: 5376.4 mL    30 Jun 2020 07:01  -  30 Jun 2020 12:05  --------------------------------------------------------  IN: 625 mL / OUT: 375 mL / NET: 250 mL      -------------------------------------------------------------------------------------------  Labs:    POCT Blood Glucose.: 75 mg/dL (30 Jun 2020 09:45)  POCT Blood Glucose.: 94 mg/dL (29 Jun 2020 18:39)  POCT Blood Glucose.: 80 mg/dL (29 Jun 2020 13:59)                        10.2   11.29 )-----------( 213      ( 29 Jun 2020 23:29 )             30.3       06-29    135  |  104  |  18  ----------------------------<  101<H>  5.5<H>   |  22  |  0.9    Calcium, Total Serum: 7.2 mg/dL (06-29-20 @ 23:29)    LFTs:    Lactate, Blood: 1.8 mmol/L (06-29-20 @ 23:29)  Blood Gas Venous - Lactate: 3.3 mmoL/L (06-29-20 @ 16:02)  Blood Gas Arterial, Lactate: 3.4 mmoL/L (06-29-20 @ 14:45)  Blood Gas Arterial, Lactate: 3.5 mmoL/L (06-29-20 @ 04:31)  Blood Gas Arterial, Lactate: 2.3 mmoL/L (06-28-20 @ 20:42)  Lactate, Blood: 4.6 mmol/L (06-28-20 @ 14:24)  Lactate, Blood: 3.4 mmol/L (06-28-20 @ 10:45)    ABG - ( 29 Jun 2020 14:45 )  pH: 7.39  /  pCO2: 34    /  pO2: 112   / HCO3: 21    / Base Excess: -3.6  /  SaO2: 100       ABG - ( 29 Jun 2020 04:31 )  pH: 7.42  /  pCO2: 26    /  pO2: 192   / HCO3: 17    / Base Excess: -5.6  /  SaO2: 100       ABG - ( 28 Jun 2020 20:42 )  pH: 7.36  /  pCO2: 36    /  pO2: 271   / HCO3: 21    / Base Excess: -4.1  /  SaO2: 99        Coags:     21.30  ----< 1.85    ( 29 Jun 2020 23:29 )     26.9      CARDIAC MARKERS ( 30 Jun 2020 04:27 )  x     / 0.02 ng/mL / 970 U/L / x     / 20.3 ng/mL  CARDIAC MARKERS ( 29 Jun 2020 23:29 )  x     / 0.02 ng/mL / 1181 U/L / x     / 33.4 ng/mL  CARDIAC MARKERS ( 29 Jun 2020 15:30 )  x     / 0.02 ng/mL / 1085 U/L / x     / 37.8 ng/mL  CARDIAC MARKERS ( 29 Jun 2020 04:40 )  x     / 0.02 ng/mL / 975 U/L / x     / 30.8 ng/mL  CARDIAC MARKERS ( 28 Jun 2020 20:22 )  x     / 0.03 ng/mL / 286 U/L / x     / x        Culture - Blood (collected 28 Jun 2020 14:24)  Source: .Blood Blood-Peripheral  Preliminary Report (29 Jun 2020 20:00):    No growth to date.    Culture - Blood (collected 28 Jun 2020 14:24)  Source: .Blood Blood-Peripheral  Preliminary Report (29 Jun 2020 20:00):    No growth to date.    ------------------------------------------------------------------------------------------  Active Medications:  MEDICATIONS  (STANDING):  acetaminophen   Tablet .. 650 milliGRAM(s) Oral every 6 hours  aspirin enteric coated 81 milliGRAM(s) Oral daily  chlorhexidine 4% Liquid 1 Application(s) Topical <User Schedule>  heparin   Injectable 5000 Unit(s) SubCutaneous every 8 hours  HYDROmorphone PCA (1 mG/mL) 30 milliLiter(s) PCA Continuous PCA Continuous  lactated ringers. 1000 milliLiter(s) (125 mL/Hr) IV Continuous <Continuous>  levETIRAcetam 1500 milliGRAM(s) Oral two times a day  meropenem  IVPB 2000 milliGRAM(s) IV Intermittent every 8 hours  metroNIDAZOLE  IVPB 500 milliGRAM(s) IV Intermittent once  metroNIDAZOLE  IVPB 500 milliGRAM(s) IV Intermittent every 8 hours  metroNIDAZOLE  IVPB      OXcarbazepine 600 milliGRAM(s) Oral <User Schedule>  OXcarbazepine 300 milliGRAM(s) Oral <User Schedule>  OXcarbazepine 300 milliGRAM(s) Oral once  pantoprazole    Tablet 40 milliGRAM(s) Oral before breakfast    MEDICATIONS  (PRN):  ondansetron Injectable 4 milliGRAM(s) IV Push once PRN Nausea and/or Vomiting    -------------------------------------------------------------------------------------------  SIGN OUT AT 06-30-20 @ 12:05 GIVEN TO:    PLAN:   Neurologic:  Pain control/Sedation:  - dilaudid PCA for pain: 0.2 mg demand dose, 10 min lockout    Hx of Epilepsy:  - on home keppra PO 1500 BID and tripleptal 300 in AM, 600 in PM    Respiratory:  - extubated 6/29 after RTOR  - saturating well on RA    Cardiovascular:  Post op hypotension/tachycardia: likely 2/2 hypovolemia vs sepsis, resolved  - Monitor closely, maintain MAP >65  - Echo 11/2019: EF 55-60 mild MR/mild TR  - troponins 0.03 > 0.02 > 0.02 , asymptomatic, stopped trending  - ekg: qtc 390 from 6/29    Hx of HTN:  - Holding home lisinopril d/t hypotension    Gastrointestinal/Nutrition:  Colonic perforation: s/p subtotal colectomy/takedown of previous colostomy, abd open, + abthera vac (6/28) and washout, closure of fascia and subcutaneous layers, creation of end ileostomy (6/29)  - prevera vac in place over midline incision -- will stay in place for 5-7 days  - colostomy site dressing in place    Diet :  - NPO w/ sips and chips, PO meds ok    Prophylaxis:  - PPI    Genitourinary/Renal:  Lactic acidosis: likely 2/2 sepsis, lactate 3.3-->1.8  - Fluid resuscitation/trend  - LR@125  - austin removed, FU TOV    Electrolyte derangements:  - Trend and replete  - Na 135, K 5.5, Mg 2.3, Ph 3.7  - hyperkalemia, s/p insulin + d50, FU 11am BMP     Hematologic/vasc:  Melva danlos type 4:  - Monitor for post op oozing  - Trend Hg, transfuse if <7  - OF NOTE: with ED type 4, spontaneous arterial rupture is a possibility -- would draw labs off R IV    Celiac artery aneurysm s/p coiling (2010):  - restarted home aspirin 6/30    Infectious Disease:  Intra-abdominal sepsis:  - Aztreonam/Flagyl/Clinda --> on meropenem and flagyl per ID  - Monitor for S/S of worsening infection     Endocrine:  - No hx of DM  - FS QAC  - A1c 5.3    DVT PTX: hep sq  GI PTX: PPI    ***Tubes/Lines/Drains  ***  Peripheral IV x3

## 2020-06-30 NOTE — PROGRESS NOTE ADULT - ASSESSMENT
24HRS EVENT:   Overnight: repeat 2330 labs, hyperkalemia to 5.5 given insulin + d50,  bolus for tachycardia HR now improved     Neurologic:  Pain control/Sedation:  - dilaudid PCA for pain: 0.2 mg demand dose, 10 min lockout    Hx of Epilepsy:  - Holding home Zyprexa, Trileptal  - on home keppra IV 1500 BID    Respiratory:  - extubated 6/29 after RTOR  - 3L NC  - AM CXR    Cardiovascular:  Post op hypotension/tachycardia: likely 2/2 hypovolemia vs sepsis  - Fluid resuscitation, s/p 500 cc LR bolus  - Monitor closely, maintain MAP >65  - Echo 11/2019: EF 55-60 mild MR/mild TR  - troponins 0.03 > 0.02 > 0.02  - ekg: qtc 390 from 6/29    Hx of HTN:  - Holding home lisinopril d/t hypotension    Gastrointestinal/Nutrition:  Colonic perforation: s/p subtotal colectomy/takedown of previous colostomy, abd open, + abthera vac (6/28) and washout, closure of fascia and subcutaneous layers, creation of end ileostomy (6/29)  - prevera vac in place over midline incision  - NGT in place to LCWS    Diet :  - NPO, strict    Prophylaxis:  - PPI    Genitourinary/Renal:  Lactic acidosis: likely 2/2 sepsis, lactate 3.3-->1.8 improving   - Fluid resuscitation/trend  - LR@125  - Salazar in place, strict I/Os    Electrolyte derangements:  - Trend and replete  - hyperkalemia, s/p insulin + d50  - Na 135, K 5.5, Mg 2.3, Ph 3.7        Hematologic/vasc:  Melva danlos type 4:  - Monitor for post op oozing  - Trend Hg, transfuse if <7    Celiac artery aneurysm s/p coiling (2010):  - Holding home ASA    Infectious Disease:  Intra-abdominal sepsis:  - Aztreonam/Flagyl/Clinda --> switched to meropenem 6/29 per ID  - Monitor for S/S of worsening infection     Endocrine:  - No hx of DM  - FS Q4H and ISS while NPO  - A1c 5.3    DVT PTX: hep sq    GI PTX: PPI    ***Tubes/Lines/Drains  ***  Peripheral IV   Urinary Catheter		Indication: Strict I&O    Date Placed:     REVIEW OF SYSTEMS    [x] A ten-point review of systems was otherwise negative except as noted.  [ ] Due to altered mental status/intubation, subjective information were not able to be obtained from the patient. History was obtained, to the extent possible, from review of the chart and collateral sources of information. 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 Upgraded to SICU for close monitoring of potentially worsening intra abdominal sepsis.     Neurologic:  Pain control/Sedation:  - dilaudid PCA for pain: 0.2 mg demand dose, 10 min lockout    Hx of Epilepsy:  - Holding home Zyprexa, Trileptal  - on home keppra IV 1500 BID    Respiratory:  - extubated 6/29 after RTOR  - 3L NC  - AM CXR    Cardiovascular:  Post op hypotension/tachycardia: likely 2/2 hypovolemia vs sepsis  - Fluid resuscitation, s/p 500 cc LR bolus  - Monitor closely, maintain MAP >65  - Echo 11/2019: EF 55-60 mild MR/mild TR  - troponins 0.03 > 0.02 > 0.02  - ekg: qtc 390 from 6/29    Hx of HTN:  - Holding home lisinopril d/t hypotension    Gastrointestinal/Nutrition:  Colonic perforation: s/p subtotal colectomy/takedown of previous colostomy, abd open, + abthera vac (6/28) and washout, closure of fascia and subcutaneous layers, creation of end ileostomy (6/29)  - prevera vac in place over midline incision  - NGT in place to LCWS    Diet :  - NPO, strict    Prophylaxis:  - PPI    Genitourinary/Renal:  Lactic acidosis: likely 2/2 sepsis, lactate 3.3-->1.8 improving   - Fluid resuscitation/trend  - LR@125  - Salazar in place, strict I/Os    Electrolyte derangements:  - Trend and replete  - Na 135, K 5.5, Mg 2.3, Ph 3.7   - hyperkalemia, s/p insulin + d50       Hematologic/vasc:  Melva danlos type 4:  - Monitor for post op oozing  - Trend Hg, transfuse if <7    Celiac artery aneurysm s/p coiling (2010):  - Holding home ASA    Infectious Disease:  Intra-abdominal sepsis:  - Aztreonam/Flagyl/Clinda --> switched to meropenem 6/29 per ID  - Monitor for S/S of worsening infection     Endocrine:  - No hx of DM  - FS Q4H and ISS while NPO  - A1c 5.3    DVT PTX: hep sq    GI PTX: PPI    ***Tubes/Lines/Drains  ***  Peripheral IV   Urinary Catheter		Indication: Strict I&O    Date Placed:     REVIEW OF SYSTEMS    [x] A ten-point review of systems was otherwise negative except as noted.  [ ] Due to altered mental status/intubation, subjective information were not able to be obtained from the patient. History was obtained, to the extent possible, from review of the chart and collateral sources of information. 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 Upgraded to SICU for close monitoring of potentially worsening intra abdominal sepsis.     Neurologic:  Pain control/Sedation:  - dilaudid PCA for pain: 0.2 mg demand dose, 10 min lockout    Hx of Epilepsy:  - Holding home Zyprexa, Trileptal  - on home keppra IV 1500 BID    Respiratory:  - extubated 6/29 after RTOR  - 3L NC  - AM CXR    Cardiovascular:  Post op hypotension/tachycardia: likely 2/2 hypovolemia vs sepsis  - Fluid resuscitation, s/p 500 cc LR bolus  - Monitor closely, maintain MAP >65  - Echo 11/2019: EF 55-60 mild MR/mild TR  - troponins 0.03 > 0.02 > 0.02  - ekg: qtc 390 from 6/29    Hx of HTN:  - Holding home lisinopril d/t hypotension    Gastrointestinal/Nutrition:  Colonic perforation: s/p subtotal colectomy/takedown of previous colostomy, abd open, + abthera vac (6/28) and washout, closure of fascia and subcutaneous layers, creation of end ileostomy (6/29)  - prevera vac in place over midline incision  - NGT in place to LCWS, d/c ngt    Diet :  - NPO, strict    Prophylaxis:  - PPI    Genitourinary/Renal:  Lactic acidosis: likely 2/2 sepsis, lactate 3.3-->1.8 improving   - Fluid resuscitation/trend  - LR@125  - d/c [place] today    Electrolyte derangements:  - Trend and replete  - Na 135, K 5.5, Mg 2.3, Ph 3.7   - hyperkalemia, s/p insulin + d50       Hematologic/vasc:  Melva danlos type 4:  - Monitor for post op oozing  - Trend Hg, transfuse if <7    Celiac artery aneurysm s/p coiling (2010):  - Holding home ASA    Infectious Disease:  Intra-abdominal sepsis:  - Aztreonam/Flagyl/Clinda --> switched to meropenem 6/29 per ID  - Monitor for S/S of worsening infection     Endocrine:  - No hx of DM  - FS Q4H and ISS while NPO  - A1c 5.3    DVT PTX: hep sq    GI PTX: PPI    ***Tubes/Lines/Drains  ***  Peripheral IV   Urinary Catheter		Indication: Strict I&O    Date Placed:     REVIEW OF SYSTEMS    [x] A ten-point review of systems was otherwise negative except as noted.  [ ] Due to altered mental status/intubation, subjective information were not able to be obtained from the patient. History was obtained, to the extent possible, from review of the chart and collateral sources of information. 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 Upgraded to SICU for close monitoring of potentially worsening intra abdominal sepsis.     Neurologic:  Pain control/Sedation:  - dilaudid PCA for pain: 0.2 mg demand dose, 10 min lockout -- keep today    Hx of Epilepsy:  - had been holding home Zyprexa, Trileptal  -- restart today  - on home keppra IV 1500 BID -- switch to PO    Respiratory:  - extubated 6/29 after RTOR  - stable on room air  - AM CXR    Cardiovascular:  Post op hypotension/tachycardia: likely 2/2 hypovolemia vs sepsis  - Fluid resuscitation, s/p 1.5L overnight  - Monitor closely, maintain MAP >65  - Echo 11/2019: EF 55-60 mild MR/mild TR  - troponins 0.03 > 0.02 > 0.02 > 0.02 (lateral)  - ekg: qtc 390 from 6/29    Hx of HTN:  - Holding home lisinopril d/t hypotension    Gastrointestinal/Nutrition:  Colonic perforation: s/p subtotal colectomy/takedown of previous colostomy, abd open, + abthera vac (6/28) and washout, closure of fascia and subcutaneous layers, creation of end ileostomy (6/29)  - prevera vac in place over midline incision  - NGT removed today    Diet :  - sips and chips    Prophylaxis:  - PPI    Genitourinary/Renal:  Lactic acidosis: likely 2/2 sepsis, lactate 3.3-->1.8 resolved  - Fluid resuscitation/trend  - LR@125 -- continuing IVF  - d/c'd [place], FU TOV    Electrolyte derangements:  - Trend and replete  - Na 135, K 5.5, Mg 2.3, Ph 3.7   - hyperkalemia, s/p insulin + d50  - 11am BMP ordered, FU repeat K    Hematologic/vasc:  Melva danlos type 4:  - Monitor for post op oozing  - Trend Hg, transfuse if <7    Celiac artery aneurysm s/p coiling (2010):  - Holding home ASA    Infectious Disease:  Intra-abdominal sepsis:  - Aztreonam/Flagyl/Clinda --> switched to meropenem 6/29 per ID  - Monitor for S/S of worsening infection     Endocrine:  - No hx of DM  - FS Q4H and ISS while NPO  - A1c 5.3    DVT PTX: hep sq    GI PTX: PPI    ***Tubes/Lines/Drains  ***  Peripheral IV   Urinary Catheter		Indication: Strict I&O    Date Placed:     REVIEW OF SYSTEMS    [x] A ten-point review of systems was otherwise negative except as noted.  [ ] Due to altered mental status/intubation, subjective information were not able to be obtained from the patient. History was obtained, to the extent possible, from review of the chart and collateral sources of information.

## 2020-06-30 NOTE — PROGRESS NOTE ADULT - SUBJECTIVE AND OBJECTIVE BOX
FAZAL CASSIDY  [id number]  44y Male    Indication for ICU admission: + Colonic perforation s/p subtotal colectomy/takedown of previous colostomy, abd open upgraded for close monitoring in the setting of intraabdominal sepsis    Indication for ICU admission:  Admit Date:20  ICU Date: 20  OR Date: 20    moxifloxacin (Hives)  penicillin (Unknown)    PAST MEDICAL & SURGICAL HISTORY:  Dislocations and subluxations  Aneurysm artery, celiac: s/p coiling 2010  Keratoconus  HTN (hypertension)  Epilepsy  EDS (Melva-Danlos syndrome)  Status post Kerri's procedure: 2019    Home Medications:  Aspir 81 oral delayed release tablet: orally once a day (2020 14:40)  Keppra 750 mg oral tablet: 2 tab(s) orally 2 times a day (2020 14:40)  LISINOPRIL   TAB 30MG:  (2020 14:40)  Multiple Vitamins oral tablet: 1 tab(s) orally once a day (2020 14:40)  OXCARBAZEPIN TAB 600M milligram(s) orally once a day (at bedtime) (2020 14:44)  Trileptal 300 mg oral tablet: 300 milligram(s) orally once a day in the morning (2020 14:44)        24HRS EVENT:   Overnight: repeat 2330 labs, K 5.5--> insulin/d50,  bolus for tachycardia HR now improved     Neurologic:  Pain control/Sedation:  - dilaudid PCA for pain: 0.2 mg demand dose, 10 min lockout    Hx of Epilepsy:  - Holding home Zyprexa, Trileptal  - on home keppra IV 1500 BID    Respiratory:  - extubated  after RTOR  - 3L NC  - AM CXR    Cardiovascular:  Post op hypotension/tachycardia: likely 2/2 hypovolemia vs sepsis  - Fluid resuscitation, s/p 500 cc LR bolus  - Monitor closely, maintain MAP >65  - Echo 2019: EF 55-60 mild MR/mild TR  - troponins 0.03 > 0.02 > 0.02  - ekg: qtc 390 from     Hx of HTN:  - Holding home lisinopril d/t hypotension    Gastrointestinal/Nutrition:  Colonic perforation: s/p subtotal colectomy/takedown of previous colostomy, abd open, + abthera vac () and washout, closure of fascia and subcutaneous layers, creation of end ileostomy ()  - prevera vac in place over midline incision  - NGT in place to LCWS    Diet :  - NPO, strict    Prophylaxis:  - PPI    Genitourinary/Renal:  Lactic acidosis: likely 2/2 sepsis, lactate 3.3-->1.8 improving   - Fluid resuscitation/trend  - LR@125  - Salazar in place, strict I/Os    Electrolyte derangements:  - Trend and replete  - Na 135, K 5.5, Mg 2.3, Ph 3.7      Hematologic/vasc:  Melva danlos type 4:  - Monitor for post op oozing  - Trend Hg, transfuse if <7    Celiac artery aneurysm s/p coiling ():  - Holding home ASA    Infectious Disease:  Intra-abdominal sepsis:  - Aztreonam/Flagyl/Clinda --> switched to meropenem  per ID  - Monitor for S/S of worsening infection     Endocrine:  - No hx of DM  - FS Q4H and ISS while NPO  - A1c 5.3    DVT PTX: hep sq    GI PTX: PPI    ***Tubes/Lines/Drains  ***  Peripheral IV   Urinary Catheter		Indication: Strict I&O    Date Placed:     REVIEW OF SYSTEMS    [x] A ten-point review of systems was otherwise negative except as noted.  [ ] Due to altered mental status/intubation, subjective information were not able to be obtained from the patient. History was obtained, to the extent possible, from review of the chart and collateral sources of information. FAZAL CASSIDY  [id number]  44y Male    Indication for ICU admission: + Colonic perforation s/p subtotal colectomy/takedown of previous colostomy, abd open upgraded for close monitoring in the setting of intraabdominal sepsis    Indication for ICU admission:  Admit Date:20  ICU Date: 20  OR Date: 20    moxifloxacin (Hives)  penicillin (Unknown)    PAST MEDICAL & SURGICAL HISTORY:  Dislocations and subluxations  Aneurysm artery, celiac: s/p coiling 2010  Keratoconus  HTN (hypertension)  Epilepsy  EDS (Melva-Danlos syndrome)  Status post Kerri's procedure: 2019    Home Medications:  Aspir 81 oral delayed release tablet: orally once a day (2020 14:40)  Keppra 750 mg oral tablet: 2 tab(s) orally 2 times a day (2020 14:40)  LISINOPRIL   TAB 30MG:  (2020 14:40)  Multiple Vitamins oral tablet: 1 tab(s) orally once a day (2020 14:40)  OXCARBAZEPIN TAB 600M milligram(s) orally once a day (at bedtime) (2020 14:44)  Trileptal 300 mg oral tablet: 300 milligram(s) orally once a day in the morning (2020 14:44)        24HRS EVENT:   Overnight: repeat 2330 labs, K 5.5--> insulin/d50,  bolus for tachycardia HR now improved     Neurologic:  Pain control/Sedation:  - dilaudid PCA for pain: 0.2 mg demand dose, 10 min lockout    Hx of Epilepsy:  - Holding home Zyprexa, Trileptal  - on home keppra IV 1500 BID    Respiratory:  - extubated  after RTOR  - 3L NC  - AM CXR    Cardiovascular:  Post op hypotension/tachycardia: likely 2/2 hypovolemia vs sepsis  - Fluid resuscitation, s/p 500 cc LR bolus  - Monitor closely, maintain MAP >65  - Echo 2019: EF 55-60 mild MR/mild TR  - troponins 0.03 > 0.02 > 0.02  - ekg: qtc 390 from     Hx of HTN:  - Holding home lisinopril d/t hypotension    Gastrointestinal/Nutrition:  Colonic perforation: s/p subtotal colectomy/takedown of previous colostomy, abd open, + abthera vac () and washout, closure of fascia and subcutaneous layers, creation of end ileostomy ()  - prevera vac in place over midline incision  - NGT in place to LCWS    Diet :  - NPO, strict    Prophylaxis:  - PPI    Genitourinary/Renal:  Lactic acidosis: likely 2/2 sepsis, lactate 3.3-->1.8 improving   - Fluid resuscitation/trend  - LR@125  - Salazar in place, strict I/Os    Electrolyte derangements:  - Trend and replete  - Na 135, K 5.5, Mg 2.3, Ph 3.7      Hematologic/vasc:  Melva danlos type 4:  - Monitor for post op oozing  - Trend Hg, transfuse if <7    Celiac artery aneurysm s/p coiling ():  - Holding home ASA    Infectious Disease:  Intra-abdominal sepsis:  - Aztreonam/Flagyl/Clinda --> switched to meropenem  per ID  - Monitor for S/S of worsening infection     Endocrine:  - No hx of DM  - FS Q4H and ISS while NPO  - A1c 5.3    DVT PTX: hep sq    GI PTX: PPI    ***Tubes/Lines/Drains  ***  Peripheral IV   Urinary Catheter		Indication: Strict I&O    Date Placed:     REVIEW OF SYSTEMS    [x] A ten-point review of systems was otherwise negative except as noted.  [ ] Due to altered mental status/intubation, subjective information were not able to be obtained from the patient. History was obtained, to the extent possible, from review of the chart and collateral sources of information.    Daily Height in cm: 185.42 (2020 10:48)    Daily     Diet, NPO (20 @ 14:25)      CURRENT MEDS:  Neurologic Medications  acetaminophen   Tablet .. 650 milliGRAM(s) Oral every 6 hours  HYDROmorphone PCA (1 mG/mL) 30 milliLiter(s) PCA Continuous PCA Continuous  levETIRAcetam  IVPB 1500 milliGRAM(s) IV Intermittent every 12 hours  ondansetron Injectable 4 milliGRAM(s) IV Push once PRN Nausea and/or Vomiting    Respiratory Medications    Cardiovascular Medications    Gastrointestinal Medications  lactated ringers. 1000 milliLiter(s) IV Continuous <Continuous>  pantoprazole  Injectable 40 milliGRAM(s) IV Push daily    Genitourinary Medications    Hematologic/Oncologic Medications  heparin   Injectable 5000 Unit(s) SubCutaneous every 8 hours    Antimicrobial/Immunologic Medications  meropenem  IVPB 1000 milliGRAM(s) IV Intermittent every 8 hours    Endocrine/Metabolic Medications    Topical/Other Medications  chlorhexidine 4% Liquid 1 Application(s) Topical <User Schedule>      ICU Vital Signs Last 24 Hrs  T(C): 36.2 (2020 16:00), Max: 36.6 (2020 08:00)  T(F): 97.1 (2020 16:00), Max: 97.9 (2020 08:00)  HR: 100 (2020 02:00) (92 - 122)  BP: 100/63 (2020 02:00) (74/55 - 117/72)  BP(mean): 72 (2020 02:00) (42 - 103)  ABP: --  ABP(mean): --  RR: 18 (2020 02:00) (6 - 30)  SpO2: 100% (2020 02:00) (93% - 100%)      Adult Advanced Hemodynamics Last 24 Hrs  CVP(mm Hg): --  CVP(cm H2O): --  CO: 10.7 (2020 16:00) (6.3 - 10.7)  CI: 5.5 (2020 16:00) (3.3 - 5.5)  PA: --  PA(mean): --  PCWP: --  SVR: --  SVRI: --  PVR: --  PVRI: --    Mode: CPAP with PS  FiO2: 40  PEEP: 5  PS: 8    ABG - ( 2020 14:45 )  pH, Arterial: 7.39  pH, Blood: x     /  pCO2: 34    /  pO2: 112   / HCO3: 21    / Base Excess: -3.6  /  SaO2: 100                 I&O's Summary    2020 07:01  -  2020 07:00  --------------------------------------------------------  IN: 4079.9 mL / OUT: 1480 mL / NET: 2599.9 mL    2020 07: 03:43  --------------------------------------------------------  IN: 5521.4 mL / OUT: 1020 mL / NET: 4501.4 mL      I&O's Detail    2020 07:  -  2020 07:00  --------------------------------------------------------  IN:    dexmedetomidine Infusion: 25.7 mL    fentaNYL  Infusion: 142.8 mL    fentaNYL Infusion.: 32.4 mL    IV PiggyBack: 400 mL    Lactated Ringers IV Bolus: 1250 mL    lactated ringers.: 1000 mL    lactated ringers.: 100 mL    lactated ringers.: 100 mL    norepinephrine Infusion: 29 mL    Sodium Chloride 0.9% IV Bolus: 1000 mL  Total IN: 4079.9 mL    OUT:    Indwelling Catheter - Urethral: 430 mL    Nasoenteral Tube: 150 mL    VAC (Vacuum Assisted Closure) System: 900 mL  Total OUT: 1480 mL    Total NET: 2599.9 mL      2020 07:  -  2020 03:43  --------------------------------------------------------  IN:    dexmedetomidine Infusion: 8 mL    fentaNYL Infusion.: 14.4 mL    IV PiggyBack: 600 mL    Lactated Ringers IV Bolus: 1500 mL    lactated ringers.: 375 mL    lactated ringers.: 1500 mL    norepinephrine Infusion: 24 mL    Sodium Chloride 0.9% IV Bolus: 1500 mL  Total IN: 5521.4 mL    OUT:    Indwelling Catheter - Urethral: 820 mL    VAC (Vacuum Assisted Closure) System: 200 mL  Total OUT: 1020 mL    Total NET: 4501.4 mL          PHYSICAL EXAM:    General/Neuro A&O    Lungs:clear to auscultation    Cardiovascular : S1, S2.  tachycardia    GI: wound vac in place, sponges over midline incision and colostomy site, soft, non-distended, appropriately TTP    Extremities: Extremities cool, dopplerable radial/brachial pulses    Derm: Good skin turgor, no skin breakdown.      :Salazar catheter in place.      CXR:       LABS:  CAPILLARY BLOOD GLUCOSE      POCT Blood Glucose.: 94 mg/dL (2020 18:39)  POCT Blood Glucose.: 80 mg/dL (2020 13:59)  POCT Blood Glucose.: 80 mg/dL (2020 09:43)  POCT Blood Glucose.: 106 mg/dL (2020 05:20)                          10.2   11.29 )-----------( 213      ( 2020 23:29 )             30.3       06-29    135  |  104  |  18  ----------------------------<  101<H>  5.5<H>   |  22  |  0.9    Ca    7.2<L>      2020 23:29  Phos  3.7       Mg     2.3     29    TPro  8.1<H>  /  Alb  4.7  /  TBili  0.3  /  DBili  x   /  AST  39  /  ALT  18  /  AlkPhos  58  06-28      PT/INR - ( 2020 23:29 )   PT: 21.30 sec;   INR: 1.85 ratio         PTT - ( 2020 23:29 )  PTT:26.9 sec  CARDIAC MARKERS ( 2020 23:29 )  x     / 0.02 ng/mL / 1181 U/L / x     / 33.4 ng/mL  CARDIAC MARKERS ( 2020 15:30 )  x     / 0.02 ng/mL / 1085 U/L / x     / 37.8 ng/mL  CARDIAC MARKERS ( 2020 04:40 )  x     / 0.02 ng/mL / 975 U/L / x     / 30.8 ng/mL  CARDIAC MARKERS ( 2020 20:22 )  x     / 0.03 ng/mL / 286 U/L / x     / x              Culture - Blood (collected 2020 14:24)  Source: .Blood Blood-Peripheral  Preliminary Report (2020 20:00):    No growth to date.    Culture - Blood (collected 2020 14:24)  Source: .Blood Blood-Peripheral  Preliminary Report (2020 20:00):    No growth to date. FAZAL CASSIDY  [id number]  44y Male    Indication for ICU admission: + Colonic perforation s/p subtotal colectomy/takedown of previous colostomy, abd open upgraded for close monitoring in the setting of intraabdominal sepsis    Indication for ICU admission:  Admit Date:20  ICU Date: 20  OR Date: 20    moxifloxacin (Hives)  penicillin (Unknown)    PAST MEDICAL & SURGICAL HISTORY:  Dislocations and subluxations  Aneurysm artery, celiac: s/p coiling 2010  Keratoconus  HTN (hypertension)  Epilepsy  EDS (Melva-Danlos syndrome)  Status post Kerri's procedure: 2019    Home Medications:  Aspir 81 oral delayed release tablet: orally once a day (2020 14:40)  Keppra 750 mg oral tablet: 2 tab(s) orally 2 times a day (2020 14:40)  LISINOPRIL   TAB 30MG:  (2020 14:40)  Multiple Vitamins oral tablet: 1 tab(s) orally once a day (2020 14:40)  OXCARBAZEPIN TAB 600M milligram(s) orally once a day (at bedtime) (2020 14:44)  Trileptal 300 mg oral tablet: 300 milligram(s) orally once a day in the morning (2020 14:44)          24HRS EVENT:   Overnight: K 5.5 on pm labs given insulin + d50, nicom responsive x2,  bolus x2      Neurologic:  Pain control/Sedation:  - dilaudid PCA for pain: 0.2 mg demand dose, 10 min lockout    Hx of Epilepsy:  - Holding home Zyprexa, Trileptal  - on home keppra IV 1500 BID    Respiratory:  - extubated  after RTOR  - 3L NC  - AM CXR    Cardiovascular:  Post op hypotension/tachycardia: likely 2/2 hypovolemia vs sepsis  - Fluid resuscitation, s/p 500 cc LR bolus  - Monitor closely, maintain MAP >65  - Echo 2019: EF 55-60 mild MR/mild TR  - troponins 0.03 > 0.02 > 0.02  - ekg: qtc 390 from     Hx of HTN:  - Holding home lisinopril d/t hypotension    Gastrointestinal/Nutrition:  Colonic perforation: s/p subtotal colectomy/takedown of previous colostomy, abd open, + abthera vac () and washout, closure of fascia and subcutaneous layers, creation of end ileostomy ()  - prevera vac in place over midline incision  - NGT in place to LCWS    Diet :  - NPO, strict    Prophylaxis:  - PPI    Genitourinary/Renal:  Lactic acidosis: likely 2/2 sepsis, lactate 3.3-->1.8 improving   - Fluid resuscitation/trend  - LR@125  - Salazar in place, strict I/Os    Electrolyte derangements:  - Trend and replete  - Na 135, K 5.5, Mg 2.3, Ph 3.7   - hyperkalemia, s/p insulin + d50       Hematologic/vasc:  Melva danlos type 4:  - Monitor for post op oozing  - Trend Hg, transfuse if <7    Celiac artery aneurysm s/p coiling ():  - Holding home ASA    Infectious Disease:  Intra-abdominal sepsis:  - Aztreonam/Flagyl/Clinda --> switched to meropenem  per ID  - Monitor for S/S of worsening infection     Endocrine:  - No hx of DM  - FS Q4H and ISS while NPO  - A1c 5.3    DVT PTX: hep sq    GI PTX: PPI    ***Tubes/Lines/Drains  ***  Peripheral IV   Urinary Catheter		Indication: Strict I&O    Date Placed:     REVIEW OF SYSTEMS    [x] A ten-point review of systems was otherwise negative except as noted.  [ ] Due to altered mental status/intubation, subjective information were not able to be obtained from the patient. History was obtained, to the extent possible, from review of the chart and collateral sources of information.        REVIEW OF SYSTEMS    [x] A ten-point review of systems was otherwise negative except as noted.  [ ] Due to altered mental status/intubation, subjective information were not able to be obtained from the patient. History was obtained, to the extent possible, from review of the chart and collateral sources of information.    Daily Height in cm: 185.42 (2020 10:48)    Daily     Diet, NPO (20 @ 14:25)      CURRENT MEDS:  Neurologic Medications  acetaminophen   Tablet .. 650 milliGRAM(s) Oral every 6 hours  HYDROmorphone PCA (1 mG/mL) 30 milliLiter(s) PCA Continuous PCA Continuous  levETIRAcetam  IVPB 1500 milliGRAM(s) IV Intermittent every 12 hours  ondansetron Injectable 4 milliGRAM(s) IV Push once PRN Nausea and/or Vomiting    Respiratory Medications    Cardiovascular Medications    Gastrointestinal Medications  lactated ringers. 1000 milliLiter(s) IV Continuous <Continuous>  pantoprazole  Injectable 40 milliGRAM(s) IV Push daily    Genitourinary Medications    Hematologic/Oncologic Medications  heparin   Injectable 5000 Unit(s) SubCutaneous every 8 hours    Antimicrobial/Immunologic Medications  meropenem  IVPB 1000 milliGRAM(s) IV Intermittent every 8 hours    Endocrine/Metabolic Medications    Topical/Other Medications  chlorhexidine 4% Liquid 1 Application(s) Topical <User Schedule>      ICU Vital Signs Last 24 Hrs  T(C): 36.2 (2020 16:00), Max: 36.6 (2020 08:00)  T(F): 97.1 (2020 16:00), Max: 97.9 (2020 08:00)  HR: 100 (2020 02:00) (92 - 122)  BP: 100/63 (2020 02:00) (74/55 - 117/72)  BP(mean): 72 (2020 02:00) (42 - 103)  ABP: --  ABP(mean): --  RR: 18 (2020 02:00) (6 - 30)  SpO2: 100% (2020 02:00) (93% - 100%)      Adult Advanced Hemodynamics Last 24 Hrs  CVP(mm Hg): --  CVP(cm H2O): --  CO: 10.7 (2020 16:00) (6.3 - 10.7)  CI: 5.5 (2020 16:00) (3.3 - 5.5)  PA: --  PA(mean): --  PCWP: --  SVR: --  SVRI: --  PVR: --  PVRI: --    Mode: CPAP with PS  FiO2: 40  PEEP: 5  PS: 8    ABG - ( 2020 14:45 )  pH, Arterial: 7.39  pH, Blood: x     /  pCO2: 34    /  pO2: 112   / HCO3: 21    / Base Excess: -3.6  /  SaO2: 100                 I&O's Summary    2020 07:01  -  2020 07:00  --------------------------------------------------------  IN: 4079.9 mL / OUT: 1480 mL / NET: 2599.9 mL    2020 07:  -  2020 03:43  --------------------------------------------------------  IN: 5521.4 mL / OUT: 1020 mL / NET: 4501.4 mL      I&O's Detail    2020 07: 07:00  --------------------------------------------------------  IN:    dexmedetomidine Infusion: 25.7 mL    fentaNYL  Infusion: 142.8 mL    fentaNYL Infusion.: 32.4 mL    IV PiggyBack: 400 mL    Lactated Ringers IV Bolus: 1250 mL    lactated ringers.: 1000 mL    lactated ringers.: 100 mL    lactated ringers.: 100 mL    norepinephrine Infusion: 29 mL    Sodium Chloride 0.9% IV Bolus: 1000 mL  Total IN: 4079.9 mL    OUT:    Indwelling Catheter - Urethral: 430 mL    Nasoenteral Tube: 150 mL    VAC (Vacuum Assisted Closure) System: 900 mL  Total OUT: 1480 mL    Total NET: 2599.9 mL      2020 07: 03:43  --------------------------------------------------------  IN:    dexmedetomidine Infusion: 8 mL    fentaNYL Infusion.: 14.4 mL    IV PiggyBack: 600 mL    Lactated Ringers IV Bolus: 1500 mL    lactated ringers.: 375 mL    lactated ringers.: 1500 mL    norepinephrine Infusion: 24 mL    Sodium Chloride 0.9% IV Bolus: 1500 mL  Total IN: 5521.4 mL    OUT:    Indwelling Catheter - Urethral: 820 mL    VAC (Vacuum Assisted Closure) System: 200 mL  Total OUT: 1020 mL    Total NET: 4501.4 mL          PHYSICAL EXAM:    General/Neuro A&O    Lungs:clear to auscultation    Cardiovascular : S1, S2.  tachycardia    GI: wound vac in place, sponges over midline incision and colostomy site, soft, non-distended, appropriately TTP    Extremities: Extremities cool, dopplerable radial/brachial pulses    Derm: Good skin turgor, no skin breakdown.      : Salazar catheter in place.      CXR:       LABS:  CAPILLARY BLOOD GLUCOSE      POCT Blood Glucose.: 94 mg/dL (2020 18:39)  POCT Blood Glucose.: 80 mg/dL (2020 13:59)  POCT Blood Glucose.: 80 mg/dL (2020 09:43)  POCT Blood Glucose.: 106 mg/dL (2020 05:20)                          10.2   11.29 )-----------( 213      ( 2020 23:29 )             30.3       06-29    135  |  104  |  18  ----------------------------<  101<H>  5.5<H>   |  22  |  0.9    Ca    7.2<L>      2020 23:29  Phos  3.7       Mg     2.3         TPro  8.1<H>  /  Alb  4.7  /  TBili  0.3  /  DBili  x   /  AST  39  /  ALT  18  /  AlkPhos  58  06-28      PT/INR - ( 2020 23:29 )   PT: 21.30 sec;   INR: 1.85 ratio         PTT - ( 2020 23:29 )  PTT:26.9 sec  CARDIAC MARKERS ( 2020 23:29 )  x     / 0.02 ng/mL / 1181 U/L / x     / 33.4 ng/mL  CARDIAC MARKERS ( 2020 15:30 )  x     / 0.02 ng/mL / 1085 U/L / x     / 37.8 ng/mL  CARDIAC MARKERS ( 2020 04:40 )  x     / 0.02 ng/mL / 975 U/L / x     / 30.8 ng/mL  CARDIAC MARKERS ( 2020 20:22 )  x     / 0.03 ng/mL / 286 U/L / x     / x              Culture - Blood (collected 2020 14:24)  Source: .Blood Blood-Peripheral  Preliminary Report (2020 20:00):    No growth to date.    Culture - Blood (collected 2020 14:24)  Source: .Blood Blood-Peripheral  Preliminary Report (2020 20:00):    No growth to date. FAZAL CASSIDY  [id number]  44y Male    Indication for ICU admission: + Colonic perforation s/p subtotal colectomy/takedown of previous colostomy, abd open upgraded for close monitoring in the setting of intraabdominal sepsis    Indication for ICU admission:  Admit Date:20  ICU Date: 20  OR Date: 20    moxifloxacin (Hives)  penicillin (Unknown)    PAST MEDICAL & SURGICAL HISTORY:  Dislocations and subluxations  Aneurysm artery, celiac: s/p coiling 2010  Keratoconus  HTN (hypertension)  Epilepsy  EDS (Melva-Danlos syndrome)  Status post Kerri's procedure: 2019    Home Medications:  Aspir 81 oral delayed release tablet: orally once a day (2020 14:40)  Keppra 750 mg oral tablet: 2 tab(s) orally 2 times a day (2020 14:40)  LISINOPRIL   TAB 30MG:  (2020 14:40)  Multiple Vitamins oral tablet: 1 tab(s) orally once a day (2020 14:40)  OXCARBAZEPIN TAB 600M milligram(s) orally once a day (at bedtime) (2020 14:44)  Trileptal 300 mg oral tablet: 300 milligram(s) orally once a day in the morning (2020 14:44)    24HRS EVENT:   Overnight: K 5.5 on pm labs given insulin + d50, nicom responsive x2,  bolus x2      Neurologic:  Pain control/Sedation:  - dilaudid PCA for pain: 0.2 mg demand dose, 10 min lockout    Hx of Epilepsy:  - Holding home Zyprexa, Trileptal  - on home keppra IV 1500 BID    Respiratory:  - extubated  after RTOR  - 3L NC  - AM CXR    Cardiovascular:  Post op hypotension/tachycardia: likely 2/2 hypovolemia vs sepsis  - Fluid resuscitation, s/p 500 cc LR bolus  - Monitor closely, maintain MAP >65  - Echo 2019: EF 55-60 mild MR/mild TR  - troponins 0.03 > 0.02 > 0.02  - ekg: qtc 390 from     Hx of HTN:  - Holding home lisinopril d/t hypotension    Gastrointestinal/Nutrition:  Colonic perforation: s/p subtotal colectomy/takedown of previous colostomy, abd open, + abthera vac () and washout, closure of fascia and subcutaneous layers, creation of end ileostomy ()  - prevera vac in place over midline incision  - NGT in place to LCWS    Diet :  - NPO, strict    Prophylaxis:  - PPI    Genitourinary/Renal:  Lactic acidosis: likely 2/2 sepsis, lactate 3.3-->1.8 improving   - Fluid resuscitation/trend  - LR@125  - Salazar in place, strict I/Os    Electrolyte derangements:  - Trend and replete  - Na 135, K 5.5, Mg 2.3, Ph 3.7   - hyperkalemia, s/p insulin + d50     Hematologic/vasc:  Melva danlos type 4:  - Monitor for post op oozing  - Trend Hg, transfuse if <7    Celiac artery aneurysm s/p coiling ():  - Holding home ASA    Infectious Disease:  Intra-abdominal sepsis:  - Aztreonam/Flagyl/Clinda --> switched to meropenem  per ID  - Monitor for S/S of worsening infection     Endocrine:  - No hx of DM  - FS Q4H and ISS while NPO  - A1c 5.3    DVT PTX: hep sq    GI PTX: PPI    ***Tubes/Lines/Drains  ***  Peripheral IV   Urinary Catheter		Indication: Strict I&O    Date Placed:     REVIEW OF SYSTEMS    [x] A ten-point review of systems was otherwise negative except as noted.  [ ] Due to altered mental status/intubation, subjective information were not able to be obtained from the patient. History was obtained, to the extent possible, from review of the chart and collateral sources of information.    Daily Height in cm: 185.42 (2020 10:48)      Diet, NPO (20 @ 14:25)    CURRENT MEDS:  Neurologic Medications  acetaminophen   Tablet .. 650 milliGRAM(s) Oral every 6 hours  HYDROmorphone PCA (1 mG/mL) 30 milliLiter(s) PCA Continuous PCA Continuous  levETIRAcetam  IVPB 1500 milliGRAM(s) IV Intermittent every 12 hours  ondansetron Injectable 4 milliGRAM(s) IV Push once PRN Nausea and/or Vomiting    Respiratory Medications    Cardiovascular Medications    Gastrointestinal Medications  lactated ringers. 1000 milliLiter(s) IV Continuous <Continuous>  pantoprazole  Injectable 40 milliGRAM(s) IV Push daily    Genitourinary Medications    Hematologic/Oncologic Medications  heparin   Injectable 5000 Unit(s) SubCutaneous every 8 hours    Antimicrobial/Immunologic Medications  meropenem  IVPB 1000 milliGRAM(s) IV Intermittent every 8 hours    Endocrine/Metabolic Medications    Topical/Other Medications  chlorhexidine 4% Liquid 1 Application(s) Topical <User Schedule>      ICU Vital Signs Last 24 Hrs  T(C): 36.2 (2020 16:00), Max: 36.6 (2020 08:00)  T(F): 97.1 (2020 16:00), Max: 97.9 (2020 08:00)  HR: 100 (2020 02:00) (92 - 122)  BP: 100/63 (2020 02:00) (74/55 - 117/72)  BP(mean): 72 (2020 02:00) (42 - 103)  RR: 18 (2020 02:00) (6 - 30)  SpO2: 100% (2020 02:00) (93% - 100%)    Adult Advanced Hemodynamics Last 24 Hrs  CVP(mm Hg): --  CVP(cm H2O): --  CO: 10.7 (2020 16:00) (6.3 - 10.7)  CI: 5.5 (2020 16:00) (3.3 - 5.5)  PA: --  PA(mean): --  PCWP: --  SVR: --  SVRI: --  PVR: --  PVRI: --    Mode: CPAP with PS  FiO2: 40  PEEP: 5  PS: 8    ABG - ( 2020 14:45 )  pH, Arterial: 7.39  pH, Blood: x     /  pCO2: 34    /  pO2: 112   / HCO3: 21    / Base Excess: -3.6  /  SaO2: 100       I&O's Summary    2020 07:01  -  2020 07:00  --------------------------------------------------------  IN: 4079.9 mL / OUT: 1480 mL / NET: 2599.9 mL    2020 07:01  -  2020 03:43  --------------------------------------------------------  IN: 5521.4 mL / OUT: 1020 mL / NET: 4501.4 mL    I&O's Detail    2020 07:  -  2020 07:00  --------------------------------------------------------  IN:    dexmedetomidine Infusion: 25.7 mL    fentaNYL  Infusion: 142.8 mL    fentaNYL Infusion.: 32.4 mL    IV PiggyBack: 400 mL    Lactated Ringers IV Bolus: 1250 mL    lactated ringers.: 1000 mL    lactated ringers.: 100 mL    lactated ringers.: 100 mL    norepinephrine Infusion: 29 mL    Sodium Chloride 0.9% IV Bolus: 1000 mL  Total IN: 4079.9 mL    OUT:    Indwelling Catheter - Urethral: 430 mL    Nasoenteral Tube: 150 mL    VAC (Vacuum Assisted Closure) System: 900 mL  Total OUT: 1480 mL    Total NET: 2599.9 mL    2020 07:  -  2020 03:43  --------------------------------------------------------  IN:    dexmedetomidine Infusion: 8 mL    fentaNYL Infusion.: 14.4 mL    IV PiggyBack: 600 mL    Lactated Ringers IV Bolus: 1500 mL    lactated ringers.: 375 mL    lactated ringers.: 1500 mL    norepinephrine Infusion: 24 mL    Sodium Chloride 0.9% IV Bolus: 1500 mL  Total IN: 5521.4 mL    OUT:    Indwelling Catheter - Urethral: 820 mL    VAC (Vacuum Assisted Closure) System: 200 mL  Total OUT: 1020 mL    Total NET: 4501.4 mL      PHYSICAL EXAM:  General/Neuro: A&O x3    Lungs: clear to auscultation    Cardiovascular : S1, S2. tachycardic    GI: wound vac in place, sponges over midline incision and colostomy site, soft, non-distended, appropriately TTP    Extremities: Extremities cool, dopplerable radial/brachial pulses    Derm: Good skin turgor, no skin breakdown    : Salazar catheter in place    LABS:    POCT Blood Glucose.: 94 mg/dL (2020 18:39)  POCT Blood Glucose.: 80 mg/dL (2020 13:59)  POCT Blood Glucose.: 80 mg/dL (2020 09:43)  POCT Blood Glucose.: 106 mg/dL (2020 05:20)                        10.2   11.29 )-----------( 213      ( 2020 23:29 )             30.3       06-29    135  |  104  |  18  ----------------------------<  101<H>  5.5<H>   |  22  |  0.9    Ca    7.2<L>      2020 23:29  Phos  3.7       Mg     2.3     29    TPro  8.1<H>  /  Alb  4.7  /  TBili  0.3  /  DBili  x   /  AST  39  /  ALT  18  /  AlkPhos  58  06-28      PT/INR - ( 2020 23:29 )   PT: 21.30 sec;   INR: 1.85 ratio       PTT - ( 2020 23:29 )  PTT:26.9 sec  CARDIAC MARKERS ( 2020 23:29 )  x     / 0.02 ng/mL / 1181 U/L / x     / 33.4 ng/mL  CARDIAC MARKERS ( 2020 15:30 )  x     / 0.02 ng/mL / 1085 U/L / x     / 37.8 ng/mL  CARDIAC MARKERS ( 2020 04:40 )  x     / 0.02 ng/mL / 975 U/L / x     / 30.8 ng/mL  CARDIAC MARKERS ( 2020 20:22 )  x     / 0.03 ng/mL / 286 U/L / x     / x        Culture - Blood (collected 2020 14:24)  Source: .Blood Blood-Peripheral  Preliminary Report (2020 20:00):    No growth to date.    Culture - Blood (collected 2020 14:24)  Source: .Blood Blood-Peripheral  Preliminary Report (2020 20:00):    No growth to date. FAZAL CASSIDY  [id number]  44y Male    Indication for ICU admission: + Colonic perforation s/p subtotal colectomy/takedown of previous colostomy, abd open upgraded for close monitoring in the setting of intraabdominal sepsis    Indication for ICU admission:  Admit Date:20  ICU Date: 20  OR Date: 20    moxifloxacin (Hives)  penicillin (Unknown)    PAST MEDICAL & SURGICAL HISTORY:  Dislocations and subluxations  Aneurysm artery, celiac: s/p coiling 2010  Keratoconus  HTN (hypertension)  Epilepsy  EDS (Melva-Danlos syndrome)  Status post Kerri's procedure: 2019    Home Medications:  Aspir 81 oral delayed release tablet: orally once a day (2020 14:40)  Keppra 750 mg oral tablet: 2 tab(s) orally 2 times a day (2020 14:40)  LISINOPRIL   TAB 30MG:  (2020 14:40)  Multiple Vitamins oral tablet: 1 tab(s) orally once a day (2020 14:40)  OXCARBAZEPIN TAB 600M milligram(s) orally once a day (at bedtime) (2020 14:44)  Trileptal 300 mg oral tablet: 300 milligram(s) orally once a day in the morning (2020 14:44)    24HRS EVENT:   Overnight: K 5.5 on pm labs given insulin + d50, nicom responsive x2,  bolus x2      Neurologic:  Pain control/Sedation:  - dilaudid PCA for pain: 0.2 mg demand dose, 10 min lockout    Hx of Epilepsy:  - Holding home Zyprexa, Trileptal  - on home keppra IV 1500 BID    Respiratory:  - extubated  after RTOR  - 3L NC  - AM CXR    Cardiovascular:  Post op hypotension/tachycardia: likely 2/2 hypovolemia vs sepsis  - Fluid resuscitation, s/p 500 cc LR bolus  - Monitor closely, maintain MAP >65  - Echo 2019: EF 55-60 mild MR/mild TR  - troponins 0.03 > 0.02 > 0.02  - ekg: qtc 390 from     Hx of HTN:  - Holding home lisinopril d/t hypotension    Gastrointestinal/Nutrition:  Colonic perforation: s/p subtotal colectomy/takedown of previous colostomy, abd open, + abthera vac () and washout, closure of fascia and subcutaneous layers, creation of end ileostomy ()  - prevera vac in place over midline incision  - NGT in place to LCWS, d/c ngt      Prophylaxis:  - PPI    Genitourinary/Renal:  Lactic acidosis: likely 2/2 sepsis, lactate 3.3-->1.8 improving   - Fluid resuscitation/trend  - LR@125  -d/c austin     Electrolyte derangements:  - Trend and replete  - Na 135, K 5.5, Mg 2.3, Ph 3.7   - hyperkalemia, s/p insulin + d50     Hematologic/vasc:  Melva danlos type 4:  - Monitor for post op oozing  - Trend Hg, transfuse if <7    Celiac artery aneurysm s/p coiling ():  - Holding home ASA    Infectious Disease:  Intra-abdominal sepsis:  - Aztreonam/Flagyl/Clinda --> switched to meropenem  per ID  - Monitor for S/S of worsening infection     Endocrine:  - No hx of DM  - FS Q4H and ISS while NPO  - A1c 5.3    DVT PTX: hep sq    GI PTX: PPI    ***Tubes/Lines/Drains  ***  Peripheral IV   Urinary Catheter		Indication: Strict I&O    Date Placed:     REVIEW OF SYSTEMS    [x] A ten-point review of systems was otherwise negative except as noted.  [ ] Due to altered mental status/intubation, subjective information were not able to be obtained from the patient. History was obtained, to the extent possible, from review of the chart and collateral sources of information.    Daily Height in cm: 185.42 (2020 10:48)      Diet, NPO (20 @ 14:25)    CURRENT MEDS:  Neurologic Medications  acetaminophen   Tablet .. 650 milliGRAM(s) Oral every 6 hours  HYDROmorphone PCA (1 mG/mL) 30 milliLiter(s) PCA Continuous PCA Continuous  levETIRAcetam  IVPB 1500 milliGRAM(s) IV Intermittent every 12 hours  ondansetron Injectable 4 milliGRAM(s) IV Push once PRN Nausea and/or Vomiting    Respiratory Medications    Cardiovascular Medications    Gastrointestinal Medications  lactated ringers. 1000 milliLiter(s) IV Continuous <Continuous>  pantoprazole  Injectable 40 milliGRAM(s) IV Push daily    Genitourinary Medications    Hematologic/Oncologic Medications  heparin   Injectable 5000 Unit(s) SubCutaneous every 8 hours    Antimicrobial/Immunologic Medications  meropenem  IVPB 1000 milliGRAM(s) IV Intermittent every 8 hours    Endocrine/Metabolic Medications    Topical/Other Medications  chlorhexidine 4% Liquid 1 Application(s) Topical <User Schedule>      ICU Vital Signs Last 24 Hrs  T(C): 36.2 (2020 16:00), Max: 36.6 (2020 08:00)  T(F): 97.1 (2020 16:00), Max: 97.9 (2020 08:00)  HR: 100 (2020 02:00) (92 - 122)  BP: 100/63 (2020 02:00) (74/55 - 117/72)  BP(mean): 72 (2020 02:00) (42 - 103)  RR: 18 (2020 02:00) (6 - 30)  SpO2: 100% (2020 02:00) (93% - 100%)    Adult Advanced Hemodynamics Last 24 Hrs  CVP(mm Hg): --  CVP(cm H2O): --  CO: 10.7 (2020 16:00) (6.3 - 10.7)  CI: 5.5 (2020 16:00) (3.3 - 5.5)  PA: --  PA(mean): --  PCWP: --  SVR: --  SVRI: --  PVR: --  PVRI: --    Mode: CPAP with PS  FiO2: 40  PEEP: 5  PS: 8    ABG - ( 2020 14:45 )  pH, Arterial: 7.39  pH, Blood: x     /  pCO2: 34    /  pO2: 112   / HCO3: 21    / Base Excess: -3.6  /  SaO2: 100       I&O's Summary    2020 07:01  -  2020 07:00  --------------------------------------------------------  IN: 4079.9 mL / OUT: 1480 mL / NET: 2599.9 mL    2020 07:01  -  2020 03:43  --------------------------------------------------------  IN: 5521.4 mL / OUT: 1020 mL / NET: 4501.4 mL    I&O's Detail    2020 07:  -  2020 07:00  --------------------------------------------------------  IN:    dexmedetomidine Infusion: 25.7 mL    fentaNYL  Infusion: 142.8 mL    fentaNYL Infusion.: 32.4 mL    IV PiggyBack: 400 mL    Lactated Ringers IV Bolus: 1250 mL    lactated ringers.: 1000 mL    lactated ringers.: 100 mL    lactated ringers.: 100 mL    norepinephrine Infusion: 29 mL    Sodium Chloride 0.9% IV Bolus: 1000 mL  Total IN: 4079.9 mL    OUT:    Indwelling Catheter - Urethral: 430 mL    Nasoenteral Tube: 150 mL    VAC (Vacuum Assisted Closure) System: 900 mL  Total OUT: 1480 mL    Total NET: 2599.9 mL    2020 07:  -  2020 03:43  --------------------------------------------------------  IN:    dexmedetomidine Infusion: 8 mL    fentaNYL Infusion.: 14.4 mL    IV PiggyBack: 600 mL    Lactated Ringers IV Bolus: 1500 mL    lactated ringers.: 375 mL    lactated ringers.: 1500 mL    norepinephrine Infusion: 24 mL    Sodium Chloride 0.9% IV Bolus: 1500 mL  Total IN: 5521.4 mL    OUT:    Indwelling Catheter - Urethral: 820 mL    VAC (Vacuum Assisted Closure) System: 200 mL  Total OUT: 1020 mL    Total NET: 4501.4 mL      PHYSICAL EXAM:  General/Neuro: A&O x3    Lungs: clear to auscultation    Cardiovascular : S1, S2. tachycardic    GI: wound vac in place, sponges over midline incision and colostomy site, soft, non-distended, appropriately TTP    Extremities: Extremities cool, dopplerable radial/brachial pulses    Derm: Good skin turgor, no skin breakdown    : Austin catheter in place    LABS:    POCT Blood Glucose.: 94 mg/dL (2020 18:39)  POCT Blood Glucose.: 80 mg/dL (2020 13:59)  POCT Blood Glucose.: 80 mg/dL (2020 09:43)  POCT Blood Glucose.: 106 mg/dL (2020 05:20)                        10.2   11.29 )-----------( 213      ( 2020 23:29 )             30.3       06-29    135  |  104  |  18  ----------------------------<  101<H>  5.5<H>   |  22  |  0.9    Ca    7.2<L>      2020 23:29  Phos  3.7     -  Mg     2.3     29    TPro  8.1<H>  /  Alb  4.7  /  TBili  0.3  /  DBili  x   /  AST  39  /  ALT  18  /  AlkPhos  58  06-28      PT/INR - ( 2020 23:29 )   PT: 21.30 sec;   INR: 1.85 ratio       PTT - ( 2020 23:29 )  PTT:26.9 sec  CARDIAC MARKERS ( 2020 23:29 )  x     / 0.02 ng/mL / 1181 U/L / x     / 33.4 ng/mL  CARDIAC MARKERS ( 2020 15:30 )  x     / 0.02 ng/mL / 1085 U/L / x     / 37.8 ng/mL  CARDIAC MARKERS ( 2020 04:40 )  x     / 0.02 ng/mL / 975 U/L / x     / 30.8 ng/mL  CARDIAC MARKERS ( 2020 20:22 )  x     / 0.03 ng/mL / 286 U/L / x     / x        Culture - Blood (collected 2020 14:24)  Source: .Blood Blood-Peripheral  Preliminary Report (2020 20:00):    No growth to date.    Culture - Blood (collected 2020 14:24)  Source: .Blood Blood-Peripheral  Preliminary Report (2020 20:00):    No growth to date. FAZAL CASSIDY  [id number]  44y Male    Indication for ICU admission: + Colonic perforation s/p subtotal colectomy/takedown of previous colostomy, abd open upgraded for close monitoring in the setting of intraabdominal sepsis    Indication for ICU admission:  Admit Date:20  ICU Date: 20  OR Date: 20    moxifloxacin (Hives)  penicillin (Unknown)    PAST MEDICAL & SURGICAL HISTORY:  Dislocations and subluxations  Aneurysm artery, celiac: s/p coiling 2010  Keratoconus  HTN (hypertension)  Epilepsy  EDS (Melva-Danlos syndrome)  Status post Kerri's procedure: 2019    Home Medications:  Aspir 81 oral delayed release tablet: orally once a day (2020 14:40)  Keppra 750 mg oral tablet: 2 tab(s) orally 2 times a day (2020 14:40)  LISINOPRIL   TAB 30MG:  (2020 14:40)  Multiple Vitamins oral tablet: 1 tab(s) orally once a day (2020 14:40)  OXCARBAZEPIN TAB 600M milligram(s) orally once a day (at bedtime) (2020 14:44)  Trileptal 300 mg oral tablet: 300 milligram(s) orally once a day in the morning (2020 14:44)    24HRS EVENT:   Overnight: K 5.5 on pm labs given insulin + d50, nicom responsive x2,  bolus x2      Neurologic:  Pain control/Sedation:  - dilaudid PCA for pain: 0.2 mg demand dose, 10 min lockout    Hx of Epilepsy:  - Holding home Zyprexa, Trileptal  - on home keppra IV 1500 BID    Respiratory:  - extubated  after RTOR  - 3L NC  - AM CXR    Cardiovascular:  Post op hypotension/tachycardia: likely 2/2 hypovolemia vs sepsis  - Fluid resuscitation, s/p 500 cc LR bolus  - Monitor closely, maintain MAP >65  - Echo 2019: EF 55-60 mild MR/mild TR  - troponins 0.03 > 0.02 > 0.02  - ekg: qtc 390 from     Hx of HTN:  - Holding home lisinopril d/t hypotension    Gastrointestinal/Nutrition:  Colonic perforation: s/p subtotal colectomy/takedown of previous colostomy, abd open, + abthera vac () and washout, closure of fascia and subcutaneous layers, creation of end ileostomy ()  - prevera vac in place over midline incision  - NGT in place to LCWS, d/c ngt      Prophylaxis:  - PPI    Genitourinary/Renal:  Lactic acidosis: likely 2/2 sepsis, lactate 3.3-->1.8 improving   - Fluid resuscitation/trend  - LR@125  -d/c austin     Electrolyte derangements:  - Trend and replete  - Na 135, K 5.5, Mg 2.3, Ph 3.7   - hyperkalemia, s/p insulin + d50     Hematologic/vasc:  Melva danlos type 4:  - Monitor for post op oozing  - Trend Hg, transfuse if <7    Celiac artery aneurysm s/p coiling ():  - Holding home ASA    Infectious Disease:  Intra-abdominal sepsis:  - Aztreonam/Flagyl/Clinda --> switched to meropenem  per ID  - Monitor for S/S of worsening infection     Endocrine:  - No hx of DM  - FS Q4H and ISS while NPO  - A1c 5.3    DVT PTX: hep sq    GI PTX: PPI    ***Tubes/Lines/Drains  ***  Peripheral IV   Urinary Catheter		Indication: Strict I&O    Date Placed:     REVIEW OF SYSTEMS    [x] A ten-point review of systems was otherwise negative except as noted.  [ ] Due to altered mental status/intubation, subjective information were not able to be obtained from the patient. History was obtained, to the extent possible, from review of the chart and collateral sources of information.    Daily Height in cm: 185.42 (2020 10:48)      Diet, NPO (20 @ 14:25)    CURRENT MEDS:  Neurologic Medications  acetaminophen   Tablet .. 650 milliGRAM(s) Oral every 6 hours  HYDROmorphone PCA (1 mG/mL) 30 milliLiter(s) PCA Continuous PCA Continuous  levETIRAcetam  IVPB 1500 milliGRAM(s) IV Intermittent every 12 hours  ondansetron Injectable 4 milliGRAM(s) IV Push once PRN Nausea and/or Vomiting    Respiratory Medications    Cardiovascular Medications    Gastrointestinal Medications  lactated ringers. 1000 milliLiter(s) IV Continuous <Continuous>  pantoprazole  Injectable 40 milliGRAM(s) IV Push daily    Genitourinary Medications    Hematologic/Oncologic Medications  heparin   Injectable 5000 Unit(s) SubCutaneous every 8 hours    Antimicrobial/Immunologic Medications  meropenem  IVPB 1000 milliGRAM(s) IV Intermittent every 8 hours    Endocrine/Metabolic Medications    Topical/Other Medications  chlorhexidine 4% Liquid 1 Application(s) Topical <User Schedule>      ICU Vital Signs Last 24 Hrs  T(C): 36.2 (2020 16:00), Max: 36.6 (2020 08:00)  T(F): 97.1 (2020 16:00), Max: 97.9 (2020 08:00)  HR: 100 (2020 02:00) (92 - 122)  BP: 100/63 (2020 02:00) (74/55 - 117/72)  BP(mean): 72 (2020 02:00) (42 - 103)  RR: 18 (2020 02:00) (6 - 30)  SpO2: 100% (2020 02:00) (93% - 100%)    Adult Advanced Hemodynamics Last 24 Hrs  CVP(mm Hg): --  CVP(cm H2O): --  CO: 10.7 (2020 16:00) (6.3 - 10.7)  CI: 5.5 (2020 16:00) (3.3 - 5.5)  PA: --  PA(mean): --  PCWP: --  SVR: --  SVRI: --  PVR: --  PVRI: --    Mode: CPAP with PS  FiO2: 40  PEEP: 5  PS: 8    ABG - ( 2020 14:45 )  pH, Arterial: 7.39  pH, Blood: x     /  pCO2: 34    /  pO2: 112   / HCO3: 21    / Base Excess: -3.6  /  SaO2: 100       I&O's Summary    2020 07:01  -  2020 07:00  --------------------------------------------------------  IN: 4079.9 mL / OUT: 1480 mL / NET: 2599.9 mL    2020 07:01  -  2020 03:43  --------------------------------------------------------  IN: 5521.4 mL / OUT: 1020 mL / NET: 4501.4 mL    I&O's Detail    2020 07:  -  2020 07:00  --------------------------------------------------------  IN:    dexmedetomidine Infusion: 25.7 mL    fentaNYL  Infusion: 142.8 mL    fentaNYL Infusion.: 32.4 mL    IV PiggyBack: 400 mL    Lactated Ringers IV Bolus: 1250 mL    lactated ringers.: 1000 mL    lactated ringers.: 100 mL    lactated ringers.: 100 mL    norepinephrine Infusion: 29 mL    Sodium Chloride 0.9% IV Bolus: 1000 mL  Total IN: 4079.9 mL    OUT:    Indwelling Catheter - Urethral: 430 mL    Nasoenteral Tube: 150 mL    VAC (Vacuum Assisted Closure) System: 900 mL  Total OUT: 1480 mL    Total NET: 2599.9 mL    2020 07:  -  2020 03:43  --------------------------------------------------------  IN:    dexmedetomidine Infusion: 8 mL    fentaNYL Infusion.: 14.4 mL    IV PiggyBack: 600 mL    Lactated Ringers IV Bolus: 1500 mL    lactated ringers.: 375 mL    lactated ringers.: 1500 mL    norepinephrine Infusion: 24 mL    Sodium Chloride 0.9% IV Bolus: 1500 mL  Total IN: 5521.4 mL    OUT:    Indwelling Catheter - Urethral: 820 mL    VAC (Vacuum Assisted Closure) System: 200 mL  Total OUT: 1020 mL    Total NET: 4501.4 mL      PHYSICAL EXAM:  General/Neuro: A&O x3    Lungs: clear to auscultation, saturating well on room air    Cardiovascular : S1, S2, normocardic, mildly hypotensive    GI: wound vac in place, sponges over midline incision and colostomy site, soft, non-distended, ileostomy with serosanguinous fluid, no stool    Extremities: PEDROZA    Derm: Good skin turgor, no skin breakdown    : no austin in place    LABS:    POCT Blood Glucose.: 94 mg/dL (2020 18:39)  POCT Blood Glucose.: 80 mg/dL (2020 13:59)  POCT Blood Glucose.: 80 mg/dL (2020 09:43)  POCT Blood Glucose.: 106 mg/dL (2020 05:20)                        10.2   11.29 )-----------( 213      ( 2020 23:29 )             30.3       06-29    135  |  104  |  18  ----------------------------<  101<H>  5.5<H>   |  22  |  0.9    Ca    7.2<L>      2020 23:29  Phos  3.7       Mg     2.3         TPro  8.1<H>  /  Alb  4.7  /  TBili  0.3  /  DBili  x   /  AST  39  /  ALT  18  /  AlkPhos  58  06-28      PT/INR - ( 2020 23:29 )   PT: 21.30 sec;   INR: 1.85 ratio       PTT - ( 2020 23:29 )  PTT:26.9 sec  CARDIAC MARKERS ( 2020 23:29 )  x     / 0.02 ng/mL / 1181 U/L / x     / 33.4 ng/mL  CARDIAC MARKERS ( 2020 15:30 )  x     / 0.02 ng/mL / 1085 U/L / x     / 37.8 ng/mL  CARDIAC MARKERS ( 2020 04:40 )  x     / 0.02 ng/mL / 975 U/L / x     / 30.8 ng/mL  CARDIAC MARKERS ( 2020 20:22 )  x     / 0.03 ng/mL / 286 U/L / x     / x        Culture - Blood (collected 2020 14:24)  Source: .Blood Blood-Peripheral  Preliminary Report (2020 20:00):    No growth to date.    Culture - Blood (collected 2020 14:24)  Source: .Blood Blood-Peripheral  Preliminary Report (2020 20:00):    No growth to date.

## 2020-06-30 NOTE — CONSULT NOTE ADULT - ASSESSMENT
· Assessment		  44yMale w/pertinent past hx of Melva Danlos type 4, perforation of sigmoid colon s/p Kerri's (11/2019) is currently POD 1 s/p subtotal colectomy/takedown of previous colostomy for perforated colon w/intraop findings of feculent peritonitis.     IMPRESSION;    6/29 : s/p subtotal colectomy for  perforated colon w/intraop findings of feculent peritonitis.   Presently no ongoing peritoneal signs  No sepsis  BCx 6/28 NG  CXR no consolidation    RECOMMENDATIONS;  Meropenem 2 gm iv q8h over 4h  Flagyl 500 mg iv q8h

## 2020-06-30 NOTE — PHARMACOTHERAPY INTERVENTION NOTE - COMMENTS
post-op orders: fentanyl drip, hydromorphone 1mg IV q10min prn 7-10 x4 doses, morphine 2 mg IV q4h prp 7-10  -d/w surgical team to evaluate, d/c fentanyl drip & hydromorphone
recommended changing pantoprazole & levetiracetam to po
meropenem 1g IV q12h, , recommended increasing to 1g IV q8h

## 2020-06-30 NOTE — CONSULT NOTE ADULT - SUBJECTIVE AND OBJECTIVE BOX
ANGEFAZAL  44y, Male  Allergy: moxifloxacin (Hives)  penicillin (Unknown)      All historical available data reviewed.    HPI:  HPI: 44 year old male pmhx as below presenting with 1 day of severe epigastric/RUQ pain. Patient seen and examined, states that he had a normal bowel movement this morning and now has severe abdominal pain since 9am. He admits to nausea, denies vomiting. States his ostomy has been functioning normally.     11/1/2020 patient presented with abdominal pain, found to have free air on imaging and taken to OR emergently, findings included perforated sigmoid and feculent peritonitis, he underwent a sigmoid resection and colostomy creation     Melva-Danlos syndrome followed at [facility] GI: Dr. Loredo, Vascular: Dr Ramirez (28 Jun 2020 14:21)  ID called for ABx    FAMILY HISTORY:    PAST MEDICAL & SURGICAL HISTORY:  Dislocations and subluxations  Aneurysm artery, celiac: s/p coiling 4/2010  Keratoconus  HTN (hypertension)  Epilepsy  EDS (Melva-Danlos syndrome)  Status post Kerri's procedure: 11/2019        VITALS:  T(F): 96.4, Max: 98.3 (06-30-20 @ 04:00)  HR: 88  BP: 107/60  RR: 11Vital Signs Last 24 Hrs  T(C): 35.8 (30 Jun 2020 08:00), Max: 36.8 (30 Jun 2020 04:00)  T(F): 96.4 (30 Jun 2020 08:00), Max: 98.3 (30 Jun 2020 04:00)  HR: 88 (30 Jun 2020 09:00) (88 - 122)  BP: 107/60 (30 Jun 2020 09:00) (81/62 - 117/72)  BP(mean): 76 (30 Jun 2020 09:00) (65 - 103)  RR: 11 (30 Jun 2020 09:00) (6 - 30)  SpO2: 100% (30 Jun 2020 09:00) (93% - 100%)    TESTS & MEASUREMENTS:                        10.2   11.29 )-----------( 213      ( 29 Jun 2020 23:29 )             30.3     06-29    135  |  104  |  18  ----------------------------<  101<H>  5.5<H>   |  22  |  0.9    Ca    7.2<L>      29 Jun 2020 23:29  Phos  3.7     06-29  Mg     2.3     06-29    TPro  8.1<H>  /  Alb  4.7  /  TBili  0.3  /  DBili  x   /  AST  39  /  ALT  18  /  AlkPhos  58  06-28    LIVER FUNCTIONS - ( 28 Jun 2020 10:45 )  Alb: 4.7 g/dL / Pro: 8.1 g/dL / ALK PHOS: 58 U/L / ALT: 18 U/L / AST: 39 U/L / GGT: x             Culture - Blood (collected 06-28-20 @ 14:24)  Source: .Blood Blood-Peripheral  Preliminary Report (06-29-20 @ 20:00):    No growth to date.    Culture - Blood (collected 06-28-20 @ 14:24)  Source: .Blood Blood-Peripheral  Preliminary Report (06-29-20 @ 20:00):    No growth to date.            RADIOLOGY & ADDITIONAL TESTS:  Personal review of radiological diagnostics performed  Echo and EKG results noted when applicable.     MEDICATIONS:  acetaminophen   Tablet .. 650 milliGRAM(s) Oral every 6 hours  chlorhexidine 4% Liquid 1 Application(s) Topical <User Schedule>  heparin   Injectable 5000 Unit(s) SubCutaneous every 8 hours  HYDROmorphone PCA (1 mG/mL) 30 milliLiter(s) PCA Continuous PCA Continuous  lactated ringers. 1000 milliLiter(s) IV Continuous <Continuous>  levETIRAcetam  IVPB 1500 milliGRAM(s) IV Intermittent every 12 hours  meropenem  IVPB 1000 milliGRAM(s) IV Intermittent every 8 hours  ondansetron Injectable 4 milliGRAM(s) IV Push once PRN  pantoprazole  Injectable 40 milliGRAM(s) IV Push daily      ANTIBIOTICS:  meropenem  IVPB 1000 milliGRAM(s) IV Intermittent every 8 hours

## 2020-07-01 LAB
ANION GAP SERPL CALC-SCNC: 10 MMOL/L — SIGNIFICANT CHANGE UP (ref 7–14)
ANION GAP SERPL CALC-SCNC: 8 MMOL/L — SIGNIFICANT CHANGE UP (ref 7–14)
BASOPHILS # BLD AUTO: 0.01 K/UL — SIGNIFICANT CHANGE UP (ref 0–0.2)
BASOPHILS NFR BLD AUTO: 0.1 % — SIGNIFICANT CHANGE UP (ref 0–1)
BLD GP AB SCN SERPL QL: SIGNIFICANT CHANGE UP
BUN SERPL-MCNC: 12 MG/DL — SIGNIFICANT CHANGE UP (ref 10–20)
BUN SERPL-MCNC: 12 MG/DL — SIGNIFICANT CHANGE UP (ref 10–20)
CALCIUM SERPL-MCNC: 7.3 MG/DL — LOW (ref 8.5–10.1)
CALCIUM SERPL-MCNC: 7.4 MG/DL — LOW (ref 8.5–10.1)
CHLORIDE SERPL-SCNC: 103 MMOL/L — SIGNIFICANT CHANGE UP (ref 98–110)
CHLORIDE SERPL-SCNC: 104 MMOL/L — SIGNIFICANT CHANGE UP (ref 98–110)
CO2 SERPL-SCNC: 24 MMOL/L — SIGNIFICANT CHANGE UP (ref 17–32)
CO2 SERPL-SCNC: 24 MMOL/L — SIGNIFICANT CHANGE UP (ref 17–32)
CREAT SERPL-MCNC: 0.6 MG/DL — LOW (ref 0.7–1.5)
CREAT SERPL-MCNC: 0.6 MG/DL — LOW (ref 0.7–1.5)
EOSINOPHIL # BLD AUTO: 0 K/UL — SIGNIFICANT CHANGE UP (ref 0–0.7)
EOSINOPHIL # BLD AUTO: 0 K/UL — SIGNIFICANT CHANGE UP (ref 0–0.7)
EOSINOPHIL # BLD AUTO: 0.01 K/UL — SIGNIFICANT CHANGE UP (ref 0–0.7)
EOSINOPHIL NFR BLD AUTO: 0 % — SIGNIFICANT CHANGE UP (ref 0–8)
EOSINOPHIL NFR BLD AUTO: 0 % — SIGNIFICANT CHANGE UP (ref 0–8)
EOSINOPHIL NFR BLD AUTO: 0.1 % — SIGNIFICANT CHANGE UP (ref 0–8)
GLUCOSE SERPL-MCNC: 132 MG/DL — HIGH (ref 70–99)
GLUCOSE SERPL-MCNC: 74 MG/DL — SIGNIFICANT CHANGE UP (ref 70–99)
HCT VFR BLD CALC: 19.4 % — LOW (ref 42–52)
HCT VFR BLD CALC: 19.7 % — LOW (ref 42–52)
HCT VFR BLD CALC: 21.8 % — LOW (ref 42–52)
HGB BLD-MCNC: 6.6 G/DL — CRITICAL LOW (ref 14–18)
HGB BLD-MCNC: 6.8 G/DL — CRITICAL LOW (ref 14–18)
HGB BLD-MCNC: 7.3 G/DL — LOW (ref 14–18)
IMM GRANULOCYTES NFR BLD AUTO: 0.3 % — SIGNIFICANT CHANGE UP (ref 0.1–0.3)
IMM GRANULOCYTES NFR BLD AUTO: 0.4 % — HIGH (ref 0.1–0.3)
IMM GRANULOCYTES NFR BLD AUTO: 0.5 % — HIGH (ref 0.1–0.3)
LYMPHOCYTES # BLD AUTO: 0.54 K/UL — LOW (ref 1.2–3.4)
LYMPHOCYTES # BLD AUTO: 0.6 K/UL — LOW (ref 1.2–3.4)
LYMPHOCYTES # BLD AUTO: 0.62 K/UL — LOW (ref 1.2–3.4)
LYMPHOCYTES # BLD AUTO: 5.8 % — LOW (ref 20.5–51.1)
LYMPHOCYTES # BLD AUTO: 6.1 % — LOW (ref 20.5–51.1)
LYMPHOCYTES # BLD AUTO: 6.8 % — LOW (ref 20.5–51.1)
MAGNESIUM SERPL-MCNC: 1.6 MG/DL — LOW (ref 1.8–2.4)
MAGNESIUM SERPL-MCNC: 1.7 MG/DL — LOW (ref 1.8–2.4)
MCHC RBC-ENTMCNC: 29.8 PG — SIGNIFICANT CHANGE UP (ref 27–31)
MCHC RBC-ENTMCNC: 30.3 PG — SIGNIFICANT CHANGE UP (ref 27–31)
MCHC RBC-ENTMCNC: 30.6 PG — SIGNIFICANT CHANGE UP (ref 27–31)
MCHC RBC-ENTMCNC: 33.5 G/DL — SIGNIFICANT CHANGE UP (ref 32–37)
MCHC RBC-ENTMCNC: 34 G/DL — SIGNIFICANT CHANGE UP (ref 32–37)
MCHC RBC-ENTMCNC: 34.5 G/DL — SIGNIFICANT CHANGE UP (ref 32–37)
MCV RBC AUTO: 88.7 FL — SIGNIFICANT CHANGE UP (ref 80–94)
MCV RBC AUTO: 89 FL — SIGNIFICANT CHANGE UP (ref 80–94)
MCV RBC AUTO: 89 FL — SIGNIFICANT CHANGE UP (ref 80–94)
MONOCYTES # BLD AUTO: 0.25 K/UL — SIGNIFICANT CHANGE UP (ref 0.1–0.6)
MONOCYTES # BLD AUTO: 0.29 K/UL — SIGNIFICANT CHANGE UP (ref 0.1–0.6)
MONOCYTES # BLD AUTO: 0.3 K/UL — SIGNIFICANT CHANGE UP (ref 0.1–0.6)
MONOCYTES NFR BLD AUTO: 2.7 % — SIGNIFICANT CHANGE UP (ref 1.7–9.3)
MONOCYTES NFR BLD AUTO: 2.8 % — SIGNIFICANT CHANGE UP (ref 1.7–9.3)
MONOCYTES NFR BLD AUTO: 3.4 % — SIGNIFICANT CHANGE UP (ref 1.7–9.3)
NEUTROPHILS # BLD AUTO: 7.84 K/UL — HIGH (ref 1.4–6.5)
NEUTROPHILS # BLD AUTO: 8.05 K/UL — HIGH (ref 1.4–6.5)
NEUTROPHILS # BLD AUTO: 9.64 K/UL — HIGH (ref 1.4–6.5)
NEUTROPHILS NFR BLD AUTO: 89.2 % — HIGH (ref 42.2–75.2)
NEUTROPHILS NFR BLD AUTO: 90.6 % — HIGH (ref 42.2–75.2)
NEUTROPHILS NFR BLD AUTO: 91 % — HIGH (ref 42.2–75.2)
NRBC # BLD: 0 /100 WBCS — SIGNIFICANT CHANGE UP (ref 0–0)
PHOSPHATE SERPL-MCNC: 2.2 MG/DL — SIGNIFICANT CHANGE UP (ref 2.1–4.9)
PHOSPHATE SERPL-MCNC: 2.2 MG/DL — SIGNIFICANT CHANGE UP (ref 2.1–4.9)
PLATELET # BLD AUTO: 150 K/UL — SIGNIFICANT CHANGE UP (ref 130–400)
PLATELET # BLD AUTO: 155 K/UL — SIGNIFICANT CHANGE UP (ref 130–400)
PLATELET # BLD AUTO: 157 K/UL — SIGNIFICANT CHANGE UP (ref 130–400)
POTASSIUM SERPL-MCNC: 3.7 MMOL/L — SIGNIFICANT CHANGE UP (ref 3.5–5)
POTASSIUM SERPL-MCNC: 4 MMOL/L — SIGNIFICANT CHANGE UP (ref 3.5–5)
POTASSIUM SERPL-SCNC: 3.7 MMOL/L — SIGNIFICANT CHANGE UP (ref 3.5–5)
POTASSIUM SERPL-SCNC: 4 MMOL/L — SIGNIFICANT CHANGE UP (ref 3.5–5)
RBC # BLD: 2.18 M/UL — LOW (ref 4.7–6.1)
RBC # BLD: 2.22 M/UL — LOW (ref 4.7–6.1)
RBC # BLD: 2.45 M/UL — LOW (ref 4.7–6.1)
RBC # FLD: 12.8 % — SIGNIFICANT CHANGE UP (ref 11.5–14.5)
RBC # FLD: 12.9 % — SIGNIFICANT CHANGE UP (ref 11.5–14.5)
RBC # FLD: 13 % — SIGNIFICANT CHANGE UP (ref 11.5–14.5)
SODIUM SERPL-SCNC: 136 MMOL/L — SIGNIFICANT CHANGE UP (ref 135–146)
SODIUM SERPL-SCNC: 137 MMOL/L — SIGNIFICANT CHANGE UP (ref 135–146)
WBC # BLD: 10.6 K/UL — SIGNIFICANT CHANGE UP (ref 4.8–10.8)
WBC # BLD: 8.79 K/UL — SIGNIFICANT CHANGE UP (ref 4.8–10.8)
WBC # BLD: 8.89 K/UL — SIGNIFICANT CHANGE UP (ref 4.8–10.8)
WBC # FLD AUTO: 10.6 K/UL — SIGNIFICANT CHANGE UP (ref 4.8–10.8)
WBC # FLD AUTO: 8.79 K/UL — SIGNIFICANT CHANGE UP (ref 4.8–10.8)
WBC # FLD AUTO: 8.89 K/UL — SIGNIFICANT CHANGE UP (ref 4.8–10.8)

## 2020-07-01 PROCEDURE — 99024 POSTOP FOLLOW-UP VISIT: CPT

## 2020-07-01 RX ORDER — SODIUM CHLORIDE 9 MG/ML
1000 INJECTION, SOLUTION INTRAVENOUS
Refills: 0 | Status: DISCONTINUED | OUTPATIENT
Start: 2020-07-01 | End: 2020-07-03

## 2020-07-01 RX ORDER — MAGNESIUM SULFATE 500 MG/ML
2 VIAL (ML) INJECTION ONCE
Refills: 0 | Status: COMPLETED | OUTPATIENT
Start: 2020-07-01 | End: 2020-07-01

## 2020-07-01 RX ADMIN — Medication 650 MILLIGRAM(S): at 17:19

## 2020-07-01 RX ADMIN — MEROPENEM 25 MILLIGRAM(S): 1 INJECTION INTRAVENOUS at 13:29

## 2020-07-01 RX ADMIN — SODIUM CHLORIDE 125 MILLILITER(S): 9 INJECTION, SOLUTION INTRAVENOUS at 04:09

## 2020-07-01 RX ADMIN — OXCARBAZEPINE 300 MILLIGRAM(S): 300 TABLET, FILM COATED ORAL at 08:03

## 2020-07-01 RX ADMIN — MEROPENEM 25 MILLIGRAM(S): 1 INJECTION INTRAVENOUS at 21:53

## 2020-07-01 RX ADMIN — Medication 650 MILLIGRAM(S): at 05:26

## 2020-07-01 RX ADMIN — Medication 100 MILLIGRAM(S): at 04:55

## 2020-07-01 RX ADMIN — HEPARIN SODIUM 5000 UNIT(S): 5000 INJECTION INTRAVENOUS; SUBCUTANEOUS at 13:28

## 2020-07-01 RX ADMIN — MEROPENEM 25 MILLIGRAM(S): 1 INJECTION INTRAVENOUS at 05:57

## 2020-07-01 RX ADMIN — Medication 100 MILLIGRAM(S): at 15:14

## 2020-07-01 RX ADMIN — OXCARBAZEPINE 600 MILLIGRAM(S): 300 TABLET, FILM COATED ORAL at 21:51

## 2020-07-01 RX ADMIN — LEVETIRACETAM 1500 MILLIGRAM(S): 250 TABLET, FILM COATED ORAL at 17:19

## 2020-07-01 RX ADMIN — HEPARIN SODIUM 5000 UNIT(S): 5000 INJECTION INTRAVENOUS; SUBCUTANEOUS at 21:52

## 2020-07-01 RX ADMIN — HEPARIN SODIUM 5000 UNIT(S): 5000 INJECTION INTRAVENOUS; SUBCUTANEOUS at 04:56

## 2020-07-01 RX ADMIN — PANTOPRAZOLE SODIUM 40 MILLIGRAM(S): 20 TABLET, DELAYED RELEASE ORAL at 04:57

## 2020-07-01 RX ADMIN — LEVETIRACETAM 1500 MILLIGRAM(S): 250 TABLET, FILM COATED ORAL at 04:56

## 2020-07-01 RX ADMIN — Medication 100 MILLIGRAM(S): at 21:53

## 2020-07-01 RX ADMIN — Medication 650 MILLIGRAM(S): at 11:49

## 2020-07-01 RX ADMIN — Medication 81 MILLIGRAM(S): at 11:49

## 2020-07-01 NOTE — CONSULT NOTE ADULT - CONSULT REQUESTED BY NAME
ED Provider Note    Scribed for Devin Hughes M.D. by Kylah Zimmer. 6/2/2019  4:39 AM    Primary care provider: Pcp Pt States None  Means of arrival: Ambulance  History obtained from: Patient  History limited by: None    CHIEF COMPLAINT  Chief Complaint   Patient presents with   • High Blood Sugar       HPI  Aryan Waters is a type 2 diabetic 68 y.o. male transfer from [facility] who presents to the Emergency Department via ambulance for evaluation of hyperglycemia onset 5 days ago. Patient states he has been noncompliant with Metformin medication.  Patient states he started taking Metformin 5 days ago, and had ran out of meds 2 weeks ago prior. Patient reports a few days ago, he went outside to his car and suddenly felt unwell, cold, and had body aches. He at first thought he had viral infection. Four days ago, he called into work and tried to eat something, but vomited and could not keep any medication down. He notes he had a fever and was seen at [facility]. Patient states he started taking Prednisone 4 days ago. Negative for shortness of breath or dysuria.    REVIEW OF SYSTEMS  Pertinent positives include hyperglycemia, vomiting, chills, and fever.   Pertinent negatives include no shortness of breath or dysuria.    All other systems reviewed and negative. See HPI for further details.       PAST MEDICAL HISTORY   has a past medical history of Diabetes (HCC).    SURGICAL HISTORY  patient denies any surgical history    SOCIAL HISTORY  Social History   Substance Use Topics   • Smoking status: Former Smoker     Types: Cigarettes     Quit date: 6/2/1989   • Smokeless tobacco: Never Used   • Alcohol use No      History   Drug Use No       FAMILY HISTORY  History reviewed. No pertinent family history.    CURRENT MEDICATIONS  Home Medications     Reviewed by Ana Brumfield R.N. (Registered Nurse) on 06/02/19 at 0432  Med List Status: Complete   Medication Last Dose Status   B  surgery "Complex Vitamins (VITAMIN B COMPLEX) Tab  Active   Ginseng 50 MG Cap  Active   glipiZIDE (GLUCOTROL) 5 MG Tab  Active   metformin (GLUCOPHAGE) 500 MG Tab  Active   predniSONE (DELTASONE) 10 MG Tab  Active   Pumpkin Seed 500 MG Tab  Active                ALLERGIES  No Known Allergies    PHYSICAL EXAM  VITAL SIGNS: /65   Pulse 82   Temp 36.7 °C (98.1 °F) (Temporal)   Resp 12   Ht 1.753 m (5' 9\")   Wt 80.7 kg (178 lb)   SpO2 97%   BMI 26.29 kg/m²     Nursing note and vitals reviewed.  Constitutional: Well-developed and well-nourished. No distress.   HENT: Head is normocephalic and atraumatic. Oropharynx is clear and moist without exudate or erythema.   Eyes: Pupils are equal, round, and reactive to light. Conjunctiva are normal.   Cardiovascular: Normal rate and regular rhythm. No murmur heard. Normal radial pulses.  Pulmonary/Chest: Breath sounds normal. No wheezes or rales.   Abdominal: Soft and non-tender. No distention    Musculoskeletal: Extremities exhibit normal range of motion without edema or tenderness.   Neurological: Awake, alert and oriented to person, place, and time. No focal deficits noted.  Skin: Skin is warm and dry. No rash.   Psychiatric: Normal mood and affect. Appropriate for clinical situation.    DIAGNOSTIC STUDIES / PROCEDURES    LABS  Results for orders placed or performed during the hospital encounter of 06/02/19   CBC WITH DIFFERENTIAL   Result Value Ref Range    WBC 10.5 4.8 - 10.8 K/uL    RBC 3.90 (L) 4.70 - 6.10 M/uL    Hemoglobin 11.8 (L) 14.0 - 18.0 g/dL    Hematocrit 35.4 (L) 42.0 - 52.0 %    MCV 90.8 81.4 - 97.8 fL    MCH 30.3 27.0 - 33.0 pg    MCHC 33.3 (L) 33.7 - 35.3 g/dL    RDW 46.8 35.9 - 50.0 fL    Platelet Count 149 (L) 164 - 446 K/uL    MPV 11.1 9.0 - 12.9 fL    Neutrophils-Polys 87.80 (H) 44.00 - 72.00 %    Lymphocytes 6.10 (L) 22.00 - 41.00 %    Monocytes 4.40 0.00 - 13.40 %    Eosinophils 0.00 0.00 - 6.90 %    Basophils 0.00 0.00 - 1.80 %    Nucleated RBC 0.00 " /100 WBC    Neutrophils (Absolute) 9.22 (H) 1.82 - 7.42 K/uL    Lymphs (Absolute) 0.64 (L) 1.00 - 4.80 K/uL    Monos (Absolute) 0.46 0.00 - 0.85 K/uL    Eos (Absolute) 0.00 0.00 - 0.51 K/uL    Baso (Absolute) 0.00 0.00 - 0.12 K/uL    NRBC (Absolute) 0.00 K/uL   BASIC METABOLIC PANEL   Result Value Ref Range    Sodium 133 (L) 135 - 145 mmol/L    Potassium 4.6 3.6 - 5.5 mmol/L    Chloride 100 96 - 112 mmol/L    Co2 17 (L) 20 - 33 mmol/L    Glucose 407 (H) 65 - 99 mg/dL    Bun 37 (H) 8 - 22 mg/dL    Creatinine 0.99 0.50 - 1.40 mg/dL    Calcium 7.6 (L) 8.5 - 10.5 mg/dL    Anion Gap 16.0 (H) 0.0 - 11.9   ESTIMATED GFR   Result Value Ref Range    GFR If African American >60 >60 mL/min/1.73 m 2    GFR If Non African American >60 >60 mL/min/1.73 m 2   DIFFERENTIAL MANUAL   Result Value Ref Range    Metamyelocytes 1.70 %    Manual Diff Status PERFORMED    PERIPHERAL SMEAR REVIEW   Result Value Ref Range    Peripheral Smear Review see below    PLATELET ESTIMATE   Result Value Ref Range    Plt Estimation Decreased    MORPHOLOGY   Result Value Ref Range    RBC Morphology Present     Large Platelets 1+    MAGNESIUM   Result Value Ref Range    Magnesium 1.7 1.5 - 2.5 mg/dL   PHOSPHORUS   Result Value Ref Range    Phosphorus 3.4 2.5 - 4.5 mg/dL   ACCU-CHEK GLUCOSE   Result Value Ref Range    Glucose - Accu-Ck 371 (H) 65 - 99 mg/dL   ACCU-CHEK GLUCOSE   Result Value Ref Range    Glucose - Accu-Ck 325 (H) 65 - 99 mg/dL   EKG   Result Value Ref Range    Report       [facility] Emergency Dept.    Test Date:  2019  Pt Name:    EVELYN MOTA              Department: ER  MRN:        [id number]                      Room:       RD 11  Gender:     Male                         Technician: [id number]  :        1951                   Requested By:ER TRIAGE PROTOCOL  Order #:    [id number]                    Reading MD: CARLITO ANDERSON MD    Measurements  Intervals                                Axis  Rate:       78                            P:          28  KS:         140                          QRS:        41  QRSD:       90                           T:          47  QT:         392  QTc:        447    Interpretive Statements  SINUS RHYTHM  BORDERLINE LOW VOLTAGE IN FRONTAL LEADS  No previous ECG available for comparison    Electronically Signed On 2019 5:14:52 PDT by CARLITO ANDERSON MD     EKG   Result Value Ref Range    Report       [facility]    Test Date:  2019  Pt Name:    EVELYN MOTA              Department: 171  MRN:        [id number]                      Room:       T736  Gender:     Male                         Technician: CHANTELLE  :        1951                   Requested By:KEHINDE SCHUMACHER  Order #:    [id number]                    Reading MD:    Measurements  Intervals                                Axis  Rate:       80                           P:          33  KS:         132                          QRS:        57  QRSD:       76                           T:          60  QT:         387  QTc:        447    Interpretive Statements  SINUS RHYTHM  MINIMAL ST ELEVATION, INFERIOR LEADS  Compared to ECG 2019 04:36:49  ST (T wave) deviation now present        All labs reviewed by me.    COURSE & MEDICAL DECISION MAKING  Nursing notes, VS, PMSFHx reviewed in chart.     Review of past medical records shows the patient is diabetic and ran out Metformin medication 3.5 weeks ago. He started taken Prednisone due to body aches and viral syndrome symptoms. Lab results indicate WBC 11.7 Hemoglobin 13, Glucose 488, CO2 16, Creatine 1.27, Urinalysis negative for infection, Beta Hydroxybutyrate 6.6, Chest xray negative for infection,     4:39 AM - Patient seen and examined at bedside. Patient will be treated with NS Infusion 1000 mL. Intravenous fluids were administered for hyperglycemia and treatment for DKA.  Ordered CBC with differential, BMP, Accu-Chek and EKG to evaluate his symptoms. The  differential diagnoses include but are not limited to: DKA     4:52 AM Paged Hospitalist.    4:58 AM I discussed the patient's case and the above findings with Dr. Damon (Hospitalist) who agrees to admit patient.       HYDRATION: Based on the patient's presentation of Hyperglycemia the patient was given IV fluids. IV Hydration was used because oral hydration was not adequate alone. Upon recheck following hydration, the patient was mildly improved.    DISPOSITION:  Patient will be admitted to Dr. Damon in guarded condition.     FINAL IMPRESSION  1. Diabetic ketoacidosis without coma associated with type 2 diabetes mellitus (HCC)          Kylah HERNANDEZ (Scribe), am scribing for, and in the presence of, Devin Hughes M.D..    Electronically signed by: Kylah Zimmer (Scribe), 6/2/2019    Devin HERNANDEZ M.D. personally performed the services described in this documentation, as scribed by Kylah Zimmer in my presence, and it is both accurate and complete. C    The note accurately reflects work and decisions made by me.  Devin Hughes  6/2/2019  11:14 AM

## 2020-07-01 NOTE — CONSULT NOTE ADULT - ASSESSMENT
IMPRESSION: Rehab of debilitation / s/p sigmoid resection with colostomy for perforated bowel    PRECAUTIONS: [  ] Cardiac  [  ] Respiratory  [  ] Seizures [  ] Contact Isolation  [  ] Droplet Isolation  [  ] Other    Weight Bearing Status:     RECOMMENDATION:    Out of Bed to Chair     DVT/Decubiti Prophylaxis    REHAB PLAN:     [  x ] Bedside P/T 3-5 times a week   [   ]   Bedside O/T  2-3 times a week             [   ] No Rehab Therapy Indicated                   [   ]  Speech Therapy   Conditioning/ROM                                    ADL  Bed Mobility                                               Conditioning/ROM  Transfers                                                     Bed Mobility  Sitting /Standing Balance                         Transfers                                        Gait Training                                               Sitting/Standing Balance  Stair Training [   ]Applicable                    Home equipment Eval                                                                        Splinting  [   ] Only      GOALS:   ADL   [   ]   Independent                    Transfers  [ x  ] Independent                          Ambulation  [ x  ] Independent     [  x  ] With device                            [   ]  CG                                                         [   ]  CG                                                                  [   ] CG                            [    ] Min A                                                   [   ] Min A                                                              [   ] Min  A          DISCHARGE PLAN:   [   ]  Good candidate for Intensive Rehabilitation/Hospital based                                             Will tolerate 3hrs Intensive Rehab Daily                                       [    ]  Short Term Rehab in Skilled Nursing Facility                                       [  x  ]  Home with Outpatient or  services                                         [    ]  Possible Candidate for Intensive Hospital based Rehab

## 2020-07-01 NOTE — PROGRESS NOTE ADULT - SUBJECTIVE AND OBJECTIVE BOX
GENERAL SURGERY PROGRESS NOTE     ANGE [address] day :4  POD:   Procedure: End ileostomy  Reexploration of abdomen with irrigation or drainage and closure  Colectomy, subtotal, open    Surgical Attending: Srinivasan Gave  Overnight events: Hgb dropped to 6.8 from 10, VSS, hemodynamically stable. ostomy with bilious output. Pain controlled     T(F): 98.6 (20 @ 07:00), Max: 99 (20 @ 22:51)  HR: 99 (20 @ 08:07) (90 - 100)  BP: 129/79 (20 @ 08:07) (100/61 - 129/79)  ABP: --  ABP(mean): --  RR: 18 (20 @ 07:00) (13 - 18)  SpO2: 99% (20 @ 08:07) (99% - 100%)      20 @ 07:  -  20 @ 07:00  --------------------------------------------------------  IN:    lactated ringers.: 1375 mL  Total IN: 1375 mL    OUT:    Ileostomy: 165 mL    Indwelling Catheter - Urethral: 275 mL    Voided: 1470 mL  Total OUT: 1910 mL    Total NET: -535 mL      20 @ 07:  -  20 @ 11:24  --------------------------------------------------------  IN:  Total IN: 0 mL    OUT:    Voided: 750 mL  Total OUT: 750 mL    Total NET: -750 mL        DIET/FLUIDS: lactated ringers. 1000 milliLiter(s) IV Continuous <Continuous>    N20 @ 07:  -  20 @ 07:00  --------------------------------------------------------  OUT: 0 mL                                                                                 DRAINS:     BM:   20 @ 07:  -  20 @ 07:00  --------------------------------------------------------  OUT: 165 mL      EMESIS:     URINE:   20 @ :  -  20 @ 07:00  --------------------------------------------------------  OUT: 275 mL       GI proph:  pantoprazole    Tablet 40 milliGRAM(s) Oral before breakfast    AC/ proph: aspirin enteric coated 81 milliGRAM(s) Oral daily  heparin   Injectable 5000 Unit(s) SubCutaneous every 8 hours    ABx: meropenem  IVPB 2000 milliGRAM(s) IV Intermittent every 8 hours  metroNIDAZOLE  IVPB 500 milliGRAM(s) IV Intermittent every 8 hours  metroNIDAZOLE  IVPB          PHYSICAL EXAM:  GENERAL: NAD, well-appearing  CHEST/LUNG: Clear to auscultation bilaterally  HEART: Regular rate and rhythm  ABDOMEN: Soft, Nontender, Nondistended; RIght sided ileostomy with bilious output, midline incision with prevena and left sided previous ostomy with packing.   EXTREMITIES:  No clubbing, cyanosis, or edema      LABS  Labs:  CAPILLARY BLOOD GLUCOSE                              6.8    8.89  )-----------( 150      ( 2020 05:55 )             19.7       Auto Neutrophil %: 90.6 % (20 @ 05:55)  Auto Immature Granulocyte %: 0.3 % (20 @ 05:55)        136  |  104  |  12  ----------------------------<  74  4.0   |  24  |  0.6<L>      Calcium, Total Serum: 7.4 mg/dL (20 @ 05:55)      LFTs:     Lactate, Blood: 1.8 mmol/L (20 @ 23:29)  Blood Gas Venous - Lactate: 3.3 mmoL/L (20 @ 16:02)  Blood Gas Arterial, Lactate: 3.4 mmoL/L (20 @ 14:45)  Blood Gas Arterial, Lactate: 3.5 mmoL/L (20 @ 04:31)  Blood Gas Arterial, Lactate: 2.3 mmoL/L (20 @ 20:42)  Lactate, Blood: 4.6 mmol/L (20 @ 14:24)    ABG - ( 2020 14:45 )  pH: 7.39  /  pCO2: 34    /  pO2: 112   / HCO3: 21    / Base Excess: -3.6  /  SaO2: 100             ABG - ( 2020 04:31 )  pH: 7.42  /  pCO2: 26    /  pO2: 192   / HCO3: 17    / Base Excess: -5.6  /  SaO2: 100             ABG - ( 2020 20:42 )  pH: 7.36  /  pCO2: 36    /  pO2: 271   / HCO3: 21    / Base Excess: -4.1  /  SaO2: 99                Coags:     21.30  ----< 1.85    ( 2020 23:29 )     26.9        CARDIAC MARKERS ( 2020 04:27 )  x     / 0.02 ng/mL / 970 U/L / x     / 20.3 ng/mL  CARDIAC MARKERS ( 2020 23:29 )  x     / 0.02 ng/mL / 1181 U/L / x     / 33.4 ng/mL  CARDIAC MARKERS ( 2020 15:30 )  x     / 0.02 ng/mL / 1085 U/L / x     / 37.8 ng/mL              Culture - Blood (collected 2020 14:24)  Source: .Blood Blood-Peripheral  Preliminary Report (2020 20:00):    No growth to date.    Culture - Blood (collected 2020 14:24)  Source: .Blood Blood-Peripheral  Preliminary Report (2020 20:00):    No growth to date.          RADIOLOGY & ADDITIONAL TESTS:

## 2020-07-01 NOTE — PROGRESS NOTE ADULT - ASSESSMENT
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     IMPRESSION;    6/29 : s/p subtotal colectomy for  perforated colon w/intraop findings of feculent peritonitis.   Presently no ongoing peritoneal signs  No sepsis  BCx 6/28 NG  CXR no consolidation    RECOMMENDATIONS;  Meropenem 2 gm iv q8h over 4h  Flagyl 500 mg iv q8h

## 2020-07-01 NOTE — CONSULT NOTE ADULT - SUBJECTIVE AND OBJECTIVE BOX
HPI:  HPI: 44 year old male pmhx as below presenting with 1 day of severe epigastric/RUQ pain. Patient seen and examined, states that he had a normal bowel movement this morning and now has severe abdominal pain since 9am. He admits to nausea, denies vomiting. States his ostomy has been functioning normally.     11/1/2020 patient presented with abdominal pain, found to have free air on imaging and taken to OR emergently, findings included perforated sigmoid and feculent peritonitis, he underwent a sigmoid resection and colostomy creation     Melva-Danlos syndrome followed at [facility] GI: Dr. 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 650 milliGRAM(s) Oral every 6 hours  aspirin enteric coated 81 milliGRAM(s) Oral daily  chlorhexidine 4% Liquid 1 Application(s) Topical <User Schedule>  heparin   Injectable 5000 Unit(s) SubCutaneous every 8 hours  HYDROmorphone PCA (1 mG/mL) 30 milliLiter(s) PCA Continuous PCA Continuous  lactated ringers. 1000 milliLiter(s) (125 mL/Hr) IV Continuous <Continuous>  levETIRAcetam 1500 milliGRAM(s) Oral two times a day  meropenem  IVPB 2000 milliGRAM(s) IV Intermittent every 8 hours  metroNIDAZOLE  IVPB 500 milliGRAM(s) IV Intermittent every 8 hours  metroNIDAZOLE  IVPB      OXcarbazepine 600 milliGRAM(s) Oral <User Schedule>  OXcarbazepine 300 milliGRAM(s) Oral <User Schedule>  pantoprazole    Tablet 40 milliGRAM(s) Oral before breakfast    MEDICATIONS  (PRN):  ondansetron Injectable 4 milliGRAM(s) IV Push once PRN Nausea and/or Vomiting      FAMILY HISTORY:      Allergies    moxifloxacin (Hives)  penicillin (Unknown)    Intolerances        SOCIAL HISTORY:    [  ] Etoh  [  ] Smoking  [  ] Substance abuse     Home Environment:  [  ] Home Alone  [ x ] Lives with Family  [  ] Home Health Aid    Dwelling:  [  ] Apartment  [x  ] Private House  [  ] Adult Home  [  ] Skilled Nursing Facility      [  ] Short Term  [  ] Long Term  [x  ] Stairs       Elevator [  ]    FUNCTIONAL STATUS PTA: (Check all that apply)  Ambulation: [ x  ]Independent    [  ] Dependent     [  ] Non-Ambulatory  Assistive Device: [  ] SA Cane  [  ]  Q Cane  [  ] Walker  [  ]  Wheelchair  ADL : [ x ] Independent  [  ]  Dependent       Vital Signs Last 24 Hrs  T(C): 37.2 (01 Jul 2020 14:47), Max: 37.7 (01 Jul 2020 12:19)  T(F): 99 (01 Jul 2020 14:47), Max: 99.8 (01 Jul 2020 12:19)  HR: 97 (01 Jul 2020 14:47) (92 - 100)  BP: 128/80 (01 Jul 2020 14:47) (101/51 - 129/81)  BP(mean): --  RR: 18 (01 Jul 2020 14:47) (17 - 18)  SpO2: 99% (01 Jul 2020 12:19) (99% - 100%)      PHYSICAL EXAM: Alert & Oriented X3  GENERAL: NAD, well-groomed, well-developed  HEAD:  Atraumatic, Normocephalic  CHEST/LUNG: Clear   HEART: S1S2+  ABDOMEN: Soft, Nontender  EXTREMITIES:  no calf tenderness    NERVOUS SYSTEM:  Cranial Nerves 2-12 intact [  ] Abnormal  [  ]  ROM: WFL all extremities [ x ]  Abnormal [  ]  Motor Strength: WFL all extremities  [ x ]  Abnormal [  ]  Sensation: intact to light touch [x  ] Abnormal [  ]  Reflexes: Symmetric [  ]  Abnormal [  ]    FUNCTIONAL STATUS:  Bed Mobility: Independent [  ]  Supervision [  ]  Needs Assistance [x  ]  N/A [  ]  Transfers: Independent [  ]  Supervision [  ]  Needs Assistance [x  ]  N/A [  ]   Ambulation: Independent [  ]  Supervision [  ]  Needs Assistance [  ]  N/A [  ]  ADL: Independent [  ] Requires Assistance [  ] N/A [  ]      LABS:                        6.8    8.89  )-----------( 150      ( 01 Jul 2020 05:55 )             19.7     07-01    136  |  104  |  12  ----------------------------<  74  4.0   |  24  |  0.6<L>    Ca    7.4<L>      01 Jul 2020 05:55  Phos  2.2     07-01  Mg     1.7     07-01      PT/INR - ( 29 Jun 2020 23:29 )   PT: 21.30 sec;   INR: 1.85 ratio         PTT - ( 29 Jun 2020 23:29 )  PTT:26.9 sec      RADIOLOGY & ADDITIONAL STUDIES:    Assesment:

## 2020-07-01 NOTE — PROGRESS NOTE ADULT - ASSESSMENT
A/P:  FAZAL CASSIDY is a 44y Male with daron danlos type 4 HD 4/POD 3 from End ileostomy  Reexploration of abdomen with irrigation or drainage and closure  Colectomy, subtotal, open. He is currently hemodynamically stable, pain is controlled.     Plan:   - Transfuse 1 unit, F/U hemoglobin after   - incentive spirometer  - tolerating sips  - advance to regular diet  - packing changes for old ostomy site  - DVT ppx  - continue IV ABX per ID recommendations: Meropenem 2 gm iv q8h over 4h, Flagyl 500 mg iv q8h  - begin D/C planning

## 2020-07-02 DIAGNOSIS — K63.1 PERFORATION OF INTESTINE (NONTRAUMATIC): ICD-10-CM

## 2020-07-02 LAB
ANION GAP SERPL CALC-SCNC: 9 MMOL/L — SIGNIFICANT CHANGE UP (ref 7–14)
BASOPHILS # BLD AUTO: 0.01 K/UL — SIGNIFICANT CHANGE UP (ref 0–0.2)
BASOPHILS # BLD AUTO: 0.01 K/UL — SIGNIFICANT CHANGE UP (ref 0–0.2)
BASOPHILS NFR BLD AUTO: 0.1 % — SIGNIFICANT CHANGE UP (ref 0–1)
BASOPHILS NFR BLD AUTO: 0.1 % — SIGNIFICANT CHANGE UP (ref 0–1)
BUN SERPL-MCNC: 10 MG/DL — SIGNIFICANT CHANGE UP (ref 10–20)
CALCIUM SERPL-MCNC: 7.5 MG/DL — LOW (ref 8.5–10.1)
CHLORIDE SERPL-SCNC: 99 MMOL/L — SIGNIFICANT CHANGE UP (ref 98–110)
CO2 SERPL-SCNC: 28 MMOL/L — SIGNIFICANT CHANGE UP (ref 17–32)
CREAT SERPL-MCNC: 0.7 MG/DL — SIGNIFICANT CHANGE UP (ref 0.7–1.5)
EOSINOPHIL # BLD AUTO: 0.01 K/UL — SIGNIFICANT CHANGE UP (ref 0–0.7)
EOSINOPHIL # BLD AUTO: 0.02 K/UL — SIGNIFICANT CHANGE UP (ref 0–0.7)
EOSINOPHIL NFR BLD AUTO: 0.1 % — SIGNIFICANT CHANGE UP (ref 0–8)
EOSINOPHIL NFR BLD AUTO: 0.3 % — SIGNIFICANT CHANGE UP (ref 0–8)
GLUCOSE SERPL-MCNC: 133 MG/DL — HIGH (ref 70–99)
HCT VFR BLD CALC: 23.7 % — LOW (ref 42–52)
HCT VFR BLD CALC: 25.1 % — LOW (ref 42–52)
HGB BLD-MCNC: 8 G/DL — LOW (ref 14–18)
HGB BLD-MCNC: 8.6 G/DL — LOW (ref 14–18)
IMM GRANULOCYTES NFR BLD AUTO: 1 % — HIGH (ref 0.1–0.3)
IMM GRANULOCYTES NFR BLD AUTO: 1.4 % — HIGH (ref 0.1–0.3)
LYMPHOCYTES # BLD AUTO: 0.47 K/UL — LOW (ref 1.2–3.4)
LYMPHOCYTES # BLD AUTO: 0.72 K/UL — LOW (ref 1.2–3.4)
LYMPHOCYTES # BLD AUTO: 10.3 % — LOW (ref 20.5–51.1)
LYMPHOCYTES # BLD AUTO: 5.3 % — LOW (ref 20.5–51.1)
MAGNESIUM SERPL-MCNC: 1.6 MG/DL — LOW (ref 1.8–2.4)
MCHC RBC-ENTMCNC: 29.7 PG — SIGNIFICANT CHANGE UP (ref 27–31)
MCHC RBC-ENTMCNC: 30.7 PG — SIGNIFICANT CHANGE UP (ref 27–31)
MCHC RBC-ENTMCNC: 33.8 G/DL — SIGNIFICANT CHANGE UP (ref 32–37)
MCHC RBC-ENTMCNC: 34.3 G/DL — SIGNIFICANT CHANGE UP (ref 32–37)
MCV RBC AUTO: 88.1 FL — SIGNIFICANT CHANGE UP (ref 80–94)
MCV RBC AUTO: 89.6 FL — SIGNIFICANT CHANGE UP (ref 80–94)
MONOCYTES # BLD AUTO: 0.46 K/UL — SIGNIFICANT CHANGE UP (ref 0.1–0.6)
MONOCYTES # BLD AUTO: 0.49 K/UL — SIGNIFICANT CHANGE UP (ref 0.1–0.6)
MONOCYTES NFR BLD AUTO: 5.2 % — SIGNIFICANT CHANGE UP (ref 1.7–9.3)
MONOCYTES NFR BLD AUTO: 7 % — SIGNIFICANT CHANGE UP (ref 1.7–9.3)
NEUTROPHILS # BLD AUTO: 5.66 K/UL — SIGNIFICANT CHANGE UP (ref 1.4–6.5)
NEUTROPHILS # BLD AUTO: 7.77 K/UL — HIGH (ref 1.4–6.5)
NEUTROPHILS NFR BLD AUTO: 80.9 % — HIGH (ref 42.2–75.2)
NEUTROPHILS NFR BLD AUTO: 88.3 % — HIGH (ref 42.2–75.2)
NRBC # BLD: 0 /100 WBCS — SIGNIFICANT CHANGE UP (ref 0–0)
NRBC # BLD: 0 /100 WBCS — SIGNIFICANT CHANGE UP (ref 0–0)
PHOSPHATE SERPL-MCNC: 1.8 MG/DL — LOW (ref 2.1–4.9)
PLATELET # BLD AUTO: 157 K/UL — SIGNIFICANT CHANGE UP (ref 130–400)
PLATELET # BLD AUTO: 188 K/UL — SIGNIFICANT CHANGE UP (ref 130–400)
POTASSIUM SERPL-MCNC: 3.3 MMOL/L — LOW (ref 3.5–5)
POTASSIUM SERPL-SCNC: 3.3 MMOL/L — LOW (ref 3.5–5)
RBC # BLD: 2.69 M/UL — LOW (ref 4.7–6.1)
RBC # BLD: 2.8 M/UL — LOW (ref 4.7–6.1)
RBC # FLD: 12.8 % — SIGNIFICANT CHANGE UP (ref 11.5–14.5)
RBC # FLD: 12.8 % — SIGNIFICANT CHANGE UP (ref 11.5–14.5)
SODIUM SERPL-SCNC: 136 MMOL/L — SIGNIFICANT CHANGE UP (ref 135–146)
WBC # BLD: 7 K/UL — SIGNIFICANT CHANGE UP (ref 4.8–10.8)
WBC # BLD: 8.81 K/UL — SIGNIFICANT CHANGE UP (ref 4.8–10.8)
WBC # FLD AUTO: 7 K/UL — SIGNIFICANT CHANGE UP (ref 4.8–10.8)
WBC # FLD AUTO: 8.81 K/UL — SIGNIFICANT CHANGE UP (ref 4.8–10.8)

## 2020-07-02 PROCEDURE — 99024 POSTOP FOLLOW-UP VISIT: CPT

## 2020-07-02 RX ORDER — ACETAMINOPHEN 500 MG
650 TABLET ORAL EVERY 6 HOURS
Refills: 0 | Status: DISCONTINUED | OUTPATIENT
Start: 2020-07-02 | End: 2020-07-03

## 2020-07-02 RX ORDER — OXYCODONE HYDROCHLORIDE 5 MG/1
5 TABLET ORAL EVERY 6 HOURS
Refills: 0 | Status: DISCONTINUED | OUTPATIENT
Start: 2020-07-02 | End: 2020-07-03

## 2020-07-02 RX ADMIN — MEROPENEM 25 MILLIGRAM(S): 1 INJECTION INTRAVENOUS at 12:19

## 2020-07-02 RX ADMIN — OXYCODONE HYDROCHLORIDE 5 MILLIGRAM(S): 5 TABLET ORAL at 18:33

## 2020-07-02 RX ADMIN — SODIUM CHLORIDE 75 MILLILITER(S): 9 INJECTION, SOLUTION INTRAVENOUS at 06:28

## 2020-07-02 RX ADMIN — Medication 650 MILLIGRAM(S): at 18:27

## 2020-07-02 RX ADMIN — OXCARBAZEPINE 600 MILLIGRAM(S): 300 TABLET, FILM COATED ORAL at 19:26

## 2020-07-02 RX ADMIN — Medication 650 MILLIGRAM(S): at 23:30

## 2020-07-02 RX ADMIN — HEPARIN SODIUM 5000 UNIT(S): 5000 INJECTION INTRAVENOUS; SUBCUTANEOUS at 21:03

## 2020-07-02 RX ADMIN — MEROPENEM 25 MILLIGRAM(S): 1 INJECTION INTRAVENOUS at 06:21

## 2020-07-02 RX ADMIN — MEROPENEM 25 MILLIGRAM(S): 1 INJECTION INTRAVENOUS at 21:03

## 2020-07-02 RX ADMIN — Medication 100 MILLIGRAM(S): at 12:19

## 2020-07-02 RX ADMIN — PANTOPRAZOLE SODIUM 40 MILLIGRAM(S): 20 TABLET, DELAYED RELEASE ORAL at 06:18

## 2020-07-02 RX ADMIN — OXCARBAZEPINE 300 MILLIGRAM(S): 300 TABLET, FILM COATED ORAL at 08:02

## 2020-07-02 RX ADMIN — Medication 50 GRAM(S): at 00:00

## 2020-07-02 RX ADMIN — Medication 650 MILLIGRAM(S): at 12:19

## 2020-07-02 RX ADMIN — Medication 100 MILLIGRAM(S): at 21:03

## 2020-07-02 RX ADMIN — LEVETIRACETAM 1500 MILLIGRAM(S): 250 TABLET, FILM COATED ORAL at 06:18

## 2020-07-02 RX ADMIN — Medication 81 MILLIGRAM(S): at 12:19

## 2020-07-02 RX ADMIN — HEPARIN SODIUM 5000 UNIT(S): 5000 INJECTION INTRAVENOUS; SUBCUTANEOUS at 12:19

## 2020-07-02 RX ADMIN — LEVETIRACETAM 1500 MILLIGRAM(S): 250 TABLET, FILM COATED ORAL at 18:26

## 2020-07-02 RX ADMIN — HEPARIN SODIUM 5000 UNIT(S): 5000 INJECTION INTRAVENOUS; SUBCUTANEOUS at 06:19

## 2020-07-02 RX ADMIN — Medication 100 MILLIGRAM(S): at 06:18

## 2020-07-02 RX ADMIN — Medication 650 MILLIGRAM(S): at 06:18

## 2020-07-02 NOTE — PHYSICAL THERAPY INITIAL EVALUATION ADULT - DIAGNOSIS, PT EVAL
Rehab of gait dysfunction s/p Colectomy; End ileostomy; Reexploration of abdomen with irrigation or drainage and closure

## 2020-07-02 NOTE — CDI QUERY NOTE - NSCDIOTHERTXTBX_GEN_ALL_CORE_HH
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 Upgraded to SICU for close monitoring of potentially worsening intra abdominal sepsis.   Post op hypotension/tachycardia 2/2 septic shock  Assessment: sepsis. lactic acidosis  ** PN 6/30: Lactic acidosis: likely 2/2 sepsis, lactate 3.3-->1.8 improving   ** ID consult 6/30: No sepsis    Vital:  ED: T 98.2, , /71  On 6/28 @ 23:00: T 96, , BP 52/40. pt continue to have systolic BP <90. 500 LR bolus was given.    Lab:  WBC: 5.33 on admission  WBC trend after admission: 1.78, 6.18, 10.67  Lactate: 3.4 on admission  Lactate trend after admission: 4.6, 3.5, 1.8    Treatment:   Levophed infusion on 6/29 from 05:16 - 12:13   Meropenem 2000 mg / 8hr IV   Metronidazole 500 mg / 8 hr IV   IV fluid                                                    Based on your clinical evaluation of the patient, can you please clarify:   • Pt has sepsis based on my clinical evaluation and clinical indicators (please specify).      Please specify if sepsis was present on admission of developed after admission  • After study, sepsis was ruled out	  • Other (please specify).  • Unable to determine. 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 Upgraded to SICU for close monitoring of potentially worsening intra abdominal sepsis.   Post op hypotension/tachycardia 2/2 septic shock  Assessment: sepsis. lactic acidosis  ** PN 6/30: Lactic acidosis: likely 2/2 sepsis, lactate 3.3-->1.8 improving   ** ID consult 6/30: No sepsis    Vital:  ED: T 98.2, , /71  On 6/28 @ 23:00: T 96, , BP 52/40. pt continue to have systolic BP <90. 500 LR bolus was given.    Lab:  WBC: 5.33 on admission  WBC trend after admission: 1.78, 6.18, 10.67  Lactate: 3.4 on admission  Lactate trend after admission: 4.6, 3.5, 1.8    Treatment:   Levophed infusion on 6/29 from 05:16 - 12:13   Meropenem 2000 mg / 8hr IV   Metronidazole 500 mg / 8 hr IV   IV fluid                                                    Based on your clinical evaluation of the patient, can you please clarify:   • Pt has sepsis based on my clinical evaluation and clinical indicators (please specify).      Please specify if sepsis was present on admission or developed after admission  • After study, sepsis was ruled out	  • Other (please specify).  • Unable to determine.

## 2020-07-02 NOTE — PROGRESS NOTE ADULT - ASSESSMENT
Assessment:  44y Male with Type IV EDS s/p ex lap, subtotal colectomy, takedown of previous stoma and ileostomy resited, Prevena VAC to midline wound (closed). Dropped Hgb to 6.6 (from 7.3 post transfusion), currently receiving 1uPRBC (total of 2uPRBC over 24 hours)    Plan:   - f/u AM Hgb (post transfusion)  - incentive spirometer  - tolerating FLD  - packing changes for old ostomy site  - continue IV ABX per ID recommendations: Meropenem 2 gm iv q8h over 4h, Flagyl 500 mg iv q8h  - Rehab: home with VNS  - PT: pending

## 2020-07-02 NOTE — PROGRESS NOTE ADULT - PROBLEM SELECTOR PLAN 1
DC  duplicate acetaminophen   Tablet .. 650 milliGRAM(s) Oral every 6 hours  Con't acetaminophen   Tablet .. 650 milliGRAM(s) Oral every 6 hours  Con't ondansetron Injectable 4 milliGRAM(s) IV Push once PRN  Con't oxyCODONE    IR 5 milliGRAM(s) Oral every 6 hours PRN  Start Neurontin 200mg Q12hrs

## 2020-07-02 NOTE — PROGRESS NOTE ADULT - SUBJECTIVE AND OBJECTIVE BOX
Pain Management Progress Note -     44 year old male s/p End ileostomy, Reexploration of abdomen with irrigation or drainage and closure 2020. Patient states he just had his abd dressings changed. Pain is controlled at this time. No opioids on board.           moxifloxacin (Hives)  penicillin (Unknown)      PERFORATION OF COLON  ^PERFORATION OF COLON  Handoff  MEWS Score  Dislocations and subluxations  Aneurysm artery, celiac  Keratoconus  HTN (hypertension)  Epilepsy  EDS (Melva-Danlos syndrome)  Perforation of colon  Perforated viscus  Perforation of colon  End ileostomy  Reexploration of abdomen with irrigation or drainage and closure  Colectomy, subtotal, open  Status post Kerri's procedure  No significant past surgical history  ABD PAIN  90+      heparin   Injectable  chlorhexidine 4% Liquid  ondansetron Injectable  acetaminophen   Tablet ..  meropenem  IVPB  metroNIDAZOLE  IVPB  metroNIDAZOLE  IVPB  aspirin enteric coated  OXcarbazepine  OXcarbazepine  pantoprazole    Tablet  levETIRAcetam  dextrose 5% + sodium chloride 0.45%.  acetaminophen   Tablet ..  oxyCODONE    IR      acetaminophen   Tablet .. 650 milliGRAM(s) Oral every 6 hours  acetaminophen   Tablet .. 650 milliGRAM(s) Oral every 6 hours  aspirin enteric coated 81 milliGRAM(s) Oral daily  chlorhexidine 4% Liquid 1 Application(s) Topical <User Schedule>  dextrose 5% + sodium chloride 0.45%. 1000 milliLiter(s) IV Continuous <Continuous>  heparin   Injectable 5000 Unit(s) SubCutaneous every 8 hours  levETIRAcetam 1500 milliGRAM(s) Oral two times a day  meropenem  IVPB 2000 milliGRAM(s) IV Intermittent every 8 hours  metroNIDAZOLE  IVPB 500 milliGRAM(s) IV Intermittent every 8 hours  metroNIDAZOLE  IVPB      ondansetron Injectable 4 milliGRAM(s) IV Push once PRN  OXcarbazepine 600 milliGRAM(s) Oral <User Schedule>  OXcarbazepine 300 milliGRAM(s) Oral <User Schedule>  oxyCODONE    IR 5 milliGRAM(s) Oral every 6 hours PRN  pantoprazole    Tablet 40 milliGRAM(s) Oral before breakfast      07-01 @ 20:69765 mL/min/1.73M2          Hemoglobin: 8.0 g/dL (07-02 @ 08:12)  Hemoglobin: 6.6 g/dL (07-01 @ 20:56)  Hemoglobin: 7.3 g/dL (07-01 @ 15:40)  Hemoglobin: 6.8 g/dL (07-01 @ 05:55)        T(C): 36.7 (07-02-20 @ 12:29), Max: 37.6 (07-01-20 @ 18:04)  HR: 85 (07-02-20 @ 12:29) (83 - 91)  BP: 119/77 (07-02-20 @ 12:29) (118/79 - 137/82)  RR: 16 (07-02-20 @ 12:29) (16 - 18)  SpO2: 98% (07-01-20 @ 23:52) (98% - 98%)  Wt(kg): --     REVIEW OF SYSTEMS    General:	NAD   Skin/Breast:	Neg  Ophthalmologic:	Neg  ENMT: Neg	  Respiratory and Thorax: Neg	  Cardiovascular:	Neg  Gastrointestinal:	Neg  Genitourinary:	Neg  Musculoskeletal:	Neg  Neurological:	Neg  Psychiatric:	Neg  Hematology/Lymphatics:	Neg  Endocrine:	Neg        PHYSICAL EXAM:    GENERAL: NAD, well-groomed, well-developed  HEAD:  Atraumatic, Normocephalic  EYES: EOMI, PERRLA, conjunctiva and sclera clear  ENMT: No tonsillar erythema, exudates, or enlargement; Moist mucous membranes, Good dentition, No lesions  NECK: Supple, No JVD, Normal thyroid  NERVOUS SYSTEM:  Alert & Oriented X3, Good concentration; Motor Strength 5/5 B/L upper and lower extremities  CHEST/LUNG: Clear to percussion bilaterally; No rales, rhonchi, wheezing, or rubs  HEART: Regular rate and rhythm; No murmurs, rubs, or gallops  ABDOMEN: Soft, Nontender, Nondistended; Bowel sounds present  EXTREMITIES:  No clubbing, cyanosis, or edema  LYMPH: No lymphadenopathy noted  SKIN: No rashes or lesions

## 2020-07-02 NOTE — PROGRESS NOTE ADULT - SUBJECTIVE AND OBJECTIVE BOX
ANGYFAZAL GODFREY  44y, Male    All available historical data reviewed    OVERNIGHT EVENTS:  no fevers  feels well and has no complaints     ROS:  General: Denies rigors, night sweats  HEENT: Denies headache, rhinorrhea, sore throat, eye pain  CV: Denies CP, palpitations  PULM: Denies wheezing, hemoptysis  GI: Denies hematemesis, hematochezia, melena  : Denies discharge, hematuria  MSK: Denies arthralgias, myalgias  SKIN: Denies rash, lesions  NEURO: Denies paresthesias, weakness  PSYCH: Denies depression, anxiety    VITALS:  T(F): 97.5, Max: 99.8 (07-01-20 @ 12:19)  HR: 84  BP: 118/79  RR: 16Vital Signs Last 24 Hrs  T(C): 36.4 (02 Jul 2020 08:42), Max: 37.7 (01 Jul 2020 12:19)  T(F): 97.5 (02 Jul 2020 08:42), Max: 99.8 (01 Jul 2020 12:19)  HR: 84 (02 Jul 2020 08:42) (83 - 97)  BP: 118/79 (02 Jul 2020 08:42) (118/79 - 137/82)  BP(mean): --  RR: 16 (02 Jul 2020 08:42) (16 - 18)  SpO2: 98% (01 Jul 2020 23:52) (98% - 99%)    TESTS & MEASUREMENTS:                        8.0    8.81  )-----------( 157      ( 02 Jul 2020 08:12 )             23.7     07-01    137  |  103  |  12  ----------------------------<  132<H>  3.7   |  24  |  0.6<L>    Ca    7.3<L>      01 Jul 2020 20:56  Phos  2.2     07-01  Mg     1.6     07-01          Culture - Blood (collected 06-28-20 @ 14:24)  Source: .Blood Blood-Peripheral  Preliminary Report (06-29-20 @ 20:00):    No growth to date.    Culture - Blood (collected 06-28-20 @ 14:24)  Source: .Blood Blood-Peripheral  Preliminary Report (06-29-20 @ 20:00):    No growth to date.            RADIOLOGY & ADDITIONAL TESTS:  Personal review of radiological diagnostics performed  Echo and EKG results noted when applicable.     MEDICATIONS:  acetaminophen   Tablet .. 650 milliGRAM(s) Oral every 6 hours  acetaminophen   Tablet .. 650 milliGRAM(s) Oral every 6 hours  aspirin enteric coated 81 milliGRAM(s) Oral daily  chlorhexidine 4% Liquid 1 Application(s) Topical <User Schedule>  dextrose 5% + sodium chloride 0.45%. 1000 milliLiter(s) IV Continuous <Continuous>  heparin   Injectable 5000 Unit(s) SubCutaneous every 8 hours  levETIRAcetam 1500 milliGRAM(s) Oral two times a day  meropenem  IVPB 2000 milliGRAM(s) IV Intermittent every 8 hours  metroNIDAZOLE  IVPB 500 milliGRAM(s) IV Intermittent every 8 hours  metroNIDAZOLE  IVPB      ondansetron Injectable 4 milliGRAM(s) IV Push once PRN  OXcarbazepine 600 milliGRAM(s) Oral <User Schedule>  OXcarbazepine 300 milliGRAM(s) Oral <User Schedule>  oxyCODONE    IR 5 milliGRAM(s) Oral every 6 hours PRN  pantoprazole    Tablet 40 milliGRAM(s) Oral before breakfast      ANTIBIOTICS:  meropenem  IVPB 2000 milliGRAM(s) IV Intermittent every 8 hours  metroNIDAZOLE  IVPB 500 milliGRAM(s) IV Intermittent every 8 hours  metroNIDAZOLE  IVPB

## 2020-07-02 NOTE — PHYSICAL THERAPY INITIAL EVALUATION ADULT - DID THE PATIENT HAVE SURGERY?
6/28: Colectomy, subtotal, open, 6/29: End ileostomy; Reexploration of abdomen with irrigation or drainage and closure/yes

## 2020-07-02 NOTE — PHYSICAL THERAPY INITIAL EVALUATION ADULT - GENERAL OBSERVATIONS, REHAB EVAL
Patient seen from 10:00-10:45. Pt encountered in bed in no apparent distress. + IV lock + colostomy + wound vac. Patient agreeable to evaluation.

## 2020-07-02 NOTE — PROGRESS NOTE ADULT - SUBJECTIVE AND OBJECTIVE BOX
GENERAL SURGERY PROGRESS NOTE     FAZAL CASSIDY  44y  Male  Hospital day :4d  POD:  Procedure: End ileostomy  Reexploration of abdomen with irrigation or drainage and closure  Colectomy, subtotal, open    OVERNIGHT EVENTS: s/p 1uPRBC during the day, repeat Hgb 7.3 which then dropped to 6.6, giving another unit of pRBC currently    T(F): 97.5 (20 @ 01:55), Max: 99.8 (20 @ 12:19)  HR: 83 (20 @ 01:55) (83 - 99)  BP: 120/76 (20 @ 01:55) (106/69 - 132/80)  RR: 18 (20 @ 01:55) (17 - 18)  SpO2: 98% (20 @ 23:52) (98% - 100%)    DIET/FLUIDS: dextrose 5% + sodium chloride 0.45%. 1000 milliLiter(s) IV Continuous <Continuous>    N20 @ 07:  -  20 @ 07:00  --------------------------------------------------------  OUT: 0 mL                                                                                 DRAINS:     BM:   20 @ 07:  -  20 @ 07:00  --------------------------------------------------------  OUT: 165 mL      EMESIS:     URINE:   20 @ 07:  -  20 @ 07:00  --------------------------------------------------------  OUT: 275 mL       GI proph:  pantoprazole    Tablet 40 milliGRAM(s) Oral before breakfast    AC/ proph: aspirin enteric coated 81 milliGRAM(s) Oral daily  heparin   Injectable 5000 Unit(s) SubCutaneous every 8 hours    ABx: meropenem  IVPB 2000 milliGRAM(s) IV Intermittent every 8 hours  metroNIDAZOLE  IVPB 500 milliGRAM(s) IV Intermittent every 8 hours  metroNIDAZOLE  IVPB          PHYSICAL EXAM:  GENERAL: NAD, well-appearing  ABDOMEN: midline wound with Prevena VAC in place, previous stoma site packing changed with oozing    LABS  CAPILLARY BLOOD GLUCOSE                        6.6    8.79  )-----------( 155      ( 2020 20:56 )             19.4       Auto Neutrophil %: 89.2 % (20 @ 20:56)  Auto Immature Granulocyte %: 0.5 % (20 @ 20:56)  Auto Neutrophil %: 91.0 % (20 @ 15:40)  Auto Immature Granulocyte %: 0.4 % (20 @ 15:40)  Auto Neutrophil %: 90.6 % (20 @ 05:55)  Auto Immature Granulocyte %: 0.3 % (20 @ 05:55)        137  |  103  |  12  ----------------------------<  132<H>  3.7   |  24  |  0.6<L>      Calcium, Total Serum: 7.3 mg/dL (20 @ 20:56)      LFTs:     Lactate, Blood: 1.8 mmol/L (20 @ 23:29)  Blood Gas Venous - Lactate: 3.3 mmoL/L (20 @ 16:02)  Blood Gas Arterial, Lactate: 3.4 mmoL/L (20 @ 14:45)  Blood Gas Arterial, Lactate: 3.5 mmoL/L (20 @ 04:31)    ABG - ( 2020 14:45 )  pH: 7.39  /  pCO2: 34    /  pO2: 112   / HCO3: 21    / Base Excess: -3.6  /  SaO2: 100             ABG - ( 2020 04:31 )  pH: 7.42  /  pCO2: 26    /  pO2: 192   / HCO3: 17    / Base Excess: -5.6  /  SaO2: 100             ABG - ( 2020 20:42 )  pH: 7.36  /  pCO2: 36    /  pO2: 271   / HCO3: 21    / Base Excess: -4.1  /  SaO2: 99          Coags:    CARDIAC MARKERS ( 2020 04:27 )  x     / 0.02 ng/mL / 970 U/L / x     / 20.3 ng/mL

## 2020-07-02 NOTE — PHYSICAL THERAPY INITIAL EVALUATION ADULT - PERTINENT HX OF CURRENT PROBLEM, REHAB EVAL
Patient is a 44 year old male pmhx as below presenting with 1 day of severe epigastric/RUQ pain. Patient seen and examined, states that he had a normal bowel movement this morning and now has severe abdominal pain since 9am. He admits to nausea, denies vomiting. States his ostomy has been functioning normally.

## 2020-07-02 NOTE — ADVANCED PRACTICE NURSE CONSULT - REASON FOR CONSULT
Ostomy assessment, pouch change, ostomy teaching
Review ostomy care w/ patient & wife, provide all d/c paperwork & supplies

## 2020-07-02 NOTE — PHYSICAL THERAPY INITIAL EVALUATION ADULT - PLANNED THERAPY INTERVENTIONS, PT EVAL
balance training/gait training/bed mobility training/transfer training/postural re-education/strengthening

## 2020-07-02 NOTE — ADVANCED PRACTICE NURSE CONSULT - ASSESSMENT
44 year old male with PMH significant for Melva-Danlos syndrome, h/o perforated sigmoid and feculent peritonitis-s/p sigmoid resection and colostomy creation -11/2019; now reversed; currently presenting with severe epigastric and RUQ pain x 1 day, elevated lactate, imaging consistent with colonic perforation/free air. Pt taken to OR-now s/p subtotal colectomy/takedown of previous colostomy, abd open, + abthera vac (6/28) and washout, closure of fascia and subcutaneous layers, creation of end ileostomy (6/29); Prevena vac in place over midline incision    Received patient on 4C, sitting up in chair at bedside, awake, A&Ox3, with recognition of WOCN from previous visit, wife Pastora at bedside, both made aware of purpose of WOCN visit, agreeable to consult. Ileostomy to RLQ w/ Pallavi 2 3/4" pouching system still in place, barrier remains intact, barrier overlapped directly over midline Prevena VAC-being managed by surgery MD team.     Patient reports previous colostomy & good understanding of ostomy care. Cory reviewed w/ patient differences between colostomy vs. ileostomy (stool frequency & consistency). Patient reports previously utilizing [place] 2 1/4" pouching system w/ use of Adapt flat ring w/o pouching or leakage issues. Patient states he has sufficient supplies at home from previous ostomy & knowledgeable on how to obtain more supplies if needed, pt to continue utilizing his previous ostomy supplies when d/c'd to home.    Reviewed with patient in detail dietary restrictions and dehydration s/s & prevention. Instructed patient to measure ostomy output at home (in collection canister provided) and notify MD if output > or = 1000cc/24 hours. Also reviewed w/ patient stomal obstruction s/s and prevention and lifestyle modifications (clothing, bathing, physical activity, etc).  Patient verbalized understanding of all information given.    Written information regarding all above topics provided to patient.
44 year old male with PMH significant for Melva-Danlos syndrome, h/o perforated sigmoid and feculent peritonitis-s/p sigmoid resection and colostomy creation -11/2019; now reversed; currently presenting with severe epigastric and RUQ pain x 1 day, elevated lactate, imaging consistent with colonic perforation/free air. Pt taken to OR-now s/p subtotal colectomy/takedown of previous colostomy, abd open, + abthera vac (6/28) and washout, closure of fascia and subcutaneous layers, creation of end ileostomy (6/29); Prevena vac in place over midline incision    Received patient in ICU sitting up in bed, awake, A&Ox3, made aware of purpose of WOCN visit, agreeable to consult. Ileostomy to RLQ w/ Pallavi 2 3/4" pouching system in place, barrier intact, barrier overlapped directly over midline Prevena VAC. Pouch left in place as to not disrupt VAC dressing & pouch currently intact w/o any leakage present. Spoke to surgery ICU team in regards to plan for ? Prevena takedown, informed by ICU MD team that trauma surgery team (who performed surgery) will change packing dressing to old colostomy site and assess Prevena VAC dressing & change ileostomy ostomy pouch if needed during rounds tomorrow am.     Patient reports previous colostomy & good understanding of ostomy care. Briefly reviewed w/ patient differences between colostomy vs. ileostomy , dietary restrictions  and dehydration prevention. Patient requested WOCN revisit when wife present as she assist patient at home w/ ostomy care.

## 2020-07-02 NOTE — PHYSICAL THERAPY INITIAL EVALUATION ADULT - SPECIFY REASON(S)
PT IE on hold. Pt's updated blood values show hgb of 6.6. Will follow up as appropriate.
PT IE on hold at this time. Pt has a low hgb of 6.8. Pending updated blood values. Will follow up as appropriate.

## 2020-07-02 NOTE — ADVANCED PRACTICE NURSE CONSULT - RECOMMEDATIONS
Ileostomy pouch change: 	  -Gently remove pouch water moistened gauze   -Cleanse stoma site with water moistened gauze, gently pat dry  -Measure stoma  -Trace and cut current size on [place] 2 1/4” CeraPlus flat barrier (#43773)  -Stretch Pallavi Adapt CeraRing (#[id number]) onto back of barrier  -Apply barrier to patient's skin  -Attach Pallavi 2 1/4” drainable lock and roll pouch (#70243) onto barrier  Empty pouch when 1/3 to no more than 1/2 full of effluent/flatus. Change pouching system q 3 - 4 days and prn for leaking.     Plan of Care: Patient d/c still pending at this time, pt to continue utilizing his previous ostomy supplies when d/c'd to home. Patient to follow with MD upon d/c for continued post-op management & care/treatment.  Patient given [facility] contact info and instructed to call w/ questions/concerns. Signing off on patient, patient d/c'ed from [facility] service. Questions or concerns, please contact [facility], [phone].
WOCN service to follow-up w/ patient and wife Thursday to review ostomy care, supplies needed for pouching, dietary restrictions, and dehydration prevention. Primary RN Maryse made aware of above. Questions or concern, or pouch w/ consistent leakage and unable to obtain seal, please contact [facility], [phone].

## 2020-07-03 ENCOUNTER — TRANSCRIPTION ENCOUNTER (OUTPATIENT)
Age: 45
End: 2020-07-03

## 2020-07-03 VITALS
RESPIRATION RATE: 18 BRPM | DIASTOLIC BLOOD PRESSURE: 89 MMHG | HEART RATE: 95 BPM | TEMPERATURE: 99 F | SYSTOLIC BLOOD PRESSURE: 125 MMHG

## 2020-07-03 PROCEDURE — 99024 POSTOP FOLLOW-UP VISIT: CPT

## 2020-07-03 RX ORDER — CEFPODOXIME PROXETIL 100 MG
1 TABLET ORAL
Qty: 14 | Refills: 0
Start: 2020-07-03 | End: 2020-07-09

## 2020-07-03 RX ORDER — OXYCODONE HYDROCHLORIDE 5 MG/1
1 TABLET ORAL
Qty: 15 | Refills: 0
Start: 2020-07-03

## 2020-07-03 RX ORDER — MAGNESIUM SULFATE 500 MG/ML
2 VIAL (ML) INJECTION ONCE
Refills: 0 | Status: DISCONTINUED | OUTPATIENT
Start: 2020-07-03 | End: 2020-07-03

## 2020-07-03 RX ORDER — METRONIDAZOLE 500 MG
1 TABLET ORAL
Qty: 21 | Refills: 0
Start: 2020-07-03 | End: 2020-07-09

## 2020-07-03 RX ORDER — SODIUM,POTASSIUM PHOSPHATES 278-250MG
POWDER IN PACKET (EA) ORAL
Refills: 0 | Status: DISCONTINUED | OUTPATIENT
Start: 2020-07-03 | End: 2020-07-03

## 2020-07-03 RX ORDER — POTASSIUM PHOSPHATE, MONOBASIC POTASSIUM PHOSPHATE, DIBASIC 236; 224 MG/ML; MG/ML
30 INJECTION, SOLUTION INTRAVENOUS ONCE
Refills: 0 | Status: COMPLETED | OUTPATIENT
Start: 2020-07-03 | End: 2020-07-03

## 2020-07-03 RX ORDER — ACETAMINOPHEN 500 MG
2 TABLET ORAL
Qty: 0 | Refills: 0 | DISCHARGE
Start: 2020-07-03

## 2020-07-03 RX ORDER — MAGNESIUM SULFATE 500 MG/ML
2 VIAL (ML) INJECTION ONCE
Refills: 0 | Status: COMPLETED | OUTPATIENT
Start: 2020-07-03 | End: 2020-07-03

## 2020-07-03 RX ORDER — POTASSIUM CHLORIDE 20 MEQ
20 PACKET (EA) ORAL ONCE
Refills: 0 | Status: DISCONTINUED | OUTPATIENT
Start: 2020-07-03 | End: 2020-07-03

## 2020-07-03 RX ADMIN — HEPARIN SODIUM 5000 UNIT(S): 5000 INJECTION INTRAVENOUS; SUBCUTANEOUS at 05:04

## 2020-07-03 RX ADMIN — HEPARIN SODIUM 5000 UNIT(S): 5000 INJECTION INTRAVENOUS; SUBCUTANEOUS at 14:01

## 2020-07-03 RX ADMIN — PANTOPRAZOLE SODIUM 40 MILLIGRAM(S): 20 TABLET, DELAYED RELEASE ORAL at 05:06

## 2020-07-03 RX ADMIN — OXYCODONE HYDROCHLORIDE 5 MILLIGRAM(S): 5 TABLET ORAL at 11:22

## 2020-07-03 RX ADMIN — MEROPENEM 25 MILLIGRAM(S): 1 INJECTION INTRAVENOUS at 14:00

## 2020-07-03 RX ADMIN — LEVETIRACETAM 1500 MILLIGRAM(S): 250 TABLET, FILM COATED ORAL at 17:42

## 2020-07-03 RX ADMIN — Medication 100 MILLIGRAM(S): at 14:00

## 2020-07-03 RX ADMIN — OXCARBAZEPINE 300 MILLIGRAM(S): 300 TABLET, FILM COATED ORAL at 08:44

## 2020-07-03 RX ADMIN — MEROPENEM 25 MILLIGRAM(S): 1 INJECTION INTRAVENOUS at 05:04

## 2020-07-03 RX ADMIN — Medication 650 MILLIGRAM(S): at 11:22

## 2020-07-03 RX ADMIN — Medication 650 MILLIGRAM(S): at 17:42

## 2020-07-03 RX ADMIN — LEVETIRACETAM 1500 MILLIGRAM(S): 250 TABLET, FILM COATED ORAL at 05:04

## 2020-07-03 RX ADMIN — OXYCODONE HYDROCHLORIDE 5 MILLIGRAM(S): 5 TABLET ORAL at 17:40

## 2020-07-03 RX ADMIN — Medication 81 MILLIGRAM(S): at 11:22

## 2020-07-03 RX ADMIN — SODIUM CHLORIDE 75 MILLILITER(S): 9 INJECTION, SOLUTION INTRAVENOUS at 08:45

## 2020-07-03 RX ADMIN — Medication 650 MILLIGRAM(S): at 05:04

## 2020-07-03 RX ADMIN — Medication 100 MILLIGRAM(S): at 05:06

## 2020-07-03 RX ADMIN — OXYCODONE HYDROCHLORIDE 5 MILLIGRAM(S): 5 TABLET ORAL at 00:51

## 2020-07-03 NOTE — PROGRESS NOTE ADULT - ATTENDING COMMENTS
anemia to 6.8, transfusing 1 unit  abdominal pain improved, ileostomy putting out bilious  will advance diet
stable overnight   on low dose levo   OR today for abdominal closure
I examined the patient and discussed my plan with the resident  the patient is ambulating, tolerating diet, pain controlled.  hb stable. can be discharged
hb drop yesterday, responded to blood but will monitor 24h to ensure stable  advance diet  plan for discharge tomorrow if stable
Patient planned for return to OR for abdominal closure and ostomy creation.    ASSESSMENT:  45 y/o male with Colon Perforation. Peritonitis.  S/P Exploratory Laparotomy.  Melva-Danlos Syndrome Type 4.  Acute hypoxic respiratory failure.  Sepsis.  Lactic acidosis.    PLAN:  - Neuro: sedation;   - Respiratory: SBT postoperatively for extubation.  - Cardio - keep MAP>65; continue IVF resuscitation.  - GI: npo  - Renal: follow serum electrolytes and UOP.  - ID: continue iv antibiotics  - GI and DVT prophylaxis
Patient looks comfortable, afebrile.  Pain controlled with PCA.    PE:  AAO x3  Chest: clear.  CV ; RRR  Abdomen: soft, nontender, ostomy pink and viable.  Extr: no edema    ASSESSMENT:  45 y/o male with Colon Perforation. Peritonitis.  S/P Exploratory Laparotomy.  Melva-Danlos Syndrome Type 4.  Acute hypoxic respiratory failure.  Sepsis.  Lactic acidosis.    PLAN:  - pain control  - start clear liquid diet.  - incentive spirometer  - continue iv antibiotics  - DVT prophylaxis

## 2020-07-03 NOTE — DISCHARGE NOTE PROVIDER - HOSPITAL COURSE
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 He presented on 6/28 w/severe abd pain , +nausea x1 day, with CT findings of a colonic perforation. He was taken to the OR emergently on 6/28 for a subtotal colectomy and takedown of colostomy with intraop findings of feculent peritonitis, large perforation of descending colon at splenic flexure, no defect at small bowel. The abdomen was left open with abthera vac in place. Patient experienced significant "oozing" intra-op 2/2 to his underlying connective tissue disorder. Started on clindamycin, aztreonam, and flagyl. Patient remained HDS and no transfusions needed. Intraop course otherwise uncomplicated. Patient remained intubated w/ plans for take back to OR on 6/29. He was transferred to PACU in stable but guarded condition. Overnight, pt was continuously hypotensive with MAPS in 50s and tachycardic to 130s. He received 1750 in IVF overnight. Throughout the night, the pt remained alert and following commands. UOP was adequate and tachycardia improved to 108.         6/29: Decision was made to start peripheral levophed on at 5am, which was weaned off before take back on 6/29 for a washout, closure, and end ileostomy. Intraoperatively they used evicel for oozing, closed fascia, and packed old colostomy site with 1/2 inch packing. Prevena vac was placed over midline incision. ID recommended stopping clindamycin, aztreonam, and flagyl as well as starting meropenem. Lactate was persistently elevated, so fluid resuscitation was continued. Pt successfully extubated to NC. Dilaudid PCA pump started for pain control.         6/30: NGT and austin removed. Sips and chips as well as PO medications started. Per ID, meropenem increased to 2g over 4H and flagyl 500 Q8H. Home oxcarbazepine restarted.         7/1: Patient was downgraded. Wound dressings changed and Prevena vac continued.         Patient has now been tolerating regular diet for last 2 days. Ambulating. Has pain on palpation and moderate pain with renewed dressing changes. Otherwise found in NAD.

## 2020-07-03 NOTE — DISCHARGE NOTE PROVIDER - CARE PROVIDER_API CALL
Srinivasan Lane [facility]  Surgery Critical Care-Surgery  [address] [place] [id number]  Phone: [phone]  Fax: [phone]  Follow Up Time: 2 weeks

## 2020-07-03 NOTE — PROGRESS NOTE ADULT - SUBJECTIVE AND OBJECTIVE BOX
GENERAL SURGERY PROGRESS NOTE     ANGE [address] day : 6d  POD: 5  Procedure: End ileostomy  Reexploration of abdomen with irrigation or drainage and closure  Colectomy, subtotal, open    Surgical Attending: Srinivasan Lane    Overnight events: Hgb stable from 8.0 to 8.6. Hemodynamically stable. ostomy with bilious output. Pain controlled. tolerating regular diet. ambulating,     T(F): 98.6 (07-03-20 @ 09:28), Max: 100.4 (07-02-20 @ 20:00)  HR: 86 (07-03-20 @ 09:28) (84 - 99)  BP: 126/84 (07-03-20 @ 09:28) (114/71 - 143/88)  ABP: --  ABP(mean): --  RR: 18 (07-03-20 @ 09:28) (16 - 18)  SpO2: --      07-02-20 @ 07:01  -  07-03-20 @ 07:00  --------------------------------------------------------  IN:    IV PiggyBack: 300 mL    Oral Fluid: 897 mL  Total IN: 1197 mL    OUT:    Ileostomy: 720 mL    Voided: 8751 mL  Total OUT: 9471 mL    Total NET: -8274 mL      07-03-20 @ 07:01  -  07-03-20 @ 09:45  --------------------------------------------------------  IN:    Oral Fluid: 210 mL  Total IN: 210 mL    OUT:    Ileostomy: 125 mL    Voided: 900 mL  Total OUT: 1025 mL    Total NET: -815 mL        DIET/FLUIDS: dextrose 5% + sodium chloride 0.45%. 1000 milliLiter(s) IV Continuous <Continuous>  magnesium sulfate  IVPB 2 Gram(s) IV Intermittent once  potassium phosphate IVPB 30 milliMole(s) IV Intermittent once      BM:   07-02-20 @ 07:01  -  07-03-20 @ 07:00  --------------------------------------------------------  OUT: 720 mL       GI proph:  pantoprazole    Tablet 40 milliGRAM(s) Oral before breakfast    AC/ proph: aspirin enteric coated 81 milliGRAM(s) Oral daily  heparin   Injectable 5000 Unit(s) SubCutaneous every 8 hours    ABx: meropenem  IVPB 2000 milliGRAM(s) IV Intermittent every 8 hours  metroNIDAZOLE  IVPB 500 milliGRAM(s) IV Intermittent every 8 hours  metroNIDAZOLE  IVPB          PHYSICAL EXAM:  GENERAL: NAD, well-appearing  CHEST/LUNG: Clear to auscultation bilaterally  HEART: Regular rate and rhythm  ABDOMEN: Soft, Nontender, Nondistended; midline Prevena, right sided ileostomy with dark bilious output in bag stoma appears pink, viable, non erythematous, clean edges , left sided old stoma with packing, non-erythematous   EXTREMITIES:  No clubbing, cyanosis, or edema. Large ecchymosis right medial antecubital fossa       Labs:  CAPILLARY BLOOD GLUCOSE                        8.6    7.00  )-----------( 188      ( 02 Jul 2020 21:54 )             25.1       Auto Neutrophil %: 80.9 % (07-02-20 @ 21:54)  Auto Immature Granulocyte %: 1.4 % (07-02-20 @ 21:54)    07-02    136  |  99  |  10  ----------------------------<  133<H>  3.3<L>   |  28  |  0.7      Calcium, Total Serum: 7.5 mg/dL (07-02-20 @ 21:54)      LFTs:    ABG - ( 29 Jun 2020 14:45 )  pH: 7.39  /  pCO2: 34    /  pO2: 112   / HCO3: 21    / Base Excess: -3.6  /  SaO2: 100       ABG - ( 29 Jun 2020 04:31 )  pH: 7.42  /  pCO2: 26    /  pO2: 192   / HCO3: 17    / Base Excess: -5.6  /  SaO2: 100       ABG - ( 28 Jun 2020 20:42 )  pH: 7.36  /  pCO2: 36    /  pO2: 271   / HCO3: 21    / Base Excess: -4.1  /  SaO2: 99

## 2020-07-03 NOTE — DISCHARGE NOTE PROVIDER - NSDCCPTREATMENT_GEN_ALL_CORE_FT
PRINCIPAL PROCEDURE  Procedure: Colectomy, subtotal, open  Findings and Treatment: continue daily packing changes, with half inch packing & dry lulú dressing.  pain control as prescribed  ostomy care  please follow up with Dr. Lane in 2 weeks  please follow up with primary care doctor as well.      SECONDARY PROCEDURE  Procedure: End ileostomy  Findings and Treatment: continue ostomy care PRINCIPAL PROCEDURE  Procedure: Colectomy, subtotal, open  Findings and Treatment: continue daily packing changes, with half inch packing & dry lulú dressing for left abdominal incision.  Please apply dry gauze dressing, ABD and tape to midline abdominal wound.  pain control as prescribed  ostomy care  please follow up with Dr. Lane in 2 weeks  please follow up with primary care doctor as well.      SECONDARY PROCEDURE  Procedure: End ileostomy  Findings and Treatment: continue ostomy care

## 2020-07-03 NOTE — DISCHARGE NOTE PROVIDER - DISCHARGE DATE
03-Jul-2020 Glycopyrrolate Pregnancy And Lactation Text: This medication is Pregnancy Category B and is considered safe during pregnancy. It is unknown if it is excreted breast milk.

## 2020-07-03 NOTE — DISCHARGE NOTE PROVIDER - NSDCCPCAREPLAN_GEN_ALL_CORE_FT
PRINCIPAL DISCHARGE DIAGNOSIS  Diagnosis: Perforation of colon  Assessment and Plan of Treatment: Ileostomy placed on R side.

## 2020-07-03 NOTE — DISCHARGE NOTE PROVIDER - NSDCMRMEDTOKEN_GEN_ALL_CORE_FT
acetaminophen 325 mg oral tablet: 2 tab(s) orally every 6 hours  Aspir 81 oral delayed release tablet: orally once a day  Keppra 750 mg oral tablet: 2 tab(s) orally 2 times a day  LISINOPRIL   TAB 30MG:   Multiple Vitamins oral tablet: 1 tab(s) orally once a day  OXCARBAZEPIN TAB 600M milligram(s) orally once a day (at bedtime)  Trileptal 300 mg oral tablet: 300 milligram(s) orally once a day in the morning acetaminophen 325 mg oral tablet: 2 tab(s) orally every 6 hours  Aspir 81 oral delayed release tablet: orally once a day  cefpodoxime 200 mg oral tablet: 1 tab(s) orally every 12 hours   Flagyl 500 mg oral tablet: 1 tab(s) orally 3 times a day   Keppra 750 mg oral tablet: 2 tab(s) orally 2 times a day  LISINOPRIL   TAB 30MG:   Multiple Vitamins oral tablet: 1 tab(s) orally once a day  OXCARBAZEPIN TAB 600M milligram(s) orally once a day (at bedtime)  oxyCODONE 5 mg oral tablet: 1 tab(s) orally every 6 hours, As Needed -Severe Pain (7 - 10) MDD:4  Trileptal 300 mg oral tablet: 300 milligram(s) orally once a day in the morning

## 2020-07-03 NOTE — PROGRESS NOTE ADULT - ASSESSMENT
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 He is currently hemodynamically stable, pain is controlled.     Plan:   - Transfuse 1 unit, F/U hemoglobin after   - incentive spirometer  - continue regular diet  - packing changes for old ostomy site daily  - F/U pain control  - K Mg and P repleted. Labs ordered to be repeated at 8pm  - DVT ppx  - continue ABX per ID recommendations: Meropenem 2 gm iv q8h over 4h, Flagyl 500 mg iv q8h. 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     He is currently hemodynamically stable, pain is controlled. ambulating+    Plan:   - incentive spirometer  - continue regular diet  - packing changes for old ostomy site daily  - F/U pain control  - K Mg and P repleted. Labs ordered to be repeated at 8pm  - DVT ppx  - continue ABX per ID recommendations: Meropenem 2 gm iv q8h over 4h, Flagyl 500 mg iv q8h. Will contact ID for PO recommendations   -  D/C planning

## 2020-07-03 NOTE — DISCHARGE NOTE NURSING/CASE MANAGEMENT/SOCIAL WORK - PATIENT PORTAL LINK FT
You can access the FollowMyHealth Patient Portal offered by [facility] by registering at the following website: [email or web address]. By joining [employer]’s FollowMyHealth portal, you will also be able to view your health information using other applications (apps) compatible with our system.

## 2020-07-06 PROBLEM — I72.8 ANEURYSM OF OTHER SPECIFIED ARTERIES: Chronic | Status: ACTIVE | Noted: 2018-07-07

## 2020-07-10 DIAGNOSIS — Q79.60 EHLERS-DANLOS SYNDROME, UNSPECIFIED: ICD-10-CM

## 2020-07-10 DIAGNOSIS — I10 ESSENTIAL (PRIMARY) HYPERTENSION: ICD-10-CM

## 2020-07-10 DIAGNOSIS — R00.0 TACHYCARDIA, UNSPECIFIED: ICD-10-CM

## 2020-07-10 DIAGNOSIS — K91.61 INTRAOPERATIVE HEMORRHAGE AND HEMATOMA OF A DIGESTIVE SYSTEM ORGAN OR STRUCTURE COMPLICATING A DIGESTIVE SYSTEM PROCEDURE: ICD-10-CM

## 2020-07-10 DIAGNOSIS — Z93.3 COLOSTOMY STATUS: ICD-10-CM

## 2020-07-10 DIAGNOSIS — Z88.0 ALLERGY STATUS TO PENICILLIN: ICD-10-CM

## 2020-07-10 DIAGNOSIS — R10.13 EPIGASTRIC PAIN: ICD-10-CM

## 2020-07-10 DIAGNOSIS — Z88.1 ALLERGY STATUS TO OTHER ANTIBIOTIC AGENTS STATUS: ICD-10-CM

## 2020-07-10 DIAGNOSIS — I08.1 RHEUMATIC DISORDERS OF BOTH MITRAL AND TRICUSPID VALVES: ICD-10-CM

## 2020-07-10 DIAGNOSIS — Z79.82 LONG TERM (CURRENT) USE OF ASPIRIN: ICD-10-CM

## 2020-07-10 DIAGNOSIS — K63.1 PERFORATION OF INTESTINE (NONTRAUMATIC): ICD-10-CM

## 2020-07-10 DIAGNOSIS — Z87.891 PERSONAL HISTORY OF NICOTINE DEPENDENCE: ICD-10-CM

## 2020-07-10 DIAGNOSIS — G40.909 EPILEPSY, UNSPECIFIED, NOT INTRACTABLE, WITHOUT STATUS EPILEPTICUS: ICD-10-CM

## 2020-07-10 DIAGNOSIS — K65.9 PERITONITIS, UNSPECIFIED: ICD-10-CM

## 2020-07-10 DIAGNOSIS — A41.9 SEPSIS, UNSPECIFIED ORGANISM: ICD-10-CM

## 2020-07-10 DIAGNOSIS — R65.21 SEVERE SEPSIS WITH SEPTIC SHOCK: ICD-10-CM

## 2020-07-14 ENCOUNTER — APPOINTMENT (OUTPATIENT)
Dept: SURGERY | Facility: CLINIC | Age: 45
End: 2020-07-14
Payer: COMMERCIAL

## 2020-07-14 VITALS
TEMPERATURE: 98.3 F | SYSTOLIC BLOOD PRESSURE: 122 MMHG | WEIGHT: 144 LBS | HEART RATE: 97 BPM | BODY MASS INDEX: 21.33 KG/M2 | HEIGHT: 69 IN | DIASTOLIC BLOOD PRESSURE: 80 MMHG

## 2020-07-14 PROCEDURE — 99024 POSTOP FOLLOW-UP VISIT: CPT

## 2020-07-14 RX ORDER — DOXYCYCLINE HYCLATE 100 MG/1
100 CAPSULE ORAL
Qty: 14 | Refills: 0 | Status: DISCONTINUED | COMMUNITY
Start: 2019-11-20 | End: 2020-07-14

## 2020-07-14 NOTE — PHYSICAL EXAM
[JVD] : no jugular venous distention  [Normal Breath Sounds] : Normal breath sounds [Abdominal Masses] : No abdominal masses [Abdomen Tenderness] : ~T ~M No abdominal tenderness [No HSM] : no hepatosplenomegaly [Tender] : was nontender [No Rash or Lesion] : No rash or lesion [Alert] : alert [Oriented to Person] : oriented to person [Oriented to Place] : oriented to place [Oriented to Time] : oriented to time [Calm] : calm [de-identified] : doing well no c/o [de-identified] : FIDE [de-identified] : soft , non tender \par ostomy functioning \par ostomy site healing , packed \par upper midline incision opening  one cm also packed \par , no cellulitis or drainage

## 2020-07-14 NOTE — ASSESSMENT
[FreeTextEntry1] : doing well \par nutrition support \par continue wound packing \par f/u in 2 weeks \par will order blood work as per patient request

## 2020-07-14 NOTE — HISTORY OF PRESENT ILLNESS
[de-identified] : patient had history of EHlos Danlos syndrome with colostomy after Caraballo procedure \par presented to Ed with perforated colon at splenic Flector with fecal peritonitis, underwent subtotal colectomy and ileostomy 3 weeks ago, here for f/u appointment n

## 2020-07-17 LAB
ANION GAP SERPL CALC-SCNC: 17 MMOL/L
BASOPHILS # BLD AUTO: 0.07 K/UL
BASOPHILS NFR BLD AUTO: 1.1 %
BUN SERPL-MCNC: 16 MG/DL
CALCIUM SERPL-MCNC: 9.8 MG/DL
CHLORIDE SERPL-SCNC: 95 MMOL/L
CO2 SERPL-SCNC: 23 MMOL/L
CREAT SERPL-MCNC: 0.8 MG/DL
EOSINOPHIL # BLD AUTO: 0.04 K/UL
EOSINOPHIL NFR BLD AUTO: 0.6 %
GLUCOSE SERPL-MCNC: 92 MG/DL
HCT VFR BLD CALC: 32.6 %
HGB BLD-MCNC: 10.3 G/DL
IMM GRANULOCYTES NFR BLD AUTO: 1.7 %
LYMPHOCYTES # BLD AUTO: 1.06 K/UL
LYMPHOCYTES NFR BLD AUTO: 16.4 %
MAN DIFF?: NORMAL
MCHC RBC-ENTMCNC: 29.4 PG
MCHC RBC-ENTMCNC: 31.6 G/DL
MCV RBC AUTO: 93.1 FL
MONOCYTES # BLD AUTO: 0.49 K/UL
MONOCYTES NFR BLD AUTO: 7.6 %
NEUTROPHILS # BLD AUTO: 4.7 K/UL
NEUTROPHILS NFR BLD AUTO: 72.6 %
PLATELET # BLD AUTO: 688 K/UL
POTASSIUM SERPL-SCNC: 4.7 MMOL/L
RBC # BLD: 3.5 M/UL
RBC # FLD: 13 %
SODIUM SERPL-SCNC: 135 MMOL/L
WBC # FLD AUTO: 6.47 K/UL

## 2020-07-28 ENCOUNTER — APPOINTMENT (OUTPATIENT)
Dept: SURGERY | Facility: CLINIC | Age: 45
End: 2020-07-28
Payer: COMMERCIAL

## 2020-07-28 VITALS
WEIGHT: 147 LBS | DIASTOLIC BLOOD PRESSURE: 78 MMHG | HEART RATE: 79 BPM | HEIGHT: 69 IN | SYSTOLIC BLOOD PRESSURE: 120 MMHG | BODY MASS INDEX: 21.77 KG/M2 | TEMPERATURE: 97.6 F

## 2020-07-28 PROCEDURE — 99024 POSTOP FOLLOW-UP VISIT: CPT

## 2020-07-28 NOTE — PHYSICAL EXAM
[Normal Breath Sounds] : Normal breath sounds [JVD] : no jugular venous distention  [Abdominal Masses] : No abdominal masses [No HSM] : no hepatosplenomegaly [Abdomen Tenderness] : ~T ~M No abdominal tenderness [Oriented to Place] : oriented to place [Oriented to Person] : oriented to person [Alert] : alert [No Rash or Lesion] : No rash or lesion [Oriented to Time] : oriented to time [Calm] : calm [de-identified] : FIDE [de-identified] : appears well , no c/o  [de-identified] : soft , non tender , functioning ostomy , ostomy site almost closed \par midline with small opening decreasing in size , no drainage or cellulitis, repacked i

## 2020-08-18 ENCOUNTER — APPOINTMENT (OUTPATIENT)
Dept: SURGERY | Facility: CLINIC | Age: 45
End: 2020-08-18
Payer: COMMERCIAL

## 2020-08-18 VITALS
DIASTOLIC BLOOD PRESSURE: 72 MMHG | SYSTOLIC BLOOD PRESSURE: 120 MMHG | HEART RATE: 89 BPM | BODY MASS INDEX: 22.07 KG/M2 | WEIGHT: 149 LBS | HEIGHT: 69 IN | TEMPERATURE: 98.2 F

## 2020-08-18 DIAGNOSIS — Q79.63 VASCULAR EHLERS-DANLOS SYNDROME: ICD-10-CM

## 2020-08-18 PROCEDURE — 99024 POSTOP FOLLOW-UP VISIT: CPT

## 2020-08-18 RX ORDER — OXCARBAZEPINE 600 MG/1
600 TABLET, FILM COATED ORAL
Qty: 135 | Refills: 0 | Status: ACTIVE | COMMUNITY
Start: 2020-05-14

## 2020-08-18 RX ORDER — METRONIDAZOLE 500 MG/1
500 TABLET ORAL
Qty: 21 | Refills: 0 | Status: ACTIVE | COMMUNITY
Start: 2020-07-03

## 2020-08-18 RX ORDER — LEVETIRACETAM 750 MG/1
750 TABLET, FILM COATED ORAL
Qty: 360 | Refills: 0 | Status: ACTIVE | COMMUNITY
Start: 2020-06-02

## 2020-08-18 RX ORDER — CEFPODOXIME PROXETIL 200 MG/1
200 TABLET, FILM COATED ORAL
Qty: 14 | Refills: 0 | Status: ACTIVE | COMMUNITY
Start: 2020-07-03

## 2020-08-18 RX ORDER — LISINOPRIL 30 MG/1
30 TABLET ORAL
Qty: 90 | Refills: 0 | Status: ACTIVE | COMMUNITY
Start: 2020-06-01

## 2020-08-18 RX ORDER — OXYCODONE 5 MG/1
5 TABLET ORAL
Qty: 15 | Refills: 0 | Status: ACTIVE | COMMUNITY
Start: 2020-07-06

## 2020-08-18 NOTE — ASSESSMENT
[FreeTextEntry1] : doing well \par incision healed \par no plans for ostomy reversal \par f/u as needed

## 2020-08-18 NOTE — PHYSICAL EXAM
[JVD] : no jugular venous distention  [Normal Breath Sounds] : Normal breath sounds [Abdominal Masses] : No abdominal masses [Abdomen Tenderness] : ~T ~M No abdominal tenderness [No HSM] : no hepatosplenomegaly [Tender] : was nontender [No Rash or Lesion] : No rash or lesion [Alert] : alert [Oriented to Person] : oriented to person [Oriented to Place] : oriented to place [Oriented to Time] : oriented to time [Calm] : calm [de-identified] : appears well [de-identified] : FIDE [de-identified] : healed opening of incision , no drainage or cellulitis

## 2020-10-20 ENCOUNTER — APPOINTMENT (OUTPATIENT)
Dept: VASCULAR SURGERY | Facility: CLINIC | Age: 45
End: 2020-10-20
Payer: COMMERCIAL

## 2020-10-20 VITALS — WEIGHT: 150 LBS | BODY MASS INDEX: 22.15 KG/M2

## 2020-10-20 VITALS — TEMPERATURE: 98.2 F | SYSTOLIC BLOOD PRESSURE: 125 MMHG | DIASTOLIC BLOOD PRESSURE: 87 MMHG

## 2020-10-20 PROCEDURE — 99203 OFFICE O/P NEW LOW 30 MIN: CPT

## 2020-10-20 PROCEDURE — 99072 ADDL SUPL MATRL&STAF TM PHE: CPT

## 2020-10-20 RX ORDER — ASPIRIN 81 MG
81 TABLET, DELAYED RELEASE (ENTERIC COATED) ORAL
Refills: 0 | Status: ACTIVE | COMMUNITY

## 2020-10-20 RX ORDER — LEVETIRACETAM 1000 MG/1
TABLET, FILM COATED ORAL
Refills: 0 | Status: ACTIVE | COMMUNITY

## 2020-10-20 NOTE — HISTORY OF PRESENT ILLNESS
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 Dima Ramirez at [facility], was admitted for colon rupture in November 2019 by Dr. Daly underwent resection and colostomy and then in June 2020 underwent ileostomy by Dr. Lane for colon rupture again. He has h/o iliac artery aneurysms, carotid aneurysms that he gets periodic CT scans every year at [facility].

## 2020-10-20 NOTE — ASSESSMENT
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 Dima Ramirez at [facility], was admitted for colon rupture in November 2019 by Dr. Daly underwent resection and colostomy and then in June 2020 underwent ileostomy by Dr. Lane for colon rupture again. He has h/o iliac artery aneurysms, carotid aneurysms that he gets periodic CT scans every year at [facility].\par \par I reviewed the CT scan from June 2020 that showed left CHRISTY aneurysm at 2.5 cm and on the CT scan of the chest in November 2019 that showed ascending thoracic aortic aneurysm at 4 cm.\par \par No surgical intervention is required at this time. He will continue to follow up at [facility], and was advised to also follow up with Rheumatologist.\par \par \par \par Due to COVID -19, pre-visit patient instructions were explained to the patient and their symptoms were checked upon arrival. Masks were used by the healthcare provider and staff and the examination room was cleaned after the patient visit concluded.\par

## 2020-11-02 NOTE — ED ADULT TRIAGE NOTE - SOURCE OF INFORMATION
Patient Benzoyl Peroxide Pregnancy And Lactation Text: This medication is Pregnancy Category C. It is unknown if benzoyl peroxide is excreted in breast milk.

## 2021-03-01 ENCOUNTER — APPOINTMENT (OUTPATIENT)
Dept: SURGERY | Facility: CLINIC | Age: 46
End: 2021-03-01
Payer: COMMERCIAL

## 2021-03-01 VITALS
HEIGHT: 69 IN | SYSTOLIC BLOOD PRESSURE: 124 MMHG | BODY MASS INDEX: 22.22 KG/M2 | WEIGHT: 150 LBS | HEART RATE: 90 BPM | TEMPERATURE: 97.1 F | DIASTOLIC BLOOD PRESSURE: 68 MMHG

## 2021-03-01 PROCEDURE — 99214 OFFICE O/P EST MOD 30 MIN: CPT

## 2021-03-01 PROCEDURE — 99072 ADDL SUPL MATRL&STAF TM PHE: CPT

## 2021-03-07 NOTE — HISTORY OF PRESENT ILLNESS
[FreeTextEntry1] : Patient is a 45M with PMH of Melva-Danlos, seizure disorder, multiple vascular aneurysms and perforated colon x2 requiring Kerri's followed by completion colectomy with end ileostomy who presents for epigastric pain.  Patient states that he has recently started having intermittent epigastric pain associated with bloating and regurgitation.  Patient states he does not feel as though this is acid reflux.  He otherwise feels well. He is tolerating a diet and his stoma works well.  He empties it 3-4 times daily.  He has not had any BM per rectum.  Patient denies fevers, chills, nausea, vomiting or unexpected weight loss.  Patient denies a family history of colon cancer rectal cancer or inflammatory bowel disease.

## 2021-03-07 NOTE — PHYSICAL EXAM
[Abdomen Masses] : No abdominal masses [Abdomen Tenderness] : ~T No ~M abdominal tenderness [No HSM] : no hepatosplenomegaly [Respiratory Effort] : normal respiratory effort [Normal Rate and Rhythm] : normal rate and rhythm [de-identified] : soft, non tender, non distended. No epigastric or RUQ pain on exam.  Midline wound well healed without herniation.  RLQ ileostomy pink patent and productive of gas and stool. [de-identified] : awake, alert and in no acute distress

## 2021-03-07 NOTE — CONSULT LETTER
[Dear  ___] : Dear  [unfilled], [Consult Letter:] : I had the pleasure of evaluating your patient, [unfilled]. [Please see my note below.] : Please see my note below. [Consult Closing:] : Thank you very much for allowing me to participate in the care of this patient.  If you have any questions, please do not hesitate to contact me. [FreeTextEntry3] : Sincerely,\par \par Jose David Gannon MD, Colon and Rectal Surgery\par \par [employer] [facility]\par [address] [place] [address]\par [facility], 3rd Floor\par [place], [place] [address]\par Tel [phone] ext [phone]\par Fax [phone]\par

## 2021-03-07 NOTE — ASSESSMENT
[FreeTextEntry1] : 45M with vascular Melva-Danlos syndrome s/p Kerri's and Completion colectomy for colon perforation x2 now with epigastric pain\par \par Patient does not appear to have any issue with his stoma or rectum that remains in place.  He describes epigastric pain associated with bloating and regurgitation concerning for upper GI pathology.  I recommended GI referral for possible EGD and evaluation.

## 2021-04-07 ENCOUNTER — APPOINTMENT (OUTPATIENT)
Dept: GASTROENTEROLOGY | Facility: CLINIC | Age: 46
End: 2021-04-07
Payer: COMMERCIAL

## 2021-04-07 PROCEDURE — 99212 OFFICE O/P EST SF 10 MIN: CPT | Mod: 95

## 2021-04-07 RX ORDER — LISINOPRIL 30 MG/1
TABLET ORAL
Refills: 0 | Status: ACTIVE | COMMUNITY

## 2021-04-07 RX ORDER — OMEPRAZOLE 20 MG/1
20 CAPSULE, DELAYED RELEASE ORAL DAILY
Qty: 30 | Refills: 3 | Status: ACTIVE | COMMUNITY
Start: 2021-04-07 | End: 1900-01-01

## 2021-04-07 NOTE — ASSESSMENT
[FreeTextEntry1] : Patient is a 44 y/o with PMHx of Melva- Danlos , seizure d/o, multiple aneurysms, and prior Colectomy whom was referred for upper GI symptoms and dyspepsia. Discussed EGD but patient declined. Will check for H pylori and start PPI therapy. \par \par Dyspepsia\par H Pylori stool studies \par Trial of low dose PPI daily\par Follow up in 6-8 weeks

## 2021-04-07 NOTE — HISTORY OF PRESENT ILLNESS
[Home] : at home, [unfilled] , at the time of the visit. [Medical Office: ([facility])___] : at the medical office located in  [de-identified] : Patient is a 46 y/o with PMHx of Melva- Danlos , seizure d/o, multiple aneurysms, and prior Colectomy whom was referred for upper GI symptoms and dyspepsia.

## 2021-06-04 ENCOUNTER — NON-APPOINTMENT (OUTPATIENT)
Age: 46
End: 2021-06-04

## 2021-06-17 NOTE — ED PROVIDER NOTE - NS ED ROS FT
Constitutional: See HPI.  Eyes: No visual changes, eye pain or discharge.   ENMT: No hearing changes, pain, discharge or infections.   Cardiac: No SOB or edema. No chest pain with exertion.  Respiratory: No cough or respiratory distress.   GI: + nausea, + abd pain.  No vomiting, diarrhea  : No dysuria, frequency or burning. No Discharge  MS: No myalgia, muscle weakness, joint pain or back pain.  Neuro: No headache or weakness.   Skin: No skin rash.  Except as documented in the HPI, all other systems are negative. Opt out

## 2021-08-26 ENCOUNTER — APPOINTMENT (OUTPATIENT)
Age: 46
End: 2021-08-26
Payer: COMMERCIAL

## 2021-08-26 VITALS
BODY MASS INDEX: 24.44 KG/M2 | HEART RATE: 76 BPM | SYSTOLIC BLOOD PRESSURE: 110 MMHG | WEIGHT: 165 LBS | DIASTOLIC BLOOD PRESSURE: 80 MMHG | HEIGHT: 69 IN | OXYGEN SATURATION: 99 %

## 2021-08-26 PROCEDURE — 71046 X-RAY EXAM CHEST 2 VIEWS: CPT

## 2021-08-26 PROCEDURE — 99203 OFFICE O/P NEW LOW 30 MIN: CPT | Mod: 25

## 2021-08-26 RX ORDER — ALBUTEROL SULFATE 90 UG/1
108 (90 BASE) AEROSOL, METERED RESPIRATORY (INHALATION)
Qty: 1 | Refills: 3 | Status: ACTIVE | COMMUNITY
Start: 2021-08-26

## 2021-08-26 NOTE — PROCEDURE
[FreeTextEntry1] : CXR PA and Lateral \par The costophrenic and cardiophrenic angles are sharp\par The lung parenchyma shows minor density RLL.  No consolidations or nodules \par The Mediastinum is within normal limits\par No pleural effusions\par

## 2021-08-26 NOTE — PHYSICAL EXAM
Health Maintenance Due   Topic Date Due    Hepatitis C Screening  1952    Pneumococcal 19-64 Medium Risk (1 of 1 - PPSV23) 02/12/1971    DTaP/Tdap/Td series (1 - Tdap) 02/12/1973    PAP AKA CERVICAL CYTOLOGY  02/12/1973    FOBT Q 1 YEAR AGE 50-75  02/12/2002    ZOSTER VACCINE AGE 60>  02/12/2012    FOOT EXAM Q1  09/18/2015    INFLUENZA AGE 9 TO ADULT  08/01/2016    EYE EXAM RETINAL OR DILATED Q1  12/03/2016       Chief Complaint   Patient presents with    Follow-up     4 month    Hypertension    Cholesterol Problem    Diabetes       1. Have you been to the ER, urgent care clinic since your last visit? Hospitalized since your last visit? No    2. Have you seen or consulted any other health care providers outside of the [address] [place] [address] since your last visit? Include any pap smears or colon screening. No    3) Do you have an Advance Directive on file? no    4) Are you interested in receiving information on Advance Directives? NO      Patient is accompanied by spouse I have received verbal consent from Lesvia Squires to discuss any/all medical information while they are present in the room. [No Acute Distress] : no acute distress [Normal Oropharynx] : normal oropharynx [Normal Appearance] : normal appearance [No Neck Mass] : no neck mass [Normal Rate/Rhythm] : normal rate/rhythm [Normal S1, S2] : normal s1, s2 [No Murmurs] : no murmurs [No Resp Distress] : no resp distress [Clear to Auscultation Bilaterally] : clear to auscultation bilaterally [No Abnormalities] : no abnormalities [Benign] : benign [Normal Gait] : normal gait [No Clubbing] : no clubbing [No Edema] : no edema [No Cyanosis] : no cyanosis [FROM] : FROM [Normal Color/ Pigmentation] : normal color/ pigmentation [No Focal Deficits] : no focal deficits [Oriented x3] : oriented x3 [Normal Affect] : normal affect

## 2021-08-26 NOTE — ASSESSMENT
[FreeTextEntry1] : NO evidence of significant pleural effusion \par Possible cough variant asthma / UACS \par HO Melva Christy

## 2021-08-26 NOTE — HISTORY OF PRESENT ILLNESS
[Cough Variant] : cough variant [Doing Well] : doing well [Well Controlled] : Well controlled [Cough] : stable coughing [Checks Regularly] : The patient checks ~his/her~ peak flow regularly [Good Control] : peak flow has been good [None] : None [Adherent] : the patient is adherent with ~his/her~ medication regimen [Goals--Doing Well] : the patient is doing well with ~his/her~ asthma goals [PFTs] : pulmonary function tests

## 2021-09-30 ENCOUNTER — EMERGENCY (EMERGENCY)
Facility: HOSPITAL | Age: 46
LOS: 0 days | Discharge: HOME | End: 2021-10-01
Attending: EMERGENCY MEDICINE | Admitting: EMERGENCY MEDICINE
Payer: COMMERCIAL

## 2021-09-30 VITALS
WEIGHT: 160.06 LBS | HEART RATE: 109 BPM | HEIGHT: 73 IN | SYSTOLIC BLOOD PRESSURE: 123 MMHG | RESPIRATION RATE: 18 BRPM | DIASTOLIC BLOOD PRESSURE: 75 MMHG | OXYGEN SATURATION: 100 % | TEMPERATURE: 98 F

## 2021-09-30 DIAGNOSIS — Z88.0 ALLERGY STATUS TO PENICILLIN: ICD-10-CM

## 2021-09-30 DIAGNOSIS — Z86.69 PERSONAL HISTORY OF OTHER DISEASES OF THE NERVOUS SYSTEM AND SENSE ORGANS: ICD-10-CM

## 2021-09-30 DIAGNOSIS — R11.0 NAUSEA: ICD-10-CM

## 2021-09-30 DIAGNOSIS — Z87.828 PERSONAL HISTORY OF OTHER (HEALED) PHYSICAL INJURY AND TRAUMA: ICD-10-CM

## 2021-09-30 DIAGNOSIS — D73.9 DISEASE OF SPLEEN, UNSPECIFIED: ICD-10-CM

## 2021-09-30 DIAGNOSIS — Z86.39 PERSONAL HISTORY OF OTHER ENDOCRINE, NUTRITIONAL AND METABOLIC DISEASE: ICD-10-CM

## 2021-09-30 DIAGNOSIS — K76.9 LIVER DISEASE, UNSPECIFIED: ICD-10-CM

## 2021-09-30 DIAGNOSIS — R59.0 LOCALIZED ENLARGED LYMPH NODES: ICD-10-CM

## 2021-09-30 DIAGNOSIS — Z93.3 COLOSTOMY STATUS: Chronic | ICD-10-CM

## 2021-09-30 DIAGNOSIS — I10 ESSENTIAL (PRIMARY) HYPERTENSION: ICD-10-CM

## 2021-09-30 DIAGNOSIS — R10.13 EPIGASTRIC PAIN: ICD-10-CM

## 2021-09-30 DIAGNOSIS — Z86.79 PERSONAL HISTORY OF OTHER DISEASES OF THE CIRCULATORY SYSTEM: ICD-10-CM

## 2021-09-30 DIAGNOSIS — R50.9 FEVER, UNSPECIFIED: ICD-10-CM

## 2021-09-30 DIAGNOSIS — Z79.82 LONG TERM (CURRENT) USE OF ASPIRIN: ICD-10-CM

## 2021-09-30 DIAGNOSIS — Z88.8 ALLERGY STATUS TO OTHER DRUGS, MEDICAMENTS AND BIOLOGICAL SUBSTANCES: ICD-10-CM

## 2021-09-30 LAB
ALBUMIN SERPL ELPH-MCNC: 4.2 G/DL — SIGNIFICANT CHANGE UP (ref 3.5–5.2)
ALP SERPL-CCNC: 125 U/L — HIGH (ref 30–115)
ALT FLD-CCNC: 23 U/L — SIGNIFICANT CHANGE UP (ref 0–41)
ANION GAP SERPL CALC-SCNC: 15 MMOL/L — HIGH (ref 7–14)
AST SERPL-CCNC: 22 U/L — SIGNIFICANT CHANGE UP (ref 0–41)
BASOPHILS # BLD AUTO: 0.04 K/UL — SIGNIFICANT CHANGE UP (ref 0–0.2)
BASOPHILS NFR BLD AUTO: 0.3 % — SIGNIFICANT CHANGE UP (ref 0–1)
BILIRUB DIRECT SERPL-MCNC: <0.2 MG/DL — SIGNIFICANT CHANGE UP (ref 0–0.2)
BILIRUB INDIRECT FLD-MCNC: >0 MG/DL — LOW (ref 0.2–1.2)
BILIRUB SERPL-MCNC: 0.2 MG/DL — SIGNIFICANT CHANGE UP (ref 0.2–1.2)
BLD GP AB SCN SERPL QL: SIGNIFICANT CHANGE UP
BUN SERPL-MCNC: 16 MG/DL — SIGNIFICANT CHANGE UP (ref 10–20)
CALCIUM SERPL-MCNC: 9.3 MG/DL — SIGNIFICANT CHANGE UP (ref 8.5–10.1)
CHLORIDE SERPL-SCNC: 96 MMOL/L — LOW (ref 98–110)
CO2 SERPL-SCNC: 21 MMOL/L — SIGNIFICANT CHANGE UP (ref 17–32)
CREAT SERPL-MCNC: 1 MG/DL — SIGNIFICANT CHANGE UP (ref 0.7–1.5)
EOSINOPHIL # BLD AUTO: 0.02 K/UL — SIGNIFICANT CHANGE UP (ref 0–0.7)
EOSINOPHIL NFR BLD AUTO: 0.2 % — SIGNIFICANT CHANGE UP (ref 0–8)
GLUCOSE SERPL-MCNC: 96 MG/DL — SIGNIFICANT CHANGE UP (ref 70–99)
HCT VFR BLD CALC: 34.3 % — LOW (ref 42–52)
HGB BLD-MCNC: 11.3 G/DL — LOW (ref 14–18)
IMM GRANULOCYTES NFR BLD AUTO: 0.5 % — HIGH (ref 0.1–0.3)
LACTATE SERPL-SCNC: 2.3 MMOL/L — HIGH (ref 0.7–2)
LIDOCAIN IGE QN: 24 U/L — SIGNIFICANT CHANGE UP (ref 7–60)
LYMPHOCYTES # BLD AUTO: 1.26 K/UL — SIGNIFICANT CHANGE UP (ref 1.2–3.4)
LYMPHOCYTES # BLD AUTO: 10.7 % — LOW (ref 20.5–51.1)
MCHC RBC-ENTMCNC: 27.8 PG — SIGNIFICANT CHANGE UP (ref 27–31)
MCHC RBC-ENTMCNC: 32.9 G/DL — SIGNIFICANT CHANGE UP (ref 32–37)
MCV RBC AUTO: 84.3 FL — SIGNIFICANT CHANGE UP (ref 80–94)
MONOCYTES # BLD AUTO: 0.94 K/UL — HIGH (ref 0.1–0.6)
MONOCYTES NFR BLD AUTO: 8 % — SIGNIFICANT CHANGE UP (ref 1.7–9.3)
NEUTROPHILS # BLD AUTO: 9.5 K/UL — HIGH (ref 1.4–6.5)
NEUTROPHILS NFR BLD AUTO: 80.3 % — HIGH (ref 42.2–75.2)
NRBC # BLD: 0 /100 WBCS — SIGNIFICANT CHANGE UP (ref 0–0)
PLATELET # BLD AUTO: 362 K/UL — SIGNIFICANT CHANGE UP (ref 130–400)
POTASSIUM SERPL-MCNC: 4.4 MMOL/L — SIGNIFICANT CHANGE UP (ref 3.5–5)
POTASSIUM SERPL-SCNC: 4.4 MMOL/L — SIGNIFICANT CHANGE UP (ref 3.5–5)
PROT SERPL-MCNC: 7.6 G/DL — SIGNIFICANT CHANGE UP (ref 6–8)
RBC # BLD: 4.07 M/UL — LOW (ref 4.7–6.1)
RBC # FLD: 14.1 % — SIGNIFICANT CHANGE UP (ref 11.5–14.5)
SODIUM SERPL-SCNC: 132 MMOL/L — LOW (ref 135–146)
TROPONIN T SERPL-MCNC: <0.01 NG/ML — SIGNIFICANT CHANGE UP
WBC # BLD: 11.82 K/UL — HIGH (ref 4.8–10.8)
WBC # FLD AUTO: 11.82 K/UL — HIGH (ref 4.8–10.8)

## 2021-09-30 PROCEDURE — 99285 EMERGENCY DEPT VISIT HI MDM: CPT

## 2021-09-30 PROCEDURE — 71260 CT THORAX DX C+: CPT | Mod: 26,MA

## 2021-09-30 PROCEDURE — 74177 CT ABD & PELVIS W/CONTRAST: CPT | Mod: 26,MA

## 2021-09-30 RX ORDER — ONDANSETRON 8 MG/1
4 TABLET, FILM COATED ORAL ONCE
Refills: 0 | Status: COMPLETED | OUTPATIENT
Start: 2021-09-30 | End: 2021-09-30

## 2021-09-30 RX ORDER — FAMOTIDINE 10 MG/ML
20 INJECTION INTRAVENOUS ONCE
Refills: 0 | Status: COMPLETED | OUTPATIENT
Start: 2021-09-30 | End: 2021-09-30

## 2021-09-30 RX ORDER — IOHEXOL 300 MG/ML
30 INJECTION, SOLUTION INTRAVENOUS ONCE
Refills: 0 | Status: COMPLETED | OUTPATIENT
Start: 2021-09-30 | End: 2021-09-30

## 2021-09-30 RX ORDER — HYDROMORPHONE HYDROCHLORIDE 2 MG/ML
1 INJECTION INTRAMUSCULAR; INTRAVENOUS; SUBCUTANEOUS ONCE
Refills: 0 | Status: DISCONTINUED | OUTPATIENT
Start: 2021-09-30 | End: 2021-09-30

## 2021-09-30 RX ORDER — MORPHINE SULFATE 50 MG/1
4 CAPSULE, EXTENDED RELEASE ORAL ONCE
Refills: 0 | Status: DISCONTINUED | OUTPATIENT
Start: 2021-09-30 | End: 2021-09-30

## 2021-09-30 RX ADMIN — ONDANSETRON 4 MILLIGRAM(S): 8 TABLET, FILM COATED ORAL at 21:00

## 2021-09-30 RX ADMIN — FAMOTIDINE 20 MILLIGRAM(S): 10 INJECTION INTRAVENOUS at 21:01

## 2021-09-30 RX ADMIN — MORPHINE SULFATE 4 MILLIGRAM(S): 50 CAPSULE, EXTENDED RELEASE ORAL at 21:00

## 2021-09-30 RX ADMIN — IOHEXOL 30 MILLILITER(S): 300 INJECTION, SOLUTION INTRAVENOUS at 21:00

## 2021-09-30 NOTE — ED PROVIDER NOTE - PROGRESS NOTE DETAILS
ROCKY DOVE: Reviewed all results and necessity for follow up. Counseled on red flags and to return for them.  Patient appears well on discharge.

## 2021-09-30 NOTE — ED PROVIDER NOTE - OBJECTIVE STATEMENT
Pt is a 45 Y M w/ past history of Melva Danlos 4, previous hospitalization in 11/2019 for perforation of sigmoid colon s/p Kerri's, Colonic perforation w/ ileostomy in 2020,   epilepsy, HTN, celiac aneurysm s/p coiling 4/2010. presenting w/ abdominal pain and nausea for the last few days. he reports pain is R sided and radiates to the epigastric region. Associated w/ some increase In watery stool in his ostomy . Pt also noting subjective fever and chills at home. Denies any recent travel or sick contacts.

## 2021-09-30 NOTE — ED PROVIDER NOTE - NS ED ROS FT
Constitutional: (-) fever  Eyes/ENT: (-) blurry vision, (-) epistaxis  Cardiovascular: (-) chest pain, (-) syncope  Respiratory: (-) cough, (-) shortness of breath  Musculoskeletal: (-) neck pain, (-) back pain, (-) joint pain  Integumentary: (-) rash, (-) edema  Neurological: (-) headache, (-) altered mental status  Psychiatric: (-) hallucinations  Allergic/Immunologic: (-) pruritus

## 2021-09-30 NOTE — ED PROVIDER NOTE - CARE PLAN
1 Principal Discharge DX:	Abdominal pain  Secondary Diagnosis:	Liver lesion  Secondary Diagnosis:	Splenic lesion

## 2021-09-30 NOTE — ED PROVIDER NOTE - ATTENDING CONTRIBUTION TO CARE
44 y/o male with above stated PMHx presents to ED with left sided abdominal pain x 3 days, associated with prodrome of chills and fever and nausea, no vomiting.  (+) watery stool in his ostomy.  No weight loss.  No CP/SOB.  PE: agree with above.  A/P:  Abdominal Pain.  Labs, Meds and Imaging.  Reassess.

## 2021-09-30 NOTE — ED ADULT NURSE NOTE - OBJECTIVE STATEMENT
45 year old male complaining of left lower abdomen that is radiating to left lower back since sunday. Pt states he "had a mild fever tuesday" but denies nausea and vomiting.

## 2021-09-30 NOTE — ED PROVIDER NOTE - CARE PROVIDER_API CALL
Jose Oseguera)  Gastroenterology; Internal Medicine  [address] [place] [address]  Phone: [phone]  Fax: [phone]  Follow Up Time: 1-3 Days

## 2021-09-30 NOTE — ED PROVIDER NOTE - CLINICAL SUMMARY MEDICAL DECISION MAKING FREE TEXT BOX
Imaging shows omental disease with lymphadenopathy.  Pain relieved.  Patient to f/u with his PMD Ace and GI Dr. Oseguera.  Strict return instructions discussed.

## 2021-09-30 NOTE — ED PROVIDER NOTE - PATIENT PORTAL LINK FT
You can access the FollowMyHealth Patient Portal offered by [facility] by registering at the following website: [email or web address]. By joining [employer]’s FollowMyHealth portal, you will also be able to view your health information using other applications (apps) compatible with our system.

## 2021-09-30 NOTE — ED PROVIDER NOTE - PHYSICAL EXAMINATION
CONSTITUTIONAL: Well-developed; well-nourished; in no acute distress.   SKIN: warm, dry  HEAD: Normocephalic; atraumatic.  EYES: PERRL, EOMI, normal sclera and conjunctiva   ENT: No nasal discharge; airway clear.  NECK: Supple; non tender.  CARD:  Regular rate and rhythm.   RESP: NO inc WOB , speaking in full sentences, lungs CTAB  ABD: + L sided CVA tenderness, + dark water stool in ostomy, no surrounding erythema.   EXT: Normal ROM.  no LE edema no unilateral leg swelling  NEURO: Alert, oriented, grossly unremarkable  PSYCH: Cooperative, appropriate.

## 2021-10-01 VITALS
TEMPERATURE: 98 F | OXYGEN SATURATION: 100 % | SYSTOLIC BLOOD PRESSURE: 111 MMHG | HEART RATE: 66 BPM | DIASTOLIC BLOOD PRESSURE: 57 MMHG | RESPIRATION RATE: 18 BRPM

## 2021-10-01 LAB
APPEARANCE UR: CLEAR — SIGNIFICANT CHANGE UP
BILIRUB UR-MCNC: NEGATIVE — SIGNIFICANT CHANGE UP
COLOR SPEC: YELLOW — SIGNIFICANT CHANGE UP
DIFF PNL FLD: NEGATIVE — SIGNIFICANT CHANGE UP
GLUCOSE UR QL: NEGATIVE — SIGNIFICANT CHANGE UP
KETONES UR-MCNC: SIGNIFICANT CHANGE UP
LEUKOCYTE ESTERASE UR-ACNC: NEGATIVE — SIGNIFICANT CHANGE UP
NITRITE UR-MCNC: NEGATIVE — SIGNIFICANT CHANGE UP
PH UR: 6 — SIGNIFICANT CHANGE UP (ref 5–8)
PROT UR-MCNC: SIGNIFICANT CHANGE UP
SP GR SPEC: 1.02 — SIGNIFICANT CHANGE UP (ref 1.01–1.03)
UROBILINOGEN FLD QL: SIGNIFICANT CHANGE UP

## 2021-10-01 RX ADMIN — HYDROMORPHONE HYDROCHLORIDE 1 MILLIGRAM(S): 2 INJECTION INTRAMUSCULAR; INTRAVENOUS; SUBCUTANEOUS at 00:09

## 2021-11-16 ENCOUNTER — OUTPATIENT (OUTPATIENT)
Dept: OUTPATIENT SERVICES | Facility: HOSPITAL | Age: 46
LOS: 1 days | Discharge: HOME | End: 2021-11-16
Payer: COMMERCIAL

## 2021-11-16 DIAGNOSIS — C18.9 MALIGNANT NEOPLASM OF COLON, UNSPECIFIED: ICD-10-CM

## 2021-11-16 DIAGNOSIS — Z93.3 COLOSTOMY STATUS: Chronic | ICD-10-CM

## 2021-11-16 LAB — GLUCOSE BLDC GLUCOMTR-MCNC: 77 MG/DL — SIGNIFICANT CHANGE UP (ref 70–99)

## 2021-11-16 PROCEDURE — 78815 PET IMAGE W/CT SKULL-THIGH: CPT | Mod: 26,PI

## 2021-12-07 ENCOUNTER — APPOINTMENT (OUTPATIENT)
Dept: SURGERY | Facility: CLINIC | Age: 46
End: 2021-12-07
Payer: COMMERCIAL

## 2021-12-07 VITALS
WEIGHT: 162 LBS | BODY MASS INDEX: 23.99 KG/M2 | SYSTOLIC BLOOD PRESSURE: 119 MMHG | DIASTOLIC BLOOD PRESSURE: 80 MMHG | HEART RATE: 75 BPM | TEMPERATURE: 97.7 F | HEIGHT: 69 IN

## 2021-12-07 DIAGNOSIS — R56.9 UNSPECIFIED CONVULSIONS: ICD-10-CM

## 2021-12-07 DIAGNOSIS — K21.9 GASTRO-ESOPHAGEAL REFLUX DISEASE W/OUT ESOPHAGITIS: ICD-10-CM

## 2021-12-07 DIAGNOSIS — Q79.63 VASCULAR EHLERS-DANLOS SYNDROME: ICD-10-CM

## 2021-12-07 DIAGNOSIS — Z93.2 ILEOSTOMY STATUS: ICD-10-CM

## 2021-12-07 DIAGNOSIS — I72.3 ANEURYSM OF ILIAC ARTERY: ICD-10-CM

## 2021-12-07 DIAGNOSIS — Z00.00 ENCOUNTER FOR GENERAL ADULT MEDICAL EXAMINATION W/OUT ABNORMAL FINDINGS: ICD-10-CM

## 2021-12-07 DIAGNOSIS — I72.8 ANEURYSM OF OTHER SPECIFIED ARTERIES: ICD-10-CM

## 2021-12-07 DIAGNOSIS — R10.13 EPIGASTRIC PAIN: ICD-10-CM

## 2021-12-07 DIAGNOSIS — Z87.891 PERSONAL HISTORY OF NICOTINE DEPENDENCE: ICD-10-CM

## 2021-12-07 PROCEDURE — 99212 OFFICE O/P EST SF 10 MIN: CPT

## 2021-12-07 NOTE — HISTORY OF PRESENT ILLNESS
[de-identified] : h/o subtotal colectomy  over one year ago, had recent PET scan showing enhanced LN and referred for f/u

## 2021-12-07 NOTE — ASSESSMENT
[FreeTextEntry1] : doing well \par enhanced lymph nodes \par low suspetion for malignancy f/u in 6 months with repeat CT/PET \par f/u with PMD

## 2021-12-07 NOTE — PHYSICAL EXAM
[JVD] : no jugular venous distention  [Normal Breath Sounds] : Normal breath sounds [Abdominal Masses] : No abdominal masses [Abdomen Tenderness] : ~T ~M No abdominal tenderness [No HSM] : no hepatosplenomegaly [No Rash or Lesion] : No rash or lesion [Alert] : alert [Oriented to Person] : oriented to person [Oriented to Place] : oriented to place [Oriented to Time] : oriented to time [Calm] : calm [de-identified] : appears well  [de-identified] : FIDE [de-identified] : abdomen soft non tender ostomy functioning

## 2021-12-22 ENCOUNTER — APPOINTMENT (OUTPATIENT)
Age: 46
End: 2021-12-22
Payer: COMMERCIAL

## 2021-12-22 VITALS
HEIGHT: 69 IN | BODY MASS INDEX: 24.29 KG/M2 | RESPIRATION RATE: 14 BRPM | OXYGEN SATURATION: 98 % | HEART RATE: 75 BPM | DIASTOLIC BLOOD PRESSURE: 70 MMHG | WEIGHT: 164 LBS | SYSTOLIC BLOOD PRESSURE: 108 MMHG

## 2021-12-22 DIAGNOSIS — R91.8 OTHER NONSPECIFIC ABNORMAL FINDING OF LUNG FIELD: ICD-10-CM

## 2021-12-22 PROCEDURE — 99214 OFFICE O/P EST MOD 30 MIN: CPT

## 2021-12-22 NOTE — HISTORY OF PRESENT ILLNESS
[Cough Variant] : cough variant [Doing Well] : doing well [Well Controlled] : Well controlled [Cough] : resolved coughing [Checks Regularly] : The patient checks ~his/her~ peak flow regularly [Good Control] : peak flow has been good [None] : None [Adherent] : the patient is adherent with ~his/her~ medication regimen [Goals--Doing Well] : the patient is doing well with ~his/her~ asthma goals [PFTs] : pulmonary function tests

## 2021-12-22 NOTE — ASSESSMENT
[FreeTextEntry1] : NO evidence of significant pleural effusion \par Possible cough variant asthma / UACS resolved \par Minor atelectasis on CT chest \par ELLA Christy

## 2022-03-19 NOTE — ED PROVIDER NOTE - NEUROLOGICAL, MLM
VSS. Afebrile. Rabies vaccine given in right deltoid. Tolerated well. Scheduled next appointments. discharge home.     
Alert and oriented, no focal deficits, no motor or sensory deficits.

## 2022-08-16 ENCOUNTER — APPOINTMENT (OUTPATIENT)
Dept: SURGERY | Facility: CLINIC | Age: 47
End: 2022-08-16

## 2022-08-16 VITALS
TEMPERATURE: 97 F | HEART RATE: 86 BPM | BODY MASS INDEX: 23.7 KG/M2 | WEIGHT: 160 LBS | OXYGEN SATURATION: 98 % | SYSTOLIC BLOOD PRESSURE: 119 MMHG | DIASTOLIC BLOOD PRESSURE: 71 MMHG | HEIGHT: 69 IN

## 2022-08-16 DIAGNOSIS — K57.20 DIVERTICULITIS OF LARGE INTESTINE WITH PERFORATION AND ABSCESS W/OUT BLEEDING: ICD-10-CM

## 2022-08-16 PROCEDURE — 99213 OFFICE O/P EST LOW 20 MIN: CPT

## 2022-08-18 ENCOUNTER — INPATIENT (INPATIENT)
Facility: HOSPITAL | Age: 47
LOS: 10 days | Discharge: HOME HEALTH | End: 2022-08-29
Attending: THORACIC SURGERY (CARDIOTHORACIC VASCULAR SURGERY) | Admitting: THORACIC SURGERY (CARDIOTHORACIC VASCULAR SURGERY)

## 2022-08-18 VITALS
RESPIRATION RATE: 17 BRPM | HEART RATE: 117 BPM | DIASTOLIC BLOOD PRESSURE: 68 MMHG | SYSTOLIC BLOOD PRESSURE: 108 MMHG | TEMPERATURE: 101 F | OXYGEN SATURATION: 98 % | HEIGHT: 73 IN

## 2022-08-18 DIAGNOSIS — Z93.3 COLOSTOMY STATUS: Chronic | ICD-10-CM

## 2022-08-18 LAB
ALBUMIN SERPL ELPH-MCNC: 4 G/DL — SIGNIFICANT CHANGE UP (ref 3.5–5.2)
ALP SERPL-CCNC: 138 U/L — HIGH (ref 30–115)
ALT FLD-CCNC: 27 U/L — SIGNIFICANT CHANGE UP (ref 0–41)
ANION GAP SERPL CALC-SCNC: 15 MMOL/L — HIGH (ref 7–14)
ANISOCYTOSIS BLD QL: SLIGHT — SIGNIFICANT CHANGE UP
APPEARANCE UR: CLEAR — SIGNIFICANT CHANGE UP
AST SERPL-CCNC: 20 U/L — SIGNIFICANT CHANGE UP (ref 0–41)
BACTERIA # UR AUTO: SIGNIFICANT CHANGE UP
BASOPHILS # BLD AUTO: 0 K/UL — SIGNIFICANT CHANGE UP (ref 0–0.2)
BASOPHILS NFR BLD AUTO: 0 % — SIGNIFICANT CHANGE UP (ref 0–1)
BILIRUB SERPL-MCNC: 0.6 MG/DL — SIGNIFICANT CHANGE UP (ref 0.2–1.2)
BILIRUB UR-MCNC: ABNORMAL
BUN SERPL-MCNC: 15 MG/DL — SIGNIFICANT CHANGE UP (ref 10–20)
CALCIUM SERPL-MCNC: 9.7 MG/DL — SIGNIFICANT CHANGE UP (ref 8.5–10.1)
CHLORIDE SERPL-SCNC: 95 MMOL/L — LOW (ref 98–110)
CO2 SERPL-SCNC: 24 MMOL/L — SIGNIFICANT CHANGE UP (ref 17–32)
COLOR SPEC: YELLOW — SIGNIFICANT CHANGE UP
CREAT SERPL-MCNC: 1.2 MG/DL — SIGNIFICANT CHANGE UP (ref 0.7–1.5)
DIFF PNL FLD: NEGATIVE — SIGNIFICANT CHANGE UP
EGFR: 76 ML/MIN/1.73M2 — SIGNIFICANT CHANGE UP
EOSINOPHIL # BLD AUTO: 0 K/UL — SIGNIFICANT CHANGE UP (ref 0–0.7)
EOSINOPHIL NFR BLD AUTO: 0 % — SIGNIFICANT CHANGE UP (ref 0–8)
EPI CELLS # UR: 3 /HPF — SIGNIFICANT CHANGE UP (ref 0–5)
GIANT PLATELETS BLD QL SMEAR: PRESENT — SIGNIFICANT CHANGE UP
GLUCOSE SERPL-MCNC: 97 MG/DL — SIGNIFICANT CHANGE UP (ref 70–99)
GLUCOSE UR QL: NEGATIVE — SIGNIFICANT CHANGE UP
HCT VFR BLD CALC: 36.1 % — LOW (ref 42–52)
HGB BLD-MCNC: 11.5 G/DL — LOW (ref 14–18)
KETONES UR-MCNC: ABNORMAL
LACTATE SERPL-SCNC: 1.6 MMOL/L — SIGNIFICANT CHANGE UP (ref 0.7–2)
LEUKOCYTE ESTERASE UR-ACNC: NEGATIVE — SIGNIFICANT CHANGE UP
LYMPHOCYTES # BLD AUTO: 0 % — LOW (ref 20.5–51.1)
LYMPHOCYTES # BLD AUTO: 0 K/UL — LOW (ref 1.2–3.4)
MANUAL SMEAR VERIFICATION: SIGNIFICANT CHANGE UP
MCHC RBC-ENTMCNC: 26.1 PG — LOW (ref 27–31)
MCHC RBC-ENTMCNC: 31.9 G/DL — LOW (ref 32–37)
MCV RBC AUTO: 82 FL — SIGNIFICANT CHANGE UP (ref 80–94)
MICROCYTES BLD QL: SLIGHT — SIGNIFICANT CHANGE UP
MONOCYTES # BLD AUTO: 1.88 K/UL — HIGH (ref 0.1–0.6)
MONOCYTES NFR BLD AUTO: 6.1 % — SIGNIFICANT CHANGE UP (ref 1.7–9.3)
NEUTROPHILS # BLD AUTO: 28.69 K/UL — HIGH (ref 1.4–6.5)
NEUTROPHILS NFR BLD AUTO: 89.5 % — HIGH (ref 42.2–75.2)
NEUTS BAND # BLD: 3.5 % — SIGNIFICANT CHANGE UP (ref 0–6)
NITRITE UR-MCNC: NEGATIVE — SIGNIFICANT CHANGE UP
PH UR: 5.5 — SIGNIFICANT CHANGE UP (ref 5–8)
PLAT MORPH BLD: NORMAL — SIGNIFICANT CHANGE UP
PLATELET # BLD AUTO: 488 K/UL — HIGH (ref 130–400)
POIKILOCYTOSIS BLD QL AUTO: SLIGHT — SIGNIFICANT CHANGE UP
POLYCHROMASIA BLD QL SMEAR: SLIGHT — SIGNIFICANT CHANGE UP
POTASSIUM SERPL-MCNC: 4.3 MMOL/L — SIGNIFICANT CHANGE UP (ref 3.5–5)
POTASSIUM SERPL-SCNC: 4.3 MMOL/L — SIGNIFICANT CHANGE UP (ref 3.5–5)
PROT SERPL-MCNC: 7.7 G/DL — SIGNIFICANT CHANGE UP (ref 6–8)
PROT UR-MCNC: ABNORMAL
RBC # BLD: 4.4 M/UL — LOW (ref 4.7–6.1)
RBC # FLD: 14 % — SIGNIFICANT CHANGE UP (ref 11.5–14.5)
RBC BLD AUTO: ABNORMAL
RBC CASTS # UR COMP ASSIST: 3 /HPF — SIGNIFICANT CHANGE UP (ref 0–4)
SARS-COV-2 RNA SPEC QL NAA+PROBE: SIGNIFICANT CHANGE UP
SODIUM SERPL-SCNC: 134 MMOL/L — LOW (ref 135–146)
SP GR SPEC: 1.03 — HIGH (ref 1.01–1.03)
UROBILINOGEN FLD QL: SIGNIFICANT CHANGE UP
VARIANT LYMPHS # BLD: 0.9 % — SIGNIFICANT CHANGE UP (ref 0–5)
WBC # BLD: 30.85 K/UL — HIGH (ref 4.8–10.8)
WBC # FLD AUTO: 30.85 K/UL — HIGH (ref 4.8–10.8)
WBC UR QL: 2 /HPF — SIGNIFICANT CHANGE UP (ref 0–5)

## 2022-08-18 PROCEDURE — 74177 CT ABD & PELVIS W/CONTRAST: CPT | Mod: 26,MA

## 2022-08-18 PROCEDURE — 93010 ELECTROCARDIOGRAM REPORT: CPT

## 2022-08-18 PROCEDURE — 32551 INSERTION OF CHEST TUBE: CPT

## 2022-08-18 PROCEDURE — 99291 CRITICAL CARE FIRST HOUR: CPT | Mod: 25

## 2022-08-18 PROCEDURE — 71260 CT THORAX DX C+: CPT | Mod: 26,MA

## 2022-08-18 PROCEDURE — 71045 X-RAY EXAM CHEST 1 VIEW: CPT | Mod: 26

## 2022-08-18 RX ORDER — PROPOFOL 10 MG/ML
10 INJECTION, EMULSION INTRAVENOUS ONCE
Refills: 0 | Status: COMPLETED | OUTPATIENT
Start: 2022-08-18 | End: 2022-08-18

## 2022-08-18 RX ORDER — PROPOFOL 10 MG/ML
20 INJECTION, EMULSION INTRAVENOUS ONCE
Refills: 0 | Status: COMPLETED | OUTPATIENT
Start: 2022-08-18 | End: 2022-08-18

## 2022-08-18 RX ORDER — SODIUM CHLORIDE 9 MG/ML
2500 INJECTION, SOLUTION INTRAVENOUS ONCE
Refills: 0 | Status: COMPLETED | OUTPATIENT
Start: 2022-08-18 | End: 2022-08-18

## 2022-08-18 RX ORDER — PROPOFOL 10 MG/ML
30 INJECTION, EMULSION INTRAVENOUS ONCE
Refills: 0 | Status: COMPLETED | OUTPATIENT
Start: 2022-08-18 | End: 2022-08-18

## 2022-08-18 RX ORDER — VANCOMYCIN HCL 1 G
1500 VIAL (EA) INTRAVENOUS ONCE
Refills: 0 | Status: COMPLETED | OUTPATIENT
Start: 2022-08-18 | End: 2022-08-18

## 2022-08-18 RX ORDER — IBUPROFEN 200 MG
800 TABLET ORAL ONCE
Refills: 0 | Status: COMPLETED | OUTPATIENT
Start: 2022-08-18 | End: 2022-08-18

## 2022-08-18 RX ORDER — KETAMINE HYDROCHLORIDE 100 MG/ML
30 INJECTION INTRAMUSCULAR; INTRAVENOUS ONCE
Refills: 0 | Status: DISCONTINUED | OUTPATIENT
Start: 2022-08-18 | End: 2022-08-18

## 2022-08-18 RX ORDER — MEROPENEM 1 G/30ML
1000 INJECTION INTRAVENOUS ONCE
Refills: 0 | Status: COMPLETED | OUTPATIENT
Start: 2022-08-18 | End: 2022-08-18

## 2022-08-18 RX ADMIN — Medication 800 MILLIGRAM(S): at 19:43

## 2022-08-18 RX ADMIN — KETAMINE HYDROCHLORIDE 30 MILLIGRAM(S): 100 INJECTION INTRAMUSCULAR; INTRAVENOUS at 23:12

## 2022-08-18 RX ADMIN — MEROPENEM 100 MILLIGRAM(S): 1 INJECTION INTRAVENOUS at 21:45

## 2022-08-18 RX ADMIN — Medication 300 MILLIGRAM(S): at 21:45

## 2022-08-18 RX ADMIN — Medication 800 MILLIGRAM(S): at 21:58

## 2022-08-18 RX ADMIN — PROPOFOL 10 MILLIGRAM(S): 10 INJECTION, EMULSION INTRAVENOUS at 23:12

## 2022-08-18 RX ADMIN — PROPOFOL 30 MILLIGRAM(S): 10 INJECTION, EMULSION INTRAVENOUS at 23:12

## 2022-08-18 RX ADMIN — PROPOFOL 20 MILLIGRAM(S): 10 INJECTION, EMULSION INTRAVENOUS at 23:12

## 2022-08-18 RX ADMIN — SODIUM CHLORIDE 2500 MILLILITER(S): 9 INJECTION, SOLUTION INTRAVENOUS at 21:45

## 2022-08-18 NOTE — ED PROCEDURE NOTE - CPROC ED CHEST TUBE DETAIL1
An incision into the lateral chest was made (see location above). A thoracostomy tube was placed into the pleural space (see size above), and connected to a closed drainage canister and water suction. The tube was secured with 00 nylon suture, and the area dressed with petrolatum impregnated gauze bandage. A post procedure chest x-ray was ordered, and proper placement was confirmed./Ultrasound guidance was used.

## 2022-08-18 NOTE — ED ADULT NURSE NOTE - CHPI ED NUR QUALITY2
Patient: Rosie Palmer    Procedure Summary     Date:  17 Room / Location:  [address] [place] [address] L+D OR  [facility] L+D OR    Anesthesia Start:   Anesthesia Stop:      Procedures:        SECTION (N/A )       SECTION (N/A ) Diagnosis:      Surgeon:  Louis Jonas
non-productive cough

## 2022-08-18 NOTE — ED PROVIDER NOTE - ATTENDING CONTRIBUTION TO CARE
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  Patient states T-max was 103.5 which she took earlier today.  Patient took Tylenol today at 1130 and 2:30 PM.  Patient's had a cough but no other symptoms.  He saw his primary care doctor yesterday who started him on doxycycline.  Patient felt weak today and had a syncopal episode where he woke up on the floor.  Patient does not think he hit his head.  No abdominal pain no diarrhea no nausea no vomiting.    Const: NAD, thin  Eyes: PERRL, no conjunctival injection  HENT:  Neck supple without meningismus   CV: RRR, Warm, well-perfused extremities  RESP: CTA B/L, no tachypnea   GI: soft, non-tender, non-distended, colostomy in place  MSK: No gross deformities appreciated  Skin: Warm, dry. No rashes  Neuro: Alert, CNs II-XII grossly intact. Sensation and motor function of extremities grossly intact.  Psych: Appropriate mood and affect.    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  Patient states T-max was 103.5 which she took earlier today.  Patient took Tylenol today at 1130 and 2:30 PM.  Patient's had a cough but no other symptoms.  He saw his primary care doctor yesterday who started him on doxycycline.  Patient felt weak today and had a syncopal episode where he woke up on the floor.  Patient does not think he hit his head.  No abdominal pain no diarrhea no nausea no vomiting.    Const: NAD, thin  Eyes: PERRL, no conjunctival injection  HENT:  Neck supple without meningismus   CV: RRR, Warm, well-perfused extremities  RESP: decrease breath sounds on right, no tachypnea   GI: soft, non-tender, non-distended, colostomy in place  MSK: No gross deformities appreciated  Skin: Warm, dry. No rashes  Neuro: Alert, CNs II-XII grossly intact. Sensation and motor function of extremities grossly intact.  Psych: Appropriate mood and affect.    will do labs, cxr, CT scan, UA

## 2022-08-18 NOTE — ED PROVIDER NOTE - CARE PLAN
Principal Discharge DX:	Fever  Secondary Diagnosis:	Pneumothorax, right  Secondary Diagnosis:	Cough  Secondary Diagnosis:	EDS (Melva-Danlos syndrome)   1 Principal Discharge DX:	Sepsis, unspecified organism  Secondary Diagnosis:	Pneumothorax, right  Secondary Diagnosis:	Cough  Secondary Diagnosis:	EDS (Melva-Danlos syndrome)  Secondary Diagnosis:	Tension pneumothorax

## 2022-08-18 NOTE — ED PROVIDER NOTE - PROGRESS NOTE DETAILS
Dr. Asif: sign out to Dr. Klerman   pending, labs, cxr, ct and admission Dr. Asif: sign out to Dr. Carl  Patient has a pneumothorax.   pending labs, ct, I personally saw and evaluated patient, reviewed CT scan. Will start broad spectrum antibiotics and fluid resuscitation. ED team to place pig tail. Patient and significant other spoken to in detail about results and plan of care. Repeat sepsis perfusion exam performed and patient has warm skin and strong pulses. No signs of fluid overload. Initial lactate is normal.

## 2022-08-18 NOTE — ED ADULT NURSE NOTE - NSIMPLEMENTINTERV_GEN_ALL_ED
Implemented All Fall Risk Interventions:  Josephine to call system. Call bell, personal items and telephone within reach. Instruct patient to call for assistance. Room bathroom lighting operational. Non-slip footwear when patient is off stretcher. Physically safe environment: no spills, clutter or unnecessary equipment. Stretcher in lowest position, wheels locked, appropriate side rails in place. Provide visual cue, wrist band, yellow gown, etc. Monitor gait and stability. Monitor for mental status changes and reorient to person, place, and time. Review medications for side effects contributing to fall risk. Reinforce activity limits and safety measures with patient and family.

## 2022-08-18 NOTE — ED PROVIDER NOTE - CLINICAL SUMMARY MEDICAL DECISION MAKING FREE TEXT BOX
Patient upgraded to critical care emergency department for right-sided pneumothorax.  I personally saw and evaluated patient.  Patient is a 45-year-old male past medical history of Ehler Danlos syndrome with multiple complications, epilepsy, hypertension presents to the emergency department for fevers for the past month.  Patient had recent T-max of 103.5.  Patient reports having a cough and being short of breath.  Patient was started on doxycycline yesterday by primary care PA.  Patient has an elevated white blood cell count to 30,000.  Blood culture sent.  Will treat as possible bacterial sepsis.  Patient given broad-spectrum antibiotic coverage with vancomycin and meropenem.  Given clinical picture, vancomycin given at 20 mg/kg.  CT scan reviewed and patient has tension pneumothorax and chest tube was placed.  Upgrade and plan made before CT was officially read.  Additional viral panel sent and chest tube placed successfully with Shakir pneumothorax kit and pigtail.  Patient consented and wanted conscious sedation for this procedure.  ED work-up reviewed and ICU consulted for care.  Patient had blood pressure as low as systolic 90s but this improved status post procedure and with fluids.

## 2022-08-18 NOTE — ED PROVIDER NOTE - PHYSICAL EXAMINATION
CONSTITUTIONAL: Well-appearing; well-nourished; in no apparent distress.   HEAD: Normocephalic; atraumatic, no ecchymosis, no raccoon eyes, no tran sign  EYES: PERRL; EOM intact. Conjunctiva normal B/L.   ENT: Normal pharynx with no tonsillar hypertrophy. MMM.  NECK: Supple; non-tender; no cervical lymphadenopathy.   CHEST: Normal chest excursion with respiration.   CARDIOVASCULAR: Normal S1, S2; no murmurs, rubs, or gallops.   RESPIRATORY: Normal chest excursion with respiration; decreased breath sounds on R; normal breath sounds on L; clear;   GI/: surgical abdomen w/ ileostomy, clean, well healed, no edema, no erythema   BACK: No evidence of trauma or deformity. Non-tender to palpation. No CVA tenderness.   EXT: Normal ROM in all four extremities; non-tender to palpation; distal pulses are normal. No leg edema B/L.   SKIN: Normal for age and race; warm; dry; good turgor.  NEURO: A & O x 4; CN 2-12 intact. Grossly unremarkable.

## 2022-08-18 NOTE — ED PROCEDURE NOTE - NS_POSTPROCCAREGUIDE_ED_ALL_ED
Patient is now fully awake, with vital signs and temperature stable, hydration is adequate, patients Vince’s  score is at baseline (or greater than 8), patient and escort has received  discharge education.

## 2022-08-18 NOTE — ED ADULT NURSE NOTE - OBJECTIVE STATEMENT
pt presents to the Ed c/o coughing, intermittent fever x 1 month, dizziness and chest pain with coughing and deep breath. Pt is noted to have a dry cough, tachypneic and winded when he talks. Pt talks in 5 words sentences. right upper and middle lobes have decreased areation, Rt lower lobe crackles are noted and left lobes are clear. Pt is febrile and states he has been having fever x 1 month, pt took 2 tylenols today twice at 1130 and 1430. Pt's skin is dry and hot, pt has RLQ ileostomy. Pt denies dysuria and hematuria, urine was noted to be rachel color.

## 2022-08-18 NOTE — ED PROVIDER NOTE - OBJECTIVE STATEMENT
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  Patient states T-max was 103.5 which he took earlier today.  Patient took Tylenol today at 1130 and 2:30 PM PTA.  Patient had a cough but no other symptoms.  He saw his primary care doctor yesterday who started him on doxycycline.  Patient felt weak today and had a syncopal episode where he woke up on the floor.  Wife endorses that he did not hit his head.  No abdominal pain no diarrhea no nausea no vomiting.

## 2022-08-19 PROBLEM — K57.20 PERFORATION OF SIGMOID COLON DUE TO DIVERTICULITIS: Status: RESOLVED | Noted: 2019-11-13 | Resolved: 2022-08-19

## 2022-08-19 LAB
ALBUMIN SERPL ELPH-MCNC: 3.3 G/DL — LOW (ref 3.5–5.2)
ALP SERPL-CCNC: 104 U/L — SIGNIFICANT CHANGE UP (ref 30–115)
ALT FLD-CCNC: 17 U/L — SIGNIFICANT CHANGE UP (ref 0–41)
ANION GAP SERPL CALC-SCNC: 13 MMOL/L — SIGNIFICANT CHANGE UP (ref 7–14)
AST SERPL-CCNC: 16 U/L — SIGNIFICANT CHANGE UP (ref 0–41)
BASOPHILS # BLD AUTO: 0.05 K/UL — SIGNIFICANT CHANGE UP (ref 0–0.2)
BASOPHILS NFR BLD AUTO: 0.2 % — SIGNIFICANT CHANGE UP (ref 0–1)
BILIRUB SERPL-MCNC: 0.3 MG/DL — SIGNIFICANT CHANGE UP (ref 0.2–1.2)
BUN SERPL-MCNC: 13 MG/DL — SIGNIFICANT CHANGE UP (ref 10–20)
CALCIUM SERPL-MCNC: 8.9 MG/DL — SIGNIFICANT CHANGE UP (ref 8.5–10.1)
CHLORIDE SERPL-SCNC: 98 MMOL/L — SIGNIFICANT CHANGE UP (ref 98–110)
CO2 SERPL-SCNC: 24 MMOL/L — SIGNIFICANT CHANGE UP (ref 17–32)
CREAT SERPL-MCNC: 0.8 MG/DL — SIGNIFICANT CHANGE UP (ref 0.7–1.5)
CRP SERPL-MCNC: 323.9 MG/L — HIGH
EGFR: 111 ML/MIN/1.73M2 — SIGNIFICANT CHANGE UP
EOSINOPHIL # BLD AUTO: 0.01 K/UL — SIGNIFICANT CHANGE UP (ref 0–0.7)
EOSINOPHIL NFR BLD AUTO: 0 % — SIGNIFICANT CHANGE UP (ref 0–8)
ERYTHROCYTE [SEDIMENTATION RATE] IN BLOOD: 125 MM/HR — HIGH (ref 0–10)
FERRITIN SERPL-MCNC: 399 NG/ML — SIGNIFICANT CHANGE UP (ref 30–400)
GLUCOSE SERPL-MCNC: 88 MG/DL — SIGNIFICANT CHANGE UP (ref 70–99)
HCT VFR BLD CALC: 30.9 % — LOW (ref 42–52)
HGB BLD-MCNC: 10.2 G/DL — LOW (ref 14–18)
IMM GRANULOCYTES NFR BLD AUTO: 0.8 % — HIGH (ref 0.1–0.3)
LEGIONELLA AG UR QL: NEGATIVE — SIGNIFICANT CHANGE UP
LYMPHOCYTES # BLD AUTO: 0.5 K/UL — LOW (ref 1.2–3.4)
LYMPHOCYTES # BLD AUTO: 2.3 % — LOW (ref 20.5–51.1)
MAGNESIUM SERPL-MCNC: 1.6 MG/DL — LOW (ref 1.8–2.4)
MCHC RBC-ENTMCNC: 26.7 PG — LOW (ref 27–31)
MCHC RBC-ENTMCNC: 33 G/DL — SIGNIFICANT CHANGE UP (ref 32–37)
MCV RBC AUTO: 80.9 FL — SIGNIFICANT CHANGE UP (ref 80–94)
MONOCYTES # BLD AUTO: 0.83 K/UL — HIGH (ref 0.1–0.6)
MONOCYTES NFR BLD AUTO: 3.9 % — SIGNIFICANT CHANGE UP (ref 1.7–9.3)
NEUTROPHILS # BLD AUTO: 19.82 K/UL — HIGH (ref 1.4–6.5)
NEUTROPHILS NFR BLD AUTO: 92.8 % — HIGH (ref 42.2–75.2)
NRBC # BLD: 0 /100 WBCS — SIGNIFICANT CHANGE UP (ref 0–0)
PLATELET # BLD AUTO: 357 K/UL — SIGNIFICANT CHANGE UP (ref 130–400)
POTASSIUM SERPL-MCNC: 4.3 MMOL/L — SIGNIFICANT CHANGE UP (ref 3.5–5)
POTASSIUM SERPL-SCNC: 4.3 MMOL/L — SIGNIFICANT CHANGE UP (ref 3.5–5)
PROT SERPL-MCNC: 6.2 G/DL — SIGNIFICANT CHANGE UP (ref 6–8)
RAPID RVP RESULT: SIGNIFICANT CHANGE UP
RBC # BLD: 3.82 M/UL — LOW (ref 4.7–6.1)
RBC # BLD: 3.82 M/UL — LOW (ref 4.7–6.1)
RBC # FLD: 14.2 % — SIGNIFICANT CHANGE UP (ref 11.5–14.5)
RETICS #: 64.2 K/UL — SIGNIFICANT CHANGE UP (ref 25–125)
RETICS/RBC NFR: 1.7 % — HIGH (ref 0.5–1.5)
S PNEUM AG UR QL: NEGATIVE — SIGNIFICANT CHANGE UP
SARS-COV-2 RNA SPEC QL NAA+PROBE: SIGNIFICANT CHANGE UP
SODIUM SERPL-SCNC: 135 MMOL/L — SIGNIFICANT CHANGE UP (ref 135–146)
WBC # BLD: 21.38 K/UL — HIGH (ref 4.8–10.8)
WBC # FLD AUTO: 21.38 K/UL — HIGH (ref 4.8–10.8)

## 2022-08-19 PROCEDURE — 99223 1ST HOSP IP/OBS HIGH 75: CPT

## 2022-08-19 PROCEDURE — 71045 X-RAY EXAM CHEST 1 VIEW: CPT | Mod: 26

## 2022-08-19 PROCEDURE — 99251: CPT | Mod: 25

## 2022-08-19 PROCEDURE — 93306 TTE W/DOPPLER COMPLETE: CPT | Mod: 26

## 2022-08-19 PROCEDURE — 99233 SBSQ HOSP IP/OBS HIGH 50: CPT | Mod: 25

## 2022-08-19 RX ORDER — LEVETIRACETAM 250 MG/1
2 TABLET, FILM COATED ORAL
Qty: 0 | Refills: 0 | DISCHARGE

## 2022-08-19 RX ORDER — ACETAMINOPHEN 500 MG
650 TABLET ORAL ONCE
Refills: 0 | Status: DISCONTINUED | OUTPATIENT
Start: 2022-08-19 | End: 2022-08-22

## 2022-08-19 RX ORDER — LEVETIRACETAM 250 MG/1
1500 TABLET, FILM COATED ORAL AT BEDTIME
Refills: 0 | Status: DISCONTINUED | OUTPATIENT
Start: 2022-08-19 | End: 2022-08-22

## 2022-08-19 RX ORDER — METRONIDAZOLE 500 MG
500 TABLET ORAL EVERY 8 HOURS
Refills: 0 | Status: DISCONTINUED | OUTPATIENT
Start: 2022-08-19 | End: 2022-08-19

## 2022-08-19 RX ORDER — METRONIDAZOLE 500 MG
500 TABLET ORAL ONCE
Refills: 0 | Status: COMPLETED | OUTPATIENT
Start: 2022-08-19 | End: 2022-08-19

## 2022-08-19 RX ORDER — OXCARBAZEPINE 300 MG/1
600 TABLET, FILM COATED ORAL
Refills: 0 | Status: DISCONTINUED | OUTPATIENT
Start: 2022-08-19 | End: 2022-08-22

## 2022-08-19 RX ORDER — OXCARBAZEPINE 300 MG/1
600 TABLET, FILM COATED ORAL
Qty: 0 | Refills: 0 | DISCHARGE

## 2022-08-19 RX ORDER — LEVETIRACETAM 250 MG/1
1500 TABLET, FILM COATED ORAL
Qty: 0 | Refills: 0 | DISCHARGE

## 2022-08-19 RX ORDER — CEFEPIME 1 G/1
2000 INJECTION, POWDER, FOR SOLUTION INTRAMUSCULAR; INTRAVENOUS ONCE
Refills: 0 | Status: COMPLETED | OUTPATIENT
Start: 2022-08-19 | End: 2022-08-19

## 2022-08-19 RX ORDER — MORPHINE SULFATE 50 MG/1
2 CAPSULE, EXTENDED RELEASE ORAL ONCE
Refills: 0 | Status: DISCONTINUED | OUTPATIENT
Start: 2022-08-19 | End: 2022-08-19

## 2022-08-19 RX ORDER — VANCOMYCIN HCL 1 G
1250 VIAL (EA) INTRAVENOUS EVERY 12 HOURS
Refills: 0 | Status: DISCONTINUED | OUTPATIENT
Start: 2022-08-19 | End: 2022-08-19

## 2022-08-19 RX ORDER — CEFEPIME 1 G/1
INJECTION, POWDER, FOR SOLUTION INTRAMUSCULAR; INTRAVENOUS
Refills: 0 | Status: DISCONTINUED | OUTPATIENT
Start: 2022-08-19 | End: 2022-08-22

## 2022-08-19 RX ORDER — LEVETIRACETAM 250 MG/1
1500 TABLET, FILM COATED ORAL ONCE
Refills: 0 | Status: COMPLETED | OUTPATIENT
Start: 2022-08-19 | End: 2022-08-19

## 2022-08-19 RX ORDER — KETOROLAC TROMETHAMINE 30 MG/ML
30 SYRINGE (ML) INJECTION ONCE
Refills: 0 | Status: DISCONTINUED | OUTPATIENT
Start: 2022-08-19 | End: 2022-08-19

## 2022-08-19 RX ORDER — OXCARBAZEPINE 300 MG/1
300 TABLET, FILM COATED ORAL
Qty: 0 | Refills: 0 | DISCHARGE

## 2022-08-19 RX ORDER — OXCARBAZEPINE 300 MG/1
300 TABLET, FILM COATED ORAL
Refills: 0 | Status: DISCONTINUED | OUTPATIENT
Start: 2022-08-19 | End: 2022-08-22

## 2022-08-19 RX ORDER — ASPIRIN/CALCIUM CARB/MAGNESIUM 324 MG
81 TABLET ORAL DAILY
Refills: 0 | Status: DISCONTINUED | OUTPATIENT
Start: 2022-08-19 | End: 2022-08-22

## 2022-08-19 RX ORDER — CHOLECALCIFEROL (VITAMIN D3) 125 MCG
1000 CAPSULE ORAL DAILY
Refills: 0 | Status: DISCONTINUED | OUTPATIENT
Start: 2022-08-19 | End: 2022-08-22

## 2022-08-19 RX ORDER — ENOXAPARIN SODIUM 100 MG/ML
40 INJECTION SUBCUTANEOUS EVERY 24 HOURS
Refills: 0 | Status: DISCONTINUED | OUTPATIENT
Start: 2022-08-19 | End: 2022-08-22

## 2022-08-19 RX ORDER — LEVETIRACETAM 250 MG/1
1125 TABLET, FILM COATED ORAL DAILY
Refills: 0 | Status: DISCONTINUED | OUTPATIENT
Start: 2022-08-19 | End: 2022-08-22

## 2022-08-19 RX ORDER — CEFEPIME 1 G/1
2000 INJECTION, POWDER, FOR SOLUTION INTRAMUSCULAR; INTRAVENOUS EVERY 8 HOURS
Refills: 0 | Status: DISCONTINUED | OUTPATIENT
Start: 2022-08-19 | End: 2022-08-22

## 2022-08-19 RX ORDER — LEVETIRACETAM 250 MG/1
1125 TABLET, FILM COATED ORAL
Qty: 0 | Refills: 0 | DISCHARGE

## 2022-08-19 RX ORDER — IBUPROFEN 200 MG
600 TABLET ORAL ONCE
Refills: 0 | Status: COMPLETED | OUTPATIENT
Start: 2022-08-19 | End: 2022-08-19

## 2022-08-19 RX ORDER — LANOLIN ALCOHOL/MO/W.PET/CERES
3 CREAM (GRAM) TOPICAL AT BEDTIME
Refills: 0 | Status: DISCONTINUED | OUTPATIENT
Start: 2022-08-19 | End: 2022-08-22

## 2022-08-19 RX ORDER — ACETAMINOPHEN 500 MG
650 TABLET ORAL EVERY 6 HOURS
Refills: 0 | Status: DISCONTINUED | OUTPATIENT
Start: 2022-08-19 | End: 2022-08-22

## 2022-08-19 RX ORDER — OXCARBAZEPINE 300 MG/1
600 TABLET, FILM COATED ORAL ONCE
Refills: 0 | Status: COMPLETED | OUTPATIENT
Start: 2022-08-19 | End: 2022-08-19

## 2022-08-19 RX ORDER — MORPHINE SULFATE 50 MG/1
2 CAPSULE, EXTENDED RELEASE ORAL EVERY 6 HOURS
Refills: 0 | Status: DISCONTINUED | OUTPATIENT
Start: 2022-08-19 | End: 2022-08-22

## 2022-08-19 RX ORDER — METRONIDAZOLE 500 MG
TABLET ORAL
Refills: 0 | Status: DISCONTINUED | OUTPATIENT
Start: 2022-08-19 | End: 2022-08-19

## 2022-08-19 RX ORDER — MEROPENEM 1 G/30ML
1000 INJECTION INTRAVENOUS EVERY 8 HOURS
Refills: 0 | Status: DISCONTINUED | OUTPATIENT
Start: 2022-08-19 | End: 2022-08-19

## 2022-08-19 RX ADMIN — OXCARBAZEPINE 600 MILLIGRAM(S): 300 TABLET, FILM COATED ORAL at 01:17

## 2022-08-19 RX ADMIN — Medication 600 MILLIGRAM(S): at 20:24

## 2022-08-19 RX ADMIN — Medication 30 MILLIGRAM(S): at 01:17

## 2022-08-19 RX ADMIN — Medication 30 MILLIGRAM(S): at 01:34

## 2022-08-19 RX ADMIN — OXCARBAZEPINE 300 MILLIGRAM(S): 300 TABLET, FILM COATED ORAL at 08:41

## 2022-08-19 RX ADMIN — MORPHINE SULFATE 2 MILLIGRAM(S): 50 CAPSULE, EXTENDED RELEASE ORAL at 20:10

## 2022-08-19 RX ADMIN — Medication 650 MILLIGRAM(S): at 15:20

## 2022-08-19 RX ADMIN — MORPHINE SULFATE 2 MILLIGRAM(S): 50 CAPSULE, EXTENDED RELEASE ORAL at 11:15

## 2022-08-19 RX ADMIN — Medication 100 MILLIGRAM(S): at 11:08

## 2022-08-19 RX ADMIN — Medication 81 MILLIGRAM(S): at 11:13

## 2022-08-19 RX ADMIN — LEVETIRACETAM 1500 MILLIGRAM(S): 250 TABLET, FILM COATED ORAL at 01:17

## 2022-08-19 RX ADMIN — CEFEPIME 100 MILLIGRAM(S): 1 INJECTION, POWDER, FOR SOLUTION INTRAMUSCULAR; INTRAVENOUS at 11:09

## 2022-08-19 RX ADMIN — MORPHINE SULFATE 2 MILLIGRAM(S): 50 CAPSULE, EXTENDED RELEASE ORAL at 21:00

## 2022-08-19 RX ADMIN — OXCARBAZEPINE 600 MILLIGRAM(S): 300 TABLET, FILM COATED ORAL at 17:59

## 2022-08-19 RX ADMIN — Medication 3 MILLIGRAM(S): at 22:21

## 2022-08-19 RX ADMIN — Medication 166.67 MILLIGRAM(S): at 10:18

## 2022-08-19 RX ADMIN — Medication 1000 UNIT(S): at 11:13

## 2022-08-19 RX ADMIN — Medication 1 TABLET(S): at 11:13

## 2022-08-19 RX ADMIN — LEVETIRACETAM 1500 MILLIGRAM(S): 250 TABLET, FILM COATED ORAL at 22:21

## 2022-08-19 RX ADMIN — ENOXAPARIN SODIUM 40 MILLIGRAM(S): 100 INJECTION SUBCUTANEOUS at 08:40

## 2022-08-19 RX ADMIN — LEVETIRACETAM 1125 MILLIGRAM(S): 250 TABLET, FILM COATED ORAL at 11:11

## 2022-08-19 RX ADMIN — CEFEPIME 100 MILLIGRAM(S): 1 INJECTION, POWDER, FOR SOLUTION INTRAMUSCULAR; INTRAVENOUS at 22:21

## 2022-08-19 RX ADMIN — MORPHINE SULFATE 2 MILLIGRAM(S): 50 CAPSULE, EXTENDED RELEASE ORAL at 11:45

## 2022-08-19 RX ADMIN — Medication 650 MILLIGRAM(S): at 10:18

## 2022-08-19 RX ADMIN — Medication 600 MILLIGRAM(S): at 19:03

## 2022-08-19 RX ADMIN — MORPHINE SULFATE 2 MILLIGRAM(S): 50 CAPSULE, EXTENDED RELEASE ORAL at 05:46

## 2022-08-19 NOTE — ASSESSMENT
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 We discussed possible colonoscopy for surveillance of his rectum however he states he does not want to do this for fear of perforation.  We will see him back as needed.

## 2022-08-19 NOTE — CONSULT NOTE ADULT - SUBJECTIVE AND OBJECTIVE BOX
Patient is a 46y old  Male who presents with a chief complaint of CP/ SOB    HPI:  46M with PMH Melva-Danlos syndrome followed at [facility] ([facility]--GI, [facility]--Vascular), perforation of sigmoid colon s/p Kerri's, Colonic perforation w/ ileostomy in , epilepsy, HTN, celiac aneurysm s/p coiling 2010 presented to the ED for fever / cough for 2 days. Yesterday pt had a fever of 103 which was not breaking. In the morning pt got out of the bed and had a syncopal episode. Pt fell lightheaded, saw flashes and woke up on the floor. Then pt had sever chest pain on the right side, after which he decided to come to the ED.     Off note pt was supposed to fup with Hematology/ Oncology for abnormal blood counts, pts PCP was considering a bone biopsy.    ED vitals 108/68, , RR 17, temp 100.9, O2 sat 98% on RA    Sig Labs: WBC 30.85, Hg 11.5    EKG Sinus tachycardia    CT chest w IV cont: 50% right-sided pneumothorax under tension. Lucencies seen within the lung parenchyma and may represent air trapped within fissures.    CT abd/ pelvis: neg for acute pathology    In the ED a chest tube was placed on the right, 1x dose of vanc and keke and was given 2.5 L bolus of fluid admitted to SDU, called to evaluate, this am feels better, cough      PAST MEDICAL & SURGICAL HISTORY:  EDS (Melva-Danlos syndrome)      Epilepsy      HTN (hypertension)      Keratoconus      Aneurysm artery, celiac  s/p coiling 2010      Dislocations and subluxations      Status post Kerri&#x27;s procedure  2019          SOCIAL HX:   Smoking -        REVIEW OF SYSTEMS see hpi    Allergies    moxifloxacin (Hives)  penicillin (Unknown)    Intolerances        acetaminophen     Tablet .. 650 milliGRAM(s) Oral every 6 hours PRN  aspirin enteric coated 81 milliGRAM(s) Oral daily  cholecalciferol 1000 Unit(s) Oral daily  enoxaparin Injectable 40 milliGRAM(s) SubCutaneous every 24 hours  levETIRAcetam 1125 milliGRAM(s) Oral daily  levETIRAcetam 1500 milliGRAM(s) Oral at bedtime  melatonin 3 milliGRAM(s) Oral at bedtime  meropenem  IVPB 1000 milliGRAM(s) IV Intermittent every 8 hours  multivitamin 1 Tablet(s) Oral daily  OXcarbazepine 600 milliGRAM(s) Oral <User Schedule>  OXcarbazepine 300 milliGRAM(s) Oral <User Schedule>  vancomycin  IVPB 1250 milliGRAM(s) IV Intermittent every 12 hours  : Home Meds:      PHYSICAL EXAM    ICU Vital Signs Last 24 Hrs  T(C): 36.7 (19 Aug 2022 03:30), Max: 38.3 (18 Aug 2022 16:52)  T(F): 98 (19 Aug 2022 03:30), Max: 100.9 (18 Aug 2022 16:52)  HR: 77 (19 Aug 2022 03:30) (72 - 117)  BP: 100/59 (19 Aug 2022 03:30) (90/55 - 125/76)  BP(mean): 86 (18 Aug 2022 23:30) (68 - 94)  RR: 18 (19 Aug 2022 03:30) (16 - 20)  SpO2: 97% (19 Aug 2022 03:30) (97% - 100%)    O2 Parameters below as of 19 Aug 2022 03:30  Patient On (Oxygen Delivery Method): room air            General: ill looking  Lungs: dec bs both bases, r pigtail no air leak  Cardiovascular: Regular  Abdomen: Soft, Positive BS, colostomy  Extremities: No clubbing  Skin: Warm  Neurological: Non focal         LABS:                          11.5   30.85 )-----------( 488      ( 18 Aug 2022 20:20 )             36.1                                               08-18    134<L>  |  95<L>  |  15  ----------------------------<  97  4.3   |  24  |  1.2    Ca    9.7      18 Aug 2022 20:20    TPro  7.7  /  Alb  4.0  /  TBili  0.6  /  DBili  x   /  AST  20  /  ALT  27  /  AlkPhos  138<H>  08-18                                             Urinalysis Basic - ( 18 Aug 2022 20:30 )    Color: Yellow / Appearance: Clear / S.035 / pH: x  Gluc: x / Ketone: Moderate  / Bili: Small / Urobili: <2 mg/dL   Blood: x / Protein: 30 mg/dL / Nitrite: Negative   Leuk Esterase: Negative / RBC: 3 /HPF / WBC 2 /HPF   Sq Epi: x / Non Sq Epi: 3 /HPF / Bacteria: FEW                                                  LIVER FUNCTIONS - ( 18 Aug 2022 20:20 )  Alb: 4.0 g/dL / Pro: 7.7 g/dL / ALK PHOS: 138 U/L / ALT: 27 U/L / AST: 20 U/L / GGT: x                                                                                                                     MEDICATIONS  (STANDING):  aspirin enteric coated 81 milliGRAM(s) Oral daily  cholecalciferol 1000 Unit(s) Oral daily  enoxaparin Injectable 40 milliGRAM(s) SubCutaneous every 24 hours  levETIRAcetam 1125 milliGRAM(s) Oral daily  levETIRAcetam 1500 milliGRAM(s) Oral at bedtime  melatonin 3 milliGRAM(s) Oral at bedtime  meropenem  IVPB 1000 milliGRAM(s) IV Intermittent every 8 hours  multivitamin 1 Tablet(s) Oral daily  OXcarbazepine 600 milliGRAM(s) Oral <User Schedule>  OXcarbazepine 300 milliGRAM(s) Oral <User Schedule>  vancomycin  IVPB 1250 milliGRAM(s) IV Intermittent every 12 hours    MEDICATIONS  (PRN):  acetaminophen     Tablet .. 650 milliGRAM(s) Oral every 6 hours PRN Temp greater or equal to 38C (100.4F), Mild Pain (1 - 3)

## 2022-08-19 NOTE — H&P ADULT - HISTORY OF PRESENT ILLNESS
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    ED vitals 108/68, , RR 17, temp 100.9, O2 sat 98% on RA    Sig Labs: WBC 30.85, Hg 11.5    EKG Sinus tachycardia    CT chest wi IV cont: 50% right-sided pneumothorax under tension. Lucencies seen within the lung parenchyma and may represent air trapped within fissures.    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    ED vitals 108/68, , RR 17, temp 100.9, O2 sat 98% on RA    Sig Labs: WBC 30.85, Hg 11.5    EKG Sinus tachycardia    CT chest wi IV cont: 50% right-sided pneumothorax under tension. Lucencies seen within the lung parenchyma and may represent air trapped within fissures.    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 Yesterday pt had a fever of 103 which was not breaking. In the morning pt got out of the bed and had a syncopal episode. Pt fell lightheaded, saw flashes and woke up on the floor. Then pt had sever chest pain on the right side, after which he decided to come to the ED.     Off note pt was supposed to fup with Hematology/ Oncology for abnormal blood counts, pts PCP was considering a bone biopsy.    ED vitals 108/68, , RR 17, temp 100.9, O2 sat 98% on RA    Sig Labs: WBC 30.85, Hg 11.5    EKG Sinus tachycardia    CT chest w IV cont: 50% right-sided pneumothorax under tension. Lucencies seen within the lung parenchyma and may represent air trapped within fissures.    CT abd/ pelvis: neg for acute pathology    In the ED a chest tube was placed on the right, 1x dose of vanc and keke and was given 2.5 L bolus of fluid

## 2022-08-19 NOTE — H&P ADULT - NSHPPHYSICALEXAM_GEN_ALL_CORE
GENERAL: NAD, lying in bed comfortably  HEAD:  Atraumatic, Normocephalic  EYES: EOMI, PERRLA, conjunctiva and sclera clear  ENT: Moist mucous membranes  NECK: Supple, No JVD  CHEST/LUNG: Clear to auscultation bilaterally; No rales, rhonchi, wheezing, or rubs. Unlabored respirations  HEART: Regular rate and rhythm; No murmurs, rubs, or gallops  ABDOMEN: BSx4; Soft, nontender, nondistended  EXTREMITIES:  2+ Peripheral Pulses, brisk capillary refill. No clubbing, cyanosis, or edema  NERVOUS SYSTEM:  A&Ox3, no focal deficits   SKIN: No rashes or lesions GENERAL: NAD, lying in bed comfortably  HEAD:  Atraumatic, Normocephalic  EYES: EOMI, PERRLA, conjunctiva and sclera clear  CHEST/LUNG: Decreased breath sounds on the right, Chest tube on the right  HEART: Regular rate and rhythm; No murmurs, rubs, or gallops  ABDOMEN: BSx4; Soft, nontender, nondistended, Ileostomy bag in place, surgical scars noted  EXTREMITIES:  2+ Peripheral Pulses, brisk capillary refill. No clubbing, cyanosis, or edema  NERVOUS SYSTEM:  A&Ox3, no focal deficits

## 2022-08-19 NOTE — CONSULT NOTE ADULT - ASSESSMENT
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  S/p bedside 14Fr pigtail placement= in ED with resolution of PTX, admitted to medical service for septic workup(WBC 30, Febrile 103 on admission). Thoracic surgery team consulted for further management of chest tube.    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  S/p bedside 14Fr pigtail placement in ED with resolution of PTX, admitted to medical service for septic workup(WBC 30, Febrile 103 on admission). Thoracic surgery team consulted for further management of chest tube.    Plan:   -Daily CXR   -Continue CT to suction   -IV abx, septic workup as per primary   -Thoracic surgery to follow   -Discussed with attending

## 2022-08-19 NOTE — DIETITIAN INITIAL EVALUATION ADULT - NSFNSGIIOFT_GEN_A_CORE
08-18-22 @ 07:01  -  08-19-22 @ 07:00  --------------------------------------------------------  OUT:    Chest Tube (mL): 40 mL  Total OUT: 40 mL    Total NET: -40 mL      08-19-22 @ 07:01  -  08-19-22 @ 16:19  --------------------------------------------------------  OUT:    Gastrostomy Tube (mL): 250 mL  Total OUT: 250 mL    Total NET: -250 mL

## 2022-08-19 NOTE — PROGRESS NOTE ADULT - SUBJECTIVE AND OBJECTIVE BOX
HPI:  46M with PMH Melva-Danlos syndrome, cough variant asthma w/ hx of pulmonary nodules, , perforation of sigmoid colon s/p Kerri's, Colonic perforation w/ ileostomy in , epilepsy, HTN, celiac aneurysm s/p coiling 2010   presented to the ED 22 for intermittent fevers ( recorded at home), mixed productive and non-productive cough, night sweats for 1 month. 22   pt had a fever of 103 which was not breaking. In the morning pt got out of the bed and had a syncopal episode. Pt fell lightheaded, saw flashes and woke up on the floor.   Then pt had severe chest pain on the right side, after which he decided to come to the ED.   In course of hospitalization, pt found to have PTX a/w ruptured bleb and had chest tube placed.  Pt had CT's abd and chest and no findings for PNA or sepsis , but pt has ongoing fever at high temp at 103 and 104.  Pt has had elev wbc and plt count with mild anemia.   Diff for leukocytosis unremarkable.     Infectious Diseases History:  Old Micro Data/Cultures:     FAMILY HISTORY: non-contributory     PAST MEDICAL & SURGICAL HISTORY:  EDS (Melva-Danlos syndrome)      Epilepsy      HTN (hypertension)      Keratoconus      Aneurysm artery, celiac  s/p coiling 2010      Dislocations and subluxations      Status post Kerri&#x27;s procedure  2019          SOCIAL HISTORY  Social History:  Denies alcohol, tobacco and illicit drug use. (05 Sep 2019 22:09)      Recent Travel: [place] ([facility]) - was feeling sick prior to this trip  Other Exposures:     ROS  General: (+) fever and nightsweats  HEENT: Denies headache, rhinorrhea, sore throat, eye pain  CV: (+) CP - right sided, (-) palpitations  PULM: Denies wheezing, hemoptysis (+) dry cough, SOB  GI: Denies hematemesis, hematochezia, melena  : Denies discharge, hematuria  MSK: Denies arthralgias, myalgias  SKIN: Denies rash, lesions  NEURO: Denies paresthesias, weakness    VITALS:  T(F): 97, Max: 100.9 (22 @ 16:52)  HR: 96  BP: 104/61    PHYSICAL EXAM:  Gen: NAD, resting in bed, mildly diaphoretic   Neuro: non focal, alert and oriented x3  HEENT: Normocephalic, atraumatic  Neck: supple, no lymphadenopathy  CV: Tachycardic rate & regular rhythm  Lungs: Diminished breath sounds on the right. No wheezing, ronchi or rales bilaterally  CT  Abdomen: Soft, ileostomy present, nontender, nondistended   Ext: Warm, well perfused  Skin: no rash, no lesions  Lines: no phlebitis    TESTS & MEASUREMENTS:                        11.5   30.85 )-----------( 488      ( 18 Aug 2022 20:20 )             36.1   wbc 20K         134<L>  |  95<L>  |  15  ----------------------------<  97  4.3   |  24  |  1.2    Ca    9.7      18 Aug 2022 20:20    TPro  7.7  /  Alb  4.0  /  TBili  0.6  /  DBili  x   /  AST  20  /  ALT  27  /  AlkPhos  138<H>        LIVER FUNCTIONS - ( 18 Aug 2022 20:20 )  Alb: 4.0 g/dL / Pro: 7.7 g/dL / ALK PHOS: 138 U/L / ALT: 27 U/L / AST: 20 U/L / GGT: x           Urinalysis Basic - ( 18 Aug 2022 20:30 )    Color: Yellow / Appearance: Clear / S.035 / pH: x  Gluc: x / Ketone: Moderate  / Bili: Small / Urobili: <2 mg/dL   Blood: x / Protein: 30 mg/dL / Nitrite: Negative   Leuk Esterase: Negative / RBC: 3 /HPF / WBC 2 /HPF   Sq Epi: x / Non Sq Epi: 3 /HPF / Bacteria: FEW          Lactate, Blood: 1.6 mmol/L (22 @ 20:20)      I

## 2022-08-19 NOTE — H&P ADULT - NSHPREVIEWOFSYSTEMS_GEN_ALL_CORE
CONSTITUTIONAL: No weakness, fevers or chills  EYES/ENT: No visual changes;  No vertigo or throat pain   RESPIRATORY: No cough, wheezing, hemoptysis; No shortness of breath  CARDIOVASCULAR: No chest pain or palpitations  GASTROINTESTINAL: No abdominal or epigastric pain. No nausea, vomiting, or hematemesis; No diarrhea or constipation. No melena or hematochezia.  GENITOURINARY: No dysuria, frequency or hematuria  NEUROLOGICAL: No numbness or weakness  SKIN: No itching, rashes CONSTITUTIONAL: Fevers and chills for the past month  EYES/ENT: No visual changes;  No vertigo or throat pain   RESPIRATORY: No cough, wheezing, hemoptysis; No shortness of breath  CARDIOVASCULAR: Chest pain now improved  GASTROINTESTINAL: No abdominal or epigastric pain. No nausea, vomiting, or hematemesis; No diarrhea or constipation. No melena or hematochezia.  GENITOURINARY: No dysuria, frequency or hematuria

## 2022-08-19 NOTE — CONSULT NOTE ADULT - SUBJECTIVE AND OBJECTIVE BOX
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 Yesterday pt had a fever of 103 which was not breaking. In the morning pt got out of the bed and had a syncopal episode. Pt fell lightheaded, saw flashes and woke up on the floor. Then pt had sever chest pain on the right side, after which he decided to come to the ED.     Off note pt was supposed to fup with Hematology/ Oncology for abnormal blood counts, pts PCP was considering a bone biopsy.    ED vitals 108/68, , RR 17, temp 100.9, O2 sat 98% on RA    Sig Labs: WBC 30.85, Hg 11.5    EKG Sinus tachycardia    CT chest w IV cont: 50% right-sided pneumothorax under tension. Lucencies seen within the lung parenchyma and may represent air trapped within fissures.    CT abd/ pelvis: neg for acute pathology    In the ED a chest tube was placed on the right, 1x dose of vanc and keke and was given 2.5 L bolus of fluid (19 Aug 2022 00:42)      Allergies    moxifloxacin (Hives)  penicillin (Unknown)    Intolerances        MEDICATIONS  (STANDING):  aspirin enteric coated 81 milliGRAM(s) Oral daily  cholecalciferol 1000 Unit(s) Oral daily  enoxaparin Injectable 40 milliGRAM(s) SubCutaneous every 24 hours  levETIRAcetam 1125 milliGRAM(s) Oral daily  levETIRAcetam 1500 milliGRAM(s) Oral at bedtime  levoFLOXacin IVPB      melatonin 3 milliGRAM(s) Oral at bedtime  metroNIDAZOLE  IVPB 500 milliGRAM(s) IV Intermittent once  metroNIDAZOLE  IVPB 500 milliGRAM(s) IV Intermittent every 8 hours  metroNIDAZOLE  IVPB      multivitamin 1 Tablet(s) Oral daily  OXcarbazepine 600 milliGRAM(s) Oral <User Schedule>  OXcarbazepine 300 milliGRAM(s) Oral <User Schedule>  vancomycin  IVPB 1250 milliGRAM(s) IV Intermittent every 12 hours    MEDICATIONS  (PRN):  acetaminophen     Tablet .. 650 milliGRAM(s) Oral every 6 hours PRN Temp greater or equal to 38C (100.4F), Mild Pain (1 - 3)      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      Dislocations and subluxations      Status post Kerri&#x27;s procedure  11/2019          FAMILY HISTORY:      SOCIAL HISTORY: No EtOH, no tobacco    REVIEW OF SYSTEMS:    CONSTITUTIONAL: No weakness, fevers or chills  EYES/ENT: No visual changes;  No vertigo or throat pain   NECK: No pain or stiffness  RESPIRATORY: No cough, wheezing, hemoptysis; No shortness of breath  CARDIOVASCULAR: No chest pain or palpitations  GASTROINTESTINAL: No abdominal or epigastric pain. No nausea, vomiting, or hematemesis; No diarrhea or constipation. No melena or hematochezia.  GENITOURINARY: No dysuria, frequency or hematuria  NEUROLOGICAL: No numbness or weakness  SKIN: No itching, burning, rashes, or lesions   All other review of systems is negative unless indicated above.    Height (cm): 185.4 (08-18 @ 16:52)  Weight (kg): 75 (08-18 @ 21:37)  BMI (kg/m2): 21.8 (08-18 @ 21:37)  BSA (m2): 1.98 (08-18 @ 21:37)    T(F): 97 (08-19-22 @ 07:59), Max: 100.9 (08-18-22 @ 16:52)  HR: 96 (08-19-22 @ 07:59)  BP: 104/61 (08-19-22 @ 07:59)  RR: 20 (08-19-22 @ 07:59)  SpO2: 97% (08-19-22 @ 07:59)  Wt(kg): --    GENERAL: NAD, well-developed  HEAD:  Atraumatic, Normocephalic  EYES: EOMI, PERRLA, conjunctiva and sclera clear  NECK: Supple, No JVD  CHEST/LUNG: Clear to auscultation bilaterally; No wheeze  HEART: Regular rate and rhythm; No murmurs, rubs, or gallops  ABDOMEN: Soft, Nontender, Nondistended; Bowel sounds present  EXTREMITIES:  2+ Peripheral Pulses, No clubbing, cyanosis, or edema  NEUROLOGY: non-focal  SKIN: No rashes or lesions                          10.2   21.38 )-----------( 357      ( 19 Aug 2022 08:51 )             30.9       08-18    134<L>  |  95<L>  |  15  ----------------------------<  97  4.3   |  24  |  1.2    Ca    9.7      18 Aug 2022 20:20    TPro  7.7  /  Alb  4.0  /  TBili  0.6  /  DBili  x   /  AST  20  /  ALT  27  /  AlkPhos  138<H>  08-18           Hematology Consult Note    HPI:  46M with PMH Melva-Danlos syndrome followed at [facility] ([facility]--GI, [facility]--Vascular), perforation of sigmoid colon s/p Kerri's, Colonic perforation w/ ileostomy in 2020, epilepsy, HTN, 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 Yesterday pt had a fever of 103 which was not breaking. In the morning pt got out of the bed and had a syncopal episode. Pt fell lightheaded, saw flashes and woke up on the floor. Then pt had sever chest pain on the right side, after which he decided to come to the ED.     Off note pt was supposed to fup with Hematology/ Oncology for abnormal blood counts, pts PCP was considering a bone biopsy.    ED vitals 108/68, , RR 17, temp 100.9, O2 sat 98% on RA    Sig Labs: WBC 30.85, Hg 11.5    EKG Sinus tachycardia    CT chest w IV cont: 50% right-sided pneumothorax under tension. Lucencies seen within the lung parenchyma and may represent air trapped within fissures.    CT abd/ pelvis: neg for acute pathology    In the ED a chest tube was placed on the right, 1x dose of vanc and keke and was given 2.5 L bolus of fluid (19 Aug 2022 00:42)      Heme/onc was consulted for leukocytosis, thrombocytopenia, and anemia. The patient endorses a 1.5 month history of low-grade fevers, night sweats, dry cough, and fatigue. He was following with his PCP and was due to follow up with heme/onc for eval for myelodysplastic syndrome. He has a history of Ehler's Danlos syndrome complicated by colonic rupture s/p ileostomy. He has had anemia since then, and more recently     Allergies    moxifloxacin (Hives)  penicillin (Unknown)    Intolerances        MEDICATIONS  (STANDING):  aspirin enteric coated 81 milliGRAM(s) Oral daily  cholecalciferol 1000 Unit(s) Oral daily  enoxaparin Injectable 40 milliGRAM(s) SubCutaneous every 24 hours  levETIRAcetam 1125 milliGRAM(s) Oral daily  levETIRAcetam 1500 milliGRAM(s) Oral at bedtime  levoFLOXacin IVPB      melatonin 3 milliGRAM(s) Oral at bedtime  metroNIDAZOLE  IVPB 500 milliGRAM(s) IV Intermittent once  metroNIDAZOLE  IVPB 500 milliGRAM(s) IV Intermittent every 8 hours  metroNIDAZOLE  IVPB      multivitamin 1 Tablet(s) Oral daily  OXcarbazepine 600 milliGRAM(s) Oral <User Schedule>  OXcarbazepine 300 milliGRAM(s) Oral <User Schedule>  vancomycin  IVPB 1250 milliGRAM(s) IV Intermittent every 12 hours    MEDICATIONS  (PRN):  acetaminophen     Tablet .. 650 milliGRAM(s) Oral every 6 hours PRN Temp greater or equal to 38C (100.4F), Mild Pain (1 - 3)      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      Dislocations and subluxations      Status post Kerri&#x27;s procedure  11/2019          FAMILY HISTORY:      SOCIAL HISTORY: No EtOH, no tobacco    REVIEW OF SYSTEMS:    CONSTITUTIONAL: No weakness, fevers or chills  EYES/ENT: No visual changes;  No vertigo or throat pain   NECK: No pain or stiffness  RESPIRATORY: No cough, wheezing, hemoptysis; No shortness of breath  CARDIOVASCULAR: No chest pain or palpitations  GASTROINTESTINAL: No abdominal or epigastric pain. No nausea, vomiting, or hematemesis; No diarrhea or constipation. No melena or hematochezia.  GENITOURINARY: No dysuria, frequency or hematuria  NEUROLOGICAL: No numbness or weakness  SKIN: No itching, burning, rashes, or lesions   All other review of systems is negative unless indicated above.    Height (cm): 185.4 (08-18 @ 16:52)  Weight (kg): 75 (08-18 @ 21:37)  BMI (kg/m2): 21.8 (08-18 @ 21:37)  BSA (m2): 1.98 (08-18 @ 21:37)    T(F): 97 (08-19-22 @ 07:59), Max: 100.9 (08-18-22 @ 16:52)  HR: 96 (08-19-22 @ 07:59)  BP: 104/61 (08-19-22 @ 07:59)  RR: 20 (08-19-22 @ 07:59)  SpO2: 97% (08-19-22 @ 07:59)  Wt(kg): --    GENERAL: NAD, well-developed  HEAD:  Atraumatic, Normocephalic  EYES: EOMI, PERRLA, conjunctiva and sclera clear  NECK: Supple, No JVD  CHEST/LUNG: Clear to auscultation bilaterally; No wheeze  HEART: Regular rate and rhythm; No murmurs, rubs, or gallops  ABDOMEN: Soft, Nontender, Nondistended; Bowel sounds present  EXTREMITIES:  2+ Peripheral Pulses, No clubbing, cyanosis, or edema  NEUROLOGY: non-focal  SKIN: No rashes or lesions                          10.2   21.38 )-----------( 357      ( 19 Aug 2022 08:51 )             30.9       08-18    134<L>  |  95<L>  |  15  ----------------------------<  97  4.3   |  24  |  1.2    Ca    9.7      18 Aug 2022 20:20    TPro  7.7  /  Alb  4.0  /  TBili  0.6  /  DBili  x   /  AST  20  /  ALT  27  /  AlkPhos  138<H>  08-18

## 2022-08-19 NOTE — DIETITIAN INITIAL EVALUATION ADULT - PERTINENT MEDS FT
MEDICATIONS  (STANDING):  aspirin enteric coated 81 milliGRAM(s) Oral daily  cefepime   IVPB 2000 milliGRAM(s) IV Intermittent every 8 hours  cefepime   IVPB      cholecalciferol 1000 Unit(s) Oral daily  enoxaparin Injectable 40 milliGRAM(s) SubCutaneous every 24 hours  levETIRAcetam 1125 milliGRAM(s) Oral daily  levETIRAcetam 1500 milliGRAM(s) Oral at bedtime  melatonin 3 milliGRAM(s) Oral at bedtime  multivitamin 1 Tablet(s) Oral daily  OXcarbazepine 600 milliGRAM(s) Oral <User Schedule>  OXcarbazepine 300 milliGRAM(s) Oral <User Schedule>    MEDICATIONS  (PRN):  acetaminophen     Tablet .. 650 milliGRAM(s) Oral every 6 hours PRN Temp greater or equal to 38C (100.4F), Mild Pain (1 - 3)  morphine  - Injectable 2 milliGRAM(s) IV Push every 6 hours PRN Moderate Pain (4 - 6)

## 2022-08-19 NOTE — PATIENT PROFILE ADULT - FUNCTIONAL ASSESSMENT - DAILY ACTIVITY 4.
3 = A little assistance Burow's Advancement Flap Text: The defect edges were debeveled with a #15 scalpel blade.  Given the location of the defect and the proximity to free margins a Burow's advancement flap was deemed most appropriate.  Using a sterile surgical marker, the appropriate advancement flap was drawn incorporating the defect and placing the expected incisions within the relaxed skin tension lines where possible.    The area thus outlined was incised deep to adipose tissue with a #15 scalpel blade.  The skin margins were undermined to an appropriate distance in all directions utilizing iris scissors.

## 2022-08-19 NOTE — H&P ADULT - NSHPLABSRESULTS_GEN_ALL_CORE
11.5   30.85 )-----------( 488      ( 18 Aug 2022 20:20 )             36.1           134<L>  |  95<L>  |  15  ----------------------------<  97  4.3   |  24  |  1.2    Ca    9.7      18 Aug 2022 20:20    TPro  7.7  /  Alb  4.0  /  TBili  0.6  /  DBili  x   /  AST  20  /  ALT  27  /  AlkPhos  138<H>                Urinalysis Basic - ( 18 Aug 2022 20:30 )    Color: Yellow / Appearance: Clear / S.035 / pH: x  Gluc: x / Ketone: Moderate  / Bili: Small / Urobili: <2 mg/dL   Blood: x / Protein: 30 mg/dL / Nitrite: Negative   Leuk Esterase: Negative / RBC: 3 /HPF / WBC 2 /HPF   Sq Epi: x / Non Sq Epi: 3 /HPF / Bacteria: FEW            Lactate Trend   @ 20:20 Lactate:1.6             CAPILLARY BLOOD GLUCOSE

## 2022-08-19 NOTE — DIETITIAN INITIAL EVALUATION ADULT - OTHER INFO
pt is 46 year old male with hx of Melva-Danlos syndrome, perforated sigmoid colon s/p hartmans, colonic perf with ileostomy 2020,epilepsy, HTN, celeriac aneurysm s/p coiling 4/2010, presents with intermittent fevers and cough x 1 month s/p syncopal episode and woke up on the floor with severe chest pain, found to have spontaneous pneumothorax s/p chest tube placement, suspected PNA, sepsis on admission, concern for leukemia vs MDS

## 2022-08-19 NOTE — CONSULT NOTE ADULT - ASSESSMENT
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, moxifloxacin allergy (hives), penicillin allergy (unknown) presented to the ED 8/18/22 with fevers, productive cough, R sided chest pain, and syncopal episode, CT (+) for 50% pneumothorax s/p chest tube     IMPRESSION  # SIRS criteria on admission (Pulse>90, WBC>12) secondary to R pneumothorax/ ?pneumonia     RECOMMENDATIONS  - f/u MARIELOS, Bcx, Ucx, strep pneumo antigen, MRSA swab, Legionella antigen, ferritin, CRP, ESR,  - Given chronic lung condition - antipneumococcal B-lactam + respiratory fluoroquinolone: ceftriaxone 2g Q24h or Aztreonam 2g Q8h + Levoquin 750 Q24h + ??Vanc 15mg/kg until MRSA swab?    This is a pended note. 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, moxifloxacin allergy (joint pains, concern for dissection), penicillin allergy (vomiting at 4 years old, tolerating cephalosporins) presented to the ED 8/18/22 with fevers, productive cough, R sided chest pain, and syncopal episode, CT (+) for 50% pneumothorax s/p chest tube and R sided opacity     IMPRESSION  # SIRS criteria on admission (T100.9, Pulse 117, WBC 30.85) secondary to R pneumothorax/ possible pneumonia vs reactive process - rule out sepsis  # Air collection on CXR likely bullae not abscess   - Continued fevers 101.4, WBC downtrending (21.8),   - Initial CT 8/19/22 showing 50% pneumothorax under tension. Lucency seen within the lung parenchyma and may represent air trapped within fissures  - S/p Chest tube placement 8/18/22  - CXR 8/19/22   - 8/18/22 S/p 1x dose of vanc 1.5g and keke 1g and was given 2.5 L bolus of fluid in ED  - S/P vancomycin 1250 Q12h, Levaquin 750 Q24h & Flagyl 500 Q8h 8/18/22-8/19/22  - 8/19/22 Cefepime 2g IV Q8 to present    RECOMMENDATIONS  - f/u MARIELOS, Bcx, strep pneumo antigen, MRSA swab, Legionella antigen, ferritin, ESR  - F/u procal to determine reactive vs infectious process   - C/w cefepime for coverage of community acquired pneumonia with pseudomonal coverage   - D/c other antibiotics     This is a pended note. 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, moxifloxacin allergy (joint pains, concern for dissection), penicillin allergy (vomiting at 4 years old, tolerating cephalosporins) presented to the ED 8/18/22 with fevers, productive cough, R sided chest pain, and syncopal episode, CT (+) for 50% pneumothorax s/p chest tube and R sided opacity     IMPRESSION  # SIRS criteria on admission (T100.9, Pulse 117, WBC 30.85) secondary to R pneumothorax/ possible pneumonia vs reactive process - rule out sepsis  # Air collection on CXR likely bullae not abscess   - Continued fevers 101.4, intermittent tachycardia, WBC downtrending (21.8), Platelets WNL, .9, Na WNL, Lactate WNL, UA(-)  - Initial CT 8/19/22 showing 50% pneumothorax under tension. Lucency seen within the lung parenchyma and may represent air trapped within fissures  - S/p Chest tube placement 8/18/22  - CXR 8/19/22 showing trace R pneumothorax and R basilar opacity   - 8/18/22 S/p 1x dose of vanc 1.5g and keke 1g and was given 2.5 L bolus of fluid in ED  - S/P vancomycin 1250 Q12h, Levaquin 750 Q24h & Flagyl 500 Q8h 8/18/22-8/19/22  - 8/19/22 Cefepime 2g IV Q8h to present    RECOMMENDATIONS  - f/u Bcx, strep pneumo antigen, MRSA swab, Legionella antigen, ferritin, ESR  - F/u procal to determine reactive vs infectious process   - C/w cefepime 2g IV Q8h for coverage of community acquired pneumonia with pseudomonal coverage   - D/c other antibiotics     This is a pended note. 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, moxifloxacin allergy (joint pains, concern for dissection), penicillin allergy (vomiting at 4 years old, tolerating cephalosporins) presented to the ED 8/18/22 with fevers, productive cough, R sided chest pain, and syncopal episode, CT (+) for 50% pneumothorax s/p chest tube and R sided opacity     IMPRESSION  # SIRS criteria on admission (T100.9, Pulse 117, WBC 30.85) secondary to R pneumothorax/ possible pneumonia vs reactive process - rule out sepsis  # Air collection on CXR likely bullae not abscess   - Continued fevers 101.4, intermittent tachycardia, WBC downtrending (21.8), Platelets WNL, .9, Na WNL, Lactate WNL, UA(-)  - Initial CT 8/19/22 showing 50% pneumothorax under tension. Lucency seen within the lung parenchyma and may represent air trapped within fissures  - S/p Chest tube placement 8/18/22  - CXR 8/19/22 showing trace R pneumothorax and R basilar opacity   - 8/18/22 S/p 1x dose of vanc 1.5g and keke 1g and was given 2.5 L bolus of fluid in ED  - S/P vancomycin 1250 Q12h, Levaquin 750 Q24h & Flagyl 500 Q8h 8/18/22-8/19/22  - 8/19/22 Cefepime 2g IV Q8h to present    RECOMMENDATIONS  - f/u Bcx, strep pneumo antigen, MRSA swab, Legionella antigen, ferritin, ESR  - F/u procal to determine reactive vs infectious process   - C/w cefepime 2g IV Q8h for coverage of community acquired pneumonia with pseudomonal coverage   - D/c other antibiotics

## 2022-08-19 NOTE — PROGRESS NOTE ADULT - SUBJECTIVE AND OBJECTIVE BOX
FAZAL CASSIDY 46y Male  MRN#: [id number]   Hospital Day: 1d    HPI:  46M with PMH Melva-Danlos syndrome followed at [facility] ([facility]--GI, [facility]--[facility]), perforation of sigmoid colon s/p Kerri's, Colonic perforation w/ ileostomy in , epilepsy, HTN, celiac aneurysm s/p coiling 2010 presented to the ED for intermittent fevers and mixed productive and non-productive cough for 1 month. Yesterday pt had a fever of 103 which was not breaking. In the morning pt got out of the bed and had a syncopal episode. Pt fell lightheaded, saw flashes and woke up on the floor. Then pt had sever chest pain on the right side, after which he decided to come to the ED.     Off note pt was supposed to fup with Hematology/ Oncology for abnormal blood counts, pts PCP was considering a bone biopsy.    ED vitals 108/68, , RR 17, temp 100.9, O2 sat 98% on RA    Sig Labs: WBC 30.85, Hg 11.5    EKG Sinus tachycardia    CT chest w IV cont: 50% right-sided pneumothorax under tension. Lucencies seen within the lung parenchyma and may represent air trapped within fissures.    CT abd/ pelvis: neg for acute pathology    In the ED a chest tube was placed on the right, 1x dose of vanc and keke and was given 2.5 L bolus of fluid (19 Aug 2022 00:42)      SUBJECTIVE  Patient is a 46y old Male who presents with a chief complaint of intermittent fevers and mixed productive and non-productive cough for 1 month (19 Aug 2022 08:53)  Currently admitted to medicine with the primary diagnosis of Sepsis, unspecified organism      INTERVAL HPI AND OVERNIGHT EVENTS:  Patient was examined and seen at bedside. This morning he is resting comfortably in bed but reports mild chest pain    REVIEW OF SYMPTOMS:  CONSTITUTIONAL: No weakness, fevers or chills; No headaches  EYES: No visual changes, eye pain, or discharge  ENT: No vertigo; No ear pain or change in hearing; No sore throat or difficulty swallowing  NECK: No pain or stiffness  RESPIRATORY: No cough, wheezing, or hemoptysis; No shortness of breath  CARDIOVASCULAR: mild chest pain  GASTROINTESTINAL: No abdominal or epigastric pain; No nausea, vomiting, or hematemesis; No diarrhea or constipation; No melena or hematochezia  GENITOURINARY: No dysuria, frequency or hematuria  MUSCULOSKELETAL: No joint pain, no muscle pain, no weakness  NEUROLOGICAL: No numbness or weakness  SKIN: No itching or rashes    OBJECTIVE  PAST MEDICAL & SURGICAL HISTORY  EDS (Melva-Danlos syndrome)    Epilepsy    HTN (hypertension)    Keratoconus    Aneurysm artery, celiac  s/p coiling 2010    Dislocations and subluxations    Status post Kerri&#x27;s procedure  2019      ALLERGIES:  moxifloxacin (Hives)  penicillin (Unknown)    MEDICATIONS:  STANDING MEDICATIONS  aspirin enteric coated 81 milliGRAM(s) Oral daily  cefepime   IVPB 2000 milliGRAM(s) IV Intermittent every 8 hours  cefepime   IVPB      cholecalciferol 1000 Unit(s) Oral daily  enoxaparin Injectable 40 milliGRAM(s) SubCutaneous every 24 hours  levETIRAcetam 1125 milliGRAM(s) Oral daily  levETIRAcetam 1500 milliGRAM(s) Oral at bedtime  melatonin 3 milliGRAM(s) Oral at bedtime  metroNIDAZOLE  IVPB 500 milliGRAM(s) IV Intermittent every 8 hours  metroNIDAZOLE  IVPB      multivitamin 1 Tablet(s) Oral daily  OXcarbazepine 600 milliGRAM(s) Oral <User Schedule>  OXcarbazepine 300 milliGRAM(s) Oral <User Schedule>  vancomycin  IVPB 1250 milliGRAM(s) IV Intermittent every 12 hours    PRN MEDICATIONS  acetaminophen     Tablet .. 650 milliGRAM(s) Oral every 6 hours PRN  morphine  - Injectable 2 milliGRAM(s) IV Push every 6 hours PRN      VITAL SIGNS: Last 24 Hours  T(C): 38.6 (19 Aug 2022 10:15), Max: 38.6 (19 Aug 2022 10:15)  T(F): 101.4 (19 Aug 2022 10:15), Max: 101.4 (19 Aug 2022 10:15)  HR: 96 (19 Aug 2022 07:59) (72 - 117)  BP: 104/61 (19 Aug 2022 07:59) (90/55 - 125/76)  BP(mean): 86 (18 Aug 2022 23:30) (68 - 94)  RR: 20 (19 Aug 2022 07:59) (16 - 20)  SpO2: 97% (19 Aug 2022 07:59) (96% - 100%)    LABS:                        10.2   21.38 )-----------( 357      ( 19 Aug 2022 08:51 )             30.9         135  |  98  |  13  ----------------------------<  88  4.3   |  24  |  0.8    Ca    8.9      19 Aug 2022 08:51  Mg     1.6         TPro  6.2  /  Alb  3.3<L>  /  TBili  0.3  /  DBili  x   /  AST  16  /  ALT  17  /  AlkPhos  104        Urinalysis Basic - ( 18 Aug 2022 20:30 )    Color: Yellow / Appearance: Clear / S.035 / pH: x  Gluc: x / Ketone: Moderate  / Bili: Small / Urobili: <2 mg/dL   Blood: x / Protein: 30 mg/dL / Nitrite: Negative   Leuk Esterase: Negative / RBC: 3 /HPF / WBC 2 /HPF   Sq Epi: x / Non Sq Epi: 3 /HPF / Bacteria: FEW        Sedimentation Rate, Erythrocyte: 125 mm/Hr *H* (22 @ 08:51)  Lactate, Blood: 1.6 mmol/L (22 @ 20:20)          RADIOLOGY:      PHYSICAL EXAM:  CONSTITUTIONAL: No acute distress, well-developed, well-groomed, AAOx3  HEAD: Atraumatic, normocephalic  EYES: EOM intact, PERRLA, conjunctiva and sclera clear  ENT: Supple, no masses, no thyromegaly, no bruits, no JVD; moist mucous membranes  PULMONARY: Clear to auscultation bilaterally; no wheezes, rales, or rhonchi  CARDIOVASCULAR: Regular rate and rhythm; no murmurs, rubs, or gallops  GASTROINTESTINAL: Soft, non-tender, non-distended; bowel sounds present  MUSCULOSKELETAL: 2+ peripheral pulses; no clubbing, no cyanosis, no edema  NEUROLOGY: non-focal  SKIN: No rashes or lesions; warm and dry    ASSESSMENT & PLAN  HPI:  46M with PMH Melva-Danlos syndrome followed at [facility] ([facility]--[facility], [place]), perforation of sigmoid colon s/p Kerri's, Colonic perforation w/ ileostomy in , epilepsy, HTN, celiac aneurysm s/p coiling 2010 presented to the ED for intermittent fevers and mixed productive and non-productive cough for 1 month. Yesterday pt had a fever of 103 which was not breaking. In the morning pt got out of the bed and had a syncopal episode. Pt fell lightheaded, saw flashes and woke up on the floor. Then pt had sever chest pain on the right side, after which he decided to come to the ED.     Of note pt was supposed to fup with Hematology/ Oncology for abnormal blood counts, pts PCP was considering a bone biopsy.  ED vitals 108/68, , RR 17, temp 100.9, O2 sat 98% on RA  Sig Labs: WBC 30.85, Hg 11.5  EKG Sinus tachycardia  CT chest w IV cont: 50% right-sided pneumothorax under tension. Lucencies seen within the lung parenchyma and may represent air trapped within fissures.  CT abd/ pelvis: neg for acute pathology  In the ED a chest tube was placed on the right, 1x dose of vanc and keke and was given 2.5 L bolus of fluid (19 Aug 2022 00:42)      #Spontaneous Pneumothorax  #Hx of Melva Danlos  #suspected PNA  #Sepsis on admission (leukocytosis on 30K, tachycardia, fever)  - s/p chest tube placement  - CT surgery following- considering possibility of recurrence of pneumothorax in future, will give recs regarding surgery intervention  - repeat cxr  - f/u procal, mrsa, legionella, strep, blood and urine cultures  - on cefepime, metronidazole, and vanc  - ID following     #Leukocytosis/thrombocytosis/Anemia  - Pt experienced six weeks of fever and chills which is concerning for malignancy, can be due to pneumonia POA?  - Dr Guzman hematologist outside and consulted for possible myelodysplastic syndrome or malignancy  - check peripheral smear     Pending: CT surgery, hematology recs

## 2022-08-19 NOTE — H&P ADULT - ASSESSMENT
46M with PMH Melva-Danlos syndrome followed at [facility] ([facility]--GI, [facility]--[facility]), perforation of sigmoid colon s/p Kerri's, Colonic perforation w/ ileostomy in 2020, epilepsy, HTN, celiac aneurysm s/p coiling 4/2010 presented to the ED for intermittent fevers and cough for 1 month.    #Spontaneous Pneumothorax  #Hx of Melva Danlos  #suspected PNA  #Sepsis on admission (leukocytosis on 30K, tachycardia, fever)  - In the ED a chest tube was placed on the right, 1x dose of vanc and keke and was given 2.5 L bolus of fluid  - CT chest wi IV cont: 50% right-sided pneumothorax under tension. Lucencies seen within the lung parenchyma and may represent air trapped within fissures.  - on non-rebreather, saturating well.  - fup procal, mrsa,  46M with PMH Melva-Danlos syndrome followed at [facility] ([facility]--[facility], [facility]--[facility]), perforation of sigmoid colon s/p Kerri's, Colonic perforation w/ ileostomy in 2020, epilepsy, HTN, celiac aneurysm s/p coiling 4/2010 presented to the ED for intermittent fevers and cough for 1 month.    #Spontaneous Pneumothorax  #Hx of Melva Danlos  #suspected PNA  #Sepsis on admission (leukocytosis on 30K, tachycardia, fever)  - In the ED a chest tube was placed on the right, 1x dose of vanc and keke and was given 2.5 L bolus of fluid  - CT chest wi IV cont: 50% right-sided pneumothorax under tension. Lucencies seen within the lung parenchyma and may represent air trapped within fissures.  - on non-rebreather, saturating well.  - lactate 1.6 on admission  - fup procal, mrsa, legionella, strep  - maintain O2 sat above 92%    DVT ppx: lovenox  strict bed rest  Diet  Full code 46M with PMH Melva-Danlos syndrome followed at [facility] ([facility]--GI, [facility]--[facility]), perforation of sigmoid colon s/p Kerri's, Colonic perforation w/ ileostomy in 2020, epilepsy, HTN, celiac aneurysm s/p coiling 4/2010 presented to the ED for intermittent fevers and cough for 1 month.    #Spontaneous Pneumothorax  #Hx of Melva Danlos  #suspected PNA  #Sepsis on admission (leukocytosis on 30K, tachycardia, fever)  - In the ED a chest tube was placed on the right, 1x dose of vanc and keke and was given 2.5 L bolus of fluid  - CT chest wi IV cont: 50% right-sided pneumothorax under tension. Lucencies seen within the lung parenchyma and may represent air trapped within fissures.  - on RA, saturating well.  - lactate 1.6 on admission  - fup procal, mrsa, legionella, strep  - maintain O2 sat above 92%  - c/w vanc and keke  - ID fup  - fup Blood and Urine Cx.  - fup blood gas    #Leukocytosis, thrombocytosis, Anemia  #Concern for Leukemia vs MDS?  - ruling out infectious causes  - persistent fevers, chills, decreased appetite for the past month  - PCP was in the process of working up diagnosis  - was supposed to fup with Heme/ Onc outpt for possible bone marrow biopsy  - fup Heme/ Onc consult  - order blood smear in the AM    #Syncopal episode  - lightheadedness, flashes  - no amnesia, incontinence, tongue biting.  - fup echo  - will check orthostatics after pt improves  - ECG sinus tach    #Hx of epilepsy  - c/w with trilepta 300 in the am and 600 at pm, and keppra 1125 in the morning and 1500 in the evening    #HTN  - hold lisinopril    #H/o perforation of sigmoid colon s/p Kerri's, Colonic perforation w/ ileostomy in 2020  - outpt fup    DVT ppx: lovenox  strict bed rest  Diet  Full code

## 2022-08-19 NOTE — CONSULT NOTE ADULT - ASSESSMENT
IMPRESSION:    Acute hypoxemic resp failure  Pneumothorax/ Pneumonia  jessejovita jaimes ( multiple vascular aneurysm sp celiac coiling)  sp ilieostomy        PLAN:    CNS: Avoid CNS depressant    HEENT:  Oral care    PULMONARY:  HOB @ 45 degrees, aspiration precaution, pigtial to suction, CT sx eval, IV ABX    CARDIOVASCULAR: ivf    GI: GI prophylaxis                                          Feeding po    RENAL:  F/u  lytes.  Correct as needed. accurate I/O    INFECTIOUS DISEASE: swab nose for MRSA if - dc vanco, zosyn/ levaquin till cx, procal, urine strep/ legionella    HEMATOLOGICAL:  DVT prophylaxis.    ENDOCRINE:  Follow up FS.  Insulin protocol if needed.  SDU

## 2022-08-19 NOTE — HISTORY OF PRESENT ILLNESS
[FreeTextEntry1] : Patient is a 46M with PMH of Melva-Danlos, seizure disorder, multiple vascular aneurysms and perforated colon x2 requiring Kerri's followed by completion colectomy with end ileostomy who presents for follow up. He is tolerating a diet and his stoma works well.  He empties it 3-4 times daily.  He has not had any BM per rectum.  Patient denies fevers, chills, nausea, vomiting or unexpected weight loss.  Patient denies a family history of colon cancer rectal cancer or inflammatory bowel disease.

## 2022-08-19 NOTE — DIETITIAN INITIAL EVALUATION ADULT - PERTINENT LABORATORY DATA
08-19    135  |  98  |  13  ----------------------------<  88  4.3   |  24  |  0.8    Ca    8.9      19 Aug 2022 08:51  Mg     1.6     08-19    TPro  6.2  /  Alb  3.3<L>  /  TBili  0.3  /  DBili  x   /  AST  16  /  ALT  17  /  AlkPhos  104  08-19                          10.2   21.38 )-----------( 357      ( 19 Aug 2022 08:51 )             30.9

## 2022-08-19 NOTE — CONSULT NOTE ADULT - SUBJECTIVE AND OBJECTIVE BOX
ANGEFAZAL  46y, Male  Allergy: moxifloxacin (Hives)  penicillin (Unknown)    CHIEF COMPLAINT: Fevers, cough, syncopal episode, severe R sided chest pain    HPI:  46M with PMH Melva-Danlos syndrome, cough variant asthma w/ hx of pulmonary nodules, "abnormal blood counts" f/u with hemeonc - considering bone bx in future, perforation of sigmoid colon s/p Kerri's, Colonic perforation w/ ileostomy in , epilepsy, HTN, celiac aneurysm s/p coiling 2010, moxifloxacin allergy (hives), penicillin allergy (unknown) presented to the ED 22 for intermittent fevers and mixed productive and non-productive cough for 1 month. 22 pt had a fever of 103 which was not breaking. In the morning pt got out of the bed and had a syncopal episode. Pt fell lightheaded, saw flashes and woke up on the floor. Then pt had severe chest pain on the right side, after which he decided to come to the ED.     ED vitals 108/68, , RR 17, temp 100.9, O2 sat 98% on RA  Sig Labs: WBC 30.85, Hg 11.5, Mild hyponatremia (134), Lactate WNL, UA (-)  EKG Sinus tachycardia  CT chest w IV cont 22 : 50% right-sided pneumothorax under tension. Lucency seen within the lung parenchyma and may represent air trapped within fissures.    In the ED a chest tube was placed on the right, 1x dose of vanc 1.5g and keke 1g and was given 2.5 L bolus of fluid  S/P vancomycin 1250 Q12h 22-22 Levaquin 750 Q24h & Flagyl 500 Q8 &     CXR 22: Reduced pneumothorax, right basilar opacity (air bubble?)  MARIELOS, Bcx, Ucx, strep pneumo antigen, ESR, MRSA swab, Legionella antigen, ferritin, CRP - pending       Infectious Diseases History:  Old Micro Data/Cultures:     FAMILY HISTORY:    PAST MEDICAL & SURGICAL HISTORY:  EDS (Melva-Danlos syndrome)      Epilepsy      HTN (hypertension)      Keratoconus      Aneurysm artery, celiac  s/p coiling 2010      Dislocations and subluxations      Status post Kerri&#x27;s procedure  2019          SOCIAL HISTORY  Social History:  Denies alcohol, tobacco and illicit drug use. (05 Sep 2019 22:09)      Recent Travel:  Other Exposures:     ROS  General: Denies rigors, nightsweats  HEENT: Denies headache, rhinorrhea, sore throat, eye pain  CV: Denies CP, palpitations  PULM: Denies wheezing, hemoptysis  GI: Denies hematemesis, hematochezia, melena  : Denies discharge, hematuria  MSK: Denies arthralgias, myalgias  SKIN: Denies rash, lesions  NEURO: Denies paresthesias, weakness  PSYCH: Denies depression, anxiety    VITALS:  T(F): 97, Max: 100.9 (22 @ 16:52)  HR: 96  BP: 104/61  RR: 20Vital Signs Last 24 Hrs  T(C): 36.1 (19 Aug 2022 07:59), Max: 38.3 (18 Aug 2022 16:52)  T(F): 97 (19 Aug 2022 07:59), Max: 100.9 (18 Aug 2022 16:52)  HR: 96 (19 Aug 2022 07:59) (72 - 117)  BP: 104/61 (19 Aug 2022 07:59) (90/55 - 125/76)  BP(mean): 86 (18 Aug 2022 23:30) (68 - 94)  RR: 20 (19 Aug 2022 07:59) (16 - 20)  SpO2: 97% (19 Aug 2022 07:59) (96% - 100%)    Parameters below as of 19 Aug 2022 03:30  Patient On (Oxygen Delivery Method): room air        PHYSICAL EXAM:  Gen: NAD, resting in bed  HEENT: Normocephalic, atraumatic  Neck: supple, no lymphadenopathy  CV: Regular rate & regular rhythm  Lungs: CTAB  Abdomen: Soft, BS present  Ext: Warm, well perfused  Neuro: non focal, awake  Skin: no rash, no lesions  Lines: no phlebitis    TESTS & MEASUREMENTS:                        11.5   30.85 )-----------( 488      ( 18 Aug 2022 20:20 )             36.1     08-18    134<L>  |  95<L>  |  15  ----------------------------<  97  4.3   |  24  |  1.2    Ca    9.7      18 Aug 2022 20:20    TPro  7.7  /  Alb  4.0  /  TBili  0.6  /  DBili  x   /  AST  20  /  ALT  27  /  AlkPhos  138<H>  08-18      LIVER FUNCTIONS - ( 18 Aug 2022 20:20 )  Alb: 4.0 g/dL / Pro: 7.7 g/dL / ALK PHOS: 138 U/L / ALT: 27 U/L / AST: 20 U/L / GGT: x           Urinalysis Basic - ( 18 Aug 2022 20:30 )    Color: Yellow / Appearance: Clear / S.035 / pH: x  Gluc: x / Ketone: Moderate  / Bili: Small / Urobili: <2 mg/dL   Blood: x / Protein: 30 mg/dL / Nitrite: Negative   Leuk Esterase: Negative / RBC: 3 /HPF / WBC 2 /HPF   Sq Epi: x / Non Sq Epi: 3 /HPF / Bacteria: FEW          Lactate, Blood: 1.6 mmol/L (22 @ 20:20)      INFECTIOUS DISEASES TESTING      RADIOLOGY & ADDITIONAL TESTS:  I have personally reviewed the last available Chest xray  CXR      CT  CT Chest w/ IV Cont:   ACC: [id number] EXAM:  CT ABDOMEN AND PELVIS IC                        ACC: [id number] EXAM:  CT CHEST IC                          PROCEDURE DATE:  2022          INTERPRETATION:  REASON FOR EXAM / CLINICAL STATEMENT:  Fever. Cough   Pneumonia. WBC 30.85 PMHx of Melva Danlos 4, previous hospitalization in   2019 for perforation of sigmoid colon s/p Kerri's, Colonic   perforation w/ ileostomy in , epilepsy, HTN, celiac aneurysm s/p   coiling 2010      TECHNIQUE:  Contiguous axial CT images were obtained from the thoracic   inlet through the pubic symphysis with IV contrast.  Reformatted images   in the coronal and sagittal planes were acquired.      COMPARISON CT: CT scan of the chest, abdomen and pelvis dated 2021    OTHER STUDIES USED FOR CORRELATION: None.      FINDINGS / CHEST:    TUBES AND LINES: None.    HEART AND VESSELS: The heart is normal in size. There is no pericardial   effusion.    There is no evidence of central pulmonary embolism.    Normal caliber aorta and pulmonary artery. There is no evidence of aortic   aneurysm or dissection.    Brachiocephalic vessels are unremarkable.    MEDIASTINUM: There are no suspicious mediastinal, hilar or axillary lymph   nodes. The visualized portion of the thyroid gland is unremarkable.    AIRWAYS, LUNGS AND PLEURA: The central tracheobronchial tree is patent.   There is a large right pneumothorax approximately 50%. There is mild   displacement of the heart to the left side indicating that the   pneumothorax is under tension. Areas of consolidation and/or atelectasis   are noted throughout the collapsed right lung. There is a small right   pleural effusion.    BONES AND SOFT TISSUES: Degenerative changes of the spine.      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    HEPATIC: The liver is normal in size with no evidence of gross solid mass   or bile duct dilatation. The portal vein is patent. The hepatic veins are   opacified.    BILIARY: No calcified gallstones are noted.    SPLEEN: Unremarkable.    PANCREAS: The pancreas is normal in size and configuration. No evidence   of mass or pancreatitis.    ADRENAL GLANDS: Obscured by streak artifact.    KIDNEYS: There is symmetric renal enhancement. No evidence of   hydronephrosis, calcified stones, or solid mass. Areas of cortical   scarring are noted bilaterally.    ABDOMINOPELVIC NODES: Unremarkable.    PELVIC ORGANS: No evidence of pelvic mass, lymphadenopathy, or fluid   collection.    BLADDER: Unremarkable.    PERITONEUM/MESENTERY/BOWEL: Post Kerri procedure. Post colectomy with   ileostomy is noted in the right-sided the abdomen. No evidence of bowel   obstruction or ascites. No pneumoperitoneum.    BONES/SOFT TISSUES: No acute osseous abnormalities.    OTHER: Normal caliber aorta. Stable iliac artery aneurysms.      IMPRESSION:    50% right-sided pneumothorax under tension. Lucencies seen within the   lung parenchyma and may represent air trapped within fissures. A   follow-up scan after the lung has reexpanded be useful for confirmation.      Findings were discussed with Dr. Angel Campoverde at 10:10 PM 2022, with   read back. At the time of the phonecall a pigtail catheter was being   inserted.    --- End of Report ---            DK WILLIAMSON MD; Attending Interventional Radiologist  This document has been electronically signed. Aug 18 2022 10:25PM (22 @ 20:54)  CT Abdomen and Pelvis w/ IV Cont:   ACC: [id number] EXAM:  CT ABDOMEN AND PELVIS IC                        ACC: [id number] EXAM:  CT CHEST IC                          PROCEDURE DATE:  2022          INTERPRETATION:  REASON FOR EXAM / CLINICAL STATEMENT:  Fever. Cough   Pneumonia. WBC 30.85 PMHx of Melva Danlos 4, previous hospitalization in   2019 for perforation of sigmoid colon s/p Kerri's, Colonic   perforation w/ ileostomy in , epilepsy, HTN, celiac aneurysm s/p   coiling 2010      TECHNIQUE:  Contiguous axial CT images were obtained from the thoracic   inlet through the pubic symphysis with IV contrast.  Reformatted images   in the coronal and sagittal planes were acquired.      COMPARISON CT: CT scan of the chest, abdomen and pelvis dated 2021    OTHER STUDIES USED FOR CORRELATION: None.      FINDINGS / CHEST:    TUBES AND LINES: None.    HEART AND VESSELS: The heart is normal in size. There is no pericardial   effusion.    There is no evidence of central pulmonary embolism.    Normal caliber aorta and pulmonary artery. There is no evidence of aortic   aneurysm or dissection.    Brachiocephalic vessels are unremarkable.    MEDIASTINUM: There are no suspicious mediastinal, hilar or axillary lymph   nodes. The visualized portion of the thyroid gland is unremarkable.    AIRWAYS, LUNGS AND PLEURA: The central tracheobronchial tree is patent.   There is a large right pneumothorax approximately 50%. There is mild   displacement of the heart to the left side indicating that the   pneumothorax is under tension. Areas of consolidation and/or atelectasis   are noted throughout the collapsed right lung. There is a small right   pleural effusion.    BONES AND SOFT TISSUES: Degenerative changes of the spine.      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    HEPATIC: The liver is normal in size with no evidence of gross solid mass   or bile duct dilatation. The portal vein is patent. The hepatic veins are   opacified.    BILIARY: No calcified gallstones are noted.    SPLEEN: Unremarkable.    PANCREAS: The pancreas is normal in size and configuration. No evidence   of mass or pancreatitis.    ADRENAL GLANDS: Obscured by streak artifact.    KIDNEYS: There is symmetric renal enhancement. No evidence of   hydronephrosis, calcified stones, or solid mass. Areas of cortical   scarring are noted bilaterally.    ABDOMINOPELVIC NODES: Unremarkable.    PELVIC ORGANS: No evidence of pelvic mass, lymphadenopathy, or fluid   collection.    BLADDER: Unremarkable.    PERITONEUM/MESENTERY/BOWEL: Post Kerri procedure. Post colectomy with   ileostomy is noted in the right-sided the abdomen. No evidence of bowel   obstruction or ascites. No pneumoperitoneum.    BONES/SOFT TISSUES: No acute osseous abnormalities.    OTHER: Normal caliber aorta. Stable iliac artery aneurysms.      IMPRESSION:    50% right-sided pneumothorax under tension. Lucencies seen within the   lung parenchyma and may represent air trapped within fissures. A   follow-up scan after the lung has reexpanded be useful for confirmation.      Findings were discussed with Dr. Angel Campoverde at 10:10 PM 2022, with   read back. At the time of the phonecall a pigtail catheter was being   inserted.    --- End of Report ---            DK WILLIAMSON MD; Attending Interventional Radiologist  This document has been electronically signed. Aug 18 2022 10:25PM (22 @ 20:53)      CARDIOLOGY TESTING  12 Lead ECG:   Ventricular Rate 102 BPM    Atrial Rate 102 BPM    P-R Interval 132 ms    QRS Duration 92 ms    Q-T Interval 334 ms    QTC Calculation(Bazett) 435 ms    P Axis 44 degrees    R Axis 32 degrees    T Axis 27 degrees    Diagnosis Line Sinus tachycardia  Otherwise normal ECG    Confirmed by MELIDA RAGSDALE MD ([id number]) on 2022 6:24:42 AM (22 @ 20:01)      All available historical records have been reviewed    MEDICATIONS  aspirin enteric coated 81  cholecalciferol 1000  enoxaparin Injectable 40  levETIRAcetam 1125  levETIRAcetam 1500  levoFLOXacin IVPB   melatonin 3  metroNIDAZOLE  IVPB   multivitamin 1  OXcarbazepine 600  OXcarbazepine 300  vancomycin  IVPB 1250      ANTIBIOTICS:  levoFLOXacin IVPB      metroNIDAZOLE  IVPB      vancomycin  IVPB 1250 milliGRAM(s) IV Intermittent every 12 hours      All available historical data has been reviewed     ANGEFAZAL  46y, Male  Allergy: moxifloxacin (Hives)  penicillin (Unknown)    CHIEF COMPLAINT: Fevers, cough, syncopal episode, severe R sided chest pain    HPI:  46M with PMH Melva-Danlos syndrome, cough variant asthma w/ hx of pulmonary nodules, "abnormal blood counts - low RBCs high platelets" f/u with hemeonc - considering bone bx in future, perforation of sigmoid colon s/p Kerri's, Colonic perforation w/ ileostomy in , epilepsy, HTN, celiac aneurysm s/p coiling 2010, moxifloxacin allergy (joint pains, concern for dissection), penicillin allergy (vomiting at 4 years old, tolerating cephalosporins) presented to the ED 22 for intermittent fevers ( recorded at home), mixed productive and non-productive cough, night sweats for 1 month. 22 pt had a fever of 103 which was not breaking. In the morning pt got out of the bed and had a syncopal episode. Pt fell lightheaded, saw flashes and woke up on the floor. Then pt had severe chest pain on the right side, after which he decided to come to the ED.     ED vitals 108/68, , RR 17, temp 100.9, O2 sat 98% on RA  Sig Labs: WBC 30.85, Hg 11.5, Platelets 488, Mild hyponatremia (134), Lactate WNL, UA (-), .9  EKG Sinus tachycardia  CT chest w IV cont 22 : 50% right-sided pneumothorax under tension. Lucency seen within the lung parenchyma and may represent air trapped within fissures.    In the ED a chest tube was placed on the right, 1x dose of vanc 1.5g and keke 1g and was given 2.5 L bolus of fluid  S/P vancomycin 1250 Q12h 22-22 Levaquin 750 Q24h & Flagyl 500 Q8h     CXR 22: Reduced pneumothorax, right basilar opacity  MARIELOS, Bcx, Ucx, strep pneumo antigen, ESR, MRSA swab, Legionella antigen, ferritin - pending       Infectious Diseases History:  Old Micro Data/Cultures:     FAMILY HISTORY:    PAST MEDICAL & SURGICAL HISTORY:  EDS (Melva-Danlos syndrome)      Epilepsy      HTN (hypertension)      Keratoconus      Aneurysm artery, celiac  s/p coiling 2010      Dislocations and subluxations      Status post Kerri&#x27;s procedure  2019          SOCIAL HISTORY  Social History:  Denies alcohol, tobacco and illicit drug use. (05 Sep 2019 22:09)      Recent Travel: [place] ([facility]) - was feeling sick prior to this trip  Other Exposures:     ROS  General: (+) fever and nightsweats  HEENT: Denies headache, rhinorrhea, sore throat, eye pain  CV: (+) CP - right sided, (-) palpitations  PULM: Denies wheezing, hemoptysis (+) dry cough, SOB  GI: Denies hematemesis, hematochezia, melena  : Denies discharge, hematuria  MSK: Denies arthralgias, myalgias  SKIN: Denies rash, lesions  NEURO: Denies paresthesias, weakness    VITALS:  T(F): 97, Max: 100.9 (22 @ 16:52)  HR: 96  BP: 104/61  RR: 20Vital Signs Last 24 Hrs  T(C): 36.1 (19 Aug 2022 07:59), Max: 38.3 (18 Aug 2022 16:52)  T(F): 97 (19 Aug 2022 07:59), Max: 100.9 (18 Aug 2022 16:52)  HR: 96 (19 Aug 2022 07:59) (72 - 117)  BP: 104/61 (19 Aug 2022 07:59) (90/55 - 125/76)  BP(mean): 86 (18 Aug 2022 23:30) (68 - 94)  RR: 20 (19 Aug 2022 07:59) (16 - 20)  SpO2: 97% (19 Aug 2022 07:59) (96% - 100%)    Parameters below as of 19 Aug 2022 03:30  Patient On (Oxygen Delivery Method): room air        PHYSICAL EXAM:  Gen: NAD, resting in bed, mildly diaphoretic   Neuro: non focal, alert and oriented x3  HEENT: Normocephalic, atraumatic  Neck: supple, no lymphadenopathy  CV: Tachycardic rate & regular rhythm  Lungs: Diminished breath sounds on the right. No wheezing, ronchi or rales bilaterally   Abdomen: Soft, ileostomy present, nontender, nondistended   Ext: Warm, well perfused  Skin: no rash, no lesions  Lines: no phlebitis    TESTS & MEASUREMENTS:                        11.5   30.85 )-----------( 488      ( 18 Aug 2022 20:20 )             36.1         134<L>  |  95<L>  |  15  ----------------------------<  97  4.3   |  24  |  1.2    Ca    9.7      18 Aug 2022 20:20    TPro  7.7  /  Alb  4.0  /  TBili  0.6  /  DBili  x   /  AST  20  /  ALT  27  /  AlkPhos  138<H>        LIVER FUNCTIONS - ( 18 Aug 2022 20:20 )  Alb: 4.0 g/dL / Pro: 7.7 g/dL / ALK PHOS: 138 U/L / ALT: 27 U/L / AST: 20 U/L / GGT: x           Urinalysis Basic - ( 18 Aug 2022 20:30 )    Color: Yellow / Appearance: Clear / S.035 / pH: x  Gluc: x / Ketone: Moderate  / Bili: Small / Urobili: <2 mg/dL   Blood: x / Protein: 30 mg/dL / Nitrite: Negative   Leuk Esterase: Negative / RBC: 3 /HPF / WBC 2 /HPF   Sq Epi: x / Non Sq Epi: 3 /HPF / Bacteria: FEW          Lactate, Blood: 1.6 mmol/L (22 @ 20:20)      INFECTIOUS DISEASES TESTING      RADIOLOGY & ADDITIONAL TESTS:  I have personally reviewed the last available Chest xray  CXR      CT  CT Chest w/ IV Cont:   ACC: [id number] EXAM:  CT ABDOMEN AND PELVIS IC                        ACC: [id number] EXAM:  CT CHEST IC                          PROCEDURE DATE:  2022          INTERPRETATION:  REASON FOR EXAM / CLINICAL STATEMENT:  Fever. Cough   Pneumonia. WBC 30.85 PMHx of Melva Danlos 4, previous hospitalization in   2019 for perforation of sigmoid colon s/p Kerri's, Colonic   perforation w/ ileostomy in , epilepsy, HTN, celiac aneurysm s/p   coiling 2010      TECHNIQUE:  Contiguous axial CT images were obtained from the thoracic   inlet through the pubic symphysis with IV contrast.  Reformatted images   in the coronal and sagittal planes were acquired.      COMPARISON CT: CT scan of the chest, abdomen and pelvis dated 2021    OTHER STUDIES USED FOR CORRELATION: None.      FINDINGS / CHEST:    TUBES AND LINES: None.    HEART AND VESSELS: The heart is normal in size. There is no pericardial   effusion.    There is no evidence of central pulmonary embolism.    Normal caliber aorta and pulmonary artery. There is no evidence of aortic   aneurysm or dissection.    Brachiocephalic vessels are unremarkable.    MEDIASTINUM: There are no suspicious mediastinal, hilar or axillary lymph   nodes. The visualized portion of the thyroid gland is unremarkable.    AIRWAYS, LUNGS AND PLEURA: The central tracheobronchial tree is patent.   There is a large right pneumothorax approximately 50%. There is mild   displacement of the heart to the left side indicating that the   pneumothorax is under tension. Areas of consolidation and/or atelectasis   are noted throughout the collapsed right lung. There is a small right   pleural effusion.    BONES AND SOFT TISSUES: Degenerative changes of the spine.      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    HEPATIC: The liver is normal in size with no evidence of gross solid mass   or bile duct dilatation. The portal vein is patent. The hepatic veins are   opacified.    BILIARY: No calcified gallstones are noted.    SPLEEN: Unremarkable.    PANCREAS: The pancreas is normal in size and configuration. No evidence   of mass or pancreatitis.    ADRENAL GLANDS: Obscured by streak artifact.    KIDNEYS: There is symmetric renal enhancement. No evidence of   hydronephrosis, calcified stones, or solid mass. Areas of cortical   scarring are noted bilaterally.    ABDOMINOPELVIC NODES: Unremarkable.    PELVIC ORGANS: No evidence of pelvic mass, lymphadenopathy, or fluid   collection.    BLADDER: Unremarkable.    PERITONEUM/MESENTERY/BOWEL: Post Kerri procedure. Post colectomy with   ileostomy is noted in the right-sided the abdomen. No evidence of bowel   obstruction or ascites. No pneumoperitoneum.    BONES/SOFT TISSUES: No acute osseous abnormalities.    OTHER: Normal caliber aorta. Stable iliac artery aneurysms.      IMPRESSION:    50% right-sided pneumothorax under tension. Lucencies seen within the   lung parenchyma and may represent air trapped within fissures. A   follow-up scan after the lung has reexpanded be useful for confirmation.      Findings were discussed with Dr. Angel Campoverde at 10:10 PM 2022, with   read back. At the time of the phonecall a pigtail catheter was being   inserted.    --- End of Report ---            DK WILLIAMSON MD; Attending Interventional Radiologist  This document has been electronically signed. Aug 18 2022 10:25PM (22 @ 20:54)  CT Abdomen and Pelvis w/ IV Cont:   ACC: [id number] EXAM:  CT ABDOMEN AND PELVIS IC                        ACC: [id number] EXAM:  CT CHEST IC                          PROCEDURE DATE:  2022          INTERPRETATION:  REASON FOR EXAM / CLINICAL STATEMENT:  Fever. Cough   Pneumonia. WBC 30.85 PMHx of Melva Danlos 4, previous hospitalization in   2019 for perforation of sigmoid colon s/p Kerri's, Colonic   perforation w/ ileostomy in , epilepsy, HTN, celiac aneurysm s/p   coiling 2010      TECHNIQUE:  Contiguous axial CT images were obtained from the thoracic   inlet through the pubic symphysis with IV contrast.  Reformatted images   in the coronal and sagittal planes were acquired.      COMPARISON CT: CT scan of the chest, abdomen and pelvis dated 2021    OTHER STUDIES USED FOR CORRELATION: None.      FINDINGS / CHEST:    TUBES AND LINES: None.    HEART AND VESSELS: The heart is normal in size. There is no pericardial   effusion.    There is no evidence of central pulmonary embolism.    Normal caliber aorta and pulmonary artery. There is no evidence of aortic   aneurysm or dissection.    Brachiocephalic vessels are unremarkable.    MEDIASTINUM: There are no suspicious mediastinal, hilar or axillary lymph   nodes. The visualized portion of the thyroid gland is unremarkable.    AIRWAYS, LUNGS AND PLEURA: The central tracheobronchial tree is patent.   There is a large right pneumothorax approximately 50%. There is mild   displacement of the heart to the left side indicating that the   pneumothorax is under tension. Areas of consolidation and/or atelectasis   are noted throughout the collapsed right lung. There is a small right   pleural effusion.    BONES AND SOFT TISSUES: Degenerative changes of the spine.      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    HEPATIC: The liver is normal in size with no evidence of gross solid mass   or bile duct dilatation. The portal vein is patent. The hepatic veins are   opacified.    BILIARY: No calcified gallstones are noted.    SPLEEN: Unremarkable.    PANCREAS: The pancreas is normal in size and configuration. No evidence   of mass or pancreatitis.    ADRENAL GLANDS: Obscured by streak artifact.    KIDNEYS: There is symmetric renal enhancement. No evidence of   hydronephrosis, calcified stones, or solid mass. Areas of cortical   scarring are noted bilaterally.    ABDOMINOPELVIC NODES: Unremarkable.    PELVIC ORGANS: No evidence of pelvic mass, lymphadenopathy, or fluid   collection.    BLADDER: Unremarkable.    PERITONEUM/MESENTERY/BOWEL: Post Kerri procedure. Post colectomy with   ileostomy is noted in the right-sided the abdomen. No evidence of bowel   obstruction or ascites. No pneumoperitoneum.    BONES/SOFT TISSUES: No acute osseous abnormalities.    OTHER: Normal caliber aorta. Stable iliac artery aneurysms.      IMPRESSION:    50% right-sided pneumothorax under tension. Lucencies seen within the   lung parenchyma and may represent air trapped within fissures. A   follow-up scan after the lung has reexpanded be useful for confirmation.      Findings were discussed with Dr. Angel Campoverde at 10:10 PM 2022, with   read back. At the time of the phonecall a pigtail catheter was being   inserted.    --- End of Report ---            DK WILLIAMSON MD; Attending Interventional Radiologist  This document has been electronically signed. Aug 18 2022 10:25PM (22 @ 20:53)      CARDIOLOGY TESTING  12 Lead ECG:   Ventricular Rate 102 BPM    Atrial Rate 102 BPM    P-R Interval 132 ms    QRS Duration 92 ms    Q-T Interval 334 ms    QTC Calculation(Bazett) 435 ms    P Axis 44 degrees    R Axis 32 degrees    T Axis 27 degrees    Diagnosis Line Sinus tachycardia  Otherwise normal ECG    Confirmed by MELIDA RAGSDALE MD ([id number]) on 2022 6:24:42 AM (22 @ 20:01)      All available historical records have been reviewed    MEDICATIONS  aspirin enteric coated 81  cholecalciferol 1000  enoxaparin Injectable 40  levETIRAcetam 1125  levETIRAcetam 1500  levoFLOXacin IVPB   melatonin 3  metroNIDAZOLE  IVPB   multivitamin 1  OXcarbazepine 600  OXcarbazepine 300  vancomycin  IVPB 1250      ANTIBIOTICS:  levoFLOXacin IVPB      metroNIDAZOLE  IVPB      vancomycin  IVPB 1250 milliGRAM(s) IV Intermittent every 12 hours      All available historical data has been reviewed     ANGEFAZAL  46y, Male  Allergy: moxifloxacin (Hives)  penicillin (Unknown)    CHIEF COMPLAINT: Fevers, cough, syncopal episode, severe R sided chest pain    HPI:  46M with PMH Melva-Danlos syndrome, 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 22 pt had a fever of 103 which was not breaking. In the morning pt got out of the bed and had a syncopal episode. Pt fell lightheaded, saw flashes and woke up on the floor. Then pt had severe chest pain on the right side, after which he decided to come to the ED.     ED vitals 108/68, , RR 17, temp 100.9, O2 sat 98% on RA  Sig Labs: WBC 30.85, Hg 11.5, Platelets 488, Mild hyponatremia (134), Lactate WNL, UA (-), .9  EKG Sinus tachycardia  CT chest w IV cont 22 : 50% right-sided pneumothorax under tension. Lucency seen within the lung parenchyma and may represent air trapped within fissures.    In the ED a chest tube was placed on the right, 1x dose of vanc 1.5g and keke 1g and was given 2.5 L bolus of fluid  S/P vancomycin 1250 Q12h 22-22 Levaquin 750 Q24h & Flagyl 500 Q8h     CXR 22: Reduced pneumothorax, right basilar opacity  MARIELOS, Bcx, Ucx, strep pneumo antigen, ESR, MRSA swab, Legionella antigen, ferritin - pending       Infectious Diseases History:  Old Micro Data/Cultures:     FAMILY HISTORY:    PAST MEDICAL & SURGICAL HISTORY:  EDS (Melva-Danlos syndrome)      Epilepsy      HTN (hypertension)      Keratoconus      Aneurysm artery, celiac  s/p coiling 2010      Dislocations and subluxations      Status post Kerri&#x27;s procedure  2019          SOCIAL HISTORY  Social History:  Denies alcohol, tobacco and illicit drug use. (05 Sep 2019 22:09)      Recent Travel: [place] ([facility]) - was feeling sick prior to this trip  Other Exposures:     ROS  General: (+) fever and nightsweats  HEENT: Denies headache, rhinorrhea, sore throat, eye pain  CV: (+) CP - right sided, (-) palpitations  PULM: Denies wheezing, hemoptysis (+) dry cough, SOB  GI: Denies hematemesis, hematochezia, melena  : Denies discharge, hematuria  MSK: Denies arthralgias, myalgias  SKIN: Denies rash, lesions  NEURO: Denies paresthesias, weakness    VITALS:  T(F): 97, Max: 100.9 (22 @ 16:52)  HR: 96  BP: 104/61  RR: 20Vital Signs Last 24 Hrs  T(C): 36.1 (19 Aug 2022 07:59), Max: 38.3 (18 Aug 2022 16:52)  T(F): 97 (19 Aug 2022 07:59), Max: 100.9 (18 Aug 2022 16:52)  HR: 96 (19 Aug 2022 07:59) (72 - 117)  BP: 104/61 (19 Aug 2022 07:59) (90/55 - 125/76)  BP(mean): 86 (18 Aug 2022 23:30) (68 - 94)  RR: 20 (19 Aug 2022 07:59) (16 - 20)  SpO2: 97% (19 Aug 2022 07:59) (96% - 100%)    Parameters below as of 19 Aug 2022 03:30  Patient On (Oxygen Delivery Method): room air        PHYSICAL EXAM:  Gen: NAD, resting in bed, mildly diaphoretic   Neuro: non focal, alert and oriented x3  HEENT: Normocephalic, atraumatic  Neck: supple, no lymphadenopathy  CV: Tachycardic rate & regular rhythm  Lungs: Diminished breath sounds on the right. No wheezing, ronchi or rales bilaterally   Abdomen: Soft, ileostomy present, nontender, nondistended   Ext: Warm, well perfused  Skin: no rash, no lesions  Lines: no phlebitis    TESTS & MEASUREMENTS:                        11.5   30.85 )-----------( 488      ( 18 Aug 2022 20:20 )             36.1         134<L>  |  95<L>  |  15  ----------------------------<  97  4.3   |  24  |  1.2    Ca    9.7      18 Aug 2022 20:20    TPro  7.7  /  Alb  4.0  /  TBili  0.6  /  DBili  x   /  AST  20  /  ALT  27  /  AlkPhos  138<H>        LIVER FUNCTIONS - ( 18 Aug 2022 20:20 )  Alb: 4.0 g/dL / Pro: 7.7 g/dL / ALK PHOS: 138 U/L / ALT: 27 U/L / AST: 20 U/L / GGT: x           Urinalysis Basic - ( 18 Aug 2022 20:30 )    Color: Yellow / Appearance: Clear / S.035 / pH: x  Gluc: x / Ketone: Moderate  / Bili: Small / Urobili: <2 mg/dL   Blood: x / Protein: 30 mg/dL / Nitrite: Negative   Leuk Esterase: Negative / RBC: 3 /HPF / WBC 2 /HPF   Sq Epi: x / Non Sq Epi: 3 /HPF / Bacteria: FEW          Lactate, Blood: 1.6 mmol/L (22 @ 20:20)      INFECTIOUS DISEASES TESTING      RADIOLOGY & ADDITIONAL TESTS:  I have personally reviewed the last available Chest xray  CXR  < from: Xray Chest 1 View- PORTABLE-Urgent (Xray Chest 1 View- PORTABLE-Urgent .) (22 @ 10:10) >  Findings:    Support devices: Right-sided pigtail catheter, unchanged    Cardiac/mediastinum/hilum: Unchanged    Lung parenchyma/Pleura: Trace right apical pneumothorax, unchanged.Right   basilar opacity, unchanged.    Skeleton/soft tissues: Unchanged.    Impression:    Trace right apical pneumothorax, unchanged.    Right basilar opacity, unchanged.    < end of copied text >      CT  CT Chest w/ IV Cont:   ACC: [id number] EXAM:  CT ABDOMEN AND PELVIS IC                        ACC: [id number] EXAM:  CT CHEST IC                          PROCEDURE DATE:  2022          INTERPRETATION:  REASON FOR EXAM / CLINICAL STATEMENT:  Fever. Cough   Pneumonia. WBC 30.85 PMHx of Melva Danlos 4, previous hospitalization in   2019 for perforation of sigmoid colon s/p Kerri's, Colonic   perforation w/ ileostomy in , epilepsy, HTN, celiac aneurysm s/p   coiling 2010      TECHNIQUE:  Contiguous axial CT images were obtained from the thoracic   inlet through the pubic symphysis with IV contrast.  Reformatted images   in the coronal and sagittal planes were acquired.      COMPARISON CT: CT scan of the chest, abdomen and pelvis dated 2021    OTHER STUDIES USED FOR CORRELATION: None.      FINDINGS / CHEST:    TUBES AND LINES: None.    HEART AND VESSELS: The heart is normal in size. There is no pericardial   effusion.    There is no evidence of central pulmonary embolism.    Normal caliber aorta and pulmonary artery. There is no evidence of aortic   aneurysm or dissection.    Brachiocephalic vessels are unremarkable.    MEDIASTINUM: There are no suspicious mediastinal, hilar or axillary lymph   nodes. The visualized portion of the thyroid gland is unremarkable.    AIRWAYS, LUNGS AND PLEURA: The central tracheobronchial tree is patent.   There is a large right pneumothorax approximately 50%. There is mild   displacement of the heart to the left side indicating that the   pneumothorax is under tension. Areas of consolidation and/or atelectasis   are noted throughout the collapsed right lung. There is a small right   pleural effusion.    BONES AND SOFT TISSUES: Degenerative changes of the spine.      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    HEPATIC: The liver is normal in size with no evidence of gross solid mass   or bile duct dilatation. The portal vein is patent. The hepatic veins are   opacified.    BILIARY: No calcified gallstones are noted.    SPLEEN: Unremarkable.    PANCREAS: The pancreas is normal in size and configuration. No evidence   of mass or pancreatitis.    ADRENAL GLANDS: Obscured by streak artifact.    KIDNEYS: There is symmetric renal enhancement. No evidence of   hydronephrosis, calcified stones, or solid mass. Areas of cortical   scarring are noted bilaterally.    ABDOMINOPELVIC NODES: Unremarkable.    PELVIC ORGANS: No evidence of pelvic mass, lymphadenopathy, or fluid   collection.    BLADDER: Unremarkable.    PERITONEUM/MESENTERY/BOWEL: Post Kerri procedure. Post colectomy with   ileostomy is noted in the right-sided the abdomen. No evidence of bowel   obstruction or ascites. No pneumoperitoneum.    BONES/SOFT TISSUES: No acute osseous abnormalities.    OTHER: Normal caliber aorta. Stable iliac artery aneurysms.      IMPRESSION:    50% right-sided pneumothorax under tension. Lucencies seen within the   lung parenchyma and may represent air trapped within fissures. A   follow-up scan after the lung has reexpanded be useful for confirmation.      Findings were discussed with Dr. Angel Campoverde at 10:10 PM 2022, with   read back. At the time of the phonecall a pigtail catheter was being   inserted.    --- End of Report ---            DK WILLIAMSON MD; Attending Interventional Radiologist  This document has been electronically signed. Aug 18 2022 10:25PM (22 @ 20:54)  CT Abdomen and Pelvis w/ IV Cont:   ACC: [id number] EXAM:  CT ABDOMEN AND PELVIS IC                        ACC: [id number] EXAM:  CT CHEST IC                          PROCEDURE DATE:  2022          INTERPRETATION:  REASON FOR EXAM / CLINICAL STATEMENT:  Fever. Cough   Pneumonia. WBC 30.85 PMHx of Melva Danlos 4, previous hospitalization in   2019 for perforation of sigmoid colon s/p Kerri's, Colonic   perforation w/ ileostomy in , epilepsy, HTN, celiac aneurysm s/p   coiling 2010      TECHNIQUE:  Contiguous axial CT images were obtained from the thoracic   inlet through the pubic symphysis with IV contrast.  Reformatted images   in the coronal and sagittal planes were acquired.      COMPARISON CT: CT scan of the chest, abdomen and pelvis dated 2021    OTHER STUDIES USED FOR CORRELATION: None.      FINDINGS / CHEST:    TUBES AND LINES: None.    HEART AND VESSELS: The heart is normal in size. There is no pericardial   effusion.    There is no evidence of central pulmonary embolism.    Normal caliber aorta and pulmonary artery. There is no evidence of aortic   aneurysm or dissection.    Brachiocephalic vessels are unremarkable.    MEDIASTINUM: There are no suspicious mediastinal, hilar or axillary lymph   nodes. The visualized portion of the thyroid gland is unremarkable.    AIRWAYS, LUNGS AND PLEURA: The central tracheobronchial tree is patent.   There is a large right pneumothorax approximately 50%. There is mild   displacement of the heart to the left side indicating that the   pneumothorax is under tension. Areas of consolidation and/or atelectasis   are noted throughout the collapsed right lung. There is a small right   pleural effusion.    BONES AND SOFT TISSUES: Degenerative changes of the spine.      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    HEPATIC: The liver is normal in size with no evidence of gross solid mass   or bile duct dilatation. The portal vein is patent. The hepatic veins are   opacified.    BILIARY: No calcified gallstones are noted.    SPLEEN: Unremarkable.    PANCREAS: The pancreas is normal in size and configuration. No evidence   of mass or pancreatitis.    ADRENAL GLANDS: Obscured by streak artifact.    KIDNEYS: There is symmetric renal enhancement. No evidence of   hydronephrosis, calcified stones, or solid mass. Areas of cortical   scarring are noted bilaterally.    ABDOMINOPELVIC NODES: Unremarkable.    PELVIC ORGANS: No evidence of pelvic mass, lymphadenopathy, or fluid   collection.    BLADDER: Unremarkable.    PERITONEUM/MESENTERY/BOWEL: Post Kerri procedure. Post colectomy with   ileostomy is noted in the right-sided the abdomen. No evidence of bowel   obstruction or ascites. No pneumoperitoneum.    BONES/SOFT TISSUES: No acute osseous abnormalities.    OTHER: Normal caliber aorta. Stable iliac artery aneurysms.      IMPRESSION:    50% right-sided pneumothorax under tension. Lucencies seen within the   lung parenchyma and may represent air trapped within fissures. A   follow-up scan after the lung has reexpanded be useful for confirmation.      Findings were discussed with Dr. Angel Campoverde at 10:10 PM 2022, with   read back. At the time of the phonecall a pigtail catheter was being   inserted.    --- End of Report ---            DK WILLIAMSON MD; Attending Interventional Radiologist  This document has been electronically signed. Aug 18 2022 10:25PM (22 @ 20:53)    CARDIOLOGY TESTING  12 Lead ECG:   Ventricular Rate 102 BPM    Atrial Rate 102 BPM    P-R Interval 132 ms    QRS Duration 92 ms    Q-T Interval 334 ms    QTC Calculation(Bazett) 435 ms    P Axis 44 degrees    R Axis 32 degrees    T Axis 27 degrees    Diagnosis Line Sinus tachycardia  Otherwise normal ECG    Confirmed by MELIDA RAGSDALE MD ([id number]) on 2022 6:24:42 AM (22 @ 20:01)    MARIELOS 22  Summary:  1. Normal global left ventricular systolic function.  2. LV Ejection Fraction by Landry's Method with a biplane EF of 58 %.  3. Normal left ventricular internal cavity size.  4. Normal left atrial size.  5. Normal right atrial size.  6. Mild mitral valve regurgitation.  7. LA volume Index is 27.2 ml/mï¿½ ml/m2.      All available historical records have been reviewed    MEDICATIONS  aspirin enteric coated 81  cholecalciferol 1000  enoxaparin Injectable 40  levETIRAcetam 1125  levETIRAcetam 1500  levoFLOXacin IVPB   melatonin 3  metroNIDAZOLE  IVPB   multivitamin 1  OXcarbazepine 600  OXcarbazepine 300  vancomycin  IVPB 1250      ANTIBIOTICS:  levoFLOXacin IVPB      metroNIDAZOLE  IVPB      vancomycin  IVPB 1250 milliGRAM(s) IV Intermittent every 12 hours      All available historical data has been reviewed     ANGEFAZAL  46y, Male  Allergy: moxifloxacin (Hives)  penicillin (Unknown)    CHIEF COMPLAINT: Fevers, cough, syncopal episode, severe R sided chest pain    HPI:  46M with PMH Melva-Danlos syndrome, 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 22 pt had a fever of 103 which was not breaking. In the morning pt got out of the bed and had a syncopal episode. Pt fell lightheaded, saw flashes and woke up on the floor. Then pt had severe chest pain on the right side, after which he decided to come to the ED.     ED vitals 108/68, , RR 17, temp 100.9, O2 sat 98% on RA  Sig Labs: WBC 30.85, Hg 11.5, Platelets 488, Mild hyponatremia (134), Lactate WNL, UA (-), .9  EKG Sinus tachycardia  CT chest w IV cont 22 : 50% right-sided pneumothorax under tension. Lucency seen within the lung parenchyma and may represent air trapped within fissures.    In the ED a chest tube was placed on the right, 1x dose of vanc 1.5g and keke 1g and was given 2.5 L bolus of fluid  S/P vancomycin 1250 Q12h 22-22 Levaquin 750 Q24h & Flagyl 500 Q8h     CXR 22: Reduced pneumothorax, right basilar opacity  MARIELOS, Bcx, Ucx, strep pneumo antigen, ESR, MRSA swab, Legionella antigen, ferritin - pending       Infectious Diseases History:  Old Micro Data/Cultures:     FAMILY HISTORY: non-contributory     PAST MEDICAL & SURGICAL HISTORY:  EDS (Melva-Danlos syndrome)      Epilepsy      HTN (hypertension)      Keratoconus      Aneurysm artery, celiac  s/p coiling 2010      Dislocations and subluxations      Status post Kerri&#x27;s procedure  2019          SOCIAL HISTORY  Social History:  Denies alcohol, tobacco and illicit drug use. (05 Sep 2019 22:09)      Recent Travel: [place] ([facility]) - was feeling sick prior to this trip  Other Exposures:     ROS  General: (+) fever and nightsweats  HEENT: Denies headache, rhinorrhea, sore throat, eye pain  CV: (+) CP - right sided, (-) palpitations  PULM: Denies wheezing, hemoptysis (+) dry cough, SOB  GI: Denies hematemesis, hematochezia, melena  : Denies discharge, hematuria  MSK: Denies arthralgias, myalgias  SKIN: Denies rash, lesions  NEURO: Denies paresthesias, weakness    VITALS:  T(F): 97, Max: 100.9 (22 @ 16:52)  HR: 96  BP: 104/61  RR: 20Vital Signs Last 24 Hrs  T(C): 36.1 (19 Aug 2022 07:59), Max: 38.3 (18 Aug 2022 16:52)  T(F): 97 (19 Aug 2022 07:59), Max: 100.9 (18 Aug 2022 16:52)  HR: 96 (19 Aug 2022 07:59) (72 - 117)  BP: 104/61 (19 Aug 2022 07:59) (90/55 - 125/76)  BP(mean): 86 (18 Aug 2022 23:30) (68 - 94)  RR: 20 (19 Aug 2022 07:59) (16 - 20)  SpO2: 97% (19 Aug 2022 07:59) (96% - 100%)    Parameters below as of 19 Aug 2022 03:30  Patient On (Oxygen Delivery Method): room air        PHYSICAL EXAM:  Gen: NAD, resting in bed, mildly diaphoretic   Neuro: non focal, alert and oriented x3  HEENT: Normocephalic, atraumatic  Neck: supple, no lymphadenopathy  CV: Tachycardic rate & regular rhythm  Lungs: Diminished breath sounds on the right. No wheezing, ronchi or rales bilaterally  CT  Abdomen: Soft, ileostomy present, nontender, nondistended   Ext: Warm, well perfused  Skin: no rash, no lesions  Lines: no phlebitis    TESTS & MEASUREMENTS:                        11.5   30.85 )-----------( 488      ( 18 Aug 2022 20:20 )             36.1         134<L>  |  95<L>  |  15  ----------------------------<  97  4.3   |  24  |  1.2    Ca    9.7      18 Aug 2022 20:20    TPro  7.7  /  Alb  4.0  /  TBili  0.6  /  DBili  x   /  AST  20  /  ALT  27  /  AlkPhos  138<H>        LIVER FUNCTIONS - ( 18 Aug 2022 20:20 )  Alb: 4.0 g/dL / Pro: 7.7 g/dL / ALK PHOS: 138 U/L / ALT: 27 U/L / AST: 20 U/L / GGT: x           Urinalysis Basic - ( 18 Aug 2022 20:30 )    Color: Yellow / Appearance: Clear / S.035 / pH: x  Gluc: x / Ketone: Moderate  / Bili: Small / Urobili: <2 mg/dL   Blood: x / Protein: 30 mg/dL / Nitrite: Negative   Leuk Esterase: Negative / RBC: 3 /HPF / WBC 2 /HPF   Sq Epi: x / Non Sq Epi: 3 /HPF / Bacteria: FEW          Lactate, Blood: 1.6 mmol/L (22 @ 20:20)      INFECTIOUS DISEASES TESTING      RADIOLOGY & ADDITIONAL TESTS:  I have personally reviewed the last available Chest xray  CXR  < from: Xray Chest 1 View- PORTABLE-Urgent (Xray Chest 1 View- PORTABLE-Urgent .) (22 @ 10:10) >  Findings:    Support devices: Right-sided pigtail catheter, unchanged    Cardiac/mediastinum/hilum: Unchanged    Lung parenchyma/Pleura: Trace right apical pneumothorax, unchanged.Right   basilar opacity, unchanged.    Skeleton/soft tissues: Unchanged.    Impression:    Trace right apical pneumothorax, unchanged.    Right basilar opacity, unchanged.    < end of copied text >      CT  CT Chest w/ IV Cont:   ACC: [id number] EXAM:  CT ABDOMEN AND PELVIS IC                        ACC: [id number] EXAM:  CT CHEST IC                          PROCEDURE DATE:  2022          INTERPRETATION:  REASON FOR EXAM / CLINICAL STATEMENT:  Fever. Cough   Pneumonia. WBC 30.85 PMHx of Melva Danlos 4, previous hospitalization in   2019 for perforation of sigmoid colon s/p Kerri's, Colonic   perforation w/ ileostomy in , epilepsy, HTN, celiac aneurysm s/p   coiling 2010      TECHNIQUE:  Contiguous axial CT images were obtained from the thoracic   inlet through the pubic symphysis with IV contrast.  Reformatted images   in the coronal and sagittal planes were acquired.      COMPARISON CT: CT scan of the chest, abdomen and pelvis dated 2021    OTHER STUDIES USED FOR CORRELATION: None.      FINDINGS / CHEST:    TUBES AND LINES: None.    HEART AND VESSELS: The heart is normal in size. There is no pericardial   effusion.    There is no evidence of central pulmonary embolism.    Normal caliber aorta and pulmonary artery. There is no evidence of aortic   aneurysm or dissection.    Brachiocephalic vessels are unremarkable.    MEDIASTINUM: There are no suspicious mediastinal, hilar or axillary lymph   nodes. The visualized portion of the thyroid gland is unremarkable.    AIRWAYS, LUNGS AND PLEURA: The central tracheobronchial tree is patent.   There is a large right pneumothorax approximately 50%. There is mild   displacement of the heart to the left side indicating that the   pneumothorax is under tension. Areas of consolidation and/or atelectasis   are noted throughout the collapsed right lung. There is a small right   pleural effusion.    BONES AND SOFT TISSUES: Degenerative changes of the spine.      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    HEPATIC: The liver is normal in size with no evidence of gross solid mass   or bile duct dilatation. The portal vein is patent. The hepatic veins are   opacified.    BILIARY: No calcified gallstones are noted.    SPLEEN: Unremarkable.    PANCREAS: The pancreas is normal in size and configuration. No evidence   of mass or pancreatitis.    ADRENAL GLANDS: Obscured by streak artifact.    KIDNEYS: There is symmetric renal enhancement. No evidence of   hydronephrosis, calcified stones, or solid mass. Areas of cortical   scarring are noted bilaterally.    ABDOMINOPELVIC NODES: Unremarkable.    PELVIC ORGANS: No evidence of pelvic mass, lymphadenopathy, or fluid   collection.    BLADDER: Unremarkable.    PERITONEUM/MESENTERY/BOWEL: Post Kerri procedure. Post colectomy with   ileostomy is noted in the right-sided the abdomen. No evidence of bowel   obstruction or ascites. No pneumoperitoneum.    BONES/SOFT TISSUES: No acute osseous abnormalities.    OTHER: Normal caliber aorta. Stable iliac artery aneurysms.      IMPRESSION:    50% right-sided pneumothorax under tension. Lucencies seen within the   lung parenchyma and may represent air trapped within fissures. A   follow-up scan after the lung has reexpanded be useful for confirmation.      Findings were discussed with Dr. Angel Campoverde at 10:10 PM 2022, with   read back. At the time of the phonecall a pigtail catheter was being   inserted.    --- End of Report ---            DK WILLIAMSON MD; Attending Interventional Radiologist  This document has been electronically signed. Aug 18 2022 10:25PM (22 @ 20:54)  CT Abdomen and Pelvis w/ IV Cont:   ACC: [id number] EXAM:  CT ABDOMEN AND PELVIS IC                        ACC: [id number] EXAM:  CT CHEST IC                          PROCEDURE DATE:  2022          INTERPRETATION:  REASON FOR EXAM / CLINICAL STATEMENT:  Fever. Cough   Pneumonia. WBC 30.85 PMHx of Melva Danlos 4, previous hospitalization in   2019 for perforation of sigmoid colon s/p Kerri's, Colonic   perforation w/ ileostomy in , epilepsy, HTN, celiac aneurysm s/p   coiling 2010      TECHNIQUE:  Contiguous axial CT images were obtained from the thoracic   inlet through the pubic symphysis with IV contrast.  Reformatted images   in the coronal and sagittal planes were acquired.      COMPARISON CT: CT scan of the chest, abdomen and pelvis dated 2021    OTHER STUDIES USED FOR CORRELATION: None.      FINDINGS / CHEST:    TUBES AND LINES: None.    HEART AND VESSELS: The heart is normal in size. There is no pericardial   effusion.    There is no evidence of central pulmonary embolism.    Normal caliber aorta and pulmonary artery. There is no evidence of aortic   aneurysm or dissection.    Brachiocephalic vessels are unremarkable.    MEDIASTINUM: There are no suspicious mediastinal, hilar or axillary lymph   nodes. The visualized portion of the thyroid gland is unremarkable.    AIRWAYS, LUNGS AND PLEURA: The central tracheobronchial tree is patent.   There is a large right pneumothorax approximately 50%. There is mild   displacement of the heart to the left side indicating that the   pneumothorax is under tension. Areas of consolidation and/or atelectasis   are noted throughout the collapsed right lung. There is a small right   pleural effusion.    BONES AND SOFT TISSUES: Degenerative changes of the spine.      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    HEPATIC: The liver is normal in size with no evidence of gross solid mass   or bile duct dilatation. The portal vein is patent. The hepatic veins are   opacified.    BILIARY: No calcified gallstones are noted.    SPLEEN: Unremarkable.    PANCREAS: The pancreas is normal in size and configuration. No evidence   of mass or pancreatitis.    ADRENAL GLANDS: Obscured by streak artifact.    KIDNEYS: There is symmetric renal enhancement. No evidence of   hydronephrosis, calcified stones, or solid mass. Areas of cortical   scarring are noted bilaterally.    ABDOMINOPELVIC NODES: Unremarkable.    PELVIC ORGANS: No evidence of pelvic mass, lymphadenopathy, or fluid   collection.    BLADDER: Unremarkable.    PERITONEUM/MESENTERY/BOWEL: Post Kerri procedure. Post colectomy with   ileostomy is noted in the right-sided the abdomen. No evidence of bowel   obstruction or ascites. No pneumoperitoneum.    BONES/SOFT TISSUES: No acute osseous abnormalities.    OTHER: Normal caliber aorta. Stable iliac artery aneurysms.      IMPRESSION:    50% right-sided pneumothorax under tension. Lucencies seen within the   lung parenchyma and may represent air trapped within fissures. A   follow-up scan after the lung has reexpanded be useful for confirmation.      Findings were discussed with Dr. Angel Campoverde at 10:10 PM 2022, with   read back. At the time of the phonecall a pigtail catheter was being   inserted.    --- End of Report ---            DK WILLIAMSON MD; Attending Interventional Radiologist  This document has been electronically signed. Aug 18 2022 10:25PM (22 @ 20:53)    CARDIOLOGY TESTING  12 Lead ECG:   Ventricular Rate 102 BPM    Atrial Rate 102 BPM    P-R Interval 132 ms    QRS Duration 92 ms    Q-T Interval 334 ms    QTC Calculation(Bazett) 435 ms    P Axis 44 degrees    R Axis 32 degrees    T Axis 27 degrees    Diagnosis Line Sinus tachycardia  Otherwise normal ECG    Confirmed by MELIDA RAGSDALE MD ([id number]) on 2022 6:24:42 AM (22 @ 20:01)    MARIELOS 22  Summary:  1. Normal global left ventricular systolic function.  2. LV Ejection Fraction by Landry's Method with a biplane EF of 58 %.  3. Normal left ventricular internal cavity size.  4. Normal left atrial size.  5. Normal right atrial size.  6. Mild mitral valve regurgitation.  7. LA volume Index is 27.2 ml/mï¿½ ml/m2.      All available historical records have been reviewed    MEDICATIONS  aspirin enteric coated 81  cholecalciferol 1000  enoxaparin Injectable 40  levETIRAcetam 1125  levETIRAcetam 1500  levoFLOXacin IVPB   melatonin 3  metroNIDAZOLE  IVPB   multivitamin 1  OXcarbazepine 600  OXcarbazepine 300  vancomycin  IVPB 1250      ANTIBIOTICS:  levoFLOXacin IVPB      metroNIDAZOLE  IVPB      vancomycin  IVPB 1250 milliGRAM(s) IV Intermittent every 12 hours      All available historical data has been reviewed

## 2022-08-19 NOTE — PHYSICAL EXAM
[Abdomen Masses] : No abdominal masses [Abdomen Tenderness] : ~T No ~M abdominal tenderness [No HSM] : no hepatosplenomegaly [Respiratory Effort] : normal respiratory effort [Normal Rate and Rhythm] : normal rate and rhythm [de-identified] : soft, non tender, non distended.  Midline wound well healed without herniation.  RLQ ileostomy pink patent and productive of gas and stool. [de-identified] : awake, alert and in no acute distress

## 2022-08-19 NOTE — H&P ADULT - ATTENDING COMMENTS
47 YO M with a PMH of Melva-Danlos syndrome, perforation of sigmoid colon s/p Kerri's, Colonic perforation w/ ileostomy, epilepsy, HTN, and celiac aneurysm s/p coiling who presents to the hospital w/ a c/o sudden onset right-sided CP that started today. Denies trauma/falls. Associated w/ fever (103) and SOB. ROS is positive for x 6 weeks of low-grade fevers () and night sweats, otherwise negative except as above.     Of note, the pt's PCP was in the process of working the pt up for elevated WBCs and persistent fevers. He had a Heme/Onc appointment scheduled for the end of September.     In the ED, CT-CAP w/ IV contrast showed right-sided PTX. An urgent right-sided chest tube was placed by ED staff.     FMHx: Reviewed, not relevant    Physical exam shows pt in NAD. VSS, afebrile, not hypoxic on 5L NC. A&Ox3. Neuro exam without deficits, motor/sensory intact, no dysarthria, no facial asymmetry. Muscle strength/sensation intact. CTA B/L with no W/C/R; right-sided chest tube in place connected to Pleur-evac, bubbles present. RRR, no M/G/R. ABD is soft and non-tender, normoactive BSs. LEs without swelling. No rashes. Labs and radiology as above.     Sudden onset CP/SOB from spontaneous right-sided PTX, hx of EDS. Right-sided pigtail placed in the ED. Connected to peur-evac. Pulm consult. Incentive spirometry. PRN pain meds. Supplemental O2 PRN. Repeat Chest X-Ray in the AM.     Persistent fevers + night sweats + leukocytosis, leukemia vs MDS; SIRs present on admission. Peripheral smear. Procal. ESR/CRP. FU cultures. IV ABXs (Vanc/Cefe) until infectious etiology is ruled out. Heme/Onc consult.   -As per pt, these symptoms have been ongoing for 5-6 weeks and his PCP was in the process of sending him to Heme/Onc for possible bone marrow biopsy    Normocytic anemia, at baseline. Pt denies bleeding symptoms. Send anemia work-up. Replace as necessary.     Hx of Melva-Danlos syndrome, perforation of sigmoid colon s/p Kerri's, Colonic perforation w/ ileostomy, epilepsy, HTN, and celiac aneurysm s/p coiling. Restart home meds, except as stated above. DVT PPX. Inform PCP of pt's admission to hospital. My note supersedes the residents note.     Date seen by Attendin22

## 2022-08-19 NOTE — PHARMACOTHERAPY INTERVENTION NOTE - COMMENTS
Dr. Fournier was contacted [phone] to verify Levaquin order due to allergy to Avelox.
Per policy, updated penicillin allergy history to reflect that patient has tolerated cefepime, cefotetan, and meropenem since allergy was documented in 2018.

## 2022-08-19 NOTE — PATIENT PROFILE ADULT - FALL HARM RISK - HARM RISK INTERVENTIONS

## 2022-08-19 NOTE — PHARMACOTHERAPY INTERVENTION NOTE - NSPHARMCOMMMEDSAFE
Allergy prevented - Therapy discontinued/changed
Allergy reviewed/ verified - Prescriber aware; Therapy continued

## 2022-08-19 NOTE — CONSULT NOTE ADULT - SUBJECTIVE AND OBJECTIVE BOX
SURGERY CONSULT NOTE    Patient: FAZAL CASSIDY , 46y (75)Male   MRN: [id number]  Location: [facility] A5- A  Visit: 22 Inpatient  Date: 22 @ 09:27    "HPI:  46M with PMH Melva-Danlos syndrome followed at [facility] ([facility]--GI, [facility]--Vascular), perforation of sigmoid colon s/p Kerri's, Colonic perforation w/ ileostomy in , epilepsy, HTN, celiac aneurysm s/p coiling 2010 presented to the ED for intermittent fevers and mixed productive and non-productive cough for 1 month. Yesterday pt had a fever of 103 which was not breaking. In the morning pt got out of the bed and had a syncopal episode. Pt fell lightheaded, saw flashes and woke up on the floor. Then pt had sever chest pain on the right side, after which he decided to come to the ED.     Off note pt was supposed to fup with Hematology/ Oncology for abnormal blood counts, pts PCP was considering a bone biopsy.    ED vitals 108/68, , RR 17, temp 100.9, O2 sat 98% on RA    Sig Labs: WBC 30.85, Hg 11.5    EKG Sinus tachycardia    CT chest w IV cont: 50% right-sided pneumothorax under tension. Lucencies seen within the lung parenchyma and may represent air trapped within fissures.    CT abd/ pelvis: neg for acute pathology    In the ED a chest tube was placed on the right, 1x dose of vanc and keke and was given 2.5 L bolus of fluid (19 Aug 2022 00:42)  "    Reason for Surgical Consult: Chest tube management      PAST MEDICAL & SURGICAL HISTORY:  EDS (Melva-Danlos syndrome)      Epilepsy      HTN (hypertension)      Keratoconus      Aneurysm artery, celiac  s/p coiling 2010      Dislocations and subluxations      Status post Kerri&#x27;s procedure  2019          Home Medications:  Aspir 81 oral delayed release tablet: orally once a day (19 Aug 2022 03:20)  Keppra: 1125 milligram(s) orally once a day (19 Aug 2022 03:20)  Keppra: 1500 milligram(s) orally once a day (at bedtime) (19 Aug 2022 03:20)  LISINOPRIL   TAB 30MG:  (19 Aug 2022 03:20)  Multiple Vitamins oral tablet: 1 tab(s) orally once a day (19 Aug 2022 03:20)  OXCARBAZEPIN TAB 600M milligram(s) orally once a day (at bedtime) (19 Aug 2022 03:20)  Trileptal 300 mg oral tablet: 300 milligram(s) orally once a day in the morning (19 Aug 2022 03:20)  Vitamin D3 25 mcg (1000 intl units) oral tablet, chewable: 1 tab(s) orally once a day (19 Aug 2022 03:20)        VITALS:  T(F): 97 (22 @ 07:59), Max: 100.9 (22 @ 16:52)  HR: 96 (22 @ 07:59) (72 - 117)  BP: 104/61 (22 @ 07:59) (90/55 - 125/76)  RR: 20 (22 @ 07:59) (16 - 20)  SpO2: 97% (22 @ 07:59) (96% - 100%)    PHYSICAL EXAM:  General: NAD, AAOx3  Cardiac: RRR  Respiratory: Unlabored breathing at rest.  14Fr R sided pigtail in place and to suction, no air leak.  Abdomen: Soft, non-distended, non-tender.  RLQ ostomy in place and with formed stool in bag, pink mucosa.  Musculoskeletal: Strength 5/5 BL UE/LE, ROM intact, compartments soft  Neuro: Sensation grossly intact and equal throughout, no focal deficits  Skin: Warm/dry, normal color, no jaundice      MEDICATIONS  (STANDING):  aspirin enteric coated 81 milliGRAM(s) Oral daily  cholecalciferol 1000 Unit(s) Oral daily  enoxaparin Injectable 40 milliGRAM(s) SubCutaneous every 24 hours  levETIRAcetam 1125 milliGRAM(s) Oral daily  levETIRAcetam 1500 milliGRAM(s) Oral at bedtime  levoFLOXacin IVPB      melatonin 3 milliGRAM(s) Oral at bedtime  metroNIDAZOLE  IVPB 500 milliGRAM(s) IV Intermittent once  metroNIDAZOLE  IVPB 500 milliGRAM(s) IV Intermittent every 8 hours  metroNIDAZOLE  IVPB      multivitamin 1 Tablet(s) Oral daily  OXcarbazepine 600 milliGRAM(s) Oral <User Schedule>  OXcarbazepine 300 milliGRAM(s) Oral <User Schedule>  vancomycin  IVPB 1250 milliGRAM(s) IV Intermittent every 12 hours    MEDICATIONS  (PRN):  acetaminophen     Tablet .. 650 milliGRAM(s) Oral every 6 hours PRN Temp greater or equal to 38C (100.4F), Mild Pain (1 - 3)      LAB/STUDIES:                        10.2   21.38 )-----------( 357      ( 19 Aug 2022 08:51 )             30.9         134<L>  |  95<L>  |  15  ----------------------------<  97  4.3   |  24  |  1.2    Ca    9.7      18 Aug 2022 20:20    TPro  7.7  /  Alb  4.0  /  TBili  0.6  /  DBili  x   /  AST  20  /  ALT  27  /  AlkPhos  138<H>        LIVER FUNCTIONS - ( 18 Aug 2022 20:20 )  Alb: 4.0 g/dL / Pro: 7.7 g/dL / ALK PHOS: 138 U/L / ALT: 27 U/L / AST: 20 U/L / GGT: x           Urinalysis Basic - ( 18 Aug 2022 20:30 )    Color: Yellow / Appearance: Clear / S.035 / pH: x  Gluc: x / Ketone: Moderate  / Bili: Small / Urobili: <2 mg/dL   Blood: x / Protein: 30 mg/dL / Nitrite: Negative   Leuk Esterase: Negative / RBC: 3 /HPF / WBC 2 /HPF   Sq Epi: x / Non Sq Epi: 3 /HPF / Bacteria: FEW                  IMAGING:  < from: CT Chest w/ IV Cont (22 @ 20:54) >  50% right-sided pneumothorax under tension. Lucencies seen within the   lung parenchyma and may represent air trapped within fissures. A   follow-up scan after the lung has reexpanded be useful for confirmation.      Findings were discussed with Dr. Angel Campoverde at 10:10 PM 2022, with   read back. At the time of the phonecall a pigtail catheter was being   inserted.    < end of copied text >  < from: Xray Chest 1 View-PORTABLE IMMEDIATE (Xray Chest 1 View-PORTABLE IMMEDIATE .) (22 @ 23:57) >  IMPRESSION:    1. Decreased right pneumothorax post pigtail catheter placement.    2. Right basilar opacity.      --- End of Report ---    < end of copied text >

## 2022-08-19 NOTE — PROGRESS NOTE ADULT - SUBJECTIVE AND OBJECTIVE BOX
Medical attending follow up note -    Patient seen and examined this morning  lying comfortably in bed  asking for pain meds    Vital Signs Last 24 Hrs  T(C): 38.6 (19 Aug 2022 10:15), Max: 38.6 (19 Aug 2022 10:15)  T(F): 101.4 (19 Aug 2022 10:15), Max: 101.4 (19 Aug 2022 10:15)  HR: 96 (19 Aug 2022 07:59) (72 - 117)  BP: 104/61 (19 Aug 2022 07:59) (90/55 - 125/76)  BP(mean): 86 (18 Aug 2022 23:30) (68 - 94)  RR: 20 (19 Aug 2022 07:59) (16 - 20)  SpO2: 97% (19 Aug 2022 07:59) (96% - 100%)    Parameters below as of 19 Aug 2022 03:30  Patient On (Oxygen Delivery Method): room air    #Spontaneous Pneumothorax  #Hx of Melva Danlos  #suspected PNA  #Sepsis on admission (leukocytosis on 30K, tachycardia, fever)  - s/p chest tube placement  -  follow up surgery and pulm  - repeat cxr  - oxygen saturation = 99%  - fup procal, mrsa, legionella, strep, blood and urine cultures  - on vanc and keke  - ID following   - pain control  - antipyretics prn     #Leukocytosis/thrombocytosis/Anemia  - heme onc c/s pending  - check peripheral smear     Progress Note Handoff  Pending Consults: oncology  Pending Tests: labs, cxr  Pending Results: as above  Family Discussion: discussed ptx, pain control, oxygenation and overall plan of care with pt and medical staff.   Disposition: Home__x___/SNF______/Other_____/Unknown at this time_____  Spent over 35 min reviewing chart and on coordinating patient care during interdisciplinary rounds

## 2022-08-19 NOTE — DIETITIAN INITIAL EVALUATION ADULT - ORAL INTAKE PTA/DIET HISTORY
as per pt and family at bedside pt tolerates regular diet PTA with adequate po intake. Denies any weight changes

## 2022-08-19 NOTE — PROGRESS NOTE ADULT - ASSESSMENT
1 . hx of EDS vascular type, multiple hx of vascular ruptures including sig colon s/p macedo's  and ileostomy in 2020  .. celiac and other vessel aneurysm and rupture  ..no hx of excessive bleeding or bruises  ..no hx of postop bleeding    2 now with pTX after bleb ruptures a/w cough and fever for past 2 wks  s/p chest tube placement  on antibx  CT of chest and abd without abcess or infection foci    3 elev wbc at 20 to 30K and elev plt count  reactive process suspected  r/o MPD  send flow cytometry and GABRIELLA 2 studies /ABL/BCR     4 discussed possible benefit with DDAVP, plt transfusion and vit D therapy to strengthen soft tissue.  will follow

## 2022-08-20 LAB
ANION GAP SERPL CALC-SCNC: 13 MMOL/L — SIGNIFICANT CHANGE UP (ref 7–14)
BASOPHILS # BLD AUTO: 0.03 K/UL — SIGNIFICANT CHANGE UP (ref 0–0.2)
BASOPHILS NFR BLD AUTO: 0.2 % — SIGNIFICANT CHANGE UP (ref 0–1)
BUN SERPL-MCNC: 16 MG/DL — SIGNIFICANT CHANGE UP (ref 10–20)
CALCIUM SERPL-MCNC: 8 MG/DL — LOW (ref 8.5–10.1)
CHLORIDE SERPL-SCNC: 98 MMOL/L — SIGNIFICANT CHANGE UP (ref 98–110)
CO2 SERPL-SCNC: 23 MMOL/L — SIGNIFICANT CHANGE UP (ref 17–32)
CREAT SERPL-MCNC: 0.8 MG/DL — SIGNIFICANT CHANGE UP (ref 0.7–1.5)
CULTURE RESULTS: NO GROWTH — SIGNIFICANT CHANGE UP
EGFR: 111 ML/MIN/1.73M2 — SIGNIFICANT CHANGE UP
EOSINOPHIL # BLD AUTO: 0.04 K/UL — SIGNIFICANT CHANGE UP (ref 0–0.7)
EOSINOPHIL NFR BLD AUTO: 0.3 % — SIGNIFICANT CHANGE UP (ref 0–8)
GLUCOSE SERPL-MCNC: 119 MG/DL — HIGH (ref 70–99)
HCT VFR BLD CALC: 26.5 % — LOW (ref 42–52)
HGB BLD-MCNC: 8.7 G/DL — LOW (ref 14–18)
HIV 1+2 AB+HIV1 P24 AG SERPL QL IA: SIGNIFICANT CHANGE UP
IMM GRANULOCYTES NFR BLD AUTO: 0.5 % — HIGH (ref 0.1–0.3)
LYMPHOCYTES # BLD AUTO: 0.99 K/UL — LOW (ref 1.2–3.4)
LYMPHOCYTES # BLD AUTO: 7.5 % — LOW (ref 20.5–51.1)
MAGNESIUM SERPL-MCNC: 1.6 MG/DL — LOW (ref 1.8–2.4)
MAGNESIUM SERPL-MCNC: 2.3 MG/DL — SIGNIFICANT CHANGE UP (ref 1.8–2.4)
MCHC RBC-ENTMCNC: 26.5 PG — LOW (ref 27–31)
MCHC RBC-ENTMCNC: 32.8 G/DL — SIGNIFICANT CHANGE UP (ref 32–37)
MCV RBC AUTO: 80.8 FL — SIGNIFICANT CHANGE UP (ref 80–94)
MONOCYTES # BLD AUTO: 1.19 K/UL — HIGH (ref 0.1–0.6)
MONOCYTES NFR BLD AUTO: 9 % — SIGNIFICANT CHANGE UP (ref 1.7–9.3)
MRSA PCR RESULT.: NEGATIVE — SIGNIFICANT CHANGE UP
NEUTROPHILS # BLD AUTO: 10.92 K/UL — HIGH (ref 1.4–6.5)
NEUTROPHILS NFR BLD AUTO: 82.5 % — HIGH (ref 42.2–75.2)
NRBC # BLD: 0 /100 WBCS — SIGNIFICANT CHANGE UP (ref 0–0)
PLATELET # BLD AUTO: 319 K/UL — SIGNIFICANT CHANGE UP (ref 130–400)
POTASSIUM SERPL-MCNC: 3.9 MMOL/L — SIGNIFICANT CHANGE UP (ref 3.5–5)
POTASSIUM SERPL-SCNC: 3.9 MMOL/L — SIGNIFICANT CHANGE UP (ref 3.5–5)
RBC # BLD: 3.28 M/UL — LOW (ref 4.7–6.1)
RBC # FLD: 14 % — SIGNIFICANT CHANGE UP (ref 11.5–14.5)
SODIUM SERPL-SCNC: 134 MMOL/L — LOW (ref 135–146)
SPECIMEN SOURCE: SIGNIFICANT CHANGE UP
WBC # BLD: 13.23 K/UL — HIGH (ref 4.8–10.8)
WBC # FLD AUTO: 13.23 K/UL — HIGH (ref 4.8–10.8)

## 2022-08-20 PROCEDURE — 99233 SBSQ HOSP IP/OBS HIGH 50: CPT

## 2022-08-20 PROCEDURE — 71045 X-RAY EXAM CHEST 1 VIEW: CPT | Mod: 26

## 2022-08-20 RX ORDER — IBUPROFEN 200 MG
600 TABLET ORAL ONCE
Refills: 0 | Status: COMPLETED | OUTPATIENT
Start: 2022-08-20 | End: 2022-08-20

## 2022-08-20 RX ORDER — METRONIDAZOLE 500 MG
500 TABLET ORAL EVERY 8 HOURS
Refills: 0 | Status: DISCONTINUED | OUTPATIENT
Start: 2022-08-20 | End: 2022-08-22

## 2022-08-20 RX ORDER — IBUPROFEN 200 MG
800 TABLET ORAL ONCE
Refills: 0 | Status: COMPLETED | OUTPATIENT
Start: 2022-08-20 | End: 2022-08-20

## 2022-08-20 RX ORDER — MAGNESIUM OXIDE 400 MG ORAL TABLET 241.3 MG
400 TABLET ORAL
Refills: 0 | Status: DISCONTINUED | OUTPATIENT
Start: 2022-08-20 | End: 2022-08-22

## 2022-08-20 RX ORDER — METRONIDAZOLE 500 MG
500 TABLET ORAL EVERY 8 HOURS
Refills: 0 | Status: DISCONTINUED | OUTPATIENT
Start: 2022-08-20 | End: 2022-08-20

## 2022-08-20 RX ORDER — MAGNESIUM SULFATE 500 MG/ML
2 VIAL (ML) INJECTION ONCE
Refills: 0 | Status: COMPLETED | OUTPATIENT
Start: 2022-08-20 | End: 2022-08-20

## 2022-08-20 RX ADMIN — Medication 1000 UNIT(S): at 11:39

## 2022-08-20 RX ADMIN — Medication 600 MILLIGRAM(S): at 05:56

## 2022-08-20 RX ADMIN — Medication 800 MILLIGRAM(S): at 22:55

## 2022-08-20 RX ADMIN — Medication 3 MILLIGRAM(S): at 22:56

## 2022-08-20 RX ADMIN — Medication 25 GRAM(S): at 15:22

## 2022-08-20 RX ADMIN — LEVETIRACETAM 1125 MILLIGRAM(S): 250 TABLET, FILM COATED ORAL at 11:40

## 2022-08-20 RX ADMIN — LEVETIRACETAM 1500 MILLIGRAM(S): 250 TABLET, FILM COATED ORAL at 22:55

## 2022-08-20 RX ADMIN — MORPHINE SULFATE 2 MILLIGRAM(S): 50 CAPSULE, EXTENDED RELEASE ORAL at 18:44

## 2022-08-20 RX ADMIN — MAGNESIUM OXIDE 400 MG ORAL TABLET 400 MILLIGRAM(S): 241.3 TABLET ORAL at 15:21

## 2022-08-20 RX ADMIN — MORPHINE SULFATE 2 MILLIGRAM(S): 50 CAPSULE, EXTENDED RELEASE ORAL at 17:52

## 2022-08-20 RX ADMIN — Medication 500 MILLIGRAM(S): at 22:56

## 2022-08-20 RX ADMIN — CEFEPIME 100 MILLIGRAM(S): 1 INJECTION, POWDER, FOR SOLUTION INTRAMUSCULAR; INTRAVENOUS at 14:15

## 2022-08-20 RX ADMIN — OXCARBAZEPINE 300 MILLIGRAM(S): 300 TABLET, FILM COATED ORAL at 05:17

## 2022-08-20 RX ADMIN — Medication 100 MILLIGRAM(S): at 14:16

## 2022-08-20 RX ADMIN — Medication 650 MILLIGRAM(S): at 15:30

## 2022-08-20 RX ADMIN — ENOXAPARIN SODIUM 40 MILLIGRAM(S): 100 INJECTION SUBCUTANEOUS at 08:20

## 2022-08-20 RX ADMIN — CEFEPIME 100 MILLIGRAM(S): 1 INJECTION, POWDER, FOR SOLUTION INTRAMUSCULAR; INTRAVENOUS at 22:55

## 2022-08-20 RX ADMIN — MAGNESIUM OXIDE 400 MG ORAL TABLET 400 MILLIGRAM(S): 241.3 TABLET ORAL at 17:49

## 2022-08-20 RX ADMIN — Medication 600 MILLIGRAM(S): at 06:19

## 2022-08-20 RX ADMIN — CEFEPIME 100 MILLIGRAM(S): 1 INJECTION, POWDER, FOR SOLUTION INTRAMUSCULAR; INTRAVENOUS at 05:17

## 2022-08-20 RX ADMIN — Medication 1 TABLET(S): at 11:39

## 2022-08-20 RX ADMIN — Medication 650 MILLIGRAM(S): at 14:51

## 2022-08-20 RX ADMIN — Medication 81 MILLIGRAM(S): at 11:39

## 2022-08-20 RX ADMIN — OXCARBAZEPINE 600 MILLIGRAM(S): 300 TABLET, FILM COATED ORAL at 17:49

## 2022-08-20 NOTE — PROGRESS NOTE ADULT - ASSESSMENT
46M with PMH Melva-Danlos syndrome followed at [facility] ([facility]--GI, [facility]--Vascular), perforation of sigmoid colon s/p Kerri's, Colonic perforation w/ ileostomy in 2020, epilepsy, HTN, 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 Yesterday pt had a fever of 103 which was not breaking. In the morning pt got out of the bed and had a syncopal episode. Pt fell lightheaded, saw flashes and woke up on the floor. Then pt had sever chest pain on the right side, after which he decided to come to the ED.     #Spontaneous Pneumothorax  #Hx of Melva Danlos  #suspected PNA    #Sepsis on admission (leukocytosis on 30K, and repeat  is  lower  to  13,000  and  still  with  anemia   awaiting  flowcytometry  - s/p chest tube placement  - follow up surgery and pulm  - repeat daily cxr  - echo = ef 58%  - oxygen saturation = 99%    - on cefepime and flagyl   - ID following   discussed  possible  dif  dx  with  pt.  and  wife   continue  baby  asa   check  PT/PTT  as  well  as  VIT  K level (  total  colectomy)

## 2022-08-20 NOTE — PROGRESS NOTE ADULT - SUBJECTIVE AND OBJECTIVE BOX
Patient is a 46y old  Male who presents with a chief complaint of cp/ fever (20 Aug 2022 07:52)   Seen  by  Dr Guzman  on  8/19  pt  seen  and  examined with  diagnosis of Melva Danlos Syndrome  being treated  with followed  by  [facility] team [facility]  and  was  admitted  with chest  pain  and  fever  and  elevated  WBC  to  30,000    Pt is seen and examined wife  at bed  side   pt is awake and lying in bed  pt seems comfortable and denies any complaints at this time          ROS:  Negative except for:    MEDICATIONS  (STANDING):  aspirin enteric coated 81 milliGRAM(s) Oral daily  cefepime   IVPB 2000 milliGRAM(s) IV Intermittent every 8 hours  cefepime   IVPB      cholecalciferol 1000 Unit(s) Oral daily  enoxaparin Injectable 40 milliGRAM(s) SubCutaneous every 24 hours  ibuprofen  Tablet. 800 milliGRAM(s) Oral Once  levETIRAcetam 1125 milliGRAM(s) Oral daily  levETIRAcetam 1500 milliGRAM(s) Oral at bedtime  magnesium oxide 400 milliGRAM(s) Oral three times a day with meals  melatonin 3 milliGRAM(s) Oral at bedtime  metroNIDAZOLE    Tablet 500 milliGRAM(s) Oral every 8 hours  multivitamin 1 Tablet(s) Oral daily  OXcarbazepine 600 milliGRAM(s) Oral <User Schedule>  OXcarbazepine 300 milliGRAM(s) Oral <User Schedule>    MEDICATIONS  (PRN):  acetaminophen     Tablet .. 650 milliGRAM(s) Oral every 6 hours PRN Temp greater or equal to 38C (100.4F), Mild Pain (1 - 3)  acetaminophen     Tablet .. 650 milliGRAM(s) Oral once PRN Temp greater or equal to 38C (100.4F)  morphine  - Injectable 2 milliGRAM(s) IV Push every 6 hours PRN Moderate Pain (4 - 6)      Allergies    moxifloxacin (Hives)  penicillin (Unknown)    Intolerances        Vital Signs Last 24 Hrs  T(C): 37.9 (20 Aug 2022 17:33), Max: 38.7 (20 Aug 2022 15:15)  T(F): 100.3 (20 Aug 2022 17:33), Max: 101.6 (20 Aug 2022 15:15)  HR: 92 (20 Aug 2022 16:48) (66 - 92)  BP: 112/69 (20 Aug 2022 16:48) (101/65 - 127/79)  BP(mean): 86 (20 Aug 2022 16:48) (79 - 86)  RR: 17 (20 Aug 2022 16:48) (17 - 19)  SpO2: 97% (20 Aug 2022 16:48) (97% - 98%)    Parameters below as of 20 Aug 2022 16:48  Patient On (Oxygen Delivery Method): room air        PHYSICAL EXAM  General: adult in NAD  HEENT: clear oropharynx, anicteric sclera, pink conjunctiva  Neck: supple  CV: normal S1/S2 with no murmur rubs or gallops  Lungs: positive air movement b/l ant lungs,clear to auscultation  with  decrease  air  flow right  side  , no wheezes, no rales  Abdomen: soft non-tender non-distended, no hepatosplenomegaly  Ext: no clubbing cyanosis or edema  Skin: no rashes and no petechiae  Neuro: alert and oriented X 4, no focal deficits  LABS:                          8.7    13.23 )-----------( 319      ( 20 Aug 2022 00:15 )             26.5         Mean Cell Volume : 80.8 fL  Mean Cell Hemoglobin : 26.5 pg  Mean Cell Hemoglobin Concentration : 32.8 g/dL  Auto Neutrophil # : 10.92 K/uL  Auto Lymphocyte # : 0.99 K/uL  Auto Monocyte # : 1.19 K/uL  Auto Eosinophil # : 0.04 K/uL  Auto Basophil # : 0.03 K/uL  Auto Neutrophil % : 82.5 %  Auto Lymphocyte % : 7.5 %  Auto Monocyte % : 9.0 %  Auto Eosinophil % : 0.3 %  Auto Basophil % : 0.2 %    Serial CBC's  08-20 @ 00:15  Hct-26.5 / Hgb-8.7 / Plat-319 / RBC-3.28 / WBC-13.23          Serial CBC's  08-19 @ 08:51  Hct-30.9 / Hgb-10.2 / Plat-357 / RBC-3.82 / WBC-21.38          Serial CBC's  08-18 @ 20:20  Hct-36.1 / Hgb-11.5 / Plat-488 / RBC-4.40 / WBC-30.85            08-20    134<L>  |  98  |  16  ----------------------------<  119<H>  3.9   |  23  |  0.8    Ca    8.0<L>      20 Aug 2022 00:15  Mg     2.3     08-20    TPro  6.2  /  Alb  3.3<L>  /  TBili  0.3  /  DBili  x   /  AST  16  /  ALT  17  /  AlkPhos  104  08-19          WBC Count: 13.23 K/uL (08-20-22 @ 00:15)  Hemoglobin: 8.7 g/dL (08-20-22 @ 00:15)  Hematocrit: 26.5 % (08-20-22 @ 00:15)  Platelet Count - Automated: 319 K/uL (08-20-22 @ 00:15)  WBC Count: 21.38 K/uL (08-19-22 @ 08:51)  Hemoglobin: 10.2 g/dL (08-19-22 @ 08:51)  Hematocrit: 30.9 % (08-19-22 @ 08:51)  Platelet Count - Automated: 357 K/uL (08-19-22 @ 08:51)  Ferritin, Serum: 399 ng/mL (08-19-22 @ 08:51)  Reticulocyte Percent: 1.7 % (08-19-22 @ 08:51)  WBC Count: 30.85 K/uL (08-18-22 @ 20:20)  Hemoglobin: 11.5 g/dL (08-18-22 @ 20:20)  Hematocrit: 36.1 % (08-18-22 @ 20:20)  Platelet Count - Automated: 488 K/uL (08-18-22 @ 20:20)                BLOOD SMEAR INTERPRETATION:       RADIOLOGY & ADDITIONAL STUDIES:

## 2022-08-20 NOTE — PROGRESS NOTE ADULT - ASSESSMENT
46M who presented to the ED on 8/18 with fevers/chest pain and was found to have spontaneous PTX.  S/p bedside 14Fr pigtail placement in ED with resolution of PTX, admitted to medical service for septic workup(WBC 30, Febrile 103 on admission). Thoracic surgery team consulted for further management of chest tube.    Plan:   -Daily CXR   -Continue CT to suction   -IV abx, septic workup as per primary   -Thoracic surgery to follow  - Hemodynamic monitoring  - Pulm recs appreciated - swab nose for MRSA if - dc vanco, zosyn/ levaquin till cx, procal, urine strep/ legionella  - ID recs appreciated - c/w ABx, send flow cytometry and GABRIELLA 2 studies/ABL/ BCR  - DVT pphx,   - pain control

## 2022-08-20 NOTE — PROGRESS NOTE ADULT - ASSESSMENT
46M with PMH Melva-Danlos syndrome followed at [facility] ([facility]--GI, [facility]--Vascular), perforation of sigmoid colon s/p Kerri's, Colonic perforation w/ ileostomy in 2020, epilepsy, HTN, 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 Yesterday pt had a fever of 103 which was not breaking. In the morning pt got out of the bed and had a syncopal episode. Pt fell lightheaded, saw flashes and woke up on the floor. Then pt had sever chest pain on the right side, after which he decided to come to the ED.     #Spontaneous Pneumothorax  #Hx of Melva Danlos  #suspected PNA  #Sepsis on admission (leukocytosis on 30K, tachycardia, fever)  - s/p chest tube placement  - follow up surgery and pulm  - repeat daily cxr  - echo = ef 58%  - oxygen saturation = 99%  - fup procal, mrsa, legionella, strep, blood and urine cultures  - on cefepime and flagyl   - ID following   - pain control  - antipyretics prn along with cooling blanket     #Seizure disorder  -on keppra and oxcarazepine     #Leukocytosis/thrombocytosis/Anemia r/o MPD  - heme onc c/s appreciated - send flow cytometry and GABRIELLA 2 studies /ABL/BCR    - check peripheral smear   - monitor daily cbc    Progress Note Handoff  Pending Consults: surgery/id follow up  Pending Tests: labs, cxr  Pending Results: labs, cxr  Family Discussion: discussed mediation, chest tube, ambulation around the room, all test and consult with pt and his wife bedside. All questions answered.   Disposition: Home_x____/SNF______/Other_____/Unknown at this time_____  Spent over 45 min reviewing chart and on coordinating patient care during interdisciplinary rounds     Please call me with any questions at extension [phone]

## 2022-08-20 NOTE — PROGRESS NOTE ADULT - SUBJECTIVE AND OBJECTIVE BOX
GENERAL SURGERY PROGRESS NOTE    Patient: FAZAL CASSIDY , 46y (75)Male   MRN: [id number]  Location: [place] [id number] A  Visit: 22 Inpatient  Date: 22 @ 07:02    Hospital Day #: 2    Procedure/Dx/Injuries: R spontaneous PTX s/p pigtail placement     Events of past 24 hours: No acute events overnight. Pigtail to suction, and output was 40cc. WBC count 13.23 from 21.38. Febrile with a TMax of 103.7 yesterday starting at 10:15  until  20:00. Patient was afebrile overnight. Complains of mild soreness at the pigtail insertion site, but denies centralized CP, SOB, N/V/dizziness or lightheadedness.     PAST MEDICAL & SURGICAL HISTORY:  EDS (Melva-Danlos syndrome)      Epilepsy      HTN (hypertension)      Keratoconus      Aneurysm artery, celiac  s/p coiling 2010      Dislocations and subluxations      Status post Kerri&#x27;s procedure  2019          Vitals:   T(F): 99 (22 @ 04:00), Max: 103.7 (22 @ 17:27)  HR: 78 (22 @ 04:00)  BP: 127/79 (22 @ 04:00)  RR: 19 (22 @ 04:00)  SpO2: 98% (22 @ 04:00)      Diet, DASH/TLC:   Sodium & Cholesterol Restricted      Fluids:     I & O's:    22 @ 07:01  -  22 @ 07:00  --------------------------------------------------------  IN:    Oral Fluid: 400 mL  Total IN: 400 mL    OUT:    Chest Tube (mL): 30 mL    Gastrostomy Tube (mL): 250 mL    Voided (mL): 850 mL  Total OUT: 1130 mL    Total NET: -730 mL    PHYSICAL EXAM:  General: NAD, AAOx3, calm and cooperative  HEENT: NCAT  Cardiac: RRR S1, S2, no Murmurs, rubs or gallops  Chest: R pigtail to suction. Mild tenderness to palpation.   Respiratory: CTAB, normal respiratory effort  Abdomen: Soft, non-distended  Musculoskeletal: compartments soft  Neuro: no focal deficits  Vascular: Pulses 2+ throughout, extremities well perfused  Skin: Warm/dry, normal color  Incision/wound: healing well, dressings in place, clean, dry and intact    MEDICATIONS  (STANDING):  aspirin enteric coated 81 milliGRAM(s) Oral daily  cefepime   IVPB 2000 milliGRAM(s) IV Intermittent every 8 hours  cefepime   IVPB      cholecalciferol 1000 Unit(s) Oral daily  enoxaparin Injectable 40 milliGRAM(s) SubCutaneous every 24 hours  levETIRAcetam 1125 milliGRAM(s) Oral daily  levETIRAcetam 1500 milliGRAM(s) Oral at bedtime  melatonin 3 milliGRAM(s) Oral at bedtime  multivitamin 1 Tablet(s) Oral daily  OXcarbazepine 600 milliGRAM(s) Oral <User Schedule>  OXcarbazepine 300 milliGRAM(s) Oral <User Schedule>    MEDICATIONS  (PRN):  acetaminophen     Tablet .. 650 milliGRAM(s) Oral every 6 hours PRN Temp greater or equal to 38C (100.4F), Mild Pain (1 - 3)  acetaminophen     Tablet .. 650 milliGRAM(s) Oral once PRN Temp greater or equal to 38C (100.4F)  morphine  - Injectable 2 milliGRAM(s) IV Push every 6 hours PRN Moderate Pain (4 - 6)      DVT PROPHYLAXIS: enoxaparin Injectable 40 milliGRAM(s) SubCutaneous every 24 hours    GI PROPHYLAXIS:   ANTICOAGULATION:   ANTIBIOTICS:  cefepime   IVPB 2000 milliGRAM(s)  cefepime   IVPB              LAB/STUDIES:  Labs:  CAPILLARY BLOOD GLUCOSE                              8.7    13.23 )-----------( 319      ( 20 Aug 2022 00:15 )             26.5       Auto Neutrophil %: 82.5 % (22 @ 00:15)  Auto Immature Granulocyte %: 0.5 % (22 @ 00:15)  Auto Neutrophil %: 92.8 % (22 @ 08:51)  Auto Immature Granulocyte %: 0.8 % (22 @ 08:51)    08-20    134<L>  |  98  |  16  ----------------------------<  119<H>  3.9   |  23  |  0.8      Calcium, Total Serum: 8.0 mg/dL (22 @ 00:15)      LFTs:             6.2  | 0.3  | 16       ------------------[104     ( 19 Aug 2022 08:51 )  3.3  | x    | 17                 Lactate, Blood: 1.6 mmol/L (22 @ 20:20)          Urinalysis Basic - ( 18 Aug 2022 20:30 )    Color: Yellow / Appearance: Clear / S.035 / pH: x  Gluc: x / Ketone: Moderate  / Bili: Small / Urobili: <2 mg/dL   Blood: x / Protein: 30 mg/dL / Nitrite: Negative   Leuk Esterase: Negative / RBC: 3 /HPF / WBC 2 /HPF   Sq Epi: x / Non Sq Epi: 3 /HPF / Bacteria: FEW        Culture - Blood (collected 18 Aug 2022 20:20)  Source: .Blood Blood  Preliminary Report (20 Aug 2022 02:02):    No growth to date.    Culture - Blood (collected 18 Aug 2022 20:20)  Source: .Blood Blood  Preliminary Report (20 Aug 2022 02:02):    No growth to date.    IMAGING: < from: Xray Chest 1 View- PORTABLE-Urgent (Xray Chest 1 View- PORTABLE-Urgent .) (22 @ 10:10) >    Trace right apical pneumothorax, unchanged.    Right basilar opacity, unchanged.      ACCESS/ DEVICES:  [x] Peripheral IV  [ ] Central Venous Line	[ ] R	[ ] L	[ ] IJ	[ ] Fem	[ ] SC	Placed:   [ ] Arterial Line		[ ] R	[ ] L	[ ] Fem	[ ] Rad	[ ] Ax	Placed:   [ ] PICC:					[ ] Mediport  [ ] Urinary Catheter,  Date Placed:   [x] Pigtail: [x] Right, [ ] Left  [ ] NADIRA/Isaiah Drains

## 2022-08-20 NOTE — PROGRESS NOTE ADULT - ASSESSMENT
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, "abnormal blood counts - low RBCs high platelets" f/u with hemeonc - considering bone bx in future, perforation of sigmoid colon s/p Kerri's, Colonic perforation w/ ileostomy in 2020, epilepsy, HTN, celiac aneurysm s/p coiling 4/2010, moxifloxacin allergy (joint pains, concern for dissection), penicillin allergy (vomiting at 4 years old, tolerating cephalosporins) presented to the ED 8/18/22 with fevers, productive cough, R sided chest pain, and syncopal episode, CT (+) for 50% pneumothorax s/p chest tube and R sided opacity     IMPRESSION  # Rule out sepsis on admission (T100.9, Pulse 117, WBC 30.85) secondary to R pneumothorax/ possible pneumonia/ lung abscess    CT 8/19/22 showing 50% pneumothorax under tension. Lucency seen within the lung parenchyma and may represent air trapped within fissures    S/p Chest tube placement 8/18/22    CXR 8/19/22 showing trace R pneumothorax and R basilar opacity     Legionella Antigen, Urine: Negative (08-19-22 @ 04:04)    Streptococcus pneumoniae Ag, Ur Result: Negative (08-19-22 @ 04:04)    Rapid RVP Result: NotDetec (08-18-22 @ 23:50)    Sedimentation Rate, Erythrocyte: 125 mm/Hr (08-19-22 @ 08:51)    C-Reactive Protein, Serum: 323.9 mg/L (08-19-22 @ 08:51)  Reports > 1 mo dry cough, fever, nightsweats  #Allergies: PCN, avoid fluoroquinolones  #PTX    RECOMMENDATIONS  - no sputum to collect culture  - SEND procalcitonin   - Continue cefepime 2g IV Q8h  - OK for PO flagyl 500mg TID  - f/u repeat CT  - Reports > 1 mo dry cough, fever, nightsweats, rule out underlying process (?reports inconclusive PET as outpatient )

## 2022-08-20 NOTE — PROGRESS NOTE ADULT - ASSESSMENT
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  Correct as needed. accurate I/O    INFECTIOUS DISEASE: swab nose for MRSA if - dc vanco, add flagyl on cefepime, sputum gram stain/ cx    HEMATOLOGICAL:  DVT prophylaxis.    ENDOCRINE:  Follow up FS.  Insulin protocol if needed.  SDU

## 2022-08-20 NOTE — PROGRESS NOTE ADULT - SUBJECTIVE AND OBJECTIVE BOX
Over Night Events: events noted, patient reports now that he had low grade fever for almost 1 month, night sweats, had OP CXR ?? scaring was given azith and referred to hemoc?, overnight spiking T, productive cough    PHYSICAL EXAM    ICU Vital Signs Last 24 Hrs  T(C): 37.2 (20 Aug 2022 04:00), Max: 39.8 (19 Aug 2022 16:49)  T(F): 99 (20 Aug 2022 04:00), Max: 103.7 (19 Aug 2022 17:27)  HR: 78 (20 Aug 2022 04:00) (66 - 109)  BP: 127/79 (20 Aug 2022 04:00) (101/65 - 127/79)  BP(mean): 79 (20 Aug 2022 00:13) (79 - 79)  RR: 19 (20 Aug 2022 04:00) (19 - 20)  SpO2: 98% (20 Aug 2022 04:00) (96% - 98%)    O2 Parameters below as of 20 Aug 2022 04:00  Patient On (Oxygen Delivery Method): room air            General: ill looking  Lungs: dec bs r base  Cardiovascular: Regular   Abdomen: Soft, Positive BS  Extremities: No clubbing   Skin: Warm  Neurological: Non focal       22 @ 07:01  -  22 @ 07:00  --------------------------------------------------------  IN:    Oral Fluid: 400 mL  Total IN: 400 mL    OUT:    Chest Tube (mL): 30 mL    Gastrostomy Tube (mL): 250 mL    Voided (mL): 850 mL  Total OUT: 1130 mL    Total NET: -730 mL          LABS:                          8.7    13.23 )-----------( 319      ( 20 Aug 2022 00:15 )             26.5                                               08-20    134<L>  |  98  |  16  ----------------------------<  119<H>  3.9   |  23  |  0.8    Ca    8.0<L>      20 Aug 2022 00:15  Mg     1.6     08-20    TPro  6.2  /  Alb  3.3<L>  /  TBili  0.3  /  DBili  x   /  AST  16  /  ALT  17  /  AlkPhos  104  -                                             Urinalysis Basic - ( 18 Aug 2022 20:30 )    Color: Yellow / Appearance: Clear / S.035 / pH: x  Gluc: x / Ketone: Moderate  / Bili: Small / Urobili: <2 mg/dL   Blood: x / Protein: 30 mg/dL / Nitrite: Negative   Leuk Esterase: Negative / RBC: 3 /HPF / WBC 2 /HPF   Sq Epi: x / Non Sq Epi: 3 /HPF / Bacteria: FEW                                                  LIVER FUNCTIONS - ( 19 Aug 2022 08:51 )  Alb: 3.3 g/dL / Pro: 6.2 g/dL / ALK PHOS: 104 U/L / ALT: 17 U/L / AST: 16 U/L / GGT: x                                                  Culture - Urine (collected 18 Aug 2022 20:30)  Source: Clean Catch Clean Catch (Midstream)  Final Report (20 Aug 2022 07:14):    No growth    Culture - Blood (collected 18 Aug 2022 20:20)  Source: .Blood Blood  Preliminary Report (20 Aug 2022 02:02):    No growth to date.    Culture - Blood (collected 18 Aug 2022 20:20)  Source: .Blood Blood  Preliminary Report (20 Aug 2022 02:02):    No growth to date.                                                                                           MEDICATIONS  (STANDING):  aspirin enteric coated 81 milliGRAM(s) Oral daily  cefepime   IVPB 2000 milliGRAM(s) IV Intermittent every 8 hours  cefepime   IVPB      cholecalciferol 1000 Unit(s) Oral daily  enoxaparin Injectable 40 milliGRAM(s) SubCutaneous every 24 hours  levETIRAcetam 1125 milliGRAM(s) Oral daily  levETIRAcetam 1500 milliGRAM(s) Oral at bedtime  melatonin 3 milliGRAM(s) Oral at bedtime  multivitamin 1 Tablet(s) Oral daily  OXcarbazepine 600 milliGRAM(s) Oral <User Schedule>  OXcarbazepine 300 milliGRAM(s) Oral <User Schedule>    MEDICATIONS  (PRN):  acetaminophen     Tablet .. 650 milliGRAM(s) Oral every 6 hours PRN Temp greater or equal to 38C (100.4F), Mild Pain (1 - 3)  acetaminophen     Tablet .. 650 milliGRAM(s) Oral once PRN Temp greater or equal to 38C (100.4F)  morphine  - Injectable 2 milliGRAM(s) IV Push every 6 hours PRN Moderate Pain (4 - 6)      CXR reviewed

## 2022-08-20 NOTE — PROGRESS NOTE ADULT - SUBJECTIVE AND OBJECTIVE BOX
ANGE, FAZAL  46y, Male  Allergy: moxifloxacin (Hives)  penicillin (Unknown)      LOS  2d    CHIEF COMPLAINT: cp/ fever (20 Aug 2022 07:52)      INTERVAL EVENTS/HPI  - No acute events overnight  - T(F): , Max: 103.7 (22 @ 17:27) fever curve downtrending   - Tolerating medication  - WBC Count: 13.23 (22 @ 00:15) downtrending   WBC Count: 21.38 (22 @ 08:51)     - Creatinine, Serum: 0.8 (22 @ 00:15)  Creatinine, Serum: 0.8 (22 @ 08:51)       ROS  ***    VITALS:  T(F): 97, Max: 103.7 (22 @ 17:27)  HR: 79  BP: 127/79  RR: 19Vital Signs Last 24 Hrs  T(C): 36.1 (20 Aug 2022 08:00), Max: 39.8 (19 Aug 2022 16:49)  T(F): 97 (20 Aug 2022 08:00), Max: 103.7 (19 Aug 2022 17:27)  HR: 79 (20 Aug 2022 08:00) (66 - 109)  BP: 127/79 (20 Aug 2022 08:00) (101/65 - 127/79)  BP(mean): 79 (20 Aug 2022 00:13) (79 - 79)  RR: 19 (20 Aug 2022 08:00) (19 - 20)  SpO2: 97% (20 Aug 2022 08:00) (96% - 98%)    Parameters below as of 20 Aug 2022 04:00  Patient On (Oxygen Delivery Method): room air        PHYSICAL EXAM:  ***    FH: Non-contributory  Social Hx: Non-contributory    TESTS & MEASUREMENTS:                        8.7    13.23 )-----------( 319      ( 20 Aug 2022 00:15 )             26.5     08-20    134<L>  |  98  |  16  ----------------------------<  119<H>  3.9   |  23  |  0.8    Ca    8.0<L>      20 Aug 2022 00:15  Mg     1.6     08-20    TPro  6.2  /  Alb  3.3<L>  /  TBili  0.3  /  DBili  x   /  AST  16  /  ALT  17  /  AlkPhos  104        LIVER FUNCTIONS - ( 19 Aug 2022 08:51 )  Alb: 3.3 g/dL / Pro: 6.2 g/dL / ALK PHOS: 104 U/L / ALT: 17 U/L / AST: 16 U/L / GGT: x           Urinalysis Basic - ( 18 Aug 2022 20:30 )    Color: Yellow / Appearance: Clear / S.035 / pH: x  Gluc: x / Ketone: Moderate  / Bili: Small / Urobili: <2 mg/dL   Blood: x / Protein: 30 mg/dL / Nitrite: Negative   Leuk Esterase: Negative / RBC: 3 /HPF / WBC 2 /HPF   Sq Epi: x / Non Sq Epi: 3 /HPF / Bacteria: FEW        Culture - Urine (collected 22 @ 20:30)  Source: Clean Catch Clean Catch (Midstream)  Final Report (22 @ 07:14):    No growth    Culture - Blood (collected 22 @ 20:20)  Source: .Blood Blood  Preliminary Report (22 @ 02:02):    No growth to date.    Culture - Blood (collected 22 @ 20:20)  Source: .Blood Blood  Preliminary Report (22 @ 02:02):    No growth to date.        Lactate, Blood: 1.6 mmol/L (22 @ 20:20)      INFECTIOUS DISEASES TESTING  Legionella Antigen, Urine: Negative (22 @ 04:04)  Streptococcus pneumoniae Ag, Ur Result: Negative (22 @ 04:04)  Rapid RVP Result: NotDetec (22 @ 23:50)  COVID-19 PCR: NotDetec (22 @ 20:30)  strept    INFLAMMATORY MARKERS  Sedimentation Rate, Erythrocyte: 125 mm/Hr (22 @ 08:51)  C-Reactive Protein, Serum: 323.9 mg/L (22 @ 08:51)      RADIOLOGY & ADDITIONAL TESTS:  I have personally reviewed the last available Chest xray  CXR  Xray Chest 1 View- PORTABLE-Urgent:   ACC: [id number] EXAM:  XR CHEST PORTABLE URGENT 1V                          PROCEDURE DATE:  2022          INTERPRETATION:  Clinical History / Reason for exam: Pneumothorax post   pigtail catheter placement    Comparison : Chest radiograph 2022.    Technique/Positioning: Single AP view of the chest.    Findings:    Support devices: Right-sided pigtail catheter, unchanged    Cardiac/mediastinum/hilum: Unchanged    Lung parenchyma/Pleura: Trace right apical pneumothorax, unchanged.Right   basilar opacity, unchanged.    Skeleton/soft tissues: Unchanged.    Impression:    Trace right apical pneumothorax, unchanged.    Right basilar opacity, unchanged.    --- End of Report ---            ELLIOT LANDAU MD; Attending Radiologist  This document has been electronically signed. Aug 19 2022 11:30AM (22 @ 10:10)      CT  CT Chest w/ IV Cont:   ACC: [id number] EXAM:  CT ABDOMEN AND PELVIS IC                        ACC: [id number] EXAM:  CT CHEST IC                          PROCEDURE DATE:  2022          INTERPRETATION:  REASON FOR EXAM / CLINICAL STATEMENT:  Fever. Cough   Pneumonia. WBC 30.85 PMHx of Melva Danlos 4, previous hospitalization in   2019 for perforation of sigmoid colon s/p Kerri's, Colonic   perforation w/ ileostomy in , epilepsy, HTN, celiac aneurysm s/p   coiling 2010      TECHNIQUE:  Contiguous axial CT images were obtained from the thoracic   inlet through the pubic symphysis with IV contrast.  Reformatted images   in the coronal and sagittal planes were acquired.      COMPARISON CT: CT scan of the chest, abdomen and pelvis dated 2021    OTHER STUDIES USED FOR CORRELATION: None.      FINDINGS / CHEST:    TUBES AND LINES: None.    HEART AND VESSELS: The heart is normal in size. There is no pericardial   effusion.    There is no evidence of central pulmonary embolism.    Normal caliber aorta and pulmonary artery. There is no evidence of aortic   aneurysm or dissection.    Brachiocephalic vessels are unremarkable.    MEDIASTINUM: There are no suspicious mediastinal, hilar or axillary lymph   nodes. The visualized portion of the thyroid gland is unremarkable.    AIRWAYS, LUNGS AND PLEURA: The central tracheobronchial tree is patent.   There is a large right pneumothorax approximately 50%. There is mild   displacement of the heart to the left side indicating that the   pneumothorax is under tension. Areas of consolidation and/or atelectasis   are noted throughout the collapsed right lung. There is a small right   pleural effusion.    BONES AND SOFT TISSUES: Degenerative changes of the spine.      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    HEPATIC: The liver is normal in size with no evidence of gross solid mass   or bile duct dilatation. The portal vein is patent. The hepatic veins are   opacified.    BILIARY: No calcified gallstones are noted.    SPLEEN: Unremarkable.    PANCREAS: The pancreas is normal in size and configuration. No evidence   of mass or pancreatitis.    ADRENAL GLANDS: Obscured by streak artifact.    KIDNEYS: There is symmetric renal enhancement. No evidence of   hydronephrosis, calcified stones, or solid mass. Areas of cortical   scarring are noted bilaterally.    ABDOMINOPELVIC NODES: Unremarkable.    PELVIC ORGANS: No evidence of pelvic mass, lymphadenopathy, or fluid   collection.    BLADDER: Unremarkable.    PERITONEUM/MESENTERY/BOWEL: Post Kerri procedure. Post colectomy with   ileostomy is noted in the right-sided the abdomen. No evidence of bowel   obstruction or ascites. No pneumoperitoneum.    BONES/SOFT TISSUES: No acute osseous abnormalities.    OTHER: Normal caliber aorta. Stable iliac artery aneurysms.      IMPRESSION:    50% right-sided pneumothorax under tension. Lucencies seen within the   lung parenchyma and may represent air trapped within fissures. A   follow-up scan after the lung has reexpanded be useful for confirmation.      Findings were discussed with Dr. Angel Campoverde at 10:10 PM 2022, with   read back. At the time of the phonecall a pigtail catheter was being   inserted.    --- End of Report ---            DK WILLIAMSON MD; Attending Interventional Radiologist  This document has been electronically signed. Aug 18 2022 10:25PM (22 @ 20:54)      CARDIOLOGY TESTING  12 Lead ECG:   Ventricular Rate 102 BPM    Atrial Rate 102 BPM    P-R Interval 132 ms    QRS Duration 92 ms    Q-T Interval 334 ms    QTC Calculation(Bazett) 435 ms    P Axis 44 degrees    R Axis 32 degrees    T Axis 27 degrees    Diagnosis Line Sinus tachycardia  Otherwise normal ECG    Confirmed by MELIDA RAGSDALE MD ([id number]) on 2022 6:24:42 AM (22 @ 20:01)      MEDICATIONS  aspirin enteric coated 81 Oral daily  cefepime   IVPB 2000 IV Intermittent every 8 hours  cefepime   IVPB     cholecalciferol 1000 Oral daily  enoxaparin Injectable 40 SubCutaneous every 24 hours  levETIRAcetam 1125 Oral daily  levETIRAcetam 1500 Oral at bedtime  melatonin 3 Oral at bedtime  metroNIDAZOLE  IVPB 500 IV Intermittent every 8 hours  multivitamin 1 Oral daily  OXcarbazepine 600 Oral <User Schedule>  OXcarbazepine 300 Oral <User Schedule>      WEIGHT  Weight (kg): 75 (22 @ 21:37)  Creatinine, Serum: 0.8 mg/dL (22 @ 00:15)      ANTIBIOTICS:  cefepime   IVPB 2000 milliGRAM(s) IV Intermittent every 8 hours  cefepime   IVPB      metroNIDAZOLE  IVPB 500 milliGRAM(s) IV Intermittent every 8 hours      All available historical records have been reviewed       ANGE, FAZAL  46y, Male  Allergy: moxifloxacin (Hives)  penicillin (Unknown)      LOS  2d    CHIEF COMPLAINT: cp/ fever (20 Aug 2022 07:52)      INTERVAL EVENTS/HPI  - No acute events overnight  - T(F): , Max: 103.7 (22 @ 17:27) fever curve downtrending   - Tolerating medication  - WBC Count: 13.23 (22 @ 00:15) downtrending   WBC Count: 21.38 (22 @ 08:51)     - Creatinine, Serum: 0.8 (22 @ 00:15)  Creatinine, Serum: 0.8 (22 @ 08:51)       ROS  General: Denies rigors, nightsweats  HEENT: Denies headache, rhinorrhea, sore throat, eye pain  CV: Denies CP, palpitations  PULM: Denies wheezing, hemoptysis  GI: Denies hematemesis, hematochezia, melena  : Denies discharge, hematuria  MSK: Denies arthralgias, myalgias  SKIN: Denies rash, lesions  NEURO: Denies paresthesias, weakness  PSYCH: Denies depression, anxiety     VITALS:  T(F): 97, Max: 103.7 (22 @ 17:27)  HR: 79  BP: 127/79  RR: 19Vital Signs Last 24 Hrs  T(C): 36.1 (20 Aug 2022 08:00), Max: 39.8 (19 Aug 2022 16:49)  T(F): 97 (20 Aug 2022 08:00), Max: 103.7 (19 Aug 2022 17:27)  HR: 79 (20 Aug 2022 08:00) (66 - 109)  BP: 127/79 (20 Aug 2022 08:00) (101/65 - 127/79)  BP(mean): 79 (20 Aug 2022 00:13) (79 - 79)  RR: 19 (20 Aug 2022 08:00) (19 - 20)  SpO2: 97% (20 Aug 2022 08:00) (96% - 98%)    Parameters below as of 20 Aug 2022 04:00  Patient On (Oxygen Delivery Method): room air        PHYSICAL EXAM:  Gen: NAD, resting in bed  HEENT: Normocephalic, atraumatic  Neck: supple, no lymphadenopathy  CV: Regular rate & regular rhythm  Lungs: decreased BS at bases, no fremitus CT  Abdomen: Soft, BS present  Ext: Warm, well perfused  Neuro: non focal, awake  Skin: no rash, no erythema  Lines: no phlebitis     FH: Non-contributory  Social Hx: Non-contributory    TESTS & MEASUREMENTS:                        8.7    13.23 )-----------( 319      ( 20 Aug 2022 00:15 )             26.5         134<L>  |  98  |  16  ----------------------------<  119<H>  3.9   |  23  |  0.8    Ca    8.0<L>      20 Aug 2022 00:15  Mg     1.6         TPro  6.2  /  Alb  3.3<L>  /  TBili  0.3  /  DBili  x   /  AST  16  /  ALT  17  /  AlkPhos  104        LIVER FUNCTIONS - ( 19 Aug 2022 08:51 )  Alb: 3.3 g/dL / Pro: 6.2 g/dL / ALK PHOS: 104 U/L / ALT: 17 U/L / AST: 16 U/L / GGT: x           Urinalysis Basic - ( 18 Aug 2022 20:30 )    Color: Yellow / Appearance: Clear / S.035 / pH: x  Gluc: x / Ketone: Moderate  / Bili: Small / Urobili: <2 mg/dL   Blood: x / Protein: 30 mg/dL / Nitrite: Negative   Leuk Esterase: Negative / RBC: 3 /HPF / WBC 2 /HPF   Sq Epi: x / Non Sq Epi: 3 /HPF / Bacteria: FEW        Culture - Urine (collected 22 @ 20:30)  Source: Clean Catch Clean Catch (Midstream)  Final Report (22 @ 07:14):    No growth    Culture - Blood (collected 22 @ 20:20)  Source: .Blood Blood  Preliminary Report (22 @ 02:02):    No growth to date.    Culture - Blood (collected 22 @ 20:20)  Source: .Blood Blood  Preliminary Report (22 @ 02:02):    No growth to date.        Lactate, Blood: 1.6 mmol/L (22 @ 20:20)      INFECTIOUS DISEASES TESTING  Legionella Antigen, Urine: Negative (22 @ 04:04)  Streptococcus pneumoniae Ag, Ur Result: Negative (22 @ 04:04)  Rapid RVP Result: NotDetec (22 @ 23:50)  COVID-19 PCR: NotDetec (22 @ 20:30)  strept    INFLAMMATORY MARKERS  Sedimentation Rate, Erythrocyte: 125 mm/Hr (22 @ 08:51)  C-Reactive Protein, Serum: 323.9 mg/L (22 @ 08:51)      RADIOLOGY & ADDITIONAL TESTS:  I have personally reviewed the last available Chest xray  CXR  Xray Chest 1 View- PORTABLE-Urgent:   ACC: [id number] EXAM:  XR CHEST PORTABLE URGENT 1V                          PROCEDURE DATE:  2022          INTERPRETATION:  Clinical History / Reason for exam: Pneumothorax post   pigtail catheter placement    Comparison : Chest radiograph 2022.    Technique/Positioning: Single AP view of the chest.    Findings:    Support devices: Right-sided pigtail catheter, unchanged    Cardiac/mediastinum/hilum: Unchanged    Lung parenchyma/Pleura: Trace right apical pneumothorax, unchanged.Right   basilar opacity, unchanged.    Skeleton/soft tissues: Unchanged.    Impression:    Trace right apical pneumothorax, unchanged.    Right basilar opacity, unchanged.    --- End of Report ---            ELLIOT LANDAU MD; Attending Radiologist  This document has been electronically signed. Aug 19 2022 11:30AM (22 @ 10:10)      CT  CT Chest w/ IV Cont:   ACC: [id number] EXAM:  CT ABDOMEN AND PELVIS IC                        ACC: [id number] EXAM:  CT CHEST IC                          PROCEDURE DATE:  2022          INTERPRETATION:  REASON FOR EXAM / CLINICAL STATEMENT:  Fever. Cough   Pneumonia. WBC 30.85 PMHx of Melav Danlos 4, previous hospitalization in   2019 for perforation of sigmoid colon s/p Kerri's, Colonic   perforation w/ ileostomy in , epilepsy, HTN, celiac aneurysm s/p   coiling 2010      TECHNIQUE:  Contiguous axial CT images were obtained from the thoracic   inlet through the pubic symphysis with IV contrast.  Reformatted images   in the coronal and sagittal planes were acquired.      COMPARISON CT: CT scan of the chest, abdomen and pelvis dated 2021    OTHER STUDIES USED FOR CORRELATION: None.      FINDINGS / CHEST:    TUBES AND LINES: None.    HEART AND VESSELS: The heart is normal in size. There is no pericardial   effusion.    There is no evidence of central pulmonary embolism.    Normal caliber aorta and pulmonary artery. There is no evidence of aortic   aneurysm or dissection.    Brachiocephalic vessels are unremarkable.    MEDIASTINUM: There are no suspicious mediastinal, hilar or axillary lymph   nodes. The visualized portion of the thyroid gland is unremarkable.    AIRWAYS, LUNGS AND PLEURA: The central tracheobronchial tree is patent.   There is a large right pneumothorax approximately 50%. There is mild   displacement of the heart to the left side indicating that the   pneumothorax is under tension. Areas of consolidation and/or atelectasis   are noted throughout the collapsed right lung. There is a small right   pleural effusion.    BONES AND SOFT TISSUES: Degenerative changes of the spine.      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    HEPATIC: The liver is normal in size with no evidence of gross solid mass   or bile duct dilatation. The portal vein is patent. The hepatic veins are   opacified.    BILIARY: No calcified gallstones are noted.    SPLEEN: Unremarkable.    PANCREAS: The pancreas is normal in size and configuration. No evidence   of mass or pancreatitis.    ADRENAL GLANDS: Obscured by streak artifact.    KIDNEYS: There is symmetric renal enhancement. No evidence of   hydronephrosis, calcified stones, or solid mass. Areas of cortical   scarring are noted bilaterally.    ABDOMINOPELVIC NODES: Unremarkable.    PELVIC ORGANS: No evidence of pelvic mass, lymphadenopathy, or fluid   collection.    BLADDER: Unremarkable.    PERITONEUM/MESENTERY/BOWEL: Post Kerri procedure. Post colectomy with   ileostomy is noted in the right-sided the abdomen. No evidence of bowel   obstruction or ascites. No pneumoperitoneum.    BONES/SOFT TISSUES: No acute osseous abnormalities.    OTHER: Normal caliber aorta. Stable iliac artery aneurysms.      IMPRESSION:    50% right-sided pneumothorax under tension. Lucencies seen within the   lung parenchyma and may represent air trapped within fissures. A   follow-up scan after the lung has reexpanded be useful for confirmation.      Findings were discussed with Dr. Angel Campoverde at 10:10 PM 2022, with   read back. At the time of the phonecall a pigtail catheter was being   inserted.    --- End of Report ---            DK WILLIAMSON MD; Attending Interventional Radiologist  This document has been electronically signed. Aug 18 2022 10:25PM (22 @ 20:54)      CARDIOLOGY TESTING  12 Lead ECG:   Ventricular Rate 102 BPM    Atrial Rate 102 BPM    P-R Interval 132 ms    QRS Duration 92 ms    Q-T Interval 334 ms    QTC Calculation(Bazett) 435 ms    P Axis 44 degrees    R Axis 32 degrees    T Axis 27 degrees    Diagnosis Line Sinus tachycardia  Otherwise normal ECG    Confirmed by MELIDA RAGSDALE MD ([id number]) on 2022 6:24:42 AM (22 @ 20:01)      MEDICATIONS  aspirin enteric coated 81 Oral daily  cefepime   IVPB 2000 IV Intermittent every 8 hours  cefepime   IVPB     cholecalciferol 1000 Oral daily  enoxaparin Injectable 40 SubCutaneous every 24 hours  levETIRAcetam 1125 Oral daily  levETIRAcetam 1500 Oral at bedtime  melatonin 3 Oral at bedtime  metroNIDAZOLE  IVPB 500 IV Intermittent every 8 hours  multivitamin 1 Oral daily  OXcarbazepine 600 Oral <User Schedule>  OXcarbazepine 300 Oral <User Schedule>      WEIGHT  Weight (kg): 75 (22 @ 21:37)  Creatinine, Serum: 0.8 mg/dL (22 @ 00:15)      ANTIBIOTICS:  cefepime   IVPB 2000 milliGRAM(s) IV Intermittent every 8 hours  cefepime   IVPB      metroNIDAZOLE  IVPB 500 milliGRAM(s) IV Intermittent every 8 hours      All available historical records have been reviewed

## 2022-08-20 NOTE — PROGRESS NOTE ADULT - SUBJECTIVE AND OBJECTIVE BOX
ANGE FAZAL  46y  Male      Patient is a 46y old  Male who presents with a chief complaint of cp/ fever (20 Aug 2022 07:52)      INTERVAL HPI/OVERNIGHT EVENTS:  Patient seen and examined earlier this morning with his wife bedside  lying comfortably in bed in nad  states he feels better than yesterday      REVIEW OF SYSTEMS:  CONSTITUTIONAL: No fever, weight loss, or fatigue  EYES: No eye pain, visual disturbances, or discharge  ENMT:  No difficulty hearing, tinnitus, vertigo; No sinus or throat pain  NECK: No pain or stiffness  RESPIRATORY: No cough, wheezing, chills or hemoptysis; No shortness of breath  CARDIOVASCULAR: No chest pain, palpitations, dizziness, or leg swelling  GASTROINTESTINAL: No abdominal or epigastric pain. No nausea, vomiting, or hematemesis; No diarrhea or constipation. No melena or hematochezia.  GENITOURINARY: No dysuria, frequency, hematuria, or incontinence  NEUROLOGICAL: No headaches, memory loss, loss of strength, numbness, or tremors  SKIN: No itching, burning, rashes, or lesions   LYMPH NODES: No enlarged glands  ENDOCRINE: No heat or cold intolerance; No hair loss  MUSCULOSKELETAL: No joint pain or swelling; No muscle, back, or extremity pain  PSYCHIATRIC: No depression, anxiety, mood swings, or difficulty sleeping  HEME/LYMPH: No easy bruising, or bleeding gums  ALLERY AND IMMUNOLOGIC: No hives or eczema    T(C): 36.1 (22 @ 08:00), Max: 39.8 (22 @ 16:49)  HR: 79 (22 @ 08:00) (66 - 109)  BP: 127/79 (22 @ 08:00) (101/65 - 127/79)  RR: 19 (22 @ 08:00) (19 - 20)  SpO2: 97% (22 @ 08:00) (96% - 98%)    PHYSICAL EXAM:  GENERAL: NAD, well-groomed, well-developed  HEAD:  Atraumatic, Normocephalic  EYES: EOMI, PERRLA, conjunctiva and sclera clear  ENMT: No tonsillar erythema, exudates, or enlargement; Moist mucous membranes, Good dentition, No lesions  NECK: Supple, No JVD, Normal thyroid  NERVOUS SYSTEM:  Alert & Oriented X3, Good concentration; Moves all extremities   CHEST/LUNG: decrease bs b/l R>L, chest tube on right   HEART: Regular rate and rhythm; No murmurs, rubs, or gallops  ABDOMEN: Soft, Nontender, Nondistended; Bowel sounds present, ileostomy +  EXTREMITIES:  2+ Peripheral Pulses, No clubbing, cyanosis, or edema  LYMPH: No lymphadenopathy noted  SKIN: No rashes or lesions    Consultant(s) Notes Reviewed:  [x ] YES  [ ] NO  Care Discussed with Consultants/Other Providers [ x] YES  [ ] NO    LAB:                        8.7    13.23 )-----------( 319      ( 20 Aug 2022 00:15 )             26.5     08-20    134<L>  |  98  |  16  ----------------------------<  119<H>  3.9   |  23  |  0.8    Ca    8.0<L>      20 Aug 2022 00:15  Mg     1.6     08-20    TPro  6.2  /  Alb  3.3<L>  /  TBili  0.3  /  DBili  x   /  AST  16  /  ALT  17  /  AlkPhos  104  08-19    LIVER FUNCTIONS - ( 19 Aug 2022 08:51 )  Alb: 3.3 g/dL / Pro: 6.2 g/dL / ALK PHOS: 104 U/L / ALT: 17 U/L / AST: 16 U/L / GGT: x             Drug Dosing Weight  Height (cm): 185.4 (18 Aug 2022 16:52)  Weight (kg): 75 (18 Aug 2022 21:37)  BMI (kg/m2): 21.8 (18 Aug 2022 21:37)  BSA (m2): 1.98 (18 Aug 2022 21:37)      I&O's Summary    19 Aug 2022 07:01  -  20 Aug 2022 07:00  --------------------------------------------------------  IN: 400 mL / OUT: 1130 mL / NET: -730 mL      Urinalysis Basic - ( 18 Aug 2022 20:30 )    Color: Yellow / Appearance: Clear / S.035 / pH: x  Gluc: x / Ketone: Moderate  / Bili: Small / Urobili: <2 mg/dL   Blood: x / Protein: 30 mg/dL / Nitrite: Negative   Leuk Esterase: Negative / RBC: 3 /HPF / WBC 2 /HPF   Sq Epi: x / Non Sq Epi: 3 /HPF / Bacteria: FEW        RADIOLOGY & ADDITIONAL TESTS:  Imaging Personally Reviewed:  [x] YES  [ ] NO  < from: Xray Chest 1 View- PORTABLE-Urgent (Xray Chest 1 View- PORTABLE-Urgent .) (22 @ 10:10) >  Impression:    Trace right apical pneumothorax, unchanged.    Right basilar opacity, unchanged.    < end of copied text >  < from: TTE Echo Complete w/o Contrast w/ Doppler (22 @ 10:11) >  Summary:   1. Normal global left ventricular systolic function.   2. LV Ejection Fraction by Landry's Method with a biplane EF of 58 %.   3. Normal left ventricular internal cavity size.   4. Normal left atrial size.   5. Normal right atrial size.   6. Mild mitral valve regurgitation.   7. LA volume Index is 27.2 ml/m² ml/m2.    < end of copied text >          MEDS:  acetaminophen     Tablet .. 650 milliGRAM(s) Oral every 6 hours PRN  acetaminophen     Tablet .. 650 milliGRAM(s) Oral once PRN  aspirin enteric coated 81 milliGRAM(s) Oral daily  cefepime   IVPB 2000 milliGRAM(s) IV Intermittent every 8 hours  cefepime   IVPB      cholecalciferol 1000 Unit(s) Oral daily  enoxaparin Injectable 40 milliGRAM(s) SubCutaneous every 24 hours  levETIRAcetam 1125 milliGRAM(s) Oral daily  levETIRAcetam 1500 milliGRAM(s) Oral at bedtime  melatonin 3 milliGRAM(s) Oral at bedtime  metroNIDAZOLE  IVPB 500 milliGRAM(s) IV Intermittent every 8 hours  morphine  - Injectable 2 milliGRAM(s) IV Push every 6 hours PRN  multivitamin 1 Tablet(s) Oral daily  OXcarbazepine 600 milliGRAM(s) Oral <User Schedule>  OXcarbazepine 300 milliGRAM(s) Oral <User Schedule>

## 2022-08-21 LAB
ALBUMIN SERPL ELPH-MCNC: 2.9 G/DL — LOW (ref 3.5–5.2)
ALP SERPL-CCNC: 145 U/L — HIGH (ref 30–115)
ALT FLD-CCNC: 20 U/L — SIGNIFICANT CHANGE UP (ref 0–41)
ANION GAP SERPL CALC-SCNC: 13 MMOL/L — SIGNIFICANT CHANGE UP (ref 7–14)
AST SERPL-CCNC: 15 U/L — SIGNIFICANT CHANGE UP (ref 0–41)
BILIRUB SERPL-MCNC: <0.2 MG/DL — SIGNIFICANT CHANGE UP (ref 0.2–1.2)
BUN SERPL-MCNC: 14 MG/DL — SIGNIFICANT CHANGE UP (ref 10–20)
CALCIUM SERPL-MCNC: 8.1 MG/DL — LOW (ref 8.5–10.1)
CHLORIDE SERPL-SCNC: 97 MMOL/L — LOW (ref 98–110)
CO2 SERPL-SCNC: 23 MMOL/L — SIGNIFICANT CHANGE UP (ref 17–32)
CREAT SERPL-MCNC: 0.9 MG/DL — SIGNIFICANT CHANGE UP (ref 0.7–1.5)
EGFR: 107 ML/MIN/1.73M2 — SIGNIFICANT CHANGE UP
GLUCOSE SERPL-MCNC: 105 MG/DL — HIGH (ref 70–99)
HCT VFR BLD CALC: 26.4 % — LOW (ref 42–52)
HGB BLD-MCNC: 8.6 G/DL — LOW (ref 14–18)
MAGNESIUM SERPL-MCNC: 2 MG/DL — SIGNIFICANT CHANGE UP (ref 1.8–2.4)
MCHC RBC-ENTMCNC: 25.7 PG — LOW (ref 27–31)
MCHC RBC-ENTMCNC: 32.6 G/DL — SIGNIFICANT CHANGE UP (ref 32–37)
MCV RBC AUTO: 79 FL — LOW (ref 80–94)
NRBC # BLD: 0 /100 WBCS — SIGNIFICANT CHANGE UP (ref 0–0)
PLATELET # BLD AUTO: 347 K/UL — SIGNIFICANT CHANGE UP (ref 130–400)
POTASSIUM SERPL-MCNC: 3.9 MMOL/L — SIGNIFICANT CHANGE UP (ref 3.5–5)
POTASSIUM SERPL-SCNC: 3.9 MMOL/L — SIGNIFICANT CHANGE UP (ref 3.5–5)
PROT SERPL-MCNC: 5.8 G/DL — LOW (ref 6–8)
RBC # BLD: 3.34 M/UL — LOW (ref 4.7–6.1)
RBC # FLD: 14 % — SIGNIFICANT CHANGE UP (ref 11.5–14.5)
SODIUM SERPL-SCNC: 133 MMOL/L — LOW (ref 135–146)
WBC # BLD: 9.74 K/UL — SIGNIFICANT CHANGE UP (ref 4.8–10.8)
WBC # FLD AUTO: 9.74 K/UL — SIGNIFICANT CHANGE UP (ref 4.8–10.8)

## 2022-08-21 PROCEDURE — 99233 SBSQ HOSP IP/OBS HIGH 50: CPT

## 2022-08-21 PROCEDURE — 71045 X-RAY EXAM CHEST 1 VIEW: CPT | Mod: 26

## 2022-08-21 PROCEDURE — 71260 CT THORAX DX C+: CPT | Mod: 26

## 2022-08-21 RX ORDER — IBUPROFEN 200 MG
600 TABLET ORAL EVERY 6 HOURS
Refills: 0 | Status: DISCONTINUED | OUTPATIENT
Start: 2022-08-21 | End: 2022-08-22

## 2022-08-21 RX ADMIN — MAGNESIUM OXIDE 400 MG ORAL TABLET 400 MILLIGRAM(S): 241.3 TABLET ORAL at 17:03

## 2022-08-21 RX ADMIN — MORPHINE SULFATE 2 MILLIGRAM(S): 50 CAPSULE, EXTENDED RELEASE ORAL at 22:21

## 2022-08-21 RX ADMIN — Medication 500 MILLIGRAM(S): at 22:11

## 2022-08-21 RX ADMIN — Medication 600 MILLIGRAM(S): at 17:03

## 2022-08-21 RX ADMIN — OXCARBAZEPINE 600 MILLIGRAM(S): 300 TABLET, FILM COATED ORAL at 18:34

## 2022-08-21 RX ADMIN — CEFEPIME 100 MILLIGRAM(S): 1 INJECTION, POWDER, FOR SOLUTION INTRAMUSCULAR; INTRAVENOUS at 22:11

## 2022-08-21 RX ADMIN — Medication 3 MILLIGRAM(S): at 22:11

## 2022-08-21 RX ADMIN — Medication 800 MILLIGRAM(S): at 00:28

## 2022-08-21 RX ADMIN — LEVETIRACETAM 1500 MILLIGRAM(S): 250 TABLET, FILM COATED ORAL at 22:11

## 2022-08-21 RX ADMIN — Medication 1 TABLET(S): at 11:33

## 2022-08-21 RX ADMIN — CEFEPIME 100 MILLIGRAM(S): 1 INJECTION, POWDER, FOR SOLUTION INTRAMUSCULAR; INTRAVENOUS at 14:54

## 2022-08-21 RX ADMIN — MAGNESIUM OXIDE 400 MG ORAL TABLET 400 MILLIGRAM(S): 241.3 TABLET ORAL at 14:51

## 2022-08-21 RX ADMIN — LEVETIRACETAM 1125 MILLIGRAM(S): 250 TABLET, FILM COATED ORAL at 11:36

## 2022-08-21 RX ADMIN — MAGNESIUM OXIDE 400 MG ORAL TABLET 400 MILLIGRAM(S): 241.3 TABLET ORAL at 08:35

## 2022-08-21 RX ADMIN — Medication 500 MILLIGRAM(S): at 05:23

## 2022-08-21 RX ADMIN — ENOXAPARIN SODIUM 40 MILLIGRAM(S): 100 INJECTION SUBCUTANEOUS at 08:40

## 2022-08-21 RX ADMIN — MORPHINE SULFATE 2 MILLIGRAM(S): 50 CAPSULE, EXTENDED RELEASE ORAL at 22:51

## 2022-08-21 RX ADMIN — OXCARBAZEPINE 300 MILLIGRAM(S): 300 TABLET, FILM COATED ORAL at 05:23

## 2022-08-21 RX ADMIN — Medication 500 MILLIGRAM(S): at 14:50

## 2022-08-21 RX ADMIN — CEFEPIME 100 MILLIGRAM(S): 1 INJECTION, POWDER, FOR SOLUTION INTRAMUSCULAR; INTRAVENOUS at 05:22

## 2022-08-21 RX ADMIN — Medication 1000 UNIT(S): at 11:33

## 2022-08-21 RX ADMIN — Medication 600 MILLIGRAM(S): at 18:31

## 2022-08-21 RX ADMIN — Medication 81 MILLIGRAM(S): at 11:33

## 2022-08-21 NOTE — PROGRESS NOTE ADULT - ASSESSMENT
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  Correct as needed. accurate I/O    INFECTIOUS DISEASE: Abx per ID    HEMATOLOGICAL:  DVT prophylaxis.    ENDOCRINE:  Follow up FS.  Insulin protocol if needed.    SDU

## 2022-08-21 NOTE — PROGRESS NOTE ADULT - ASSESSMENT
ASSESSMENT:  46M who presented to the ED on 8/18 with fevers/chest pain and was found to have spontaneous PTX.  S/p bedside 14Fr pigtail placement in ED with resolution of PTX, admitted to medical service for septic workup(WBC 30, Febrile 103 on admission). Thoracic surgery team consulted for further management of chest tube.    Plan:   -Daily CXR   -Continue CT to suction, monitor outputs   -IV abx, septic workup as per primary   -Thoracic surgery to follow  - Hemodynamic monitoring  - Pulm recs appreciated - swab nose for MRSA if - dc vanco, zosyn/ levaquin till cx, procal, urine strep/ legionella  - ID recs appreciated - c/w ABx, send flow cytometry and GABRIELLA 2 studies/ABL/ BCR  - DVT ppx  - pain control   - Attending surgeon to see pt    [phone]

## 2022-08-21 NOTE — PROGRESS NOTE ADULT - SUBJECTIVE AND OBJECTIVE BOX
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  Dislocations and subluxations  Status post Kerri&#x27;s procedure  11/2019    Vitals:   T(F): 98.6 (08-21-22 @ 01:36), Max: 101.7 (08-20-22 @ 23:20)  HR: 92 (08-20-22 @ 23:20)  BP: 116/65 (08-20-22 @ 23:20)  RR: 20 (08-20-22 @ 23:20)  SpO2: 95% (08-20-22 @ 23:20)    Diet, DASH/TLC:   Sodium & Cholesterol Restricted    I & O's:    08-19-22 @ 07:01  -  08-20-22 @ 07:00  --------------------------------------------------------  IN:    Oral Fluid: 400 mL  Total IN: 400 mL    OUT:    Chest Tube (mL): 30 mL    Gastrostomy Tube (mL): 250 mL    Voided (mL): 850 mL  Total OUT: 1130 mL    Total NET: -730 mL    PHYSICAL EXAM:  General: NAD, AAOx3, calm and cooperative  HEENT: NCAT  Cardiac: RRR S1, S2, no Murmurs, rubs or gallops  Chest: R pigtail to suction. Mild tenderness to palpation.   Respiratory: CTAB, normal respiratory effort  Abdomen: Soft, non-distended  Musculoskeletal: compartments soft  Neuro: no focal deficits  Vascular: Pulses 2+ throughout, extremities well perfused  Skin: Warm/dry, normal color    MEDICATIONS  (STANDING):  aspirin enteric coated 81 milliGRAM(s) Oral daily  cefepime   IVPB 2000 milliGRAM(s) IV Intermittent every 8 hours  cefepime   IVPB      cholecalciferol 1000 Unit(s) Oral daily  enoxaparin Injectable 40 milliGRAM(s) SubCutaneous every 24 hours  levETIRAcetam 1125 milliGRAM(s) Oral daily  levETIRAcetam 1500 milliGRAM(s) Oral at bedtime  magnesium oxide 400 milliGRAM(s) Oral three times a day with meals  melatonin 3 milliGRAM(s) Oral at bedtime  metroNIDAZOLE    Tablet 500 milliGRAM(s) Oral every 8 hours  multivitamin 1 Tablet(s) Oral daily  OXcarbazepine 600 milliGRAM(s) Oral <User Schedule>  OXcarbazepine 300 milliGRAM(s) Oral <User Schedule>    MEDICATIONS  (PRN):  acetaminophen     Tablet .. 650 milliGRAM(s) Oral every 6 hours PRN Temp greater or equal to 38C (100.4F), Mild Pain (1 - 3)  acetaminophen     Tablet .. 650 milliGRAM(s) Oral once PRN Temp greater or equal to 38C (100.4F)  morphine  - Injectable 2 milliGRAM(s) IV Push every 6 hours PRN Moderate Pain (4 - 6)    DVT PROPHYLAXIS: enoxaparin Injectable 40 milliGRAM(s) SubCutaneous every 24 hours    GI PROPHYLAXIS:   ANTICOAGULATION:   ANTIBIOTICS:  cefepime   IVPB 2000 milliGRAM(s)  cefepime   IVPB    metroNIDAZOLE    Tablet 500 milliGRAM(s)    LAB/STUDIES:  Labs:  CAPILLARY BLOOD GLUCOSE                      8.7    13.23 )-----------( 319      ( 20 Aug 2022 00:15 )             26.5       08-20    134<L>  |  98  |  16  ----------------------------<  119<H>  3.9   |  23  |  0.8    Magnesium, Serum: 2.3 mg/dL (08-20-22 @ 18:47)    LFTs:             6.2  | 0.3  | 16       ------------------[104     ( 19 Aug 2022 08:51 )  3.3  | x    | 17          Lipase:x      Amylase:x        Lactate, Blood: 1.6 mmol/L (08-18-22 @ 20:20)    Coags:    Culture - Urine (collected 18 Aug 2022 20:30)  Source: Clean Catch Clean Catch (Midstream)  Final Report (20 Aug 2022 07:14):    No growth    Culture - Blood (collected 18 Aug 2022 20:20)  Source: .Blood Blood  Preliminary Report (20 Aug 2022 02:02):    No growth to date.    Culture - Blood (collected 18 Aug 2022 20:20)  Source: .Blood Blood  Preliminary Report (20 Aug 2022 02:02):    No growth to date.

## 2022-08-21 NOTE — PRE-ANESTHESIA EVALUATION ADULT - NSANTHPMHFT_GEN_ALL_CORE
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 Yesterday pt had a fever of 103 which was not breaking. In the morning pt got out of the bed and had a syncopal episode. Pt fell lightheaded, saw flashes and woke up on the floor. Then pt had sever chest pain on the right side, after which he decided to come to the ED.     Off note pt was supposed to fup with Hematology/ Oncology for abnormal blood counts, pts PCP was considering a bone biopsy.

## 2022-08-21 NOTE — PRE-ANESTHESIA EVALUATION ADULT - NSANTHLABRESULTSFT_GEN_ALL_CORE
Ventricular Rate 102 BPM    Atrial Rate 102 BPM    P-R Interval 132 ms    QRS Duration 92 ms    Q-T Interval 334 ms    QTC Calculation(Bazett) 435 ms    P Axis 44 degrees    R Axis 32 degrees    T Axis 27 degrees    Diagnosis Line Sinus tachycardia  Otherwise normal ECG    Confirmed by MELIDA RAGSDALE MD ([id number]) on 8/19/2022 6:24:42 AM

## 2022-08-21 NOTE — PROGRESS NOTE ADULT - SUBJECTIVE AND OBJECTIVE BOX
Over Night Events: events noted, spiking T, no air leak, chest ct reviewed    PHYSICAL EXAM    ICU Vital Signs Last 24 Hrs  T(C): 35.5 (21 Aug 2022 05:31), Max: 38.7 (20 Aug 2022 15:15)  T(F): 95.9 (21 Aug 2022 05:31), Max: 101.7 (20 Aug 2022 23:20)  HR: 63 (21 Aug 2022 05:31) (63 - 92)  BP: 101/67 (21 Aug 2022 05:31) (101/67 - 127/79)  BP(mean): 79 (21 Aug 2022 05:31) (79 - 86)  RR: 20 (21 Aug 2022 05:31) (17 - 20)  SpO2: 97% (21 Aug 2022 05:31) (95% - 98%)    O2 Parameters below as of 21 Aug 2022 05:31  Patient On (Oxygen Delivery Method): room air            General: ill looking   Lungs: dec bs r side  Cardiovascular: Regular   Abdomen: Soft, Positive BS  Extremities: No clubbing   Skin: Warm  Neurological: Non focal       08-20-22 @ 07:01  -  08-21-22 @ 07:00  --------------------------------------------------------  IN:  Total IN: 0 mL    OUT:    Chest Tube (mL): 0 mL    Gastrostomy Tube (mL): 250 mL    Voided (mL): 600 mL  Total OUT: 850 mL    Total NET: -850 mL          LABS:                          8.6    9.74  )-----------( 347      ( 20 Aug 2022 23:30 )             26.4                                               08-20    133<L>  |  97<L>  |  14  ----------------------------<  105<H>  3.9   |  23  |  0.9    Ca    8.1<L>      20 Aug 2022 23:30  Mg     2.0     08-20    TPro  5.8<L>  /  Alb  2.9<L>  /  TBili  <0.2  /  DBili  x   /  AST  15  /  ALT  20  /  AlkPhos  145<H>  08-20                                                                                           LIVER FUNCTIONS - ( 20 Aug 2022 23:30 )  Alb: 2.9 g/dL / Pro: 5.8 g/dL / ALK PHOS: 145 U/L / ALT: 20 U/L / AST: 15 U/L / GGT: x                                                  Culture - Urine (collected 18 Aug 2022 20:30)  Source: Clean Catch Clean Catch (Midstream)  Final Report (20 Aug 2022 07:14):    No growth    Culture - Blood (collected 18 Aug 2022 20:20)  Source: .Blood Blood  Preliminary Report (20 Aug 2022 02:02):    No growth to date.    Culture - Blood (collected 18 Aug 2022 20:20)  Source: .Blood Blood  Preliminary Report (20 Aug 2022 02:02):    No growth to date.                                                                                           MEDICATIONS  (STANDING):  aspirin enteric coated 81 milliGRAM(s) Oral daily  cefepime   IVPB 2000 milliGRAM(s) IV Intermittent every 8 hours  cefepime   IVPB      cholecalciferol 1000 Unit(s) Oral daily  enoxaparin Injectable 40 milliGRAM(s) SubCutaneous every 24 hours  levETIRAcetam 1125 milliGRAM(s) Oral daily  levETIRAcetam 1500 milliGRAM(s) Oral at bedtime  magnesium oxide 400 milliGRAM(s) Oral three times a day with meals  melatonin 3 milliGRAM(s) Oral at bedtime  metroNIDAZOLE    Tablet 500 milliGRAM(s) Oral every 8 hours  multivitamin 1 Tablet(s) Oral daily  OXcarbazepine 600 milliGRAM(s) Oral <User Schedule>  OXcarbazepine 300 milliGRAM(s) Oral <User Schedule>    MEDICATIONS  (PRN):  acetaminophen     Tablet .. 650 milliGRAM(s) Oral every 6 hours PRN Temp greater or equal to 38C (100.4F), Mild Pain (1 - 3)  acetaminophen     Tablet .. 650 milliGRAM(s) Oral once PRN Temp greater or equal to 38C (100.4F)  morphine  - Injectable 2 milliGRAM(s) IV Push every 6 hours PRN Moderate Pain (4 - 6)      Chest ct/ CXR reviewed

## 2022-08-21 NOTE — PROGRESS NOTE ADULT - SUBJECTIVE AND OBJECTIVE BOX
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 Yesterday pt had a fever of 103 which was not breaking. In the morning pt got out of the bed and had a syncopal episode. Pt fell lightheaded, saw flashes and woke up on the floor. Then pt had sever chest pain on the right side, after which he decided to come to the ED.     Off note pt was supposed to fup with Hematology/ Oncology for abnormal blood counts, pts PCP was considering a bone biopsy.    ED vitals 108/68, , RR 17, temp 100.9, O2 sat 98% on RA    Sig Labs: WBC 30.85, Hg 11.5    EKG Sinus tachycardia    CT chest w IV cont: 50% right-sided pneumothorax under tension. Lucencies seen within the lung parenchyma and may represent air trapped within fissures.    CT abd/ pelvis: neg for acute pathology    In the ED a chest tube was placed on the right, 1x dose of vanc and keke and was given 2.5 L bolus of fluid (19 Aug 2022 00:42)      SUBJECTIVE  Patient is a 46y old Male who presents with a chief complaint of sob (21 Aug 2022 07:44)  Currently admitted to medicine with the primary diagnosis of Sepsis, unspecified organism      INTERVAL HPI AND OVERNIGHT EVENTS:  Patient was examined and seen at bedside. OVN pt had fevers and could not sleep.   REVIEW OF SYMPTOMS:  CONSTITUTIONAL: fevers  EYES: No visual changes, eye pain, or discharge  ENT: No vertigo; No ear pain or change in hearing; No sore throat or difficulty swallowing  NECK: No pain or stiffness  RESPIRATORY: No cough, wheezing, or hemoptysis; No shortness of breath  CARDIOVASCULAR: No chest pain or palpitations  GASTROINTESTINAL: No abdominal or epigastric pain; No nausea, vomiting, or hematemesis; No diarrhea or constipation; No melena or hematochezia  GENITOURINARY: No dysuria, frequency or hematuria  MUSCULOSKELETAL: No joint pain, no muscle pain, no weakness  NEUROLOGICAL: No numbness or weakness  SKIN: No itching or rashes    OBJECTIVE  PAST MEDICAL & SURGICAL HISTORY  EDS (Melva-Danlos syndrome)    Epilepsy    HTN (hypertension)    Keratoconus    Aneurysm artery, 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    Dislocations and subluxations    Status post Kerri&#x27;s procedure  11/2019      ALLERGIES:  moxifloxacin (Hives)  penicillin (Unknown)    MEDICATIONS:  STANDING MEDICATIONS  aspirin enteric coated 81 milliGRAM(s) Oral daily  cefepime   IVPB 2000 milliGRAM(s) IV Intermittent every 8 hours  cefepime   IVPB      cholecalciferol 1000 Unit(s) Oral daily  enoxaparin Injectable 40 milliGRAM(s) SubCutaneous every 24 hours  levETIRAcetam 1125 milliGRAM(s) Oral daily  levETIRAcetam 1500 milliGRAM(s) Oral at bedtime  magnesium oxide 400 milliGRAM(s) Oral three times a day with meals  melatonin 3 milliGRAM(s) Oral at bedtime  metroNIDAZOLE    Tablet 500 milliGRAM(s) Oral every 8 hours  multivitamin 1 Tablet(s) Oral daily  OXcarbazepine 600 milliGRAM(s) Oral <User Schedule>  OXcarbazepine 300 milliGRAM(s) Oral <User Schedule>    PRN MEDICATIONS  acetaminophen     Tablet .. 650 milliGRAM(s) Oral every 6 hours PRN  acetaminophen     Tablet .. 650 milliGRAM(s) Oral once PRN  morphine  - Injectable 2 milliGRAM(s) IV Push every 6 hours PRN      VITAL SIGNS: Last 24 Hours  T(C): 36.3 (21 Aug 2022 08:00), Max: 38.7 (20 Aug 2022 15:15)  T(F): 97.4 (21 Aug 2022 08:00), Max: 101.7 (20 Aug 2022 23:20)  HR: 65 (21 Aug 2022 08:00) (63 - 92)  BP: 119/69 (21 Aug 2022 08:00) (101/67 - 119/69)  BP(mean): 81 (21 Aug 2022 08:00) (79 - 86)  RR: 20 (21 Aug 2022 08:00) (17 - 20)  SpO2: 98% (21 Aug 2022 08:00) (95% - 98%)    LABS:                        8.6    9.74  )-----------( 347      ( 20 Aug 2022 23:30 )             26.4     08-20    133<L>  |  97<L>  |  14  ----------------------------<  105<H>  3.9   |  23  |  0.9    Ca    8.1<L>      20 Aug 2022 23:30  Mg     2.0     08-20    TPro  5.8<L>  /  Alb  2.9<L>  /  TBili  <0.2  /  DBili  x   /  AST  15  /  ALT  20  /  AlkPhos  145<H>  08-20              Culture - Urine (collected 18 Aug 2022 20:30)  Source: Clean Catch Clean Catch (Midstream)  Final Report (20 Aug 2022 07:14):    No growth    Culture - Blood (collected 18 Aug 2022 20:20)  Source: .Blood Blood  Preliminary Report (20 Aug 2022 02:02):    No growth to date.    Culture - Blood (collected 18 Aug 2022 20:20)  Source: .Blood Blood  Preliminary Report (20 Aug 2022 02:02):    No growth to date.          RADIOLOGY:      PHYSICAL EXAM:  CONSTITUTIONAL: No acute distress, AAOx3  HEAD: Atraumatic, normocephalic  EYES: EOM intact, PERRLA, conjunctiva and sclera clear  ENT: Supple, no masses, no thyromegaly, no bruits, no JVD; moist mucous membranes  PULMONARY: decreased BS R side  CARDIOVASCULAR: Regular rate and rhythm; no murmurs, rubs, or gallops  GASTROINTESTINAL: Soft, non-tender, non-distended; bowel sounds present  MUSCULOSKELETAL: 2+ peripheral pulses; no clubbing, no cyanosis, no edema  NEUROLOGY: non-focal  SKIN: No rashes or lesions; warm and dry    ASSESSMENT & PLAN  46M with PMH Melva-Danlos syndrome followed at [facility] ([facility]--GI, [place]--Vascular), perforation of sigmoid colon s/p Kerri's, Colonic perforation w/ ileostomy in 2020, epilepsy, HTN, 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 Yesterday pt had a fever of 103 which was not breaking. In the morning pt got out of the bed and had a syncopal episode. Pt fell lightheaded, saw flashes and woke up on the floor. Then pt had sever chest pain on the right side, after which he decided to come to the ED.     #Spontaneous Pneumothorax  #Hx of Melva Danlos  #suspected PNA  #Sepsis on admission (leukocytosis on 30K, tachycardia, fever)  #persistent fevers  - s/p chest tube placement  - follow up surgery and pulm  - repeat daily cxr  - echo = ef 58%  - oxygen saturation = 99%  - mrsa, legionella, strep, blood and urine cultures all negative  - on cefepime and flagyl   - ID following   - pain control  - antipyretics prn along with cooling blanket     #Seizure disorder  -on keppra and oxcarazepine     #Leukocytosis/thrombocytosis/Anemia r/o MPD  - heme onc c/s appreciated - send flow cytometry and GABRIELLA 2 studies /ABL/BCR    - check peripheral smear   - monitor daily cbc    Pending: surgery f/u, hem onc f/u

## 2022-08-21 NOTE — PRE-ANESTHESIA EVALUATION ADULT - NSRADCARDRESULTSFT_GEN_ALL_CORE
ACC: [id number] EXAM:  ECHO TTE WO CON COMP W DOPP                          PROCEDURE DATE:  08/19/2022          INTERPRETATION:   [place] [address]                Phone: [phone].   TRANSTHORACIC ECHOCARDIOGRAM REPORT        Patient Name:   FAZAL CASSIDY Accession #: [id number]  Medical Rec #:  [id number]      Height:      73.0 in 185.4 cm  YOB: 1975   Weight:      165.0 lb 74.84 kg  Patient Age:    46 years     BSA:         1.98 m²  Patient Gender: M            BP:          104/61 mmHg      Date of Exam:        8/19/2022 10:11:21 AM  Referring Physician: [id number] BROOKS FRAGA  Sonographer:         Norma Mckenna  Reading Physician:   Theodore Chaidez MD.    Procedure:     2D Echo/Doppler/Color Doppler Complete.  Indications:   R55 - Syncope and Collapse  Diagnosis:     R55 - Syncope and Collapse  Study Details: Technically difficult study. Study quality was adversely   affected                 due to body habitus.        Summary:   1. Normal global left ventricular systolic function.   2. LV Ejection Fraction by Landry's Method with a biplane EF of 58 %.   3. Normal left ventricular internal cavity size.   4. Normal left atrial size.   5. Normal right atrial size.   6. Mild mitral valve regurgitation.   7. LA volume Index is 27.2 ml/m² ml/m2.    PHYSICIAN INTERPRETATION:  Left Ventricle: The left ventricular internal cavity size is normal. Left   ventricular wall thickness is normal. There is no left ventricular   hypertrophy. Global LV systolic function was normal. Normal segmental   left ventricular systolic function. Spectral Doppler shows normal pattern   of LV diastolic filling. Normal LV filling pressures.

## 2022-08-21 NOTE — PROGRESS NOTE ADULT - ASSESSMENT
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 Yesterday pt had a fever of 103 which was not breaking. In the morning pt got out of the bed and had a syncopal episode. Pt fell lightheaded, saw flashes and woke up on the floor. Then pt had sever chest pain on the right side, after which he decided to come to the ED.     #Spontaneous Pneumothorax  #Hx of Melva Danlos  #empyema - 18.4cm  #Sepsis on admission (leukocytosis on 30K, tachycardia, fever)  - s/p chest tube placement  - pulm following  - CTS following - VATS planned for tomorrow?  - covid swab today  - NPO past midnight   - repeat daily cxr  - echo = ef 58%  - oxygen saturation = 99%  - follow up procal, mrsa, legionella, strep, blood and urine cultures  - on cefepime and flagyl as per ID  - pain control  - antipyretics prn along with cooling blanket as needed    #Seizure disorder  -on keppra and oxcarazepine     #Leukocytosis/thrombocytosis/Anemia r/o MPD  - heme onc c/s appreciated - send flow cytometry and GABRIELLA 2 studies /ABL/BCR    - check peripheral smear   - monitor daily cbc    Patient's cardiologist EDS specialist Dr. Marianela Romero - [phone] - high risk for arterial complications and organ rupture.      Progress Note Handoff  Pending Consults: CTS, cardiology   Pending Tests: labs, cxr  Pending Results: labs, cxr  Family Discussion: discussed mediation, chest tube, empyema, VATS, ambulation around the room, all test and consults with pt and his wife bedside. All questions answered.   Disposition: Home_x____/SNF______/Other_____/Unknown at this time_____  Spent over 45 min reviewing chart and on coordinating patient care during interdisciplinary rounds     Please call me with any questions at extension [phone]

## 2022-08-21 NOTE — PROGRESS NOTE ADULT - SUBJECTIVE AND OBJECTIVE BOX
ANGE FAZAL  46y  Male      Patient is a 46y old  Male who presents with a chief complaint of cp/ fever (20 Aug 2022 07:52)      INTERVAL HPI/OVERNIGHT EVENTS:  Patient seen and examined earlier this morning with his wife bedside  lying comfortably in bed in nad  states he feels better than yesterday  s/p CT chest - empyema      REVIEW OF SYSTEMS:  CONSTITUTIONAL: No fever, weight loss, or fatigue  EYES: No eye pain, visual disturbances, or discharge  ENMT:  No difficulty hearing, tinnitus, vertigo; No sinus or throat pain  NECK: No pain or stiffness  RESPIRATORY: No cough, wheezing, chills or hemoptysis; No shortness of breath  CARDIOVASCULAR: No chest pain, palpitations, dizziness, or leg swelling  GASTROINTESTINAL: No abdominal or epigastric pain. No nausea, vomiting, or hematemesis; No diarrhea or constipation. No melena or hematochezia.  GENITOURINARY: No dysuria, frequency, hematuria, or incontinence  NEUROLOGICAL: No headaches, memory loss, loss of strength, numbness, or tremors  SKIN: No itching, burning, rashes, or lesions   LYMPH NODES: No enlarged glands  ENDOCRINE: No heat or cold intolerance; No hair loss  MUSCULOSKELETAL: No joint pain or swelling; No muscle, back, or extremity pain  PSYCHIATRIC: No depression, anxiety, mood swings, or difficulty sleeping  HEME/LYMPH: No easy bruising, or bleeding gums  ALLERY AND IMMUNOLOGIC: No hives or eczema    Vital Signs Last 24 Hrs  T(C): 36.3 (21 Aug 2022 08:00), Max: 38.7 (20 Aug 2022 15:15)  T(F): 97.4 (21 Aug 2022 08:00), Max: 101.7 (20 Aug 2022 23:20)  HR: 65 (21 Aug 2022 08:00) (63 - 92)  BP: 119/69 (21 Aug 2022 08:00) (101/67 - 119/69)  BP(mean): 81 (21 Aug 2022 08:00) (79 - 86)  RR: 20 (21 Aug 2022 08:00) (17 - 20)  SpO2: 98% (21 Aug 2022 08:00) (95% - 98%)    Parameters below as of 21 Aug 2022 08:00  Patient On (Oxygen Delivery Method): room air      PHYSICAL EXAM:  GENERAL: NAD, well-groomed, well-developed  HEAD:  Atraumatic, Normocephalic  EYES: EOMI, PERRLA, conjunctiva and sclera clear  ENMT: No tonsillar erythema, exudates, or enlargement; Moist mucous membranes, Good dentition, No lesions  NECK: Supple, No JVD, Normal thyroid  NERVOUS SYSTEM:  Alert & Oriented X3, Good concentration; Moves all extremities   CHEST/LUNG: decrease bs b/l R>L, chest tube on right   HEART: Regular rate and rhythm; No murmurs, rubs, or gallops  ABDOMEN: Soft, Nontender, Nondistended; Bowel sounds present, ileostomy +  EXTREMITIES:  2+ Peripheral Pulses, No clubbing, cyanosis, or edema  LYMPH: No lymphadenopathy noted  SKIN: No rashes or lesions    Consultant(s) Notes Reviewed:  [x ] YES  [ ] NO  Care Discussed with Consultants/Other Providers [ x] YES  [ ] NO    LAB:                                8.6    9.74  )-----------( 347      ( 20 Aug 2022 23:30 )             26.4     08-20    133<L>  |  97<L>  |  14  ----------------------------<  105<H>  3.9   |  23  |  0.9    Ca    8.1<L>      20 Aug 2022 23:30  Mg     2.0     08-20    TPro  5.8<L>  /  Alb  2.9<L>  /  TBili  <0.2  /  DBili  x   /  AST  15  /  ALT  20  /  AlkPhos  145<H>  08-20         Drug Dosing Weight  Height (cm): 185.4 (18 Aug 2022 16:52)  Weight (kg): 75 (18 Aug 2022 21:37)  BMI (kg/m2): 21.8 (18 Aug 2022 21:37)  BSA (m2): 1.98 (18 Aug 2022 21:37)    Urinalysis Basic - ( 18 Aug 2022 20:30 )    Color: Yellow / Appearance: Clear / S.035 / pH: x  Gluc: x / Ketone: Moderate  / Bili: Small / Urobili: <2 mg/dL   Blood: x / Protein: 30 mg/dL / Nitrite: Negative   Leuk Esterase: Negative / RBC: 3 /HPF / WBC 2 /HPF   Sq Epi: x / Non Sq Epi: 3 /HPF / Bacteria: FEW        RADIOLOGY & ADDITIONAL TESTS:  Imaging Personally Reviewed:  [x] YES  [ ] NO  < from: Xray Chest 1 View- PORTABLE-Urgent (Xray Chest 1 View- PORTABLE-Urgent .) (22 @ 10:10) >  Impression:    Trace right apical pneumothorax, unchanged.    Right basilar opacity, unchanged.    < end of copied text >  < from: TTE Echo Complete w/o Contrast w/ Doppler (22 @ 10:11) >  Summary:   1. Normal global left ventricular systolic function.   2. LV Ejection Fraction by Landry's Method with a biplane EF of 58 %.   3. Normal left ventricular internal cavity size.   4. Normal left atrial size.   5. Normal right atrial size.   6. Mild mitral valve regurgitation.   7. LA volume Index is 27.2 ml/m² ml/m2.  < end of copied text >      < from: CT Chest w/ IV Cont (22 @ 01:24) >  1.  Findings are compatible with a large right-sided empyema measuring up   to 18.4 cm craniocaudal. This may be communicating with a smaller   multiloculated fluid and gas collection more anteriorly, likely within   the right major fissure.  2.  Right middle and lower lobe consolidation, likely infectious.  3.  Near resolution of previously seen right-sided pneumothorax status   post right chest tube placement. Residual trace right apical pneumothorax   seen.  < end of copied text >      MEDICATIONS  (STANDING):  aspirin enteric coated 81 milliGRAM(s) Oral daily  cefepime   IVPB 2000 milliGRAM(s) IV Intermittent every 8 hours  cefepime   IVPB      cholecalciferol 1000 Unit(s) Oral daily  enoxaparin Injectable 40 milliGRAM(s) SubCutaneous every 24 hours  levETIRAcetam 1125 milliGRAM(s) Oral daily  levETIRAcetam 1500 milliGRAM(s) Oral at bedtime  magnesium oxide 400 milliGRAM(s) Oral three times a day with meals  melatonin 3 milliGRAM(s) Oral at bedtime  metroNIDAZOLE    Tablet 500 milliGRAM(s) Oral every 8 hours  multivitamin 1 Tablet(s) Oral daily  OXcarbazepine 600 milliGRAM(s) Oral <User Schedule>  OXcarbazepine 300 milliGRAM(s) Oral <User Schedule>    MEDICATIONS  (PRN):  acetaminophen     Tablet .. 650 milliGRAM(s) Oral every 6 hours PRN Temp greater or equal to 38C (100.4F), Mild Pain (1 - 3)  acetaminophen     Tablet .. 650 milliGRAM(s) Oral once PRN Temp greater or equal to 38C (100.4F)  morphine  - Injectable 2 milliGRAM(s) IV Push every 6 hours PRN Moderate Pain (4 - 6)

## 2022-08-21 NOTE — PROGRESS NOTE ADULT - ASSESSMENT
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  Findings are compatible with a large right-sided empyema measuring up to 18.4 cm craniocaudal. This may be communicating with a smaller multiloculated fluid and gas collection more anteriorly, likely within the right major fissure.  2.  Right middle and lower lobe consolidation, likely infectious.  3.  Near resolution of previously seen right-sided pneumothorax status post right chest tube placement. Residual trace right apical pneumothorax   seen.    CT 8/19/22 showing 50% pneumothorax under tension. Lucency seen within the lung parenchyma and may represent air trapped within fissures    S/p Chest tube placement 8/18/22    CXR 8/19/22 showing trace R pneumothorax and R basilar opacity     Legionella Antigen, Urine: Negative (08-19-22 @ 04:04)    Streptococcus pneumoniae Ag, Ur Result: Negative (08-19-22 @ 04:04)    Rapid RVP Result: NotDetec (08-18-22 @ 23:50)    Sedimentation Rate, Erythrocyte: 125 mm/Hr (08-19-22 @ 08:51)    C-Reactive Protein, Serum: 323.9 mg/L (08-19-22 @ 08:51)  Reports > 1 mo dry cough, fever, nightsweats  #Allergies: PCN, avoid fluoroquinolones  #PTX    RECOMMENDATIONS  - CT surgery ?VATS- need source control   - no sputum to collect culture  - Continue Cefepime 2g IV Q8h  - PO flagyl 500mg TID    If any questions, please call or send a message on [employer] Teams  Please continue to update ID with any pertinent new laboratory or radiographic findings  Spectra [id number]

## 2022-08-21 NOTE — PROGRESS NOTE ADULT - SUBJECTIVE AND OBJECTIVE BOX
ANGE, FAZAL  46y, Male  Allergy: moxifloxacin (Hives)  penicillin (Unknown)      LOS  3d    CHIEF COMPLAINT: sob (21 Aug 2022 07:44)      INTERVAL EVENTS/HPI  - fever, s/p CT  - T(F): , Max: 101.7 (08-20-22 @ 23:20)  - Tolerating medication  - WBC Count: 9.74 (08-20-22 @ 23:30)  WBC Count: 13.23 (08-20-22 @ 00:15)     - Creatinine, Serum: 0.9 (08-20-22 @ 23:30)  Creatinine, Serum: 0.8 (08-20-22 @ 00:15)       ROS  ***    VITALS:  T(F): 97.4, Max: 101.7 (08-20-22 @ 23:20)  HR: 65  BP: 119/69  RR: 20Vital Signs Last 24 Hrs  T(C): 36.3 (21 Aug 2022 08:00), Max: 38.7 (20 Aug 2022 15:15)  T(F): 97.4 (21 Aug 2022 08:00), Max: 101.7 (20 Aug 2022 23:20)  HR: 65 (21 Aug 2022 08:00) (63 - 92)  BP: 119/69 (21 Aug 2022 08:00) (101/67 - 119/69)  BP(mean): 81 (21 Aug 2022 08:00) (79 - 86)  RR: 20 (21 Aug 2022 08:00) (17 - 20)  SpO2: 98% (21 Aug 2022 08:00) (95% - 98%)    Parameters below as of 21 Aug 2022 08:00  Patient On (Oxygen Delivery Method): room air        PHYSICAL EXAM:  ***    FH: Non-contributory  Social Hx: Non-contributory    TESTS & MEASUREMENTS:                        8.6    9.74  )-----------( 347      ( 20 Aug 2022 23:30 )             26.4     08-20    133<L>  |  97<L>  |  14  ----------------------------<  105<H>  3.9   |  23  |  0.9    Ca    8.1<L>      20 Aug 2022 23:30  Mg     2.0     08-20    TPro  5.8<L>  /  Alb  2.9<L>  /  TBili  <0.2  /  DBili  x   /  AST  15  /  ALT  20  /  AlkPhos  145<H>  08-20      LIVER FUNCTIONS - ( 20 Aug 2022 23:30 )  Alb: 2.9 g/dL / Pro: 5.8 g/dL / ALK PHOS: 145 U/L / ALT: 20 U/L / AST: 15 U/L / GGT: x               Culture - Urine (collected 08-18-22 @ 20:30)  Source: Clean Catch Clean Catch (Midstream)  Final Report (08-20-22 @ 07:14):    No growth    Culture - Blood (collected 08-18-22 @ 20:20)  Source: .Blood Blood  Preliminary Report (08-20-22 @ 02:02):    No growth to date.    Culture - Blood (collected 08-18-22 @ 20:20)  Source: .Blood Blood  Preliminary Report (08-20-22 @ 02:02):    No growth to date.        Lactate, Blood: 1.6 mmol/L (08-18-22 @ 20:20)      INFECTIOUS DISEASES TESTING  MRSA PCR Result.: Negative (08-20-22 @ 18:26)  HIV-1/2 Combo Result: Nonreact (08-20-22 @ 00:15)  Legionella Antigen, Urine: Negative (08-19-22 @ 04:04)  Streptococcus pneumoniae Ag, Ur Result: Negative (08-19-22 @ 04:04)  Rapid RVP Result: NotDetec (08-18-22 @ 23:50)  COVID-19 PCR: NotDetec (08-18-22 @ 20:30)  strept    INFLAMMATORY MARKERS  Sedimentation Rate, Erythrocyte: 125 mm/Hr (08-19-22 @ 08:51)  C-Reactive Protein, Serum: 323.9 mg/L (08-19-22 @ 08:51)      RADIOLOGY & ADDITIONAL TESTS:  I have personally reviewed the last available Chest xray  CXR  Xray Chest 1 View- PORTABLE-Urgent:   ACC: [id number] EXAM:  XR CHEST PORTABLE URGENT 1V                          PROCEDURE DATE:  08/19/2022          INTERPRETATION:  Clinical History / Reason for exam: Pneumothorax post   pigtail catheter placement    Comparison : Chest radiograph August 18, 2022.    Technique/Positioning: Single AP view of the chest.    Findings:    Support devices: Right-sided pigtail catheter, unchanged    Cardiac/mediastinum/hilum: Unchanged    Lung parenchyma/Pleura: Trace right apical pneumothorax, unchanged.Right   basilar opacity, unchanged.    Skeleton/soft tissues: Unchanged.    Impression:    Trace right apical pneumothorax, unchanged.    Right basilar opacity, unchanged.    --- End of Report ---            ELLIOT LANDAU MD; Attending Radiologist  This document has been electronically signed. Aug 19 2022 11:30AM (08-19-22 @ 10:10)      CT  CT Chest w/ IV Cont:   ACC: [id number] EXAM:  CT CHEST IC                          PROCEDURE DATE:  08/21/2022          INTERPRETATION:  INDICATION: Pneumonia. Pneumothorax post- chest tube.    TECHNIQUE: CT of the chest was performed from the thoracic inlet to the   levelof the adrenal glands following the administration of intravenous   contrast. Coronal and sagittal images have been submitted, as well as MIP   reformats.    COMPARISON: CT chest 8/18/2022    FINDINGS:    LUNGS, PLEURA, AND AIRWAYS: Interval placement of a right-sided chest   tube with near resolution of previously seen right pneumothorax. A small   residual pneumothorax visualized in the right apex. Right middle and   lower lobe consolidation. Fluid and gas containing right-sided posterior   pleural collection measuring approximately 8.8 x 3.9 x 18.4 cm. This may   be communicating with an additional multiloculated fluid and gas   containing collection seen more anteriorly, likely occupying the right   major fissure (series 5, image 134 and series 7, image 293).    MEDIASTINUM/LYMPH NODES: Prominent supraclavicular lymph nodes again   noted, stable from prior exam 9/30/2021, for example series 5, image 49   on the left and image 60 on the right.    HEART/GREAT VESSELS: Normal heart size.No pericardial effusion. Normal   caliber main pulmonary artery. Normal caliber thoracic aorta.    VISUALIZED UPPER ABDOMEN: Limited evaluation secondary to streak artifact   from coil embolization material.  BONES AND SOFT TISSUES: Bony degenerativechange.      IMPRESSION:    1.  Findings are compatible with a large right-sided empyema measuring up   to 18.4 cm craniocaudal. This may be communicating with a smaller   multiloculated fluid and gas collection more anteriorly, likely within   the right major fissure.  2.  Right middle and lower lobe consolidation, likely infectious.  3.  Near resolution of previously seen right-sided pneumothorax status   post right chest tube placement. Residual trace right apical pneumothorax   seen.    --- End of Report ---          MAURICIO PARHAM MD; Resident Radiologist  This document has been electronically signed.  MANSI OLGUIN MD; Attending Radiologist  This document has been electronically signed. Aug 21 2022  3:55AM (08-21-22 @ 01:24)      CARDIOLOGY TESTING  12 Lead ECG:   Ventricular Rate 102 BPM    Atrial Rate 102 BPM    P-R Interval 132 ms    QRS Duration 92 ms    Q-T Interval 334 ms    QTC Calculation(Bazett) 435 ms    P Axis 44 degrees    R Axis 32 degrees    T Axis 27 degrees    Diagnosis Line Sinus tachycardia  Otherwise normal ECG    Confirmed by MELIDA RAGSDALE MD ([id number]) on 8/19/2022 6:24:42 AM (08-18-22 @ 20:01)      MEDICATIONS  aspirin enteric coated 81 Oral daily  cefepime   IVPB 2000 IV Intermittent every 8 hours  cefepime   IVPB     cholecalciferol 1000 Oral daily  enoxaparin Injectable 40 SubCutaneous every 24 hours  levETIRAcetam 1125 Oral daily  levETIRAcetam 1500 Oral at bedtime  magnesium oxide 400 Oral three times a day with meals  melatonin 3 Oral at bedtime  metroNIDAZOLE    Tablet 500 Oral every 8 hours  multivitamin 1 Oral daily  OXcarbazepine 600 Oral <User Schedule>  OXcarbazepine 300 Oral <User Schedule>      WEIGHT  Weight (kg): 75 (08-18-22 @ 21:37)  Creatinine, Serum: 0.9 mg/dL (08-20-22 @ 23:30)      ANTIBIOTICS:  cefepime   IVPB 2000 milliGRAM(s) IV Intermittent every 8 hours  cefepime   IVPB      metroNIDAZOLE    Tablet 500 milliGRAM(s) Oral every 8 hours      All available historical records have been reviewed       ANGE, FAZAL  46y, Male  Allergy: moxifloxacin (Hives)  penicillin (Unknown)      LOS  3d    CHIEF COMPLAINT: sob (21 Aug 2022 07:44)      INTERVAL EVENTS/HPI  - fever, s/p CT  - T(F): , Max: 101.7 (08-20-22 @ 23:20)  - Tolerating medication  - WBC Count: 9.74 (08-20-22 @ 23:30)  WBC Count: 13.23 (08-20-22 @ 00:15)     - Creatinine, Serum: 0.9 (08-20-22 @ 23:30)  Creatinine, Serum: 0.8 (08-20-22 @ 00:15)       ROS  General: Denies rigors, nightsweats  HEENT: Denies headache, rhinorrhea, sore throat, eye pain  CV: Denies CP, palpitations  PULM: Denies wheezing, hemoptysis  GI: Denies hematemesis, hematochezia, melena  : Denies discharge, hematuria  MSK: Denies arthralgias, myalgias  SKIN: Denies rash, lesions  NEURO: Denies paresthesias, weakness  PSYCH: Denies depression, anxiety     VITALS:  T(F): 97.4, Max: 101.7 (08-20-22 @ 23:20)  HR: 65  BP: 119/69  RR: 20Vital Signs Last 24 Hrs  T(C): 36.3 (21 Aug 2022 08:00), Max: 38.7 (20 Aug 2022 15:15)  T(F): 97.4 (21 Aug 2022 08:00), Max: 101.7 (20 Aug 2022 23:20)  HR: 65 (21 Aug 2022 08:00) (63 - 92)  BP: 119/69 (21 Aug 2022 08:00) (101/67 - 119/69)  BP(mean): 81 (21 Aug 2022 08:00) (79 - 86)  RR: 20 (21 Aug 2022 08:00) (17 - 20)  SpO2: 98% (21 Aug 2022 08:00) (95% - 98%)    Parameters below as of 21 Aug 2022 08:00  Patient On (Oxygen Delivery Method): room air        PHYSICAL EXAM:  Gen: NAD, resting in bed  HEENT: Normocephalic, atraumatic  Neck: supple, no lymphadenopathy  CV: Regular rate & regular rhythm  Lungs: decreased BS at bases, no fremitus CT  Abdomen: Soft, BS present  Ext: Warm, well perfused  Neuro: non focal, awake  Skin: no rash, no erythema  Lines: no phlebitis     FH: Non-contributory  Social Hx: Non-contributory    TESTS & MEASUREMENTS:                        8.6    9.74  )-----------( 347      ( 20 Aug 2022 23:30 )             26.4     08-20    133<L>  |  97<L>  |  14  ----------------------------<  105<H>  3.9   |  23  |  0.9    Ca    8.1<L>      20 Aug 2022 23:30  Mg     2.0     08-20    TPro  5.8<L>  /  Alb  2.9<L>  /  TBili  <0.2  /  DBili  x   /  AST  15  /  ALT  20  /  AlkPhos  145<H>  08-20      LIVER FUNCTIONS - ( 20 Aug 2022 23:30 )  Alb: 2.9 g/dL / Pro: 5.8 g/dL / ALK PHOS: 145 U/L / ALT: 20 U/L / AST: 15 U/L / GGT: x               Culture - Urine (collected 08-18-22 @ 20:30)  Source: Clean Catch Clean Catch (Midstream)  Final Report (08-20-22 @ 07:14):    No growth    Culture - Blood (collected 08-18-22 @ 20:20)  Source: .Blood Blood  Preliminary Report (08-20-22 @ 02:02):    No growth to date.    Culture - Blood (collected 08-18-22 @ 20:20)  Source: .Blood Blood  Preliminary Report (08-20-22 @ 02:02):    No growth to date.        Lactate, Blood: 1.6 mmol/L (08-18-22 @ 20:20)      INFECTIOUS DISEASES TESTING  MRSA PCR Result.: Negative (08-20-22 @ 18:26)  HIV-1/2 Combo Result: Nonreact (08-20-22 @ 00:15)  Legionella Antigen, Urine: Negative (08-19-22 @ 04:04)  Streptococcus pneumoniae Ag, Ur Result: Negative (08-19-22 @ 04:04)  Rapid RVP Result: NotDetec (08-18-22 @ 23:50)  COVID-19 PCR: NotDetec (08-18-22 @ 20:30)  strept    INFLAMMATORY MARKERS  Sedimentation Rate, Erythrocyte: 125 mm/Hr (08-19-22 @ 08:51)  C-Reactive Protein, Serum: 323.9 mg/L (08-19-22 @ 08:51)      RADIOLOGY & ADDITIONAL TESTS:  I have personally reviewed the last available Chest xray  CXR  Xray Chest 1 View- PORTABLE-Urgent:   ACC: [id number] EXAM:  XR CHEST PORTABLE URGENT 1V                          PROCEDURE DATE:  08/19/2022          INTERPRETATION:  Clinical History / Reason for exam: Pneumothorax post   pigtail catheter placement    Comparison : Chest radiograph August 18, 2022.    Technique/Positioning: Single AP view of the chest.    Findings:    Support devices: Right-sided pigtail catheter, unchanged    Cardiac/mediastinum/hilum: Unchanged    Lung parenchyma/Pleura: Trace right apical pneumothorax, unchanged.Right   basilar opacity, unchanged.    Skeleton/soft tissues: Unchanged.    Impression:    Trace right apical pneumothorax, unchanged.    Right basilar opacity, unchanged.    --- End of Report ---            ELLIOT LANDAU MD; Attending Radiologist  This document has been electronically signed. Aug 19 2022 11:30AM (08-19-22 @ 10:10)      CT  CT Chest w/ IV Cont:   ACC: [id number] EXAM:  CT CHEST IC                          PROCEDURE DATE:  08/21/2022          INTERPRETATION:  INDICATION: Pneumonia. Pneumothorax post- chest tube.    TECHNIQUE: CT of the chest was performed from the thoracic inlet to the   levelof the adrenal glands following the administration of intravenous   contrast. Coronal and sagittal images have been submitted, as well as MIP   reformats.    COMPARISON: CT chest 8/18/2022    FINDINGS:    LUNGS, PLEURA, AND AIRWAYS: Interval placement of a right-sided chest   tube with near resolution of previously seen right pneumothorax. A small   residual pneumothorax visualized in the right apex. Right middle and   lower lobe consolidation. Fluid and gas containing right-sided posterior   pleural collection measuring approximately 8.8 x 3.9 x 18.4 cm. This may   be communicating with an additional multiloculated fluid and gas   containing collection seen more anteriorly, likely occupying the right   major fissure (series 5, image 134 and series 7, image 293).    MEDIASTINUM/LYMPH NODES: Prominent supraclavicular lymph nodes again   noted, stable from prior exam 9/30/2021, for example series 5, image 49   on the left and image 60 on the right.    HEART/GREAT VESSELS: Normal heart size.No pericardial effusion. Normal   caliber main pulmonary artery. Normal caliber thoracic aorta.    VISUALIZED UPPER ABDOMEN: Limited evaluation secondary to streak artifact   from coil embolization material.  BONES AND SOFT TISSUES: Bony degenerativechange.      IMPRESSION:    1.  Findings are compatible with a large right-sided empyema measuring up   to 18.4 cm craniocaudal. This may be communicating with a smaller   multiloculated fluid and gas collection more anteriorly, likely within   the right major fissure.  2.  Right middle and lower lobe consolidation, likely infectious.  3.  Near resolution of previously seen right-sided pneumothorax status   post right chest tube placement. Residual trace right apical pneumothorax   seen.    --- End of Report ---          MAURICIO PARHAM MD; Resident Radiologist  This document has been electronically signed.  MANSI OLGUIN MD; Attending Radiologist  This document has been electronically signed. Aug 21 2022  3:55AM (08-21-22 @ 01:24)      CARDIOLOGY TESTING  12 Lead ECG:   Ventricular Rate 102 BPM    Atrial Rate 102 BPM    P-R Interval 132 ms    QRS Duration 92 ms    Q-T Interval 334 ms    QTC Calculation(Bazett) 435 ms    P Axis 44 degrees    R Axis 32 degrees    T Axis 27 degrees    Diagnosis Line Sinus tachycardia  Otherwise normal ECG    Confirmed by MELIDA RAGSDALE MD ([id number]) on 8/19/2022 6:24:42 AM (08-18-22 @ 20:01)      MEDICATIONS  aspirin enteric coated 81 Oral daily  cefepime   IVPB 2000 IV Intermittent every 8 hours  cefepime   IVPB     cholecalciferol 1000 Oral daily  enoxaparin Injectable 40 SubCutaneous every 24 hours  levETIRAcetam 1125 Oral daily  levETIRAcetam 1500 Oral at bedtime  magnesium oxide 400 Oral three times a day with meals  melatonin 3 Oral at bedtime  metroNIDAZOLE    Tablet 500 Oral every 8 hours  multivitamin 1 Oral daily  OXcarbazepine 600 Oral <User Schedule>  OXcarbazepine 300 Oral <User Schedule>      WEIGHT  Weight (kg): 75 (08-18-22 @ 21:37)  Creatinine, Serum: 0.9 mg/dL (08-20-22 @ 23:30)      ANTIBIOTICS:  cefepime   IVPB 2000 milliGRAM(s) IV Intermittent every 8 hours  cefepime   IVPB      metroNIDAZOLE    Tablet 500 milliGRAM(s) Oral every 8 hours      All available historical records have been reviewed

## 2022-08-21 NOTE — PROGRESS NOTE ADULT - ASSESSMENT
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 Yesterday pt had a fever of 103 which was not breaking. In the morning pt got out of the bed and had a syncopal episode. Pt fell lightheaded, saw flashes and woke up on the floor. Then pt had sever chest pain on the right side, after which he decided to come to the ED.     #Spontaneous Pneumothorax/pleural effusion  #Hx of Melva Danlos  #suspected PNA    #Sepsis on admission (leukocytosis on 30K, and repeat  is  lower  to  13,000   then  to  9,700 and  still  with  anemia     - s/p chest tube placement  - follow up surgery and pulm  - repeat daily cxr  - echo = ef 58%  - oxygen saturation = 99%    - on cefepime and flagyl   - ID following   discussed  possible  dif  dx  with  pt.  and  wife   continue  baby  asa   check  PT/PTT  as  well  as  VIT  K level (  total  colectomy)            Last Updated: 20-Aug-2022 23:00 by Claude Vigil (

## 2022-08-21 NOTE — PROGRESS NOTE ADULT - SUBJECTIVE AND OBJECTIVE BOX
Patient is a 46y old  Male who presents with a chief complaint of sob (21 Aug 2022 07:44)    Pt is seen and examined  still  with  fever  Tax  103  and  wbc  continue to  dcrease  pt is awake and lying in bed  pt seems comfortable and denies any complaints at this time          ROS:  Negative except for:    MEDICATIONS  (STANDING):  aspirin enteric coated 81 milliGRAM(s) Oral daily  cefepime   IVPB 2000 milliGRAM(s) IV Intermittent every 8 hours  cefepime   IVPB      cholecalciferol 1000 Unit(s) Oral daily  enoxaparin Injectable 40 milliGRAM(s) SubCutaneous every 24 hours  levETIRAcetam 1125 milliGRAM(s) Oral daily  levETIRAcetam 1500 milliGRAM(s) Oral at bedtime  magnesium oxide 400 milliGRAM(s) Oral three times a day with meals  melatonin 3 milliGRAM(s) Oral at bedtime  metroNIDAZOLE    Tablet 500 milliGRAM(s) Oral every 8 hours  multivitamin 1 Tablet(s) Oral daily  OXcarbazepine 600 milliGRAM(s) Oral <User Schedule>  OXcarbazepine 300 milliGRAM(s) Oral <User Schedule>    MEDICATIONS  (PRN):  acetaminophen     Tablet .. 650 milliGRAM(s) Oral every 6 hours PRN Temp greater or equal to 38C (100.4F), Mild Pain (1 - 3)  acetaminophen     Tablet .. 650 milliGRAM(s) Oral once PRN Temp greater or equal to 38C (100.4F)  morphine  - Injectable 2 milliGRAM(s) IV Push every 6 hours PRN Moderate Pain (4 - 6)      Allergies    moxifloxacin (Hives)  penicillin (Unknown)    Intolerances        Vital Signs Last 24 Hrs  T(C): 36.3 (21 Aug 2022 08:00), Max: 38.7 (20 Aug 2022 15:15)  T(F): 97.4 (21 Aug 2022 08:00), Max: 101.7 (20 Aug 2022 23:20)  HR: 65 (21 Aug 2022 08:00) (63 - 92)  BP: 119/69 (21 Aug 2022 08:00) (101/67 - 119/69)  BP(mean): 81 (21 Aug 2022 08:00) (79 - 86)  RR: 20 (21 Aug 2022 08:00) (17 - 20)  SpO2: 98% (21 Aug 2022 08:00) (95% - 98%)    Parameters below as of 21 Aug 2022 08:00  Patient On (Oxygen Delivery Method): room air        PHYSICAL EXAM  General: adult in NAD  HEENT: clear oropharynx, anicteric sclera, pink conjunctiva  Neck: supple  CV: normal S1/S2 with no murmur rubs or gallops  Lungs: positive air movement b/l ant lungs,clear to auscultation, no wheezes, no rales  Abdomen: soft non-tender non-distended, no hepatosplenomegaly  Ext: no clubbing cyanosis or edema  Skin: no rashes and no petechiae  Neuro: alert and oriented X 4, no focal deficits  LABS:                          8.6    9.74  )-----------( 347      ( 20 Aug 2022 23:30 )             26.4         Mean Cell Volume : 79.0 fL  Mean Cell Hemoglobin : 25.7 pg  Mean Cell Hemoglobin Concentration : 32.6 g/dL  Auto Neutrophil # : x  Auto Lymphocyte # : x  Auto Monocyte # : x  Auto Eosinophil # : x  Auto Basophil # : x  Auto Neutrophil % : x  Auto Lymphocyte % : x  Auto Monocyte % : x  Auto Eosinophil % : x  Auto Basophil % : x    Serial CBC's  08-20 @ 23:30  Hct-26.4 / Hgb-8.6 / Plat-347 / RBC-3.34 / WBC-9.74          Serial CBC's  08-20 @ 00:15  Hct-26.5 / Hgb-8.7 / Plat-319 / RBC-3.28 / WBC-13.23          Serial CBC's  08-19 @ 08:51  Hct-30.9 / Hgb-10.2 / Plat-357 / RBC-3.82 / WBC-21.38          Serial CBC's  08-18 @ 20:20  Hct-36.1 / Hgb-11.5 / Plat-488 / RBC-4.40 / WBC-30.85            08-20    133<L>  |  97<L>  |  14  ----------------------------<  105<H>  3.9   |  23  |  0.9    Ca    8.1<L>      20 Aug 2022 23:30  Mg     2.0     08-20    TPro  5.8<L>  /  Alb  2.9<L>  /  TBili  <0.2  /  DBili  x   /  AST  15  /  ALT  20  /  AlkPhos  145<H>  08-20          WBC Count: 9.74 K/uL (08-20-22 @ 23:30)  Hemoglobin: 8.6 g/dL (08-20-22 @ 23:30)  Hematocrit: 26.4 % (08-20-22 @ 23:30)  Platelet Count - Automated: 347 K/uL (08-20-22 @ 23:30)  WBC Count: 13.23 K/uL (08-20-22 @ 00:15)  Hemoglobin: 8.7 g/dL (08-20-22 @ 00:15)  Hematocrit: 26.5 % (08-20-22 @ 00:15)  Platelet Count - Automated: 319 K/uL (08-20-22 @ 00:15)  WBC Count: 21.38 K/uL (08-19-22 @ 08:51)  Hemoglobin: 10.2 g/dL (08-19-22 @ 08:51)  Hematocrit: 30.9 % (08-19-22 @ 08:51)  Platelet Count - Automated: 357 K/uL (08-19-22 @ 08:51)  Ferritin, Serum: 399 ng/mL (08-19-22 @ 08:51)  Reticulocyte Percent: 1.7 % (08-19-22 @ 08:51)  WBC Count: 30.85 K/uL (08-18-22 @ 20:20)  Hemoglobin: 11.5 g/dL (08-18-22 @ 20:20)  Hematocrit: 36.1 % (08-18-22 @ 20:20)  Platelet Count - Automated: 488 K/uL (08-18-22 @ 20:20)                BLOOD SMEAR INTERPRETATION:       RADIOLOGY & ADDITIONAL STUDIES:

## 2022-08-22 ENCOUNTER — RESULT REVIEW (OUTPATIENT)
Age: 47
End: 2022-08-22

## 2022-08-22 LAB
ANION GAP SERPL CALC-SCNC: 12 MMOL/L — SIGNIFICANT CHANGE UP (ref 7–14)
BASOPHILS # BLD AUTO: 0.04 K/UL — SIGNIFICANT CHANGE UP (ref 0–0.2)
BASOPHILS NFR BLD AUTO: 0.5 % — SIGNIFICANT CHANGE UP (ref 0–1)
BLD GP AB SCN SERPL QL: SIGNIFICANT CHANGE UP
BUN SERPL-MCNC: 15 MG/DL — SIGNIFICANT CHANGE UP (ref 10–20)
CALCIUM SERPL-MCNC: 8.3 MG/DL — LOW (ref 8.5–10.1)
CHLORIDE SERPL-SCNC: 97 MMOL/L — LOW (ref 98–110)
CO2 SERPL-SCNC: 24 MMOL/L — SIGNIFICANT CHANGE UP (ref 17–32)
CREAT SERPL-MCNC: 0.7 MG/DL — SIGNIFICANT CHANGE UP (ref 0.7–1.5)
EGFR: 115 ML/MIN/1.73M2 — SIGNIFICANT CHANGE UP
EOSINOPHIL # BLD AUTO: 0.12 K/UL — SIGNIFICANT CHANGE UP (ref 0–0.7)
EOSINOPHIL NFR BLD AUTO: 1.6 % — SIGNIFICANT CHANGE UP (ref 0–8)
GLUCOSE BLDC GLUCOMTR-MCNC: 98 MG/DL — SIGNIFICANT CHANGE UP (ref 70–99)
GLUCOSE SERPL-MCNC: 96 MG/DL — SIGNIFICANT CHANGE UP (ref 70–99)
HCT VFR BLD CALC: 25.8 % — LOW (ref 42–52)
HGB BLD-MCNC: 8.7 G/DL — LOW (ref 14–18)
IMM GRANULOCYTES NFR BLD AUTO: 0.4 % — HIGH (ref 0.1–0.3)
INR BLD: 1.25 RATIO — SIGNIFICANT CHANGE UP (ref 0.65–1.3)
LYMPHOCYTES # BLD AUTO: 0.91 K/UL — LOW (ref 1.2–3.4)
LYMPHOCYTES # BLD AUTO: 12.4 % — LOW (ref 20.5–51.1)
MAGNESIUM SERPL-MCNC: 2 MG/DL — SIGNIFICANT CHANGE UP (ref 1.8–2.4)
MCHC RBC-ENTMCNC: 26.6 PG — LOW (ref 27–31)
MCHC RBC-ENTMCNC: 33.7 G/DL — SIGNIFICANT CHANGE UP (ref 32–37)
MCV RBC AUTO: 78.9 FL — LOW (ref 80–94)
MONOCYTES # BLD AUTO: 1.01 K/UL — HIGH (ref 0.1–0.6)
MONOCYTES NFR BLD AUTO: 13.8 % — HIGH (ref 1.7–9.3)
NEUTROPHILS # BLD AUTO: 5.21 K/UL — SIGNIFICANT CHANGE UP (ref 1.4–6.5)
NEUTROPHILS NFR BLD AUTO: 71.3 % — SIGNIFICANT CHANGE UP (ref 42.2–75.2)
NRBC # BLD: 0 /100 WBCS — SIGNIFICANT CHANGE UP (ref 0–0)
PLATELET # BLD AUTO: 317 K/UL — SIGNIFICANT CHANGE UP (ref 130–400)
POTASSIUM SERPL-MCNC: 3.9 MMOL/L — SIGNIFICANT CHANGE UP (ref 3.5–5)
POTASSIUM SERPL-SCNC: 3.9 MMOL/L — SIGNIFICANT CHANGE UP (ref 3.5–5)
PROCALCITONIN SERPL-MCNC: 17.8 NG/ML — HIGH (ref 0.02–0.1)
PROTHROM AB SERPL-ACNC: 14.4 SEC — HIGH (ref 9.95–12.87)
RBC # BLD: 3.27 M/UL — LOW (ref 4.7–6.1)
RBC # FLD: 14.3 % — SIGNIFICANT CHANGE UP (ref 11.5–14.5)
SARS-COV-2 RNA SPEC QL NAA+PROBE: SIGNIFICANT CHANGE UP
SODIUM SERPL-SCNC: 133 MMOL/L — LOW (ref 135–146)
WBC # BLD: 7.32 K/UL — SIGNIFICANT CHANGE UP (ref 4.8–10.8)
WBC # FLD AUTO: 7.32 K/UL — SIGNIFICANT CHANGE UP (ref 4.8–10.8)

## 2022-08-22 PROCEDURE — 99233 SBSQ HOSP IP/OBS HIGH 50: CPT

## 2022-08-22 PROCEDURE — 71045 X-RAY EXAM CHEST 1 VIEW: CPT | Mod: 26,77

## 2022-08-22 PROCEDURE — 88305 TISSUE EXAM BY PATHOLOGIST: CPT | Mod: 26

## 2022-08-22 PROCEDURE — 71045 X-RAY EXAM CHEST 1 VIEW: CPT | Mod: 26

## 2022-08-22 PROCEDURE — 32652 THORACOSCOPY REM TOTL CORTEX: CPT

## 2022-08-22 PROCEDURE — 99253 IP/OBS CNSLTJ NEW/EST LOW 45: CPT

## 2022-08-22 RX ORDER — LEVETIRACETAM 250 MG/1
1500 TABLET, FILM COATED ORAL AT BEDTIME
Refills: 0 | Status: DISCONTINUED | OUTPATIENT
Start: 2022-08-22 | End: 2022-08-29

## 2022-08-22 RX ORDER — CHLORHEXIDINE GLUCONATE 213 G/1000ML
1 SOLUTION TOPICAL DAILY
Refills: 0 | Status: DISCONTINUED | OUTPATIENT
Start: 2022-08-22 | End: 2022-08-29

## 2022-08-22 RX ORDER — HYDROMORPHONE HYDROCHLORIDE 2 MG/ML
30 INJECTION INTRAMUSCULAR; INTRAVENOUS; SUBCUTANEOUS
Refills: 0 | Status: DISCONTINUED | OUTPATIENT
Start: 2022-08-22 | End: 2022-08-23

## 2022-08-22 RX ORDER — HYDROMORPHONE HYDROCHLORIDE 2 MG/ML
0.5 INJECTION INTRAMUSCULAR; INTRAVENOUS; SUBCUTANEOUS
Refills: 0 | Status: DISCONTINUED | OUTPATIENT
Start: 2022-08-22 | End: 2022-08-27

## 2022-08-22 RX ORDER — ACETAMINOPHEN 500 MG
1000 TABLET ORAL EVERY 6 HOURS
Refills: 0 | Status: COMPLETED | OUTPATIENT
Start: 2022-08-23 | End: 2022-08-23

## 2022-08-22 RX ORDER — ACETAMINOPHEN 500 MG
1000 TABLET ORAL ONCE
Refills: 0 | Status: COMPLETED | OUTPATIENT
Start: 2022-08-23 | End: 2022-08-23

## 2022-08-22 RX ORDER — SENNA PLUS 8.6 MG/1
2 TABLET ORAL AT BEDTIME
Refills: 0 | Status: DISCONTINUED | OUTPATIENT
Start: 2022-08-23 | End: 2022-08-29

## 2022-08-22 RX ORDER — CEFEPIME 1 G/1
2000 INJECTION, POWDER, FOR SOLUTION INTRAMUSCULAR; INTRAVENOUS EVERY 8 HOURS
Refills: 0 | Status: DISCONTINUED | OUTPATIENT
Start: 2022-08-22 | End: 2022-08-29

## 2022-08-22 RX ORDER — METRONIDAZOLE 500 MG
500 TABLET ORAL EVERY 8 HOURS
Refills: 0 | Status: DISCONTINUED | OUTPATIENT
Start: 2022-08-22 | End: 2022-08-29

## 2022-08-22 RX ORDER — SODIUM CHLORIDE 9 MG/ML
1000 INJECTION, SOLUTION INTRAVENOUS
Refills: 0 | Status: DISCONTINUED | OUTPATIENT
Start: 2022-08-22 | End: 2022-08-22

## 2022-08-22 RX ORDER — BUPIVACAINE 13.3 MG/ML
20 INJECTION, SUSPENSION, LIPOSOMAL INFILTRATION ONCE
Refills: 0 | Status: DISCONTINUED | OUTPATIENT
Start: 2022-08-22 | End: 2022-08-28

## 2022-08-22 RX ORDER — LEVETIRACETAM 250 MG/1
1125 TABLET, FILM COATED ORAL DAILY
Refills: 0 | Status: DISCONTINUED | OUTPATIENT
Start: 2022-08-22 | End: 2022-08-29

## 2022-08-22 RX ORDER — OXCARBAZEPINE 300 MG/1
300 TABLET, FILM COATED ORAL
Refills: 0 | Status: DISCONTINUED | OUTPATIENT
Start: 2022-08-22 | End: 2022-08-29

## 2022-08-22 RX ORDER — SODIUM CHLORIDE 9 MG/ML
1000 INJECTION, SOLUTION INTRAVENOUS
Refills: 0 | Status: DISCONTINUED | OUTPATIENT
Start: 2022-08-22 | End: 2022-08-23

## 2022-08-22 RX ORDER — PANTOPRAZOLE SODIUM 20 MG/1
40 TABLET, DELAYED RELEASE ORAL ONCE
Refills: 0 | Status: COMPLETED | OUTPATIENT
Start: 2022-08-22 | End: 2022-08-24

## 2022-08-22 RX ORDER — MEPERIDINE HYDROCHLORIDE 50 MG/ML
25 INJECTION INTRAMUSCULAR; INTRAVENOUS; SUBCUTANEOUS ONCE
Refills: 0 | Status: DISCONTINUED | OUTPATIENT
Start: 2022-08-22 | End: 2022-08-28

## 2022-08-22 RX ORDER — CHOLECALCIFEROL (VITAMIN D3) 125 MCG
1000 CAPSULE ORAL DAILY
Refills: 0 | Status: DISCONTINUED | OUTPATIENT
Start: 2022-08-22 | End: 2022-08-29

## 2022-08-22 RX ORDER — LANOLIN ALCOHOL/MO/W.PET/CERES
3 CREAM (GRAM) TOPICAL AT BEDTIME
Refills: 0 | Status: DISCONTINUED | OUTPATIENT
Start: 2022-08-22 | End: 2022-08-29

## 2022-08-22 RX ORDER — MAGNESIUM OXIDE 400 MG ORAL TABLET 241.3 MG
400 TABLET ORAL
Refills: 0 | Status: DISCONTINUED | OUTPATIENT
Start: 2022-08-22 | End: 2022-08-29

## 2022-08-22 RX ORDER — ONDANSETRON 8 MG/1
4 TABLET, FILM COATED ORAL ONCE
Refills: 0 | Status: DISCONTINUED | OUTPATIENT
Start: 2022-08-22 | End: 2022-08-28

## 2022-08-22 RX ORDER — SODIUM CHLORIDE 9 MG/ML
1000 INJECTION INTRAMUSCULAR; INTRAVENOUS; SUBCUTANEOUS
Refills: 0 | Status: DISCONTINUED | OUTPATIENT
Start: 2022-08-22 | End: 2022-08-23

## 2022-08-22 RX ORDER — OXCARBAZEPINE 300 MG/1
600 TABLET, FILM COATED ORAL
Refills: 0 | Status: DISCONTINUED | OUTPATIENT
Start: 2022-08-22 | End: 2022-08-29

## 2022-08-22 RX ORDER — ASPIRIN/CALCIUM CARB/MAGNESIUM 324 MG
81 TABLET ORAL DAILY
Refills: 0 | Status: DISCONTINUED | OUTPATIENT
Start: 2022-08-22 | End: 2022-08-29

## 2022-08-22 RX ORDER — POLYETHYLENE GLYCOL 3350 17 G/17G
17 POWDER, FOR SOLUTION ORAL DAILY
Refills: 0 | Status: DISCONTINUED | OUTPATIENT
Start: 2022-08-23 | End: 2022-08-29

## 2022-08-22 RX ADMIN — CEFEPIME 100 MILLIGRAM(S): 1 INJECTION, POWDER, FOR SOLUTION INTRAMUSCULAR; INTRAVENOUS at 13:14

## 2022-08-22 RX ADMIN — ENOXAPARIN SODIUM 40 MILLIGRAM(S): 100 INJECTION SUBCUTANEOUS at 08:36

## 2022-08-22 RX ADMIN — MORPHINE SULFATE 2 MILLIGRAM(S): 50 CAPSULE, EXTENDED RELEASE ORAL at 08:48

## 2022-08-22 RX ADMIN — OXCARBAZEPINE 300 MILLIGRAM(S): 300 TABLET, FILM COATED ORAL at 05:54

## 2022-08-22 RX ADMIN — SODIUM CHLORIDE 50 MILLILITER(S): 9 INJECTION, SOLUTION INTRAVENOUS at 10:40

## 2022-08-22 RX ADMIN — MAGNESIUM OXIDE 400 MG ORAL TABLET 400 MILLIGRAM(S): 241.3 TABLET ORAL at 08:36

## 2022-08-22 RX ADMIN — Medication 600 MILLIGRAM(S): at 15:08

## 2022-08-22 RX ADMIN — CEFEPIME 100 MILLIGRAM(S): 1 INJECTION, POWDER, FOR SOLUTION INTRAMUSCULAR; INTRAVENOUS at 05:54

## 2022-08-22 RX ADMIN — Medication 400 MILLIGRAM(S): at 23:32

## 2022-08-22 RX ADMIN — Medication 1000 UNIT(S): at 11:29

## 2022-08-22 RX ADMIN — Medication 500 MILLIGRAM(S): at 13:14

## 2022-08-22 RX ADMIN — CEFEPIME 100 MILLIGRAM(S): 1 INJECTION, POWDER, FOR SOLUTION INTRAMUSCULAR; INTRAVENOUS at 23:36

## 2022-08-22 RX ADMIN — MORPHINE SULFATE 2 MILLIGRAM(S): 50 CAPSULE, EXTENDED RELEASE ORAL at 09:00

## 2022-08-22 RX ADMIN — MAGNESIUM OXIDE 400 MG ORAL TABLET 400 MILLIGRAM(S): 241.3 TABLET ORAL at 12:13

## 2022-08-22 RX ADMIN — Medication 81 MILLIGRAM(S): at 11:30

## 2022-08-22 RX ADMIN — HYDROMORPHONE HYDROCHLORIDE 0.5 MILLIGRAM(S): 2 INJECTION INTRAMUSCULAR; INTRAVENOUS; SUBCUTANEOUS at 23:05

## 2022-08-22 RX ADMIN — Medication 1 TABLET(S): at 11:29

## 2022-08-22 RX ADMIN — Medication 500 MILLIGRAM(S): at 05:54

## 2022-08-22 RX ADMIN — LEVETIRACETAM 1125 MILLIGRAM(S): 250 TABLET, FILM COATED ORAL at 11:31

## 2022-08-22 NOTE — PROGRESS NOTE ADULT - SUBJECTIVE AND OBJECTIVE BOX
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 Yesterday pt had a fever of 103 which was not breaking. In the morning pt got out of the bed and had a syncopal episode. Pt fell lightheaded, saw flashes and woke up on the floor. Then pt had sever chest pain on the right side, after which he decided to come to the ED.     Off note pt was supposed to fup with Hematology/ Oncology for abnormal blood counts, pts PCP was considering a bone biopsy.    ED vitals 108/68, , RR 17, temp 100.9, O2 sat 98% on RA    Sig Labs: WBC 30.85, Hg 11.5    EKG Sinus tachycardia    CT chest w IV cont: 50% right-sided pneumothorax under tension. Lucencies seen within the lung parenchyma and may represent air trapped within fissures.    CT abd/ pelvis: neg for acute pathology    In the ED a chest tube was placed on the right, 1x dose of vanc and keke and was given 2.5 L bolus of fluid (19 Aug 2022 00:42)      SUBJECTIVE  Patient is a 46y old Male who presents with a chief complaint of pneumothorax, acute hypoxemic respiratory failure (22 Aug 2022 11:31)  Currently admitted to medicine with the primary diagnosis of Sepsis, unspecified organism      INTERVAL HPI AND OVERNIGHT EVENTS:  Patient was examined and seen at bedside. This morning he is resting comfortably in bed and reports no issues or overnight events.    REVIEW OF SYMPTOMS:  CONSTITUTIONAL: No weakness, fevers or chills; No headaches  EYES: No visual changes, eye pain, or discharge  ENT: No vertigo; No ear pain or change in hearing; No sore throat or difficulty swallowing  NECK: No pain or stiffness  RESPIRATORY: No cough, wheezing, or hemoptysis; No shortness of breath  CARDIOVASCULAR: No chest pain or palpitations  GASTROINTESTINAL: No abdominal or epigastric pain; No nausea, vomiting, or hematemesis; No diarrhea or constipation; No melena or hematochezia  GENITOURINARY: No dysuria, frequency or hematuria  MUSCULOSKELETAL: No joint pain, no muscle pain, no weakness  NEUROLOGICAL: No numbness or weakness  SKIN: No itching or rashes    OBJECTIVE  PAST MEDICAL & SURGICAL HISTORY  EDS (Melva-Danlos syndrome)    Epilepsy    HTN (hypertension)    Keratoconus    Aneurysm artery, 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    Dislocations and subluxations    Status post Kerri&#x27;s procedure  11/2019      ALLERGIES:  moxifloxacin (Hives)  penicillin (Unknown)    MEDICATIONS:  STANDING MEDICATIONS  aspirin enteric coated 81 milliGRAM(s) Oral daily  cefepime   IVPB 2000 milliGRAM(s) IV Intermittent every 8 hours  cefepime   IVPB      cholecalciferol 1000 Unit(s) Oral daily  enoxaparin Injectable 40 milliGRAM(s) SubCutaneous every 24 hours  lactated ringers. 1000 milliLiter(s) IV Continuous <Continuous>  levETIRAcetam 1125 milliGRAM(s) Oral daily  levETIRAcetam 1500 milliGRAM(s) Oral at bedtime  magnesium oxide 400 milliGRAM(s) Oral three times a day with meals  melatonin 3 milliGRAM(s) Oral at bedtime  metroNIDAZOLE    Tablet 500 milliGRAM(s) Oral every 8 hours  multivitamin 1 Tablet(s) Oral daily  OXcarbazepine 600 milliGRAM(s) Oral <User Schedule>  OXcarbazepine 300 milliGRAM(s) Oral <User Schedule>    PRN MEDICATIONS  acetaminophen     Tablet .. 650 milliGRAM(s) Oral once PRN  acetaminophen     Tablet .. 650 milliGRAM(s) Oral every 6 hours PRN  ibuprofen  Tablet. 600 milliGRAM(s) Oral every 6 hours PRN  morphine  - Injectable 2 milliGRAM(s) IV Push every 6 hours PRN      VITAL SIGNS: Last 24 Hours  T(C): 37.6 (22 Aug 2022 11:45), Max: 38.6 (21 Aug 2022 16:33)  T(F): 99.7 (22 Aug 2022 11:45), Max: 101.4 (21 Aug 2022 16:33)  HR: 84 (22 Aug 2022 11:45) (62 - 95)  BP: 105/68 (22 Aug 2022 11:45) (100/63 - 114/77)  BP(mean): 81 (22 Aug 2022 11:45) (80 - 81)  RR: 18 (22 Aug 2022 11:45) (17 - 20)  SpO2: 98% (22 Aug 2022 11:45) (97% - 98%)    LABS:                        8.7    7.32  )-----------( 317      ( 22 Aug 2022 01:11 )             25.8     08-22    133<L>  |  97<L>  |  15  ----------------------------<  96  3.9   |  24  |  0.7    Ca    8.3<L>      22 Aug 2022 01:11  Mg     2.0     08-22    TPro  5.8<L>  /  Alb  2.9<L>  /  TBili  <0.2  /  DBili  x   /  AST  15  /  ALT  20  /  AlkPhos  145<H>  08-20    PT/INR - ( 22 Aug 2022 01:11 )   PT: 14.40 sec;   INR: 1.25 ratio                       RADIOLOGY:      PHYSICAL EXAM:  CONSTITUTIONAL: No acute distress, well-developed, well-groomed, AAOx3  HEAD: Atraumatic, normocephalic  EYES: EOM intact, PERRLA, conjunctiva and sclera clear  ENT: Supple, no masses, no thyromegaly, no bruits, no JVD; moist mucous membranes  PULMONARY: Clear to auscultation bilaterally; no wheezes, rales, or rhonchi  CARDIOVASCULAR: Regular rate and rhythm; no murmurs, rubs, or gallops  GASTROINTESTINAL: Soft, non-tender, non-distended; bowel sounds present  MUSCULOSKELETAL: 2+ peripheral pulses; no clubbing, no cyanosis, no edema  NEUROLOGY: non-focal  SKIN: No rashes or lesions; warm and dry    ASSESSMENT & PLAN  #    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      Dislocations and subluxations      Status post Kerri&#x27;s procedure  11/2019          #Misc  - DVT Prophylaxis:  - Diet:  - GI Prophylaxis:  - Activity:  - IV Fluids:  - Code Status:    Dispo: FAZAL CASSIDY 46y Male  MRN#: [id number]   Hospital Day: 4d    HPI:  46M with PMH Melva-Danlos syndrome followed at [facility] ([facility]--GI, [facility]--Vascular), perforation of sigmoid colon s/p Kerri's, Colonic perforation w/ ileostomy in 2020, epilepsy, HTN, 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 Yesterday pt had a fever of 103 which was not breaking. In the morning pt got out of the bed and had a syncopal episode. Pt fell lightheaded, saw flashes and woke up on the floor. Then pt had sever chest pain on the right side, after which he decided to come to the ED.     Off note pt was supposed to fup with Hematology/ Oncology for abnormal blood counts, pts PCP was considering a bone biopsy.    ED vitals 108/68, , RR 17, temp 100.9, O2 sat 98% on RA    Sig Labs: WBC 30.85, Hg 11.5    EKG Sinus tachycardia    CT chest w IV cont: 50% right-sided pneumothorax under tension. Lucencies seen within the lung parenchyma and may represent air trapped within fissures.    CT abd/ pelvis: neg for acute pathology    In the ED a chest tube was placed on the right, 1x dose of vanc and keke and was given 2.5 L bolus of fluid (19 Aug 2022 00:42)      SUBJECTIVE  Patient is a 46y old Male who presents with a chief complaint of pneumothorax, acute hypoxemic respiratory failure (22 Aug 2022 11:31)  Currently admitted to medicine with the primary diagnosis of Sepsis, unspecified organism      INTERVAL HPI AND OVERNIGHT EVENTS:  Patient was examined and seen at bedside. This morning he is resting comfortable and had a better night sleep.     REVIEW OF SYMPTOMS:  CONSTITUTIONAL: fevers  EYES: No visual changes, eye pain, or discharge  ENT: No vertigo; No ear pain or change in hearing; No sore throat or difficulty swallowing  NECK: No pain or stiffness  RESPIRATORY: No cough, wheezing, or hemoptysis; No shortness of breath  CARDIOVASCULAR: No chest pain or palpitations  GASTROINTESTINAL: No abdominal or epigastric pain; No nausea, vomiting, or hematemesis; No diarrhea or constipation; No melena or hematochezia  GENITOURINARY: No dysuria, frequency or hematuria  MUSCULOSKELETAL: No joint pain, no muscle pain, no weakness  NEUROLOGICAL: No numbness or weakness  SKIN: No itching or rashes    OBJECTIVE  PAST MEDICAL & SURGICAL HISTORY  EDS (Melva-Danlos syndrome)    Epilepsy    HTN (hypertension)    Keratoconus    Aneurysm artery, 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    Dislocations and subluxations    Status post Kerri&#x27;s procedure  11/2019      ALLERGIES:  moxifloxacin (Hives)  penicillin (Unknown)    MEDICATIONS:  STANDING MEDICATIONS  aspirin enteric coated 81 milliGRAM(s) Oral daily  cefepime   IVPB 2000 milliGRAM(s) IV Intermittent every 8 hours  cefepime   IVPB      cholecalciferol 1000 Unit(s) Oral daily  enoxaparin Injectable 40 milliGRAM(s) SubCutaneous every 24 hours  lactated ringers. 1000 milliLiter(s) IV Continuous <Continuous>  levETIRAcetam 1125 milliGRAM(s) Oral daily  levETIRAcetam 1500 milliGRAM(s) Oral at bedtime  magnesium oxide 400 milliGRAM(s) Oral three times a day with meals  melatonin 3 milliGRAM(s) Oral at bedtime  metroNIDAZOLE    Tablet 500 milliGRAM(s) Oral every 8 hours  multivitamin 1 Tablet(s) Oral daily  OXcarbazepine 600 milliGRAM(s) Oral <User Schedule>  OXcarbazepine 300 milliGRAM(s) Oral <User Schedule>    PRN MEDICATIONS  acetaminophen     Tablet .. 650 milliGRAM(s) Oral once PRN  acetaminophen     Tablet .. 650 milliGRAM(s) Oral every 6 hours PRN  ibuprofen  Tablet. 600 milliGRAM(s) Oral every 6 hours PRN  morphine  - Injectable 2 milliGRAM(s) IV Push every 6 hours PRN      VITAL SIGNS: Last 24 Hours  T(C): 37.6 (22 Aug 2022 11:45), Max: 38.6 (21 Aug 2022 16:33)  T(F): 99.7 (22 Aug 2022 11:45), Max: 101.4 (21 Aug 2022 16:33)  HR: 84 (22 Aug 2022 11:45) (62 - 95)  BP: 105/68 (22 Aug 2022 11:45) (100/63 - 114/77)  BP(mean): 81 (22 Aug 2022 11:45) (80 - 81)  RR: 18 (22 Aug 2022 11:45) (17 - 20)  SpO2: 98% (22 Aug 2022 11:45) (97% - 98%)    LABS:                        8.7    7.32  )-----------( 317      ( 22 Aug 2022 01:11 )             25.8     08-22    133<L>  |  97<L>  |  15  ----------------------------<  96  3.9   |  24  |  0.7    Ca    8.3<L>      22 Aug 2022 01:11  Mg     2.0     08-22    TPro  5.8<L>  /  Alb  2.9<L>  /  TBili  <0.2  /  DBili  x   /  AST  15  /  ALT  20  /  AlkPhos  145<H>  08-20    PT/INR - ( 22 Aug 2022 01:11 )   PT: 14.40 sec;   INR: 1.25 ratio                       RADIOLOGY:      PHYSICAL EXAM:  CONSTITUTIONAL: No acute distress, AAOx3  HEAD: Atraumatic, normocephalic  EYES: EOM intact, PERRLA, conjunctiva and sclera clear  ENT: Supple, no masses, no thyromegaly, no bruits, no JVD; moist mucous membranes  PULMONARY: decreased BS R side  CARDIOVASCULAR: Regular rate and rhythm; no murmurs, rubs, or gallops  GASTROINTESTINAL: Soft, non-tender, non-distended; bowel sounds present  MUSCULOSKELETAL: 2+ peripheral pulses; no clubbing, no cyanosis, no edema  NEUROLOGY: non-focal  SKIN: No rashes or lesions; warm and dry    ASSESSMENT & PLAN  46M with PMH Melva-Danlos syndrome followed at [facility] ([facility]--GI, [facility]--V[facility]), perforation of sigmoid colon s/p Kerri's, Colonic perforation w/ ileostomy in 2020, epilepsy, HTN, 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 Yesterday pt had a fever of 103 which was not breaking. In the morning pt got out of the bed and had a syncopal episode. Pt fell lightheaded, saw flashes and woke up on the floor. Then pt had sever chest pain on the right side, after which he decided to come to the ED.     #Spontaneous Pneumothorax  #Hx of Melva Danlos  #suspected PNA  #Sepsis on admission (leukocytosis on 30K, tachycardia, fever)  #persistent fevers  - s/p chest tube placement  - repeat daily cxr  - echo = ef 58%  - oxygen saturation = 99%  - mrsa, legionella, strep, blood and urine cultures all negative  - on cefepime and flagyl   - ID following   - pain control  - antipyretics prn along with cooling blanket   - for VATS procedure today    #Seizure disorder  -on keppra and oxcarbazepine     #Leukocytosis/thrombocytosis/Anemia r/o MPD  - heme onc c/s appreciated - sent GABRIELLA 2 studies /ABL/BCR    - peripheral smear noted  - monitor daily cbc    Pending: surgery

## 2022-08-22 NOTE — PROGRESS NOTE ADULT - SUBJECTIVE AND OBJECTIVE BOX
GENERAL SURGERY PROGRESS NOTE    Patient: FAZAL CASSIDY , 46y ([birth year])Male   MRN: [id number]  Location: [place] [id number]  Visit: 08-18-22 Inpatient  Date: 08-22-22 @ 07:01    Hospital Day #: 5    Procedure/Dx/Injuries: S/P pigtail placement    Events of past 24 hours: Patient was consented and added onto OR. He had no complaints throughout the day. Hospitalist and medicine were consulted for risk stratification and an echo was ordered. No acute events overnight.    PAST MEDICAL & SURGICAL HISTORY:  EDS (Melva-Danlos syndrome)  Epilepsy  HTN (hypertension)  Keratoconus  Aneurysm artery, 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  Dislocations and subluxations  Status post Kerri&#x27;s procedure  11/2019    Vitals:   T(F): 98 (08-22-22 @ 05:42), Max: 101.4 (08-21-22 @ 16:33)  HR: 80 (08-22-22 @ 05:42)  BP: 112/73 (08-22-22 @ 05:42)  RR: 20 (08-22-22 @ 05:42)  SpO2: 98% (08-22-22 @ 05:42)      Diet, NPO after Midnight:      NPO Start Date: 21-Aug-2022,   NPO Start Time: 23:59  Except Medications  Diet, DASH/TLC:   Sodium & Cholesterol Restricted      Fluids:     I & O's:    08-21-22 @ 07:01  -  08-22-22 @ 07:00  --------------------------------------------------------  IN:    IV PiggyBack: 100 mL  Total IN: 100 mL    OUT:    Chest Tube (mL): 0 mL    Voided (mL): 600 mL  Total OUT: 600 mL    Total NET: -500 mL    PHYSICAL EXAM:  General: NAD, AAOx3, calm and cooperative  HEENT: Trachea ML  Cardiac: S1, S2  Respiratory: R chest tube, normal respiratory effort  Abdomen: Soft, non-distended  Skin: Warm/dry, normal color, no jaundice      MEDICATIONS  (STANDING):  aspirin enteric coated 81 milliGRAM(s) Oral daily  cefepime   IVPB 2000 milliGRAM(s) IV Intermittent every 8 hours  cefepime   IVPB      cholecalciferol 1000 Unit(s) Oral daily  enoxaparin Injectable 40 milliGRAM(s) SubCutaneous every 24 hours  levETIRAcetam 1125 milliGRAM(s) Oral daily  levETIRAcetam 1500 milliGRAM(s) Oral at bedtime  magnesium oxide 400 milliGRAM(s) Oral three times a day with meals  melatonin 3 milliGRAM(s) Oral at bedtime  metroNIDAZOLE    Tablet 500 milliGRAM(s) Oral every 8 hours  multivitamin 1 Tablet(s) Oral daily  OXcarbazepine 600 milliGRAM(s) Oral <User Schedule>  OXcarbazepine 300 milliGRAM(s) Oral <User Schedule>    MEDICATIONS  (PRN):  acetaminophen     Tablet .. 650 milliGRAM(s) Oral every 6 hours PRN Temp greater or equal to 38C (100.4F), Mild Pain (1 - 3)  acetaminophen     Tablet .. 650 milliGRAM(s) Oral once PRN Temp greater or equal to 38C (100.4F)  ibuprofen  Tablet. 600 milliGRAM(s) Oral every 6 hours PRN Temp greater or equal to 38C (100.4F)  morphine  - Injectable 2 milliGRAM(s) IV Push every 6 hours PRN Moderate Pain (4 - 6)      DVT PROPHYLAXIS: enoxaparin Injectable 40 milliGRAM(s) SubCutaneous every 24 hours    GI PROPHYLAXIS:   ANTICOAGULATION:   ANTIBIOTICS:  cefepime   IVPB 2000 milliGRAM(s)  cefepime   IVPB    metroNIDAZOLE    Tablet 500 milliGRAM(s)      LAB/STUDIES:  Labs:  CAPILLARY BLOOD GLUCOSE                          8.7    7.32  )-----------( 317      ( 22 Aug 2022 01:11 )             25.8       Auto Neutrophil %: 71.3 % (08-22-22 @ 01:11)  Auto Immature Granulocyte %: 0.4 % (08-22-22 @ 01:11)    08-22    133<L>  |  97<L>  |  15  ----------------------------<  96  3.9   |  24  |  0.7      Calcium, Total Serum: 8.3 mg/dL (08-22-22 @ 01:11)      LFTs:             5.8  | <0.2 | 15       ------------------[145     ( 20 Aug 2022 23:30 )  2.9  | x    | 20          Lipase:x      Amylase:x             Coags:     14.40  ----< 1.25    ( 22 Aug 2022 01:11 )     x          IMAGING:  < from: Xray Chest 1 View- PORTABLE-Routine (Xray Chest 1 View- PORTABLE-Routine in AM.) (08.21.22 @ 05:42) >  Impression:  Unchanged trace right apical pneumothorax. Unchanged right opacity.    --- End of Report ---  < end of copied text >    < from: CT Chest w/ IV Cont (08.21.22 @ 01:24) >  IMPRESSION:    1.  Findings are compatible with a large right-sided empyema measuring up   to 18.4 cm craniocaudal. This may be communicating with a smaller   multiloculated fluid and gas collection more anteriorly, likely within   the right major fissure.  2.  Right middle and lower lobe consolidation, likely infectious.  3.  Near resolution of previously seen right-sided pneumothorax status   post right chest tube placement. Residual trace right apical pneumothorax   seen.    --- End of Report ---    < end of copied text >      · Assessment	  ASSESSMENT:  46M who presented to the ED on 8/18 with fevers/chest pain and was found to have spontaneous PTX.  S/p bedside 14Fr pigtail placement in ED with resolution of PTX, admitted to medical service for septic workup(WBC 30, Febrile 103 on admission). Thoracic surgery team consulted for further management of chest tube.    Plan:  -Continue CT to suction, monitor outputs  -IV abx, septic workup as per primary  - Hemodynamic monitoring  - Pulm recs appreciated - swab nose for MRSA if - dc vanco, zosyn/ levaquin till cx, procal, urine strep/ legionella  - ID recs appreciated - c/w ABx, send flow cytometry and GABRIELLA 2 studies/ABL/ BCR  - DVT ppx  - Pain control   - Patient risk stratified as High Risk  - Discuss with cardiologist  - Added on 8/22 for VATS with decortication  - Consented  - Pre-op labs    [phone]

## 2022-08-22 NOTE — PROGRESS NOTE ADULT - ATTENDING COMMENTS
Mr. Hughes presented to the ED complaining of right-sided chest pain and shortness of breath. Large right sided pneumothorax found in CT and pleural drain placed with adequate lung expansion.  Pleural drain in good position and no air leak. Initial CT with some parenchymal opacities but unable to assess lung parenchyma given size of pneumothorax. Patient endorses a few weeks of cough, low grade fever. 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 Patent stable, significant leukocytosis in ED started on antibiotics. Plan: no additional intervention indicated at this time, will need repeat chest CT with expanded lung to better assess right basal opacity. Continue drain to suction.   08/22/22: Mr. Hughes is improving clinically, resolved leukocytosis, no SIRS. CT chest revealed multiloculated right pleural effusion with thickened pleural likely corresponding to empyema. I reviewed the CT scan findings with the patient and his wife and recommended thoracoscopic decortication, I described the procedure, risks and possible complications as well as expected recovery. We also discussed significant PMH and diagnosis of EDS. Patient understood and agreed to proceed. Plan: OR for right VATS, decortication.

## 2022-08-22 NOTE — PROGRESS NOTE ADULT - ASSESSMENT
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  Possible VATS today     CARDIOVASCULAR: Gentle hydration    GI: GI prophylaxis                                          Feeding po    RENAL:  F/u  lytes.  Correct as needed. Accurate I/O    INFECTIOUS DISEASE: Continue Abx per ID    HEMATOLOGICAL:  DVT prophylaxis.     ENDOCRINE:  Follow up FS.  Insulin protocol if needed.    DGTF

## 2022-08-22 NOTE — CHART NOTE - NSCHARTNOTEFT_GEN_A_CORE
Patient: FAZAL CASSIDY  MRN: [id number]  46y Male  Location: [facility] A5- A  22 @ 04:55    Vitals:  T(F): 99.2 (22 @ 00:25), Max: 101.4 (22 @ 16:33)  HR: 62 (22 @ 00:25) (62 - 95)  BP: 100/63 (22 @ 00:25) (100/63 - 119/69)  RR: 20 (22 @ 00:25) (20 - 20)  SpO2: 97% (22 @ 00:25) (97% - 98%)    Procedure:   Consent in Chart: [  ] Yes [  ] No  Diet: Diet, NPO after Midnight:      NPO Start Date: 21-Aug-2022,   NPO Start Time: 23:59  Except Medications (22 @ 15:00)    Fluids: cholecalciferol 1000 Unit(s) Oral daily  magnesium oxide 400 milliGRAM(s) Oral three times a day with meals  multivitamin 1 Tablet(s) Oral daily    EK Lead ECG:   Ventricular Rate 102 BPM    Atrial Rate 102 BPM    P-R Interval 132 ms    QRS Duration 92 ms    Q-T Interval 334 ms    QTC Calculation(Bazett) 435 ms    P Axis 44 degrees    R Axis 32 degrees    T Axis 27 degrees    Diagnosis Line Sinus tachycardia  Otherwise normal ECG    Confirmed by MELIDA RAGSDALE MD ([id number]) on 2022 6:24:42 AM (22 @ 20:01)    CXR:  Xray Chest 1 View- PORTABLE-Routine:   ACC: [id number] EXAM:  XR CHEST PORTABLE ROUTINE 1V                          PROCEDURE DATE:  2022          INTERPRETATION:  Clinical History / Reason for exam: Pneumonia    Comparison : Chest radiograph: 2022    Technique/Positioning: Frontal view of the chest    Findings:    Support devices: Unchanged right pleural catheter.    Cardiac/mediastinum/hilum: Unchanged.    Lung parenchyma/Pleura: Unchanged trace right apical pneumothorax.   Unchanged right opacity.    Skeleton/soft tissues: Unchanged.    Impression:  Unchanged trace right apical pneumothorax. Unchanged right opacity.            --- End of Report ---            LEATHA STANLEY MD; Attending Radiologist  This document has been electronically signed. Aug 21 2022 10:01AM (22 @ 05:42)    Pre-OP Labs:  CAPILLARY BLOOD GLUCOSE                              8.7    7.32  )-----------( 317      ( 22 Aug 2022 01:11 )             25.8         133<L>  |  97<L>  |  15  ----------------------------<  96  3.9   |  24  |  0.7    Ca    8.3<L>      22 Aug 2022 01:11  Mg     2.0         TPro  5.8<L>  /  Alb  2.9<L>  /  TBili  <0.2  /  DBili  x   /  AST  15  /  ALT  20  /  AlkPhos  145<H>      PT/INR - ( 22 Aug 2022 01:11 )   PT: 14.40 sec;   INR: 1.25 ratio             Type & Screen:    Anticoagulation/Antiplatelet: Patient: FAZAL CASSIDY  MRN: [id number]  46y Male  Location: [facility] A5- A  22 @ 04:55    Vitals:  T(F): 99.2 (22 @ 00:25), Max: 101.4 (22 @ 16:33)  HR: 62 (22 @ 00:25) (62 - 95)  BP: 100/63 (22 @ 00:25) (100/63 - 119/69)  RR: 20 (22 @ 00:25) (20 - 20)  SpO2: 97% (22 @ 00:25) (97% - 98%)    Procedure:   Consent in Chart: [ x ] Yes [  ] No  Diet: Diet, NPO after Midnight:      NPO Start Date: 21-Aug-2022,   NPO Start Time: 23:59  Except Medications (22 @ 15:00)    Fluids: cholecalciferol 1000 Unit(s) Oral daily  magnesium oxide 400 milliGRAM(s) Oral three times a day with meals  multivitamin 1 Tablet(s) Oral daily    EK Lead ECG:   Ventricular Rate 102 BPM    Atrial Rate 102 BPM    P-R Interval 132 ms    QRS Duration 92 ms    Q-T Interval 334 ms    QTC Calculation(Bazett) 435 ms    P Axis 44 degrees    R Axis 32 degrees    T Axis 27 degrees    Diagnosis Line Sinus tachycardia  Otherwise normal ECG    Confirmed by MEILDA RAGSDALE MD ([id number]) on 2022 6:24:42 AM (22 @ 20:01)    CXR:  Xray Chest 1 View- PORTABLE-Routine:   ACC: [id number] EXAM:  XR CHEST PORTABLE ROUTINE 1V                          PROCEDURE DATE:  2022          INTERPRETATION:  Clinical History / Reason for exam: Pneumonia    Comparison : Chest radiograph: 2022    Technique/Positioning: Frontal view of the chest    Findings:    Support devices: Unchanged right pleural catheter.    Cardiac/mediastinum/hilum: Unchanged.    Lung parenchyma/Pleura: Unchanged trace right apical pneumothorax.   Unchanged right opacity.    Skeleton/soft tissues: Unchanged.    Impression:  Unchanged trace right apical pneumothorax. Unchanged right opacity.            --- End of Report ---            LEATHA STANLEY MD; Attending Radiologist  This document has been electronically signed. Aug 21 2022 10:01AM (22 @ 05:42)    Pre-OP Labs:  CAPILLARY BLOOD GLUCOSE                              8.7    7.32  )-----------( 317      ( 22 Aug 2022 01:11 )             25.8         133<L>  |  97<L>  |  15  ----------------------------<  96  3.9   |  24  |  0.7    Ca    8.3<L>      22 Aug 2022 01:11  Mg     2.0         TPro  5.8<L>  /  Alb  2.9<L>  /  TBili  <0.2  /  DBili  x   /  AST  15  /  ALT  20  /  AlkPhos  145<H>      PT/INR - ( 22 Aug 2022 01:11 )   PT: 14.40 sec;   INR: 1.25 ratio             Type & Screen: To be ordered    Anticoagulation/Antiplatelet:

## 2022-08-22 NOTE — PROGRESS NOTE ADULT - ASSESSMENT
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 Yesterday pt had a fever of 103 which was not breaking. In the morning pt got out of the bed and had a syncopal episode. Pt fell lightheaded, saw flashes and woke up on the floor. Then pt had sever chest pain on the right side, after which he decided to come to the ED.     #Spontaneous Pneumothorax  #Hx of Melva Danlos  #empyema - 18.4cm  #Sepsis on admission (leukocytosis on 30K, tachycardia, fever)- resolved  - s/p chest tube placement - CTS following - Added on 8/22 for VATS with decortication - patient is at moderate risk for procedure  - pulm following  - covid swab negative  - NPO past midnight   - stat type and screen to be drawn by CTS resident - discussed with intern   - repeat daily cxr - ordered stat now  - echo = ef 58%  - oxygen saturation = 99% on ra  - follow up procal, mrsa, legionella, strep, blood and urine cultures  - on cefepime and flagyl as per ID  - pain control  - antipyretics prn along with cooling blanket as needed    #Seizure disorder  -on keppra and oxcarazepine     #Leukocytosis/thrombocytosis/Anemia r/o MPD  - heme onc c/s appreciated - sent flow cytometry and GABRIELLA 2 studies /ABL/BCR    - follow up peripheral smear   - monitor daily cbc    Patient's cardiologist EDS specialist Dr. Marianela Romero - [phone] - high risk for arterial complications and organ rupture.      Progress Note Handoff  Pending Consults: CTS  Pending Tests: labs, cxr  Pending Results: labs, cxr  Family Discussion: discussed mediation, chest tube, empyema, VATS, all test and consults with pt and his wife bedside. All questions answered. Patient and his wife were considering transfer to [facility] but now decided to undergo VATS here. [phone] Vitaly Meehan working with Dr. Brewer at [facility].   Disposition: Home_x____/SNF______/Other_____/Unknown at this time_____  Spent over 45 min reviewing chart and on coordinating patient care during interdisciplinary rounds     Please call me with any questions at extension [phone]

## 2022-08-22 NOTE — PROGRESS NOTE ADULT - SUBJECTIVE AND OBJECTIVE BOX
Patient is a 46y old  Male who presents with a chief complaint of Spontaneous R PTX S/P pigtail (22 Aug 2022 06:59)        Over Night Events:  For possible VATS today.  off pressors.  On RA         ROS:     All ROS are negative except HPI         PHYSICAL EXAM    ICU Vital Signs Last 24 Hrs  T(C): 37.4 (22 Aug 2022 08:59), Max: 38.6 (21 Aug 2022 16:33)  T(F): 99.3 (22 Aug 2022 08:59), Max: 101.4 (21 Aug 2022 16:33)  HR: 84 (22 Aug 2022 08:59) (62 - 95)  BP: 105/68 (22 Aug 2022 08:59) (100/63 - 114/77)  BP(mean): 80 (22 Aug 2022 08:17) (80 - 80)  ABP: --  ABP(mean): --  RR: 17 (22 Aug 2022 08:59) (17 - 20)  SpO2: 98% (22 Aug 2022 08:59) (97% - 98%)    O2 Parameters below as of 22 Aug 2022 08:17  Patient On (Oxygen Delivery Method): room air            CONSTITUTIONAL:  IN  NAD    ENT:   Airway patent,   Mouth with normal mucosa.   No thrush    EYES:   Pupils equal,   Round and reactive to light.    CARDIAC:   Normal rate,   Regular rhythm.      RESPIRATORY:   No wheezing  Bilateral BS  Normal chest expansion  Not tachypneic,  No use of accessory muscles    GASTROINTESTINAL:  Abdomen soft,   Non-tender,   No guarding,   + BS    MUSCULOSKELETAL:   Range of motion is not limited,  No clubbing, cyanosis    NEUROLOGICAL:   Alert and oriented   No motor  deficits.    SKIN:   Skin normal color for race,   Warm and dry and intact.   No evidence of rash.    PSYCHIATRIC:   No apparent risk to self or others.          08-21-22 @ 07:01  -  08-22-22 @ 07:00  --------------------------------------------------------  IN:    IV PiggyBack: 100 mL  Total IN: 100 mL    OUT:    Chest Tube (mL): 0 mL    Voided (mL): 600 mL  Total OUT: 600 mL    Total NET: -500 mL          LABS:                            8.7    7.32  )-----------( 317      ( 22 Aug 2022 01:11 )             25.8                                               08-22    133<L>  |  97<L>  |  15  ----------------------------<  96  3.9   |  24  |  0.7    Ca    8.3<L>      22 Aug 2022 01:11  Mg     2.0     08-22    TPro  5.8<L>  /  Alb  2.9<L>  /  TBili  <0.2  /  DBili  x   /  AST  15  /  ALT  20  /  AlkPhos  145<H>  08-20      PT/INR - ( 22 Aug 2022 01:11 )   PT: 14.40 sec;   INR: 1.25 ratio                                                                                              LIVER FUNCTIONS - ( 20 Aug 2022 23:30 )  Alb: 2.9 g/dL / Pro: 5.8 g/dL / ALK PHOS: 145 U/L / ALT: 20 U/L / AST: 15 U/L / GGT: x                                                                                                                                       MEDICATIONS  (STANDING):  aspirin enteric coated 81 milliGRAM(s) Oral daily  cefepime   IVPB 2000 milliGRAM(s) IV Intermittent every 8 hours  cefepime   IVPB      cholecalciferol 1000 Unit(s) Oral daily  enoxaparin Injectable 40 milliGRAM(s) SubCutaneous every 24 hours  levETIRAcetam 1125 milliGRAM(s) Oral daily  levETIRAcetam 1500 milliGRAM(s) Oral at bedtime  magnesium oxide 400 milliGRAM(s) Oral three times a day with meals  melatonin 3 milliGRAM(s) Oral at bedtime  metroNIDAZOLE    Tablet 500 milliGRAM(s) Oral every 8 hours  multivitamin 1 Tablet(s) Oral daily  OXcarbazepine 600 milliGRAM(s) Oral <User Schedule>  OXcarbazepine 300 milliGRAM(s) Oral <User Schedule>    MEDICATIONS  (PRN):  acetaminophen     Tablet .. 650 milliGRAM(s) Oral every 6 hours PRN Temp greater or equal to 38C (100.4F), Mild Pain (1 - 3)  acetaminophen     Tablet .. 650 milliGRAM(s) Oral once PRN Temp greater or equal to 38C (100.4F)  ibuprofen  Tablet. 600 milliGRAM(s) Oral every 6 hours PRN Temp greater or equal to 38C (100.4F)  morphine  - Injectable 2 milliGRAM(s) IV Push every 6 hours PRN Moderate Pain (4 - 6)      New X-rays reviewed:                                                                                  ECHO

## 2022-08-22 NOTE — CONSULT NOTE ADULT - SUBJECTIVE AND OBJECTIVE BOX
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 Yesterday pt had a fever of 103 which was not breaking. In the morning pt got out of the bed and had a syncopal episode. Pt fell lightheaded, saw flashes and woke up on the floor. Then pt had sever chest pain on the right side, after which he decided to come to the ED.     Off note pt was supposed to fup with Hematology/ Oncology for abnormal blood counts, pts PCP was considering a bone biopsy.    ED vitals 108/68, , RR 17, temp 100.9, O2 sat 98% on RA    Sig Labs: WBC 30.85, Hg 11.5    EKG Sinus tachycardia    CT chest w IV cont: 50% right-sided pneumothorax under tension. Lucencies seen within the lung parenchyma and may represent air trapped within fissures.    CT abd/ pelvis: neg for acute pathology    In the ED a chest tube was placed on the right, 1x dose of vanc and keke and was given 2.5 L bolus of fluid (19 Aug 2022 00:42)      ---  cardio fellow additional notes:      47yo M hx of melva-danlos syndrome followed at [facility], perforation of sigmoid colon s/p Kerri's, Colonic performation w/ ileostomy in , epilepsy, HTN, celiac aneurysm s/p coiling () presented to the hospital for fevers and cough followed by acute severe chest pain, found to have spontaneous pneumothorax s/p chest tube placement.    Patient is planned for VATS procedure today as an add-on. Cardiology consulted for preop-risk stratification.        PAST MEDICAL & SURGICAL HISTORY  EDS (Melva-Danlos syndrome)    Epilepsy    HTN (hypertension)    Keratoconus    Aneurysm artery, celiac  s/p coiling 2010    Dislocations and subluxations    Status post Kerri&#x27;s procedure  2019        FAMILY HISTORY:  FAMILY HISTORY:      SOCIAL HISTORY:  Social History:      ALLERGIES:  moxifloxacin (Hives)  penicillin (Unknown)      MEDICATIONS:  aspirin enteric coated 81 milliGRAM(s) Oral daily  cefepime   IVPB 2000 milliGRAM(s) IV Intermittent every 8 hours  cefepime   IVPB      cholecalciferol 1000 Unit(s) Oral daily  enoxaparin Injectable 40 milliGRAM(s) SubCutaneous every 24 hours  lactated ringers. 1000 milliLiter(s) (50 mL/Hr) IV Continuous <Continuous>  levETIRAcetam 1125 milliGRAM(s) Oral daily  levETIRAcetam 1500 milliGRAM(s) Oral at bedtime  magnesium oxide 400 milliGRAM(s) Oral three times a day with meals  melatonin 3 milliGRAM(s) Oral at bedtime  metroNIDAZOLE    Tablet 500 milliGRAM(s) Oral every 8 hours  multivitamin 1 Tablet(s) Oral daily  OXcarbazepine 600 milliGRAM(s) Oral <User Schedule>  OXcarbazepine 300 milliGRAM(s) Oral <User Schedule>    PRN:  acetaminophen     Tablet .. 650 milliGRAM(s) Oral every 6 hours PRN  acetaminophen     Tablet .. 650 milliGRAM(s) Oral once PRN  ibuprofen  Tablet. 600 milliGRAM(s) Oral every 6 hours PRN  morphine  - Injectable 2 milliGRAM(s) IV Push every 6 hours PRN      HOME MEDICATIONS:  Home Medications:  Aspir 81 oral delayed release tablet: orally once a day (19 Aug 2022 03:20)  Keppra: 1125 milligram(s) orally once a day (19 Aug 2022 03:20)  Keppra: 1500 milligram(s) orally once a day (at bedtime) (19 Aug 2022 03:20)  LISINOPRIL   TAB 30MG:  (19 Aug 2022 03:20)  Multiple Vitamins oral tablet: 1 tab(s) orally once a day (19 Aug 2022 03:20)  OXCARBAZEPIN TAB 600M milligram(s) orally once a day (at bedtime) (19 Aug 2022 03:20)  Trileptal 300 mg oral tablet: 300 milligram(s) orally once a day in the morning (19 Aug 2022 03:20)  Vitamin D3 25 mcg (1000 intl units) oral tablet, chewable: 1 tab(s) orally once a day (19 Aug 2022 03:20)      VITALS:   T(F): 99.3 (08- @ 08:59), Max: 103.7 ( @ 17:27)  HR: 84 ( @ 08:59) (62 - 109)  BP: 105/68 ( @ 08:59) (100/63 - 127/79)  BP(mean): 80 ( @ 08:17) (79 - 86)  RR: 17 ( @ 08:59) (17 - 20)  SpO2: 98% ( @ 08:59) (95% - 98%)    I&O's Summary    21 Aug 2022 07:01  -  22 Aug 2022 07:00  --------------------------------------------------------  IN: 100 mL / OUT: 600 mL / NET: -500 mL        REVIEW OF SYSTEMS:  CONSTITUTIONAL: No weakness, fevers or chills  HEENT: No visual changes, neck/ear pain  RESPIRATORY: No cough, sob  CARDIOVASCULAR: See HPI  GASTROINTESTINAL: No abdominal pain. No nausea, vomiting, diarrhea   GENITOURINARY: No dysuria, frequency or hematuria  NEUROLOGICAL: No new focal deficits  SKIN: No new rashes    PHYSICAL EXAM:  General: Not in distress.  Non-toxic appearing.   HEENT: EOMI  Cardio: regular, S1, S2, no murmur  Pulm: B/L BS.  No wheezing / crackles / rales  Abdomen: Soft, non-tender, non-distended. Normoactive bowel sounds  Extremities: No edema b/l le  Neuro: A&O x3. No focal deficits    LABS:                        8.7    7.32  )-----------( 317      ( 22 Aug 2022 01:11 )             25.8     08    133<L>  |  97<L>  |  15  ----------------------------<  96  3.9   |  24  |  0.7    Ca    8.3<L>      22 Aug 2022 01:11  Mg     2.0         TPro  5.8<L>  /  Alb  2.9<L>  /  TBili  <0.2  /  DBili  x   /  AST  15  /  ALT  20  /  AlkPhos  145<H>  08    PT/INR - ( 22 Aug 2022 01:11 )   PT: 14.40 sec;   INR: 1.25 ratio                   Troponin trend:        COVID-19 PCR: NotDetec (21 Aug 2022 17:39)  SARS-CoV-2: NotDete (18 Aug 2022 23:50)  COVID-19 PCR: NotDete (18 Aug 2022 20:30)      RADIOLOGY:  -CXR: < from: Xray Chest 1 View- PORTABLE-Routine (Xray Chest 1 View- PORTABLE-Routine in AM.) (22 @ 05:42) >  Findings:    Support devices: Unchanged right pleural catheter.    Cardiac/mediastinum/hilum: Unchanged.    Lung parenchyma/Pleura: Unchanged trace right apical pneumothorax.   Unchanged right opacity.    Skeleton/soft tissues: Unchanged.    Impression:  Unchanged trace right apical pneumothorax. Unchanged right opacity.    < end of copied text >    -TTE: < from: TTE Echo Complete w/o Contrast w/ Doppler (22 @ 10:11) >    Summary:   1. Normal global left ventricular systolic function.   2. LV Ejection Fraction by Landry's Method with a biplane EF of 58 %.   3. Normal left ventricular internal cavity size.   4. Normal left atrial size.   5. Normal right atrial size.   6. Mild mitral valve regurgitation.   7. LA volume Index is 27.2 ml/m² ml/m2.    < end of copied text >    -CCTA: none  -STRESS TEST: none  -CATHETERIZATION: none  -OTHER:   ECG: < from: 12 Lead ECG (22 @ 20:01) >  Ventricular Rate 102 BPM    Atrial Rate 102 BPM    P-R Interval 132 ms    QRS Duration 92 ms    Q-T Interval 334 ms    QTC Calculation(Bazett) 435 ms    P Axis 44 degrees    R Axis 32 degrees    T Axis 27 degrees    Diagnosis Line Sinus tachycardia  Otherwise normal ECG    < end of copied text >        TELEMETRY EVENTS:    * SUMMARY:    * Patient-based characteristics (Functional capacity)  Patient is able to achieve more than 4 MET (walk 4 blocks, climb 2 flights of stairs, etc...)          Y [X] / N []    High-risk patient features:  - Recent (<30 days) or active MI          Y [] / N [X]  - Unstable or severe angina          Y [] / N [X]  - Decompensated heart failure, or worsening or new-onset heart failure          Y [] / N [X]  - Severe valvular disease          Y [] / N [X]  - Significant arrhythmia (Tachy- or Bradyarrhythmia)          Y [] / N [X]    * Surgery/Procedure-based characteristics (Type of surgery)  - Low-risk procedure (outpatient procedure, elective, endoscopy, etc...)          Y [] / N []  - Elevated or Moderate-risk procedure (Inpatient)          Y [] / N []  - High-risk procedure (urgent/emergent procedure, Intrathoracic, vascular, etc...)          Y [X] / N []    * Revised Cardiac Risk Index (RCRI)  1- History of ischemic heart disease          Y [] / N [X]  2- History of congestive heart failure          Y [] / N [X]  3- History of stroke/TIA          Y [] / N [X]  4- History of insulin-dependent diabetes          Y [] / N [X]  5- Chronic kidney disease (Cr >2mg/dL)          Y [] / N [X]  6- Undergoing suprainguinal vascular, intraperitoneal, or intrathoracic surgery          Y [X] / N []    Class I risk (Zero factors) --> 3.9% (30-day risk of death, MI, or cardiac arrest)  Class II risk (One factor) --> 6% risk (30-day risk of death, MI, or cardiac arrest)  Class III risk (Two factors) --> 10.1% risk (30-day risk of death, MI, or cardiac arrest)  Class IV risk (Three factors or more) --> >15% risk (30-day risk of death, MI, or cardiac arrest)       Outpt cardiologist:    HPI:  46M with PMH Melva-Danlos syndrome followed at [facility] ([facility]--GI, [facility]--Vascular), perforation of sigmoid colon s/p Kerri's, Colonic perforation w/ ileostomy in , epilepsy, HTN, celiac aneurysm s/p coiling 2010 presented to the ED for intermittent fevers and mixed productive and non-productive cough for 1 month. Yesterday pt had a fever of 103 which was not breaking. In the morning pt got out of the bed and had a syncopal episode. Pt fell lightheaded, saw flashes and woke up on the floor. Then pt had sever chest pain on the right side, after which he decided to come to the ED.     Off note pt was supposed to fup with Hematology/ Oncology for abnormal blood counts, pts PCP was considering a bone biopsy.    ED vitals 108/68, , RR 17, temp 100.9, O2 sat 98% on RA    Sig Labs: WBC 30.85, Hg 11.5    EKG Sinus tachycardia    CT chest w IV cont: 50% right-sided pneumothorax under tension. Lucencies seen within the lung parenchyma and may represent air trapped within fissures.    CT abd/ pelvis: neg for acute pathology    In the ED a chest tube was placed on the right, 1x dose of vanc and keke and was given 2.5 L bolus of fluid (19 Aug 2022 00:42)      ---  cardio fellow additional notes:      45yo M hx of melva-danlos syndrome followed at [facility], perforation of sigmoid colon s/p Kerri's, Colonic performation w/ ileostomy in , epilepsy, HTN, celiac aneurysm s/p coiling () presented to the hospital for fevers and cough followed by acute severe chest pain, found to have spontaneous pneumothorax s/p chest tube placement.    Patient is planned for VATS procedure today as an add-on vs possible transfer to [facility]. Cardiology consulted for preop-risk stratification.        PAST MEDICAL & SURGICAL HISTORY  EDS (Melva-Danlos syndrome)    Epilepsy    HTN (hypertension)    Keratoconus    Aneurysm artery, celiac  s/p coiling 2010    Dislocations and subluxations    Status post Kerri&#x27;s procedure  2019        FAMILY HISTORY:  FAMILY HISTORY:      SOCIAL HISTORY:  Social History:      ALLERGIES:  moxifloxacin (Hives)  penicillin (Unknown)      MEDICATIONS:  aspirin enteric coated 81 milliGRAM(s) Oral daily  cefepime   IVPB 2000 milliGRAM(s) IV Intermittent every 8 hours  cefepime   IVPB      cholecalciferol 1000 Unit(s) Oral daily  enoxaparin Injectable 40 milliGRAM(s) SubCutaneous every 24 hours  lactated ringers. 1000 milliLiter(s) (50 mL/Hr) IV Continuous <Continuous>  levETIRAcetam 1125 milliGRAM(s) Oral daily  levETIRAcetam 1500 milliGRAM(s) Oral at bedtime  magnesium oxide 400 milliGRAM(s) Oral three times a day with meals  melatonin 3 milliGRAM(s) Oral at bedtime  metroNIDAZOLE    Tablet 500 milliGRAM(s) Oral every 8 hours  multivitamin 1 Tablet(s) Oral daily  OXcarbazepine 600 milliGRAM(s) Oral <User Schedule>  OXcarbazepine 300 milliGRAM(s) Oral <User Schedule>    PRN:  acetaminophen     Tablet .. 650 milliGRAM(s) Oral every 6 hours PRN  acetaminophen     Tablet .. 650 milliGRAM(s) Oral once PRN  ibuprofen  Tablet. 600 milliGRAM(s) Oral every 6 hours PRN  morphine  - Injectable 2 milliGRAM(s) IV Push every 6 hours PRN      HOME MEDICATIONS:  Home Medications:  Aspir 81 oral delayed release tablet: orally once a day (19 Aug 2022 03:20)  Keppra: 1125 milligram(s) orally once a day (19 Aug 2022 03:20)  Keppra: 1500 milligram(s) orally once a day (at bedtime) (19 Aug 2022 03:20)  LISINOPRIL   TAB 30MG:  (19 Aug 2022 03:20)  Multiple Vitamins oral tablet: 1 tab(s) orally once a day (19 Aug 2022 03:20)  OXCARBAZEPIN TAB 600M milligram(s) orally once a day (at bedtime) (19 Aug 2022 03:20)  Trileptal 300 mg oral tablet: 300 milligram(s) orally once a day in the morning (19 Aug 2022 03:20)  Vitamin D3 25 mcg (1000 intl units) oral tablet, chewable: 1 tab(s) orally once a day (19 Aug 2022 03:20)      VITALS:   T(F): 99.3 ( @ 08:59), Max: 103.7 ( @ 17:27)  HR: 84 ( @ 08:59) (62 - 109)  BP: 105/68 ( @ 08:59) (100/63 - 127/79)  BP(mean): 80 ( @ 08:17) (79 - 86)  RR: 17 ( @ 08:59) (17 - 20)  SpO2: 98% ( @ 08:59) (95% - 98%)    I&O's Summary    21 Aug 2022 07:01  -  22 Aug 2022 07:00  --------------------------------------------------------  IN: 100 mL / OUT: 600 mL / NET: -500 mL        REVIEW OF SYSTEMS:  CONSTITUTIONAL: No weakness, fevers or chills  HEENT: No visual changes, neck/ear pain  RESPIRATORY: (+) dypnea but improved, (+) cough, (-) wheeze  CARDIOVASCULAR: (+) chest pain, (-) palpitations  GASTROINTESTINAL: No abdominal pain. No nausea, vomiting, diarrhea   GENITOURINARY: No dysuria, frequency or hematuria  NEUROLOGICAL: No new focal deficits  SKIN: (+) ileostomy bag, (+) pigtail catheter on right sided chest wall    PHYSICAL EXAM:  General: Not in distress.  Non-toxic appearing.   HEENT: EOMI  Cardio: regular, S1, S2, no murmur, no JVD, (+) chest wall tenderness on right-side  Pulm: Decreased to almost no breath sounds in the right-side. Left side BS clear to auscultation. No wheezing / crackles / rales  Abdomen: Soft, non-tender, non-distended. Normoactive bowel sounds, ileostomy bag on RLQ  Extremities: No edema b/l le  Neuro: A&O x3. No focal deficits    LABS:                        8.7    7.32  )-----------( 317      ( 22 Aug 2022 01:11 )             25.8         133<L>  |  97<L>  |  15  ----------------------------<  96  3.9   |  24  |  0.7    Ca    8.3<L>      22 Aug 2022 01:11  Mg     2.0         TPro  5.8<L>  /  Alb  2.9<L>  /  TBili  <0.2  /  DBili  x   /  AST  15  /  ALT  20  /  AlkPhos  145<H>      PT/INR - ( 22 Aug 2022 01:11 )   PT: 14.40 sec;   INR: 1.25 ratio                   Troponin trend:        COVID-19 PCR: NotDetec (21 Aug 2022 17:39)  SARS-CoV-2: NotDetec (18 Aug 2022 23:50)  COVID-19 PCR: NotDetec (18 Aug 2022 20:30)      RADIOLOGY:  -CXR: < from: Xray Chest 1 View- PORTABLE-Routine (Xray Chest 1 View- PORTABLE-Routine in AM.) (22 @ 05:42) >  Findings:    Support devices: Unchanged right pleural catheter.    Cardiac/mediastinum/hilum: Unchanged.    Lung parenchyma/Pleura: Unchanged trace right apical pneumothorax.   Unchanged right opacity.    Skeleton/soft tissues: Unchanged.    Impression:  Unchanged trace right apical pneumothorax. Unchanged right opacity.    < end of copied text >    -TTE: < from: TTE Echo Complete w/o Contrast w/ Doppler (22 @ 10:11) >    Summary:   1. Normal global left ventricular systolic function.   2. LV Ejection Fraction by Landry's Method with a biplane EF of 58 %.   3. Normal left ventricular internal cavity size.   4. Normal left atrial size.   5. Normal right atrial size.   6. Mild mitral valve regurgitation.   7. LA volume Index is 27.2 ml/m² ml/m2.    < end of copied text >    -CCTA: none  -STRESS TEST: none  -CATHETERIZATION: none  -OTHER:   ECG: < from: 12 Lead ECG (22 @ 20:01) >  Ventricular Rate 102 BPM    Atrial Rate 102 BPM    P-R Interval 132 ms    QRS Duration 92 ms    Q-T Interval 334 ms    QTC Calculation(Bazett) 435 ms    P Axis 44 degrees    R Axis 32 degrees    T Axis 27 degrees    Diagnosis Line Sinus tachycardia  Otherwise normal ECG    < end of copied text >        TELEMETRY EVENTS:  SpO2 88-89% x2 episodes, HR elevated to 101 on 7:30 but immediately improved. Otherwise, no acute events on telemetry.    * SUMMARY:    * Patient-based characteristics (Functional capacity)  Patient is able to achieve more than 4 MET (walk 4 blocks, climb 2 flights of stairs, etc...)          Y [X] / N []    High-risk patient features:  - Recent (<30 days) or active MI          Y [] / N [X]  - Unstable or severe angina          Y [] / N [X]  - Decompensated heart failure, or worsening or new-onset heart failure          Y [] / N [X]  - Severe valvular disease          Y [] / N [X]  - Significant arrhythmia (Tachy- or Bradyarrhythmia)          Y [] / N [X]    * Surgery/Procedure-based characteristics (Type of surgery)  - Low-risk procedure (outpatient procedure, elective, endoscopy, etc...)          Y [] / N []  - Elevated or Moderate-risk procedure (Inpatient)          Y [] / N []  - High-risk procedure (urgent/emergent procedure, Intrathoracic, vascular, etc...)          Y [X] / N []    * Revised Cardiac Risk Index (RCRI)  1- History of ischemic heart disease          Y [] / N [X]  2- History of congestive heart failure          Y [] / N [X]  3- History of stroke/TIA          Y [] / N [X]  4- History of insulin-dependent diabetes          Y [] / N [X]  5- Chronic kidney disease (Cr >2mg/dL)          Y [] / N [X]  6- Undergoing suprainguinal vascular, intraperitoneal, or intrathoracic surgery          Y [X] / N []      Class II risk (One factor) --> 6% risk (30-day risk of death, MI, or cardiac arrest)         Outpt cardiologist: Dr. 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 Yesterday pt had a fever of 103 which was not breaking. In the morning pt got out of the bed and had a syncopal episode. Pt fell lightheaded, saw flashes and woke up on the floor. Then pt had sever chest pain on the right side, after which he decided to come to the ED.     Off note pt was supposed to fup with Hematology/ Oncology for abnormal blood counts, pts PCP was considering a bone biopsy.    ED vitals 108/68, , RR 17, temp 100.9, O2 sat 98% on RA    Sig Labs: WBC 30.85, Hg 11.5    EKG Sinus tachycardia    CT chest w IV cont: 50% right-sided pneumothorax under tension. Lucencies seen within the lung parenchyma and may represent air trapped within fissures.    CT abd/ pelvis: neg for acute pathology    In the ED a chest tube was placed on the right, 1x dose of vanc and keke and was given 2.5 L bolus of fluid (19 Aug 2022 00:42)      ---  cardio fellow additional notes:      45yo M hx of melva-danlos syndrome followed at [facility], perforation of sigmoid colon s/p Kerri's, Colonic performation w/ ileostomy in , epilepsy, HTN, celiac aneurysm s/p coiling () presented to the hospital for fevers and cough followed by acute severe chest pain, found to have spontaneous pneumothorax s/p chest tube placement.    Patient is planned for VATS procedure today as an add-on vs possible transfer to [facility]. Cardiology consulted for preop-risk stratification.        PAST MEDICAL & SURGICAL HISTORY  EDS (Melva-Danlos syndrome)    Epilepsy    HTN (hypertension)    Keratoconus    Aneurysm artery, celiac  s/p coiling 2010    Dislocations and subluxations    Status post Kerri&#x27;s procedure  2019        FAMILY HISTORY:  FAMILY HISTORY:      SOCIAL HISTORY:  Social History:      ALLERGIES:  moxifloxacin (Hives)  penicillin (Unknown)      MEDICATIONS:  aspirin enteric coated 81 milliGRAM(s) Oral daily  cefepime   IVPB 2000 milliGRAM(s) IV Intermittent every 8 hours  cefepime   IVPB      cholecalciferol 1000 Unit(s) Oral daily  enoxaparin Injectable 40 milliGRAM(s) SubCutaneous every 24 hours  lactated ringers. 1000 milliLiter(s) (50 mL/Hr) IV Continuous <Continuous>  levETIRAcetam 1125 milliGRAM(s) Oral daily  levETIRAcetam 1500 milliGRAM(s) Oral at bedtime  magnesium oxide 400 milliGRAM(s) Oral three times a day with meals  melatonin 3 milliGRAM(s) Oral at bedtime  metroNIDAZOLE    Tablet 500 milliGRAM(s) Oral every 8 hours  multivitamin 1 Tablet(s) Oral daily  OXcarbazepine 600 milliGRAM(s) Oral <User Schedule>  OXcarbazepine 300 milliGRAM(s) Oral <User Schedule>    PRN:  acetaminophen     Tablet .. 650 milliGRAM(s) Oral every 6 hours PRN  acetaminophen     Tablet .. 650 milliGRAM(s) Oral once PRN  ibuprofen  Tablet. 600 milliGRAM(s) Oral every 6 hours PRN  morphine  - Injectable 2 milliGRAM(s) IV Push every 6 hours PRN      HOME MEDICATIONS:  Home Medications:  Aspir 81 oral delayed release tablet: orally once a day (19 Aug 2022 03:20)  Keppra: 1125 milligram(s) orally once a day (19 Aug 2022 03:20)  Keppra: 1500 milligram(s) orally once a day (at bedtime) (19 Aug 2022 03:20)  LISINOPRIL   TAB 30MG:  (19 Aug 2022 03:20)  Multiple Vitamins oral tablet: 1 tab(s) orally once a day (19 Aug 2022 03:20)  OXCARBAZEPIN TAB 600M milligram(s) orally once a day (at bedtime) (19 Aug 2022 03:20)  Trileptal 300 mg oral tablet: 300 milligram(s) orally once a day in the morning (19 Aug 2022 03:20)  Vitamin D3 25 mcg (1000 intl units) oral tablet, chewable: 1 tab(s) orally once a day (19 Aug 2022 03:20)      VITALS:   T(F): 99.3 ( @ 08:59), Max: 103.7 ( @ 17:27)  HR: 84 ( @ 08:59) (62 - 109)  BP: 105/68 ( @ 08:59) (100/63 - 127/79)  BP(mean): 80 ( @ 08:17) (79 - 86)  RR: 17 ( @ 08:59) (17 - 20)  SpO2: 98% ( @ 08:59) (95% - 98%)    I&O's Summary    21 Aug 2022 07:01  -  22 Aug 2022 07:00  --------------------------------------------------------  IN: 100 mL / OUT: 600 mL / NET: -500 mL        REVIEW OF SYSTEMS:  CONSTITUTIONAL: No weakness, fevers or chills  HEENT: No visual changes, neck/ear pain  RESPIRATORY: (+) dypnea but improved, (+) cough, (-) wheeze  CARDIOVASCULAR: (+) chest pain, (-) palpitations  GASTROINTESTINAL: No abdominal pain. No nausea, vomiting, diarrhea   GENITOURINARY: No dysuria, frequency or hematuria  NEUROLOGICAL: No new focal deficits  SKIN: (+) ileostomy bag, (+) pigtail catheter on right sided chest wall    PHYSICAL EXAM:  General: Not in distress.  Non-toxic appearing.   HEENT: EOMI  Cardio: regular, S1, S2, no murmur, no JVD, (+) chest wall tenderness on right-side  Pulm: Decreased to almost no breath sounds in the right-side. Left side BS clear to auscultation. No wheezing / crackles / rales  Abdomen: Soft, non-tender, non-distended. Normoactive bowel sounds, ileostomy bag on RLQ  Extremities: No edema b/l le  Neuro: A&O x3. No focal deficits    LABS:                        8.7    7.32  )-----------( 317      ( 22 Aug 2022 01:11 )             25.8     08    133<L>  |  97<L>  |  15  ----------------------------<  96  3.9   |  24  |  0.7    Ca    8.3<L>      22 Aug 2022 01:11  Mg     2.0         TPro  5.8<L>  /  Alb  2.9<L>  /  TBili  <0.2  /  DBili  x   /  AST  15  /  ALT  20  /  AlkPhos  145<H>      PT/INR - ( 22 Aug 2022 01:11 )   PT: 14.40 sec;   INR: 1.25 ratio                   Troponin trend:        COVID-19 PCR: NotDetec (21 Aug 2022 17:39)  SARS-CoV-2: NotDetec (18 Aug 2022 23:50)  COVID-19 PCR: NotDetec (18 Aug 2022 20:30)      RADIOLOGY:  -CXR: < from: Xray Chest 1 View- PORTABLE-Routine (Xray Chest 1 View- PORTABLE-Routine in AM.) (22 @ 05:42) >  Findings:    Support devices: Unchanged right pleural catheter.    Cardiac/mediastinum/hilum: Unchanged.    Lung parenchyma/Pleura: Unchanged trace right apical pneumothorax.   Unchanged right opacity.    Skeleton/soft tissues: Unchanged.    Impression:  Unchanged trace right apical pneumothorax. Unchanged right opacity.    < end of copied text >    -TTE: < from: TTE Echo Complete w/o Contrast w/ Doppler (22 @ 10:11) >    Summary:   1. Normal global left ventricular systolic function.   2. LV Ejection Fraction by Landry's Method with a biplane EF of 58 %.   3. Normal left ventricular internal cavity size.   4. Normal left atrial size.   5. Normal right atrial size.   6. Mild mitral valve regurgitation.   7. LA volume Index is 27.2 ml/m² ml/m2.    < end of copied text >    -CCTA: none  -STRESS TEST: none  -CATHETERIZATION: none  -OTHER:   ECG: < from: 12 Lead ECG (22 @ 20:01) >  Ventricular Rate 102 BPM    Atrial Rate 102 BPM    P-R Interval 132 ms    QRS Duration 92 ms    Q-T Interval 334 ms    QTC Calculation(Bazett) 435 ms    P Axis 44 degrees    R Axis 32 degrees    T Axis 27 degrees    Diagnosis Line Sinus tachycardia  Otherwise normal ECG    < end of copied text >        TELEMETRY EVENTS:  SpO2 88-89% x2 episodes, HR elevated to 101 on 7:30 but immediately improved. Otherwise, no acute events on telemetry.    * SUMMARY:    * Patient-based characteristics (Functional capacity)  Patient is able to achieve more than 4 MET (walk 4 blocks, climb 2 flights of stairs, etc...)          Y [X] / N []    High-risk patient features:  - Recent (<30 days) or active MI          Y [] / N [X]  - Unstable or severe angina          Y [] / N [X]  - Decompensated heart failure, or worsening or new-onset heart failure          Y [] / N [X]  - Severe valvular disease          Y [] / N [X]  - Significant arrhythmia (Tachy- or Bradyarrhythmia)          Y [] / N [X]    * Surgery/Procedure-based characteristics (Type of surgery)  - Low-risk procedure (outpatient procedure, elective, endoscopy, etc...)          Y [] / N []  - Elevated or Moderate-risk procedure (Inpatient)          Y [] / N []  - High-risk procedure (urgent/emergent procedure, Intrathoracic, vascular, etc...)          Y [X] / N []    * Revised Cardiac Risk Index (RCRI)  1- History of ischemic heart disease          Y [] / N [X]  2- History of congestive heart failure          Y [] / N [X]  3- History of stroke/TIA          Y [] / N [X]  4- History of insulin-dependent diabetes          Y [] / N [X]  5- Chronic kidney disease (Cr >2mg/dL)          Y [] / N [X]  6- Undergoing suprainguinal vascular, intraperitoneal, or intrathoracic surgery          Y [X] / N []      Class II risk (One factor) --> 6% risk (30-day risk of death, MI, or cardiac arrest)         Outpt cardiologist: Dr. 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 On the night before his admission, he was at home patient had a fever of 103 which was not breaking. In the following morning, pt got out of the bed and had a syncopal episode. Pt fell lightheaded, saw flashes and woke up on the floor. Then pt had sever chest pain on the right side, after which he decided to come to the ED.     Of note pt was supposed to follow up with Hematology/ Oncology for abnormal blood counts, and patient's PCP was considering a bone biopsy.    ED vitals 108/68, , RR 17, temp 100.9, O2 sat 98% on RA    Sig Labs: WBC 30.85, Hg 11.5    EKG Sinus tachycardia    CT chest w IV cont: 50% right-sided pneumothorax under tension. Lucencies seen within the lung parenchyma and may represent air trapped within fissures.    CT abd/ pelvis: neg for acute pathology    In the ED a chest tube was placed on the right, 1x dose of vanc and keke and was given 2.5 L bolus of fluid (19 Aug 2022 00:42)      ---  cardio fellow additional notes:      47yo M hx of melva-danlos syndrome followed at [facility], perforation of sigmoid colon s/p Kerri's, Colonic performation w/ ileostomy in , epilepsy, HTN, celiac aneurysm s/p coiling () presented to the hospital for fevers and cough followed by acute severe chest pain, found to have spontaneous pneumothorax s/p chest tube placement.    Patient is planned for VATS procedure today as an add-on vs. possible transfer to [facility]. Cardiology consulted for preop-risk stratification.        PAST MEDICAL & SURGICAL HISTORY  EDS (Melva-Danlos syndrome)    Epilepsy    HTN (hypertension)    Keratoconus    Aneurysm artery, celiac  s/p coiling 2010    Dislocations and subluxations    Status post Kerri&#x27;s procedure  2019        FAMILY HISTORY:  FAMILY HISTORY:      SOCIAL HISTORY:  Social History:      ALLERGIES:  moxifloxacin (Hives)  penicillin (Unknown)      MEDICATIONS:  aspirin enteric coated 81 milliGRAM(s) Oral daily  cefepime   IVPB 2000 milliGRAM(s) IV Intermittent every 8 hours  cefepime   IVPB      cholecalciferol 1000 Unit(s) Oral daily  enoxaparin Injectable 40 milliGRAM(s) SubCutaneous every 24 hours  lactated ringers. 1000 milliLiter(s) (50 mL/Hr) IV Continuous <Continuous>  levETIRAcetam 1125 milliGRAM(s) Oral daily  levETIRAcetam 1500 milliGRAM(s) Oral at bedtime  magnesium oxide 400 milliGRAM(s) Oral three times a day with meals  melatonin 3 milliGRAM(s) Oral at bedtime  metroNIDAZOLE    Tablet 500 milliGRAM(s) Oral every 8 hours  multivitamin 1 Tablet(s) Oral daily  OXcarbazepine 600 milliGRAM(s) Oral <User Schedule>  OXcarbazepine 300 milliGRAM(s) Oral <User Schedule>    PRN:  acetaminophen     Tablet .. 650 milliGRAM(s) Oral every 6 hours PRN  acetaminophen     Tablet .. 650 milliGRAM(s) Oral once PRN  ibuprofen  Tablet. 600 milliGRAM(s) Oral every 6 hours PRN  morphine  - Injectable 2 milliGRAM(s) IV Push every 6 hours PRN      HOME MEDICATIONS:  Home Medications:  Aspir 81 oral delayed release tablet: orally once a day (19 Aug 2022 03:20)  Keppra: 1125 milligram(s) orally once a day (19 Aug 2022 03:20)  Keppra: 1500 milligram(s) orally once a day (at bedtime) (19 Aug 2022 03:20)  LISINOPRIL   TAB 30MG:  (19 Aug 2022 03:20)  Multiple Vitamins oral tablet: 1 tab(s) orally once a day (19 Aug 2022 03:20)  OXCARBAZEPIN TAB 600M milligram(s) orally once a day (at bedtime) (19 Aug 2022 03:20)  Trileptal 300 mg oral tablet: 300 milligram(s) orally once a day in the morning (19 Aug 2022 03:20)  Vitamin D3 25 mcg (1000 intl units) oral tablet, chewable: 1 tab(s) orally once a day (19 Aug 2022 03:20)      VITALS:   T(F): 99.3 ( @ 08:59), Max: 103.7 ( @ 17:27)  HR: 84 ( @ 08:59) (62 - 109)  BP: 105/68 ( @ 08:59) (100/63 - 127/79)  BP(mean): 80 ( @ 08:17) (79 - 86)  RR: 17 ( @ 08:59) (17 - 20)  SpO2: 98% ( @ 08:59) (95% - 98%)    I&O's Summary    21 Aug 2022 07:01  -  22 Aug 2022 07:00  --------------------------------------------------------  IN: 100 mL / OUT: 600 mL / NET: -500 mL        REVIEW OF SYSTEMS:  CONSTITUTIONAL: No weakness, fevers or chills  HEENT: No visual changes, neck/ear pain  RESPIRATORY: (+) dypnea but improved, (+) cough, (-) wheeze  CARDIOVASCULAR: (+) chest pain, (-) palpitations  GASTROINTESTINAL: No abdominal pain. No nausea, vomiting, diarrhea   GENITOURINARY: No dysuria, frequency or hematuria  NEUROLOGICAL: No new focal deficits  SKIN: (+) ileostomy bag, (+) pigtail catheter on right sided chest wall    PHYSICAL EXAM:  General: Not in distress.  Non-toxic appearing.   HEENT: EOMI  Cardio: regular, S1, S2, no murmur, no JVD, (+) chest wall tenderness on right-side  Pulm: Decreased to almost no breath sounds in the right-side. Left side BS clear to auscultation. No wheezing / crackles / rales  Abdomen: Soft, non-tender, non-distended. Normoactive bowel sounds, ileostomy bag on RLQ  Extremities: No edema b/l le  Neuro: A&O x3. No focal deficits    LABS:                        8.7    7.32  )-----------( 317      ( 22 Aug 2022 01:11 )             25.8         133<L>  |  97<L>  |  15  ----------------------------<  96  3.9   |  24  |  0.7    Ca    8.3<L>      22 Aug 2022 01:11  Mg     2.0         TPro  5.8<L>  /  Alb  2.9<L>  /  TBili  <0.2  /  DBili  x   /  AST  15  /  ALT  20  /  AlkPhos  145<H>      PT/INR - ( 22 Aug 2022 01:11 )   PT: 14.40 sec;   INR: 1.25 ratio                   Troponin trend:        COVID-19 PCR: NotDetec (21 Aug 2022 17:39)  SARS-CoV-2: NotDetec (18 Aug 2022 23:50)  COVID-19 PCR: NotDetec (18 Aug 2022 20:30)      RADIOLOGY:  -CXR: < from: Xray Chest 1 View- PORTABLE-Routine (Xray Chest 1 View- PORTABLE-Routine in AM.) (22 @ 05:42) >  Findings:    Support devices: Unchanged right pleural catheter.    Cardiac/mediastinum/hilum: Unchanged.    Lung parenchyma/Pleura: Unchanged trace right apical pneumothorax.   Unchanged right opacity.    Skeleton/soft tissues: Unchanged.    Impression:  Unchanged trace right apical pneumothorax. Unchanged right opacity.    < end of copied text >    < from: CT Abdomen and Pelvis w/ IV Cont (22 @ 20:53) >  FINDINGS / ABDOMEN AND PELVIS:    The structures of the upper abdomen are partially obscured by streak   artifact arising from metallic coil emboli in the splenic artery.    HEPATIC: The liver is normal in size with no evidence of gross solid mass   or bile duct dilatation. The portal vein is patent. The hepatic veins are   opacified.    BILIARY: No calcified gallstones are noted.    SPLEEN: Unremarkable.    PANCREAS: The pancreas is normal in size and configuration. No evidence   of mass or pancreatitis.    ADRENAL GLANDS: Obscured by streak artifact.    KIDNEYS: There is symmetric renal enhancement. No evidence of   hydronephrosis, calcified stones, or solid mass. Areas of cortical   scarring are noted bilaterally.    ABDOMINOPELVIC NODES: Unremarkable.    PELVIC ORGANS: No evidence of pelvic mass, lymphadenopathy, or fluid   collection.    BLADDER: Unremarkable.    PERITONEUM/MESENTERY/BOWEL: Post Kerri procedure. Post colectomy with   ileostomy is noted in the right-sided the abdomen. No evidence of bowel   obstruction or ascites. No pneumoperitoneum.    BONES/SOFT TISSUES: No acute osseous abnormalities.    OTHER: Normal caliber aorta. Stable iliac artery aneurysms.    < from: CT Chest w/ IV Cont (22 @ 20:54) >  COMPARISON CT: CT scan of the chest, abdomen and pelvis dated 2021    OTHER STUDIES USED FOR CORRELATION: None.      FINDINGS / CHEST:    TUBES AND LINES: None.    HEART AND VESSELS: The heart is normal in size. There is no pericardial   effusion.    There is no evidence of central pulmonary embolism.    Normal caliber aorta and pulmonary artery. There is no evidence of aortic   aneurysm or dissection.    Brachiocephalic vessels are unremarkable.    MEDIASTINUM: There are no suspicious mediastinal, hilar or axillary lymph   nodes. The visualized portion of the thyroid gland is unremarkable.    AIRWAYS, LUNGS AND PLEURA: The central tracheobronchial tree is patent.   There is a large right pneumothorax approximately 50%. There is mild   displacement of the heart to the left side indicating that the   pneumothorax is under tension. Areas of consolidation and/or atelectasis   are noted throughout the collapsed right lung. There is a small right   pleural effusion.    BONES AND SOFT TISSUES: Degenerative changes of the spine.    < end of copied text >      IMPRESSION:    50% right-sided pneumothorax under tension. Lucencies seen within the   lung parenchyma and may represent air trapped within fissures. A   follow-up scan after the lung has reexpanded be useful for confirmation.      Findings were discussed with Dr. Angel Campoverde at 10:10 PM 2022, with   read back. At the time of the phonecall a pigtail catheter was being   inserted.    < end of copied text >      -TTE: < from: TTE Echo Complete w/o Contrast w/ Doppler (22 @ 10:11) >    Summary:   1. Normal global left ventricular systolic function.   2. LV Ejection Fraction by Landry's Method with a biplane EF of 58 %.   3. Normal left ventricular internal cavity size.   4. Normal left atrial size.   5. Normal right atrial size.   6. Mild mitral valve regurgitation.   7. LA volume Index is 27.2 ml/m² ml/m2.    < end of copied text >    -CCTA: none  -STRESS TEST: none  -CATHETERIZATION: none  -OTHER:   ECG: < from: 12 Lead ECG (22 @ 20:01) >  Ventricular Rate 102 BPM    Atrial Rate 102 BPM    P-R Interval 132 ms    QRS Duration 92 ms    Q-T Interval 334 ms    QTC Calculation(Bazett) 435 ms    P Axis 44 degrees    R Axis 32 degrees    T Axis 27 degrees    Diagnosis Line Sinus tachycardia  Otherwise normal ECG    < end of copied text >        TELEMETRY EVENTS:  SpO2 88-89% x2 episodes, HR elevated to 101 on 7:30 but immediately improved. Otherwise, no acute events on telemetry.    * SUMMARY:    * Patient-based characteristics (Functional capacity)  Patient is able to achieve more than 4 MET (walk 4 blocks, climb 2 flights of stairs, etc...)          Y [X] / N []    High-risk patient features:  - Recent (<30 days) or active MI          Y [] / N [X]  - Unstable or severe angina          Y [] / N [X]  - Decompensated heart failure, or worsening or new-onset heart failure          Y [] / N [X]  - Severe valvular disease          Y [] / N [X]  - Significant arrhythmia (Tachy- or Bradyarrhythmia)          Y [] / N [X]    * Surgery/Procedure-based characteristics (Type of surgery)  - Low-risk procedure (outpatient procedure, elective, endoscopy, etc...)          Y [] / N []  - Elevated or Moderate-risk procedure (Inpatient)          Y [] / N []  - High-risk procedure (urgent/emergent procedure, Intrathoracic, vascular, etc...)          Y [X] / N []    * Revised Cardiac Risk Index (RCRI)  1- History of ischemic heart disease          Y [] / N [X]  2- History of congestive heart failure          Y [] / N [X]  3- History of stroke/TIA          Y [] / N [X]  4- History of insulin-dependent diabetes          Y [] / N [X]  5- Chronic kidney disease (Cr >2mg/dL)          Y [] / N [X]  6- Undergoing suprainguinal vascular, intraperitoneal, or intrathoracic surgery          Y [X] / N []      Class II risk (One factor) --> 6% risk (30-day risk of death, MI, or cardiac arrest)

## 2022-08-22 NOTE — CONSULT NOTE ADULT - CONSULT REASON
FEVER/ SOB/ CP
PTX s/p pigtail placement, chest tube management
Leukocytosis, thrombocytosis, Anemia, Concern for Leukemia vs MDS?  was supposed to fup outpt for bone marrow biopsy
preop risk stratification
suspecting PNA, sepsis on admission  considering keke due to penicillin allergy

## 2022-08-22 NOTE — BRIEF OPERATIVE NOTE - NSICDXBRIEFPROCEDURE_GEN_ALL_CORE_FT
PROCEDURES:  Bronchoscopy, with lung decortication using VATS 22-Aug-2022 22:25:11  Allen Ellington

## 2022-08-22 NOTE — CONSULT NOTE ADULT - REASON FOR ADMISSION
intermittent fevers and mixed productive and non-productive cough for 1 month
pneumothorax, acute hypoxemic respiratory failure
Sepsis, pneumonia, pneumothorax

## 2022-08-22 NOTE — CHART NOTE - NSCHARTNOTEFT_GEN_A_CORE
PACU ANESTHESIA ADMISSION NOTE      Procedure:   Post op diagnosis:      ____  Intubated  TV:______       Rate: ______      FiO2: ______    __x__  Patent Airway    __x__  Full return of protective reflexes    __x__  Full recovery from anesthesia / back to baseline     Vitals:   See Anesthesia Record      Mental Status:  _x___ Awake   __x___ Alert   _____ Drowsy   _____ Sedated    Nausea/Vomiting:  _x___ NO  ______Yes,   __x__ See Post - Op Orders          Pain Scale (0-10):  __0___    Treatment: ____ None    __x__ See Post - Op/PCA Orders    Post - Operative Fluids:   ____ Oral   __x__ See Post - Op Orders    Plan: Discharge:   ____Home       _____Floor     ___x__Critical Care    _____  Other:_________________    Comments: Uneventful anesthesia. Patient transported to CTU awake and responsive, spontaneously breathing and hemodynamically stable. Report given to CTU RN and Intensivist at bedside

## 2022-08-22 NOTE — PROGRESS NOTE ADULT - REASON FOR ADMISSION
elevated  WBC
sob
Spontaneous R PTX S/P pigtail
Spontaneous R PTX s/p Pigtail Placement
cp/ fever
elevated  WBC

## 2022-08-22 NOTE — CONSULT NOTE ADULT - ASSESSMENT
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 There are no current cardiac contraindications to prevent from proceeding with the scheduled surgery/procedure.    - This consult serves only as a hossein-operative cardiac risk stratification and evaluation to predict 30-days cardiac complications risk and mortality. 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 There are no current cardiac contraindications to prevent from proceeding with the scheduled surgery/procedure.    - This consult serves only as a hossein-operative cardiac risk stratification and evaluation to predict 30-days cardiac complications risk and mortality. The decision to proceed with the surgery/procedure is made by the performing physician and the patient * IMPRESSION & RECOMMENDATIONS:    #Acute hypoxemic respiratory failure  #Right pneumothorax s/p pigtail catheter  #Pneumonia w/ cavitary lesion with loculation effusion likely empyema  #Hx of Brianler Danreymundos (multiple vascular aneurysm sp celiac coiling) ileostomy    Low-Moderate risk patient for a High (>7%) -risk surgery/procedure  No further cardiac work-up is needed at the moment. There are no current cardiac contraindications to prevent from proceeding with the scheduled surgery/procedure.    - This consult serves only as a hossein-operative cardiac risk stratification and evaluation to predict 30-days cardiac complications risk and mortality. The decision to proceed with the surgery/procedure is made by the performing physician and the patient * IMPRESSION & RECOMMENDATIONS:    #Acute hypoxemic respiratory failure  #Right pneumothorax s/p pigtail catheter  #Pneumonia w/ cavitary lesion with loculation effusion likely empyema  #Hx of Brianler Danreymundos (multiple vascular aneurysm sp celiac coiling) ileostomy      Low risk patient for a High (>7%) -risk surgery/procedure  BP under control. Has stable iliac artery aneurysms. Normal caliber thoracic aorta and abdominal aorta w/o evidence of aortic aneurysm/dissection.  No further cardiac work-up is needed at the moment. There are no current cardiac contraindications to prevent from proceeding with the scheduled surgery/procedure.    - This consult serves only as a hossein-operative cardiac risk stratification and evaluation to predict 30-days cardiac complications risk and mortality. The decision to proceed with the surgery/procedure is made by the performing physician and the patient

## 2022-08-22 NOTE — BRIEF OPERATIVE NOTE - OPERATION/FINDINGS
Extensive dense adhesions, purulent membranes and pocket of purulent fluid  Total lung decortication, adequate lung re-expansion  Small intermittent air leak

## 2022-08-22 NOTE — PRE-OP CHECKLIST - 1.
Patient received from CEU with ileostomy & right-sided chest tube to water seal. Patient reports difficulty obtaining IV access & limited mobility to left upper extremity; anesthesia & OR RN aware.

## 2022-08-22 NOTE — CONSULT NOTE ADULT - ATTENDING COMMENTS
I, Allen Barnes reviewed the diagnostic images of patient Felix Hughes on 08/19/22 and agreed with Dr. Delacruz’s clinical note, physical examination, and treatment plan.  Mr. Hughes presented to the ED complaining of right-sided chest pain and shortness of breath. Large right sided pneumothorax found in CT and pleural drain placed with adequate lung expansion.  Pleural drain in good position and no air leak. Initial CT with some parenchymal opacities but unable to assess lung parenchyma given size of pneumothorax. Patient endorses a few weeks of cough, low grade fever. 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 Patent stable, significant leukocytosis in ED started on antibiotics. Plan: no additional intervention indicated at this time, will need repeat chest CT with expanded lung to better assess right basal opacity. Continue drain to suction.
Patient can tolerate > 4 METs. On recent TTE dated 8/19/22, LVEF 58% and normal RV size and function. On review of CT scans, patient with stable iliac artery aneurysms. BP is well-controlled and therefore no further optimization for these stable aneurysms.     Low cardiovascular risk for perioperative major adverse cardiac events    No further cardiac workup is indicated at this time. There are no current cardiac contraindications - MI within 60 days or unstable angina, decompensated heart failure, unstable arrhythmias, or hemodynamically significant valvular disease - that prohibit proceeding with the scheduled surgery/procedure. This consult serves only as a perioperative cardiac risk stratification and evaluation to predict 30-day cardiac complications risk and mortality. The decision to proceed with the surgery/procedure is made by the performing physician and the patient.     Cardiology Consult team to sign off. Please reconsult if needed.
I have personally seen and examined this patient.  I have fully participated in the care of this patient.  I have reviewed all pertinent clinical information, including history, physical exam, plan and note.  I have reviewed all pertinent clinical information and reviewed all relevant imaging and diagnostic studies personally.  Corrections and edits were made wherever needed.       #SIRS on admission in the setting of PTX, rule out sepsis  No productive cough to clinically suggest bacterial PNA  Some consolidative changes on CT  Reports 1 mo of cough and fevers and nightsweats- rule out underlying process    - antibiotics and testing as above    If any questions, please call or send a message on [employer] Teams  Please continue to update ID with any pertinent new laboratory or radiographic findings  Spectra [id number]

## 2022-08-22 NOTE — PRE-OP CHECKLIST - 2.
Patient with blood available (unlimited cross match) as per blood bank- CRNA Analisa & OR RNs Marge & Savana made aware.

## 2022-08-22 NOTE — PROGRESS NOTE ADULT - SUBJECTIVE AND OBJECTIVE BOX
ANGE FAZAL  46y  Male      Patient is a 46y old  Male who presents with a chief complaint of cp/ fever (20 Aug 2022 07:52)      INTERVAL HPI/OVERNIGHT EVENTS:  Patient seen and examined earlier this morning with his wife bedside  lying comfortably in bed in nad  feels better as compared to yesterday  slept well  denies cp or sob      REVIEW OF SYSTEMS:  CONSTITUTIONAL: No fever, weight loss, or fatigue  EYES: No eye pain, visual disturbances, or discharge  ENMT:  No difficulty hearing, tinnitus, vertigo; No sinus or throat pain  NECK: No pain or stiffness  RESPIRATORY: +cough,  NO wheezing, chills or hemoptysis; No shortness of breath  CARDIOVASCULAR: No chest pain, palpitations, dizziness, or leg swelling  GASTROINTESTINAL: No abdominal or epigastric pain. No nausea, vomiting, or hematemesis; No diarrhea or constipation. No melena or hematochezia.  GENITOURINARY: No dysuria, frequency, hematuria, or incontinence  NEUROLOGICAL: No headaches, memory loss, loss of strength, numbness, or tremors  SKIN: No itching, burning, rashes, or lesions   LYMPH NODES: No enlarged glands  ENDOCRINE: No heat or cold intolerance; No hair loss  MUSCULOSKELETAL: No joint pain or swelling; No muscle, back, or extremity pain  PSYCHIATRIC: No depression, anxiety, mood swings, or difficulty sleeping  HEME/LYMPH: No easy bruising, or bleeding gums  ALLERY AND IMMUNOLOGIC: No hives or eczema    Vital Signs Last 24 Hrs  T(C): 37.6 (22 Aug 2022 11:45), Max: 38.6 (21 Aug 2022 16:33)  T(F): 99.7 (22 Aug 2022 11:45), Max: 101.4 (21 Aug 2022 16:33)  HR: 84 (22 Aug 2022 11:45) (62 - 95)  BP: 105/68 (22 Aug 2022 11:45) (100/63 - 114/77)  BP(mean): 81 (22 Aug 2022 11:45) (80 - 81)  RR: 18 (22 Aug 2022 11:45) (17 - 20)  SpO2: 98% (22 Aug 2022 11:45) (97% - 98%)    Parameters below as of 22 Aug 2022 08:17  Patient On (Oxygen Delivery Method): room air      PHYSICAL EXAM:  GENERAL: NAD, well-groomed, well-developed  HEAD:  Atraumatic, Normocephalic  EYES: EOMI, PERRLA, conjunctiva and sclera clear  ENMT: No tonsillar erythema, exudates, or enlargement; Moist mucous membranes, Good dentition, No lesions  NECK: Supple, No JVD, Normal thyroid  NERVOUS SYSTEM:  Alert & Oriented X3, Good concentration; Moves all extremities   CHEST/LUNG: decrease bs b/l R>L, chest tube on right   HEART: Regular rate and rhythm; No murmurs, rubs, or gallops  ABDOMEN: Soft, Nontender, Nondistended; Bowel sounds present, ileostomy +  EXTREMITIES:  2+ Peripheral Pulses, No clubbing, cyanosis, or edema  LYMPH: No lymphadenopathy noted  SKIN: No rashes or lesions    Consultant(s) Notes Reviewed:  [x ] YES  [ ] NO  Care Discussed with Consultants/Other Providers [ x] YES  [ ] NO    LAB:                                           8.7    7.32  )-----------( 317      ( 22 Aug 2022 01:11 )             25.8     08-    133<L>  |  97<L>  |  15  ----------------------------<  96  3.9   |  24  |  0.7    Ca    8.3<L>      22 Aug 2022 01:11  Mg     2.0     -    TPro  5.8<L>  /  Alb  2.9<L>  /  TBili  <0.2  /  DBili  x   /  AST  15  /  ALT  20  /  AlkPhos  145<H>  08-20      Drug Dosing Weight  Height (cm): 185.4 (18 Aug 2022 16:52)  Weight (kg): 75 (18 Aug 2022 21:37)  BMI (kg/m2): 21.8 (18 Aug 2022 21:37)  BSA (m2): 1.98 (18 Aug 2022 21:37)    Urinalysis Basic - ( 18 Aug 2022 20:30 )    Color: Yellow / Appearance: Clear / S.035 / pH: x  Gluc: x / Ketone: Moderate  / Bili: Small / Urobili: <2 mg/dL   Blood: x / Protein: 30 mg/dL / Nitrite: Negative   Leuk Esterase: Negative / RBC: 3 /HPF / WBC 2 /HPF   Sq Epi: x / Non Sq Epi: 3 /HPF / Bacteria: FEW        RADIOLOGY & ADDITIONAL TESTS:  Imaging Personally Reviewed:  [x] YES  [ ] NO  < from: Xray Chest 1 View- PORTABLE-Urgent (Xray Chest 1 View- PORTABLE-Urgent .) (22 @ 10:10) >  Impression:    Trace right apical pneumothorax, unchanged.    Right basilar opacity, unchanged.    < end of copied text >  < from: TTE Echo Complete w/o Contrast w/ Doppler (22 @ 10:11) >  Summary:   1. Normal global left ventricular systolic function.   2. LV Ejection Fraction by Landry's Method with a biplane EF of 58 %.   3. Normal left ventricular internal cavity size.   4. Normal left atrial size.   5. Normal right atrial size.   6. Mild mitral valve regurgitation.   7. LA volume Index is 27.2 ml/m² ml/m2.  < end of copied text >      < from: CT Chest w/ IV Cont (22 @ 01:24) >  1.  Findings are compatible with a large right-sided empyema measuring up   to 18.4 cm craniocaudal. This may be communicating with a smaller   multiloculated fluid and gas collection more anteriorly, likely within   the right major fissure.  2.  Right middle and lower lobe consolidation, likely infectious.  3.  Near resolution of previously seen right-sided pneumothorax status   post right chest tube placement. Residual trace right apical pneumothorax   seen.  < end of copied text >      MEDICATIONS  (STANDING):  aspirin enteric coated 81 milliGRAM(s) Oral daily  cefepime   IVPB 2000 milliGRAM(s) IV Intermittent every 8 hours  cefepime   IVPB      cholecalciferol 1000 Unit(s) Oral daily  enoxaparin Injectable 40 milliGRAM(s) SubCutaneous every 24 hours  lactated ringers. 1000 milliLiter(s) (50 mL/Hr) IV Continuous <Continuous>  levETIRAcetam 1125 milliGRAM(s) Oral daily  levETIRAcetam 1500 milliGRAM(s) Oral at bedtime  magnesium oxide 400 milliGRAM(s) Oral three times a day with meals  melatonin 3 milliGRAM(s) Oral at bedtime  metroNIDAZOLE    Tablet 500 milliGRAM(s) Oral every 8 hours  multivitamin 1 Tablet(s) Oral daily  OXcarbazepine 600 milliGRAM(s) Oral <User Schedule>  OXcarbazepine 300 milliGRAM(s) Oral <User Schedule>    MEDICATIONS  (PRN):  acetaminophen     Tablet .. 650 milliGRAM(s) Oral every 6 hours PRN Temp greater or equal to 38C (100.4F), Mild Pain (1 - 3)  acetaminophen     Tablet .. 650 milliGRAM(s) Oral once PRN Temp greater or equal to 38C (100.4F)  ibuprofen  Tablet. 600 milliGRAM(s) Oral every 6 hours PRN Temp greater or equal to 38C (100.4F)  morphine  - Injectable 2 milliGRAM(s) IV Push every 6 hours PRN Moderate Pain (4 - 6)

## 2022-08-23 LAB
ALBUMIN SERPL ELPH-MCNC: 2.7 G/DL — LOW (ref 3.5–5.2)
ALP SERPL-CCNC: 144 U/L — HIGH (ref 30–115)
ALT FLD-CCNC: 54 U/L — HIGH (ref 0–41)
ANION GAP SERPL CALC-SCNC: 11 MMOL/L — SIGNIFICANT CHANGE UP (ref 7–14)
AST SERPL-CCNC: 75 U/L — HIGH (ref 0–41)
BASOPHILS # BLD AUTO: 0.04 K/UL — SIGNIFICANT CHANGE UP (ref 0–0.2)
BASOPHILS NFR BLD AUTO: 0.2 % — SIGNIFICANT CHANGE UP (ref 0–1)
BILIRUB SERPL-MCNC: 0.6 MG/DL — SIGNIFICANT CHANGE UP (ref 0.2–1.2)
BUN SERPL-MCNC: 15 MG/DL — SIGNIFICANT CHANGE UP (ref 10–20)
CALCIUM SERPL-MCNC: 8.1 MG/DL — LOW (ref 8.5–10.1)
CHLORIDE SERPL-SCNC: 98 MMOL/L — SIGNIFICANT CHANGE UP (ref 98–110)
CO2 SERPL-SCNC: 26 MMOL/L — SIGNIFICANT CHANGE UP (ref 17–32)
CREAT SERPL-MCNC: 0.8 MG/DL — SIGNIFICANT CHANGE UP (ref 0.7–1.5)
EGFR: 111 ML/MIN/1.73M2 — SIGNIFICANT CHANGE UP
EOSINOPHIL # BLD AUTO: 0.01 K/UL — SIGNIFICANT CHANGE UP (ref 0–0.7)
EOSINOPHIL NFR BLD AUTO: 0 % — SIGNIFICANT CHANGE UP (ref 0–8)
GLUCOSE SERPL-MCNC: 114 MG/DL — HIGH (ref 70–99)
HCT VFR BLD CALC: 31.7 % — LOW (ref 42–52)
HGB BLD-MCNC: 10.6 G/DL — LOW (ref 14–18)
IMM GRANULOCYTES NFR BLD AUTO: 0.9 % — HIGH (ref 0.1–0.3)
LYMPHOCYTES # BLD AUTO: 0.65 K/UL — LOW (ref 1.2–3.4)
LYMPHOCYTES # BLD AUTO: 2.6 % — LOW (ref 20.5–51.1)
MAGNESIUM SERPL-MCNC: 1.7 MG/DL — LOW (ref 1.8–2.4)
MCHC RBC-ENTMCNC: 27.4 PG — SIGNIFICANT CHANGE UP (ref 27–31)
MCHC RBC-ENTMCNC: 33.4 G/DL — SIGNIFICANT CHANGE UP (ref 32–37)
MCV RBC AUTO: 81.9 FL — SIGNIFICANT CHANGE UP (ref 80–94)
MONOCYTES # BLD AUTO: 1.5 K/UL — HIGH (ref 0.1–0.6)
MONOCYTES NFR BLD AUTO: 5.9 % — SIGNIFICANT CHANGE UP (ref 1.7–9.3)
NEUTROPHILS # BLD AUTO: 22.93 K/UL — HIGH (ref 1.4–6.5)
NEUTROPHILS NFR BLD AUTO: 90.4 % — HIGH (ref 42.2–75.2)
NRBC # BLD: 0 /100 WBCS — SIGNIFICANT CHANGE UP (ref 0–0)
PLATELET # BLD AUTO: 387 K/UL — SIGNIFICANT CHANGE UP (ref 130–400)
POTASSIUM SERPL-MCNC: 4.9 MMOL/L — SIGNIFICANT CHANGE UP (ref 3.5–5)
POTASSIUM SERPL-SCNC: 4.9 MMOL/L — SIGNIFICANT CHANGE UP (ref 3.5–5)
PROT SERPL-MCNC: 5.5 G/DL — LOW (ref 6–8)
RBC # BLD: 3.87 M/UL — LOW (ref 4.7–6.1)
RBC # FLD: 14.3 % — SIGNIFICANT CHANGE UP (ref 11.5–14.5)
SODIUM SERPL-SCNC: 135 MMOL/L — SIGNIFICANT CHANGE UP (ref 135–146)
WBC # BLD: 25.37 K/UL — HIGH (ref 4.8–10.8)
WBC # FLD AUTO: 25.37 K/UL — HIGH (ref 4.8–10.8)

## 2022-08-23 PROCEDURE — 71045 X-RAY EXAM CHEST 1 VIEW: CPT | Mod: 26

## 2022-08-23 RX ORDER — SODIUM CHLORIDE 9 MG/ML
1000 INJECTION, SOLUTION INTRAVENOUS
Refills: 0 | Status: DISCONTINUED | OUTPATIENT
Start: 2022-08-23 | End: 2022-08-28

## 2022-08-23 RX ORDER — HYDROMORPHONE HYDROCHLORIDE 2 MG/ML
30 INJECTION INTRAMUSCULAR; INTRAVENOUS; SUBCUTANEOUS
Refills: 0 | Status: DISCONTINUED | OUTPATIENT
Start: 2022-08-23 | End: 2022-08-27

## 2022-08-23 RX ORDER — HEPARIN SODIUM 5000 [USP'U]/ML
5000 INJECTION INTRAVENOUS; SUBCUTANEOUS EVERY 8 HOURS
Refills: 0 | Status: DISCONTINUED | OUTPATIENT
Start: 2022-08-23 | End: 2022-08-29

## 2022-08-23 RX ORDER — ALPRAZOLAM 0.25 MG
0.25 TABLET ORAL EVERY 8 HOURS
Refills: 0 | Status: DISCONTINUED | OUTPATIENT
Start: 2022-08-23 | End: 2022-08-29

## 2022-08-23 RX ADMIN — LEVETIRACETAM 1500 MILLIGRAM(S): 250 TABLET, FILM COATED ORAL at 22:06

## 2022-08-23 RX ADMIN — Medication 1000 MILLIGRAM(S): at 18:00

## 2022-08-23 RX ADMIN — CHLORHEXIDINE GLUCONATE 1 APPLICATION(S): 213 SOLUTION TOPICAL at 11:52

## 2022-08-23 RX ADMIN — Medication 1000 MILLIGRAM(S): at 00:00

## 2022-08-23 RX ADMIN — Medication 500 MILLIGRAM(S): at 22:06

## 2022-08-23 RX ADMIN — Medication 1000 MILLIGRAM(S): at 06:39

## 2022-08-23 RX ADMIN — Medication 1 TABLET(S): at 11:54

## 2022-08-23 RX ADMIN — LEVETIRACETAM 1125 MILLIGRAM(S): 250 TABLET, FILM COATED ORAL at 11:53

## 2022-08-23 RX ADMIN — CEFEPIME 100 MILLIGRAM(S): 1 INJECTION, POWDER, FOR SOLUTION INTRAMUSCULAR; INTRAVENOUS at 22:06

## 2022-08-23 RX ADMIN — MAGNESIUM OXIDE 400 MG ORAL TABLET 400 MILLIGRAM(S): 241.3 TABLET ORAL at 07:00

## 2022-08-23 RX ADMIN — HEPARIN SODIUM 5000 UNIT(S): 5000 INJECTION INTRAVENOUS; SUBCUTANEOUS at 13:45

## 2022-08-23 RX ADMIN — Medication 400 MILLIGRAM(S): at 06:04

## 2022-08-23 RX ADMIN — Medication 1000 MILLIGRAM(S): at 12:05

## 2022-08-23 RX ADMIN — SODIUM CHLORIDE 75 MILLILITER(S): 9 INJECTION INTRAMUSCULAR; INTRAVENOUS; SUBCUTANEOUS at 01:46

## 2022-08-23 RX ADMIN — MAGNESIUM OXIDE 400 MG ORAL TABLET 400 MILLIGRAM(S): 241.3 TABLET ORAL at 17:47

## 2022-08-23 RX ADMIN — HYDROMORPHONE HYDROCHLORIDE 0.5 MILLIGRAM(S): 2 INJECTION INTRAMUSCULAR; INTRAVENOUS; SUBCUTANEOUS at 00:00

## 2022-08-23 RX ADMIN — Medication 500 MILLIGRAM(S): at 13:45

## 2022-08-23 RX ADMIN — OXCARBAZEPINE 600 MILLIGRAM(S): 300 TABLET, FILM COATED ORAL at 17:48

## 2022-08-23 RX ADMIN — HYDROMORPHONE HYDROCHLORIDE 30 MILLILITER(S): 2 INJECTION INTRAMUSCULAR; INTRAVENOUS; SUBCUTANEOUS at 07:12

## 2022-08-23 RX ADMIN — Medication 81 MILLIGRAM(S): at 11:54

## 2022-08-23 RX ADMIN — Medication 3 MILLIGRAM(S): at 22:07

## 2022-08-23 RX ADMIN — Medication 1000 UNIT(S): at 11:54

## 2022-08-23 RX ADMIN — HEPARIN SODIUM 5000 UNIT(S): 5000 INJECTION INTRAVENOUS; SUBCUTANEOUS at 22:07

## 2022-08-23 RX ADMIN — Medication 400 MILLIGRAM(S): at 17:47

## 2022-08-23 RX ADMIN — HYDROMORPHONE HYDROCHLORIDE 30 MILLILITER(S): 2 INJECTION INTRAMUSCULAR; INTRAVENOUS; SUBCUTANEOUS at 19:17

## 2022-08-23 RX ADMIN — OXCARBAZEPINE 300 MILLIGRAM(S): 300 TABLET, FILM COATED ORAL at 05:59

## 2022-08-23 RX ADMIN — Medication 500 MILLIGRAM(S): at 05:59

## 2022-08-23 RX ADMIN — HYDROMORPHONE HYDROCHLORIDE 30 MILLILITER(S): 2 INJECTION INTRAMUSCULAR; INTRAVENOUS; SUBCUTANEOUS at 00:46

## 2022-08-23 RX ADMIN — MAGNESIUM OXIDE 400 MG ORAL TABLET 400 MILLIGRAM(S): 241.3 TABLET ORAL at 11:53

## 2022-08-23 RX ADMIN — SENNA PLUS 2 TABLET(S): 8.6 TABLET ORAL at 22:06

## 2022-08-23 RX ADMIN — CEFEPIME 100 MILLIGRAM(S): 1 INJECTION, POWDER, FOR SOLUTION INTRAMUSCULAR; INTRAVENOUS at 13:45

## 2022-08-23 RX ADMIN — CEFEPIME 100 MILLIGRAM(S): 1 INJECTION, POWDER, FOR SOLUTION INTRAMUSCULAR; INTRAVENOUS at 06:04

## 2022-08-23 RX ADMIN — Medication 400 MILLIGRAM(S): at 11:52

## 2022-08-23 NOTE — PHYSICAL THERAPY INITIAL EVALUATION ADULT - ADDITIONAL COMMENTS
Lives in pvt home with 2 ADAM with no HRs, 1 flight to bedroom with HRs. Pt states he also has a bedroom & bathroom on 1st floor where he has be staying post recent Sx.

## 2022-08-23 NOTE — PHYSICAL THERAPY INITIAL EVALUATION ADULT - GENERAL OBSERVATIONS, REHAB EVAL
10:02-10:35 Pt encountered semifowler in bed with tele, IV/PCA pump, 3 CTs to suction, 2L/m O2 NC, wife  @b/s, in NAD. BP: 121/78, HR: 79bpm, SpO2 100% on O2.  Pt left sitting in b/s chair  with tele, IV/PCA pump, 3 CTs to suction, 2L/m O2 NC, wife & RN Aleksandr @ b/s, in NAD.  BP: 118/74, HR: 69bpm, SpO2 100% on O2.

## 2022-08-23 NOTE — PROGRESS NOTE ADULT - SUBJECTIVE AND OBJECTIVE BOX
ANGE, FAZAL  46y, Male  Allergy: moxifloxacin (Hives)  penicillin (Unknown)      LOS  5d    CHIEF COMPLAINT: pneumothorax, acute hypoxemic respiratory failure (22 Aug 2022 11:31)      INTERVAL EVENTS/HPI  - febrile, increasing WBC, s/p OR  - T(F): , Max: 101.5 (08-22-22 @ 15:00)  - Tolerating medication  - WBC Count: 25.37 (08-23-22 @ 04:00)  WBC Count: 7.32 (08-22-22 @ 01:11)     - Creatinine, Serum: 0.8 (08-23-22 @ 04:00)  Creatinine, Serum: 0.7 (08-22-22 @ 01:11)       ROS  ***    VITALS:  T(F): 97, Max: 101.5 (08-22-22 @ 15:00)  HR: 64  BP: 106/68  RR: 23Vital Signs Last 24 Hrs  T(C): 36.1 (23 Aug 2022 08:00), Max: 38.6 (22 Aug 2022 15:00)  T(F): 97 (23 Aug 2022 08:00), Max: 101.5 (22 Aug 2022 15:00)  HR: 64 (23 Aug 2022 08:00) (62 - 97)  BP: 106/68 (23 Aug 2022 08:00) (95/59 - 133/89)  BP(mean): 82 (23 Aug 2022 08:00) (72 - 104)  RR: 23 (23 Aug 2022 08:00) (15 - 32)  SpO2: 100% (23 Aug 2022 08:00) (95% - 100%)    Parameters below as of 23 Aug 2022 08:00  Patient On (Oxygen Delivery Method): room air        PHYSICAL EXAM:  ***    FH: Non-contributory  Social Hx: Non-contributory    TESTS & MEASUREMENTS:                        10.6   25.37 )-----------( 387      ( 23 Aug 2022 04:00 )             31.7     08-23    135  |  98  |  15  ----------------------------<  114<H>  4.9   |  26  |  0.8    Ca    8.1<L>      23 Aug 2022 04:00  Mg     1.7     08-23    TPro  5.5<L>  /  Alb  2.7<L>  /  TBili  0.6  /  DBili  x   /  AST  75<H>  /  ALT  54<H>  /  AlkPhos  144<H>  08-23      LIVER FUNCTIONS - ( 23 Aug 2022 04:00 )  Alb: 2.7 g/dL / Pro: 5.5 g/dL / ALK PHOS: 144 U/L / ALT: 54 U/L / AST: 75 U/L / GGT: x               Culture - Urine (collected 08-18-22 @ 20:30)  Source: Clean Catch Clean Catch (Midstream)  Final Report (08-20-22 @ 07:14):    No growth    Culture - Blood (collected 08-18-22 @ 20:20)  Source: .Blood Blood  Preliminary Report (08-20-22 @ 02:02):    No growth to date.    Culture - Blood (collected 08-18-22 @ 20:20)  Source: .Blood Blood  Preliminary Report (08-20-22 @ 02:02):    No growth to date.        Lactate, Blood: 1.6 mmol/L (08-18-22 @ 20:20)      INFECTIOUS DISEASES TESTING  COVID-19 PCR: NotDetec (08-21-22 @ 17:39)  Procalcitonin, Serum: 17.80 (08-20-22 @ 23:30)  MRSA PCR Result.: Negative (08-20-22 @ 18:26)  HIV-1/2 Combo Result: Nonreact (08-20-22 @ 00:15)  Legionella Antigen, Urine: Negative (08-19-22 @ 04:04)  Streptococcus pneumoniae Ag, Ur Result: Negative (08-19-22 @ 04:04)  Rapid RVP Result: NotDetec (08-18-22 @ 23:50)  COVID-19 PCR: NotDetec (08-18-22 @ 20:30)  strept    INFLAMMATORY MARKERS  Sedimentation Rate, Erythrocyte: 125 mm/Hr (08-19-22 @ 08:51)  C-Reactive Protein, Serum: 323.9 mg/L (08-19-22 @ 08:51)      RADIOLOGY & ADDITIONAL TESTS:  I have personally reviewed the last available Chest xray  CXR      CT  CT Chest w/ IV Cont:   ACC: [id number] EXAM:  CT CHEST IC                          PROCEDURE DATE:  08/21/2022          INTERPRETATION:  INDICATION: Pneumonia. Pneumothorax post- chest tube.    TECHNIQUE: CT of the chest was performed from the thoracic inlet to the   levelof the adrenal glands following the administration of intravenous   contrast. Coronal and sagittal images have been submitted, as well as MIP   reformats.    COMPARISON: CT chest 8/18/2022    FINDINGS:    LUNGS, PLEURA, AND AIRWAYS: Interval placement of a right-sided chest   tube with near resolution of previously seen right pneumothorax. A small   residual pneumothorax visualized in the right apex. Right middle and   lower lobe consolidation. Fluid and gas containing right-sided posterior   pleural collection measuring approximately 8.8 x 3.9 x 18.4 cm. This may   be communicating with an additional multiloculated fluid and gas   containing collection seen more anteriorly, likely occupying the right   major fissure (series 5, image 134 and series 7, image 293).    MEDIASTINUM/LYMPH NODES: Prominent supraclavicular lymph nodes again   noted, stable from prior exam 9/30/2021, for example series 5, image 49   on the left and image 60 on the right.    HEART/GREAT VESSELS: Normal heart size.No pericardial effusion. Normal   caliber main pulmonary artery. Normal caliber thoracic aorta.    VISUALIZED UPPER ABDOMEN: Limited evaluation secondary to streak artifact   from coil embolization material.  BONES AND SOFT TISSUES: Bony degenerativechange.      IMPRESSION:    1.  Findings are compatible with a large right-sided empyema measuring up   to 18.4 cm craniocaudal. This may be communicating with a smaller   multiloculated fluid and gas collection more anteriorly, likely within   the right major fissure.  2.  Right middle and lower lobe consolidation, likely infectious.  3.  Near resolution of previously seen right-sided pneumothorax status   post right chest tube placement. Residual trace right apical pneumothorax   seen.    --- End of Report ---          MAURICIO PARHAM MD; Resident Radiologist  This document has been electronically signed.  MANSI OLGUIN MD; Attending Radiologist  This document has been electronically signed. Aug 21 2022  3:55AM (08-21-22 @ 01:24)      CARDIOLOGY TESTING  12 Lead ECG:   Ventricular Rate 102 BPM    Atrial Rate 102 BPM    P-R Interval 132 ms    QRS Duration 92 ms    Q-T Interval 334 ms    QTC Calculation(Bazett) 435 ms    P Axis 44 degrees    R Axis 32 degrees    T Axis 27 degrees    Diagnosis Line Sinus tachycardia  Otherwise normal ECG    Confirmed by MELIDA RAGSDALE MD ([id number]) on 8/19/2022 6:24:42 AM (08-18-22 @ 20:01)      MEDICATIONS  acetaminophen   IVPB .. 1000 IV Intermittent every 6 hours  acetaminophen   IVPB .. 1000 IV Intermittent once  aspirin enteric coated 81 Oral daily  BUpivacaine liposome 1.3% Injectable 20 Local Injection once  cefepime   IVPB 2000 IV Intermittent every 8 hours  chlorhexidine 2% Cloths 1 Topical daily  cholecalciferol 1000 Oral daily  heparin   Injectable 5000 SubCutaneous every 8 hours  HYDROmorphone PCA (1 mG/mL) 30 PCA Continuous PCA Continuous  lactated ringers. 1000 IV Continuous <Continuous>  levETIRAcetam 1125 Oral daily  levETIRAcetam 1500 Oral at bedtime  magnesium oxide 400 Oral three times a day with meals  melatonin 3 Oral at bedtime  meperidine     Injectable 25 IV Push once  metroNIDAZOLE    Tablet 500 Oral every 8 hours  multivitamin 1 Oral daily  OXcarbazepine 600 Oral <User Schedule>  OXcarbazepine 300 Oral <User Schedule>  pantoprazole    Tablet 40 Oral once  polyethylene glycol 3350 17 Oral daily  senna 2 Oral at bedtime      WEIGHT  Weight (kg): 75 (08-18-22 @ 21:37)  Creatinine, Serum: 0.8 mg/dL (08-23-22 @ 04:00)      ANTIBIOTICS:  cefepime   IVPB 2000 milliGRAM(s) IV Intermittent every 8 hours  metroNIDAZOLE    Tablet 500 milliGRAM(s) Oral every 8 hours      All available historical records have been reviewed       ANGE, FAZAL  46y, Male  Allergy: moxifloxacin (Hives)  penicillin (Unknown)      LOS  5d    CHIEF COMPLAINT: pneumothorax, acute hypoxemic respiratory failure (22 Aug 2022 11:31)      INTERVAL EVENTS/HPI  - febrile, increasing WBC, s/p OR  - T(F): , Max: 101.5 (08-22-22 @ 15:00)  - Tolerating medication  - WBC Count: 25.37 (08-23-22 @ 04:00)  WBC Count: 7.32 (08-22-22 @ 01:11)     - Creatinine, Serum: 0.8 (08-23-22 @ 04:00)  Creatinine, Serum: 0.7 (08-22-22 @ 01:11)       ROS  General: Denies rigors, nightsweats  HEENT: Denies headache, rhinorrhea, sore throat, eye pain  CV: Denies CP, palpitations  PULM: Denies wheezing, hemoptysis  GI: Denies hematemesis, hematochezia, melena  : Denies discharge, hematuria  MSK: Denies arthralgias, myalgias  SKIN: Denies rash, lesions  NEURO: Denies paresthesias, weakness  PSYCH: Denies depression, anxiety     VITALS:  T(F): 97, Max: 101.5 (08-22-22 @ 15:00)  HR: 64  BP: 106/68  RR: 23Vital Signs Last 24 Hrs  T(C): 36.1 (23 Aug 2022 08:00), Max: 38.6 (22 Aug 2022 15:00)  T(F): 97 (23 Aug 2022 08:00), Max: 101.5 (22 Aug 2022 15:00)  HR: 64 (23 Aug 2022 08:00) (62 - 97)  BP: 106/68 (23 Aug 2022 08:00) (95/59 - 133/89)  BP(mean): 82 (23 Aug 2022 08:00) (72 - 104)  RR: 23 (23 Aug 2022 08:00) (15 - 32)  SpO2: 100% (23 Aug 2022 08:00) (95% - 100%)    Parameters below as of 23 Aug 2022 08:00  Patient On (Oxygen Delivery Method): room air        PHYSICAL EXAM:  Gen: NAD, resting in bed  HEENT: Normocephalic, atraumatic  Neck: supple, no lymphadenopathy  CV: Regular rate & regular rhythm  Lungs: decreased BS at bases, no fremitus CTs  Abdomen: Soft, BS present  Ext: Warm, well perfused  Neuro: non focal, awake  Skin: no rash, no erythema  Lines: no phlebitis     FH: Non-contributory  Social Hx: Non-contributory    TESTS & MEASUREMENTS:                        10.6   25.37 )-----------( 387      ( 23 Aug 2022 04:00 )             31.7     08-23    135  |  98  |  15  ----------------------------<  114<H>  4.9   |  26  |  0.8    Ca    8.1<L>      23 Aug 2022 04:00  Mg     1.7     08-23    TPro  5.5<L>  /  Alb  2.7<L>  /  TBili  0.6  /  DBili  x   /  AST  75<H>  /  ALT  54<H>  /  AlkPhos  144<H>  08-23      LIVER FUNCTIONS - ( 23 Aug 2022 04:00 )  Alb: 2.7 g/dL / Pro: 5.5 g/dL / ALK PHOS: 144 U/L / ALT: 54 U/L / AST: 75 U/L / GGT: x               Culture - Urine (collected 08-18-22 @ 20:30)  Source: Clean Catch Clean Catch (Midstream)  Final Report (08-20-22 @ 07:14):    No growth    Culture - Blood (collected 08-18-22 @ 20:20)  Source: .Blood Blood  Preliminary Report (08-20-22 @ 02:02):    No growth to date.    Culture - Blood (collected 08-18-22 @ 20:20)  Source: .Blood Blood  Preliminary Report (08-20-22 @ 02:02):    No growth to date.        Lactate, Blood: 1.6 mmol/L (08-18-22 @ 20:20)      INFECTIOUS DISEASES TESTING  COVID-19 PCR: NotDetec (08-21-22 @ 17:39)  Procalcitonin, Serum: 17.80 (08-20-22 @ 23:30)  MRSA PCR Result.: Negative (08-20-22 @ 18:26)  HIV-1/2 Combo Result: Nonreact (08-20-22 @ 00:15)  Legionella Antigen, Urine: Negative (08-19-22 @ 04:04)  Streptococcus pneumoniae Ag, Ur Result: Negative (08-19-22 @ 04:04)  Rapid RVP Result: NotDetec (08-18-22 @ 23:50)  COVID-19 PCR: NotDetec (08-18-22 @ 20:30)  strept    INFLAMMATORY MARKERS  Sedimentation Rate, Erythrocyte: 125 mm/Hr (08-19-22 @ 08:51)  C-Reactive Protein, Serum: 323.9 mg/L (08-19-22 @ 08:51)      RADIOLOGY & ADDITIONAL TESTS:  I have personally reviewed the last available Chest xray  CXR      CT  CT Chest w/ IV Cont:   ACC: [id number] EXAM:  CT CHEST IC                          PROCEDURE DATE:  08/21/2022          INTERPRETATION:  INDICATION: Pneumonia. Pneumothorax post- chest tube.    TECHNIQUE: CT of the chest was performed from the thoracic inlet to the   levelof the adrenal glands following the administration of intravenous   contrast. Coronal and sagittal images have been submitted, as well as MIP   reformats.    COMPARISON: CT chest 8/18/2022    FINDINGS:    LUNGS, PLEURA, AND AIRWAYS: Interval placement of a right-sided chest   tube with near resolution of previously seen right pneumothorax. A small   residual pneumothorax visualized in the right apex. Right middle and   lower lobe consolidation. Fluid and gas containing right-sided posterior   pleural collection measuring approximately 8.8 x 3.9 x 18.4 cm. This may   be communicating with an additional multiloculated fluid and gas   containing collection seen more anteriorly, likely occupying the right   major fissure (series 5, image 134 and series 7, image 293).    MEDIASTINUM/LYMPH NODES: Prominent supraclavicular lymph nodes again   noted, stable from prior exam 9/30/2021, for example series 5, image 49   on the left and image 60 on the right.    HEART/GREAT VESSELS: Normal heart size.No pericardial effusion. Normal   caliber main pulmonary artery. Normal caliber thoracic aorta.    VISUALIZED UPPER ABDOMEN: Limited evaluation secondary to streak artifact   from coil embolization material.  BONES AND SOFT TISSUES: Bony degenerativechange.      IMPRESSION:    1.  Findings are compatible with a large right-sided empyema measuring up   to 18.4 cm craniocaudal. This may be communicating with a smaller   multiloculated fluid and gas collection more anteriorly, likely within   the right major fissure.  2.  Right middle and lower lobe consolidation, likely infectious.  3.  Near resolution of previously seen right-sided pneumothorax status   post right chest tube placement. Residual trace right apical pneumothorax   seen.    --- End of Report ---          MAURICIO PARHAM MD; Resident Radiologist  This document has been electronically signed.  MANSI OLGUIN MD; Attending Radiologist  This document has been electronically signed. Aug 21 2022  3:55AM (08-21-22 @ 01:24)      CARDIOLOGY TESTING  12 Lead ECG:   Ventricular Rate 102 BPM    Atrial Rate 102 BPM    P-R Interval 132 ms    QRS Duration 92 ms    Q-T Interval 334 ms    QTC Calculation(Bazett) 435 ms    P Axis 44 degrees    R Axis 32 degrees    T Axis 27 degrees    Diagnosis Line Sinus tachycardia  Otherwise normal ECG    Confirmed by MELIDA RAGSDALE MD ([id number]) on 8/19/2022 6:24:42 AM (08-18-22 @ 20:01)      MEDICATIONS  acetaminophen   IVPB .. 1000 IV Intermittent every 6 hours  acetaminophen   IVPB .. 1000 IV Intermittent once  aspirin enteric coated 81 Oral daily  BUpivacaine liposome 1.3% Injectable 20 Local Injection once  cefepime   IVPB 2000 IV Intermittent every 8 hours  chlorhexidine 2% Cloths 1 Topical daily  cholecalciferol 1000 Oral daily  heparin   Injectable 5000 SubCutaneous every 8 hours  HYDROmorphone PCA (1 mG/mL) 30 PCA Continuous PCA Continuous  lactated ringers. 1000 IV Continuous <Continuous>  levETIRAcetam 1125 Oral daily  levETIRAcetam 1500 Oral at bedtime  magnesium oxide 400 Oral three times a day with meals  melatonin 3 Oral at bedtime  meperidine     Injectable 25 IV Push once  metroNIDAZOLE    Tablet 500 Oral every 8 hours  multivitamin 1 Oral daily  OXcarbazepine 600 Oral <User Schedule>  OXcarbazepine 300 Oral <User Schedule>  pantoprazole    Tablet 40 Oral once  polyethylene glycol 3350 17 Oral daily  senna 2 Oral at bedtime      WEIGHT  Weight (kg): 75 (08-18-22 @ 21:37)  Creatinine, Serum: 0.8 mg/dL (08-23-22 @ 04:00)      ANTIBIOTICS:  cefepime   IVPB 2000 milliGRAM(s) IV Intermittent every 8 hours  metroNIDAZOLE    Tablet 500 milliGRAM(s) Oral every 8 hours      All available historical records have been reviewed

## 2022-08-23 NOTE — PHYSICAL THERAPY INITIAL EVALUATION ADULT - PERTINENT HX OF CURRENT PROBLEM, REHAB EVAL
Pt presented to the ED for intermittent fevers and mixed productive and non-productive cough for 1 month. Day prior to presentation pt had a fever of 103 which was not breaking. In the morning pt got out of the bed and had a syncopal episode. Pt fell lightheaded, saw flashes and woke up on the  floor. Then pt had sever chest pain on the right side, after which he decided to come to the ED. Pt s/p bedside 14Fr pigtail placement in ED with resolution of PTX, admitted to medical service for septic workup(WBC 30, Febrile 103 on admission). Thoracic surgery team consulted for further management of chest tube. Pt now s/p VATS

## 2022-08-23 NOTE — PROGRESS NOTE ADULT - SUBJECTIVE AND OBJECTIVE BOX
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      Dislocations and subluxations      Status post Kerri&#x27;s procedure  11/2019          Vitals:   T(F): 97 (08-23-22 @ 08:00), Max: 101.5 (08-22-22 @ 15:00)  HR: 64 (08-23-22 @ 08:00)  BP: 106/68 (08-23-22 @ 08:00)  RR: 23 (08-23-22 @ 08:00)  SpO2: 100% (08-23-22 @ 08:00)      Diet, Regular      Fluids: lactated ringers.: Solution, 1000 milliLiter(s) infuse at 10 mL/Hr  Provider's Contact #: [phone]      I & O's:    08-22-22 @ 07:01  -  08-23-22 @ 07:00  --------------------------------------------------------  IN:    IV PiggyBack: 150 mL    Lactated Ringers: 600 mL  Total IN: 750 mL    OUT:    Chest Tube (mL): 8 mL    Chest Tube (mL): 290 mL    Chest Tube (mL): 12 mL    Gastrostomy Tube (mL): 0 mL    Indwelling Catheter - Urethral (mL): 265 mL  Total OUT: 575 mL    Total NET: 175 mL          PHYSICAL EXAM:  General Appearance: AAOX3, NAD  Heart: RRR  Lungs: CTAX2, R CT x3 x SXN, - leak  Abdomen:  soft, non tender, non distended  MSK/Extremities:  mobile    MEDICATIONS  (STANDING):  acetaminophen   IVPB .. 1000 milliGRAM(s) IV Intermittent once  acetaminophen   IVPB .. 1000 milliGRAM(s) IV Intermittent every 6 hours  aspirin enteric coated 81 milliGRAM(s) Oral daily  BUpivacaine liposome 1.3% Injectable 20 milliLiter(s) Local Injection once  cefepime   IVPB 2000 milliGRAM(s) IV Intermittent every 8 hours  chlorhexidine 2% Cloths 1 Application(s) Topical daily  cholecalciferol 1000 Unit(s) Oral daily  heparin   Injectable 5000 Unit(s) SubCutaneous every 8 hours  HYDROmorphone PCA (1 mG/mL) 30 milliLiter(s) PCA Continuous PCA Continuous  lactated ringers. 1000 milliLiter(s) (10 mL/Hr) IV Continuous <Continuous>  levETIRAcetam 1125 milliGRAM(s) Oral daily  levETIRAcetam 1500 milliGRAM(s) Oral at bedtime  magnesium oxide 400 milliGRAM(s) Oral three times a day with meals  melatonin 3 milliGRAM(s) Oral at bedtime  meperidine     Injectable 25 milliGRAM(s) IV Push once  metroNIDAZOLE    Tablet 500 milliGRAM(s) Oral every 8 hours  multivitamin 1 Tablet(s) Oral daily  OXcarbazepine 600 milliGRAM(s) Oral <User Schedule>  OXcarbazepine 300 milliGRAM(s) Oral <User Schedule>  pantoprazole    Tablet 40 milliGRAM(s) Oral once  polyethylene glycol 3350 17 Gram(s) Oral daily  senna 2 Tablet(s) Oral at bedtime    MEDICATIONS  (PRN):  HYDROmorphone  Injectable 0.5 milliGRAM(s) IV Push every 10 minutes PRN Moderate Pain (4 - 6)  ondansetron Injectable 4 milliGRAM(s) IV Push once PRN Nausea and/or Vomiting      DVT PROPHYLAXIS: heparin   Injectable 5000 Unit(s) SubCutaneous every 8 hours    GI PROPHYLAXIS: pantoprazole    Tablet 40 milliGRAM(s) Oral once    ANTICOAGULATION:   ANTIBIOTICS:  cefepime   IVPB 2000 milliGRAM(s)  metroNIDAZOLE    Tablet 500 milliGRAM(s)            LAB/STUDIES:  Labs:  CAPILLARY BLOOD GLUCOSE                              10.6   25.37 )-----------( 387      ( 23 Aug 2022 04:00 )             31.7       Auto Neutrophil %: 90.4 % (08-23-22 @ 04:00)  Auto Immature Granulocyte %: 0.9 % (08-23-22 @ 04:00)    08-23    135  |  98  |  15  ----------------------------<  114<H>  4.9   |  26  |  0.8      Calcium, Total Serum: 8.1 mg/dL (08-23-22 @ 04:00)      LFTs:             5.5  | 0.6  | 75       ------------------[144     ( 23 Aug 2022 04:00 )  2.7  | x    | 54          Lipase:x      Amylase:x             Coags:     14.40  ----< 1.25    ( 22 Aug 2022 01:11 )     x                               IMAGING:      ACCESS/ DEVICES:  [x ] Peripheral IV  [ ] Central Venous Line	[ ] R	[ ] L	[ ] IJ	[ ] Fem	[ ] SC	Placed:   [ ] Arterial Line		[ ] R	[ ] L	[ ] Fem	[ ] Rad	[ ] Ax	Placed:   [ ] PICC:					[ ] Mediport  [ x] Urinary Catheter,  Date Placed:   [ x Chest tube: [ ] Right, [ ] Left  [ ] NADIRA/Isaiah Drains

## 2022-08-23 NOTE — PROGRESS NOTE ADULT - ASSESSMENT
46M who presented to the ED on 8/18 with fevers/chest pain and was found to have spontaneous PTX.  S/p bedside 14Fr pigtail placement in ED with resolution of PTX, admitted to medical service for septic workup(WBC 30, Febrile 103 on admission). Thoracic surgery team consulted for further management of chest tube. Pt is s/p Bronchoscopy, with lung decortication using VATS    Plan:  -care by CTU  -3CT x sxn x 48hrs  -qdaily xray in AM  -monitor CT output  -monitor urine output  -ambulate  -regular diet  -pain control  -IV ABX  -ID for recs    spectra [id number]

## 2022-08-23 NOTE — PROGRESS NOTE ADULT - ASSESSMENT
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 "Extensive dense adhesions, purulent membranes and pocket of purulent fluid Total lung decortication, adequate lung re-expansion Small intermittent air leak"    8/21 repeat CT Chest 1.  Findings are compatible with a large right-sided empyema measuring up to 18.4 cm craniocaudal. This may be communicating with a smaller multiloculated fluid and gas collection more anteriorly, likely within the right major fissure.  2.  Right middle and lower lobe consolidation, likely infectious.  3.  Near resolution of previously seen right-sided pneumothorax status post right chest tube placement. Residual trace right apical pneumothorax   seen.    CT 8/19/22 showing 50% pneumothorax under tension. Lucency seen within the lung parenchyma and may represent air trapped within fissures    S/p Chest tube placement 8/18/22    CXR 8/19/22 showing trace R pneumothorax and R basilar opacity     Legionella Antigen, Urine: Negative (08-19-22 @ 04:04)    Streptococcus pneumoniae Ag, Ur Result: Negative (08-19-22 @ 04:04)    Rapid RVP Result: NotDetec (08-18-22 @ 23:50)    Sedimentation Rate, Erythrocyte: 125 mm/Hr (08-19-22 @ 08:51)    C-Reactive Protein, Serum: 323.9 mg/L (08-19-22 @ 08:51)  Reports > 1 mo dry cough, fever, nightsweats  #Allergies: PCN, avoid fluoroquinolones  #PTX    RECOMMENDATIONS  - Send BCx as fever, if spikes again can add Vanc 1g q12h IV while awaiting OR cultures  - f/u OR cultures  - Continue Cefepime 2g IV Q8h  - PO flagyl 500mg TID    If any questions, please call or send a message on [facility] Teams  Please continue to update ID with any pertinent new laboratory or radiographic findings  Spectra [id number]

## 2022-08-24 ENCOUNTER — APPOINTMENT (OUTPATIENT)
Age: 47
End: 2022-08-24

## 2022-08-24 LAB
ALBUMIN SERPL ELPH-MCNC: 2.8 G/DL — LOW (ref 3.5–5.2)
ALP SERPL-CCNC: 145 U/L — HIGH (ref 30–115)
ALT FLD-CCNC: 37 U/L — SIGNIFICANT CHANGE UP (ref 0–41)
ANION GAP SERPL CALC-SCNC: 8 MMOL/L — SIGNIFICANT CHANGE UP (ref 7–14)
AST SERPL-CCNC: 25 U/L — SIGNIFICANT CHANGE UP (ref 0–41)
BASOPHILS # BLD AUTO: 0.05 K/UL — SIGNIFICANT CHANGE UP (ref 0–0.2)
BASOPHILS NFR BLD AUTO: 0.3 % — SIGNIFICANT CHANGE UP (ref 0–1)
BILIRUB DIRECT SERPL-MCNC: <0.2 MG/DL — SIGNIFICANT CHANGE UP (ref 0–0.3)
BILIRUB INDIRECT FLD-MCNC: >0 MG/DL — LOW (ref 0.2–1.2)
BILIRUB SERPL-MCNC: 0.2 MG/DL — SIGNIFICANT CHANGE UP (ref 0.2–1.2)
BUN SERPL-MCNC: 11 MG/DL — SIGNIFICANT CHANGE UP (ref 10–20)
CALCIUM SERPL-MCNC: 7.9 MG/DL — LOW (ref 8.5–10.1)
CHLORIDE SERPL-SCNC: 96 MMOL/L — LOW (ref 98–110)
CO2 SERPL-SCNC: 28 MMOL/L — SIGNIFICANT CHANGE UP (ref 17–32)
CREAT SERPL-MCNC: 0.6 MG/DL — LOW (ref 0.7–1.5)
CULTURE RESULTS: SIGNIFICANT CHANGE UP
CULTURE RESULTS: SIGNIFICANT CHANGE UP
EGFR: 121 ML/MIN/1.73M2 — SIGNIFICANT CHANGE UP
EOSINOPHIL # BLD AUTO: 0.1 K/UL — SIGNIFICANT CHANGE UP (ref 0–0.7)
EOSINOPHIL NFR BLD AUTO: 0.7 % — SIGNIFICANT CHANGE UP (ref 0–8)
GLUCOSE SERPL-MCNC: 98 MG/DL — SIGNIFICANT CHANGE UP (ref 70–99)
HCT VFR BLD CALC: 24.8 % — LOW (ref 42–52)
HCT VFR BLD CALC: 26.9 % — LOW (ref 42–52)
HGB BLD-MCNC: 8.4 G/DL — LOW (ref 14–18)
HGB BLD-MCNC: 8.8 G/DL — LOW (ref 14–18)
IMM GRANULOCYTES NFR BLD AUTO: 1 % — HIGH (ref 0.1–0.3)
LYMPHOCYTES # BLD AUTO: 0.76 K/UL — LOW (ref 1.2–3.4)
LYMPHOCYTES # BLD AUTO: 5.1 % — LOW (ref 20.5–51.1)
MAGNESIUM SERPL-MCNC: 1.8 MG/DL — SIGNIFICANT CHANGE UP (ref 1.8–2.4)
MCHC RBC-ENTMCNC: 26.7 PG — LOW (ref 27–31)
MCHC RBC-ENTMCNC: 27.9 PG — SIGNIFICANT CHANGE UP (ref 27–31)
MCHC RBC-ENTMCNC: 32.7 G/DL — SIGNIFICANT CHANGE UP (ref 32–37)
MCHC RBC-ENTMCNC: 33.9 G/DL — SIGNIFICANT CHANGE UP (ref 32–37)
MCV RBC AUTO: 81.8 FL — SIGNIFICANT CHANGE UP (ref 80–94)
MCV RBC AUTO: 82.4 FL — SIGNIFICANT CHANGE UP (ref 80–94)
MONOCYTES # BLD AUTO: 1.09 K/UL — HIGH (ref 0.1–0.6)
MONOCYTES NFR BLD AUTO: 7.4 % — SIGNIFICANT CHANGE UP (ref 1.7–9.3)
NEUTROPHILS # BLD AUTO: 12.64 K/UL — HIGH (ref 1.4–6.5)
NEUTROPHILS NFR BLD AUTO: 85.5 % — HIGH (ref 42.2–75.2)
NRBC # BLD: 0 /100 WBCS — SIGNIFICANT CHANGE UP (ref 0–0)
NRBC # BLD: 0 /100 WBCS — SIGNIFICANT CHANGE UP (ref 0–0)
PLATELET # BLD AUTO: 417 K/UL — HIGH (ref 130–400)
PLATELET # BLD AUTO: 429 K/UL — HIGH (ref 130–400)
POTASSIUM SERPL-MCNC: 4.3 MMOL/L — SIGNIFICANT CHANGE UP (ref 3.5–5)
POTASSIUM SERPL-SCNC: 4.3 MMOL/L — SIGNIFICANT CHANGE UP (ref 3.5–5)
PROT SERPL-MCNC: 5.4 G/DL — LOW (ref 6–8)
RBC # BLD: 3.01 M/UL — LOW (ref 4.7–6.1)
RBC # BLD: 3.29 M/UL — LOW (ref 4.7–6.1)
RBC # FLD: 14.4 % — SIGNIFICANT CHANGE UP (ref 11.5–14.5)
RBC # FLD: 14.6 % — HIGH (ref 11.5–14.5)
SODIUM SERPL-SCNC: 132 MMOL/L — LOW (ref 135–146)
SPECIMEN SOURCE: SIGNIFICANT CHANGE UP
SPECIMEN SOURCE: SIGNIFICANT CHANGE UP
WBC # BLD: 14.16 K/UL — HIGH (ref 4.8–10.8)
WBC # BLD: 14.79 K/UL — HIGH (ref 4.8–10.8)
WBC # FLD AUTO: 14.16 K/UL — HIGH (ref 4.8–10.8)
WBC # FLD AUTO: 14.79 K/UL — HIGH (ref 4.8–10.8)

## 2022-08-24 PROCEDURE — 71045 X-RAY EXAM CHEST 1 VIEW: CPT | Mod: 26

## 2022-08-24 RX ADMIN — CEFEPIME 100 MILLIGRAM(S): 1 INJECTION, POWDER, FOR SOLUTION INTRAMUSCULAR; INTRAVENOUS at 06:07

## 2022-08-24 RX ADMIN — Medication 1 TABLET(S): at 12:27

## 2022-08-24 RX ADMIN — Medication 500 MILLIGRAM(S): at 21:21

## 2022-08-24 RX ADMIN — HEPARIN SODIUM 5000 UNIT(S): 5000 INJECTION INTRAVENOUS; SUBCUTANEOUS at 14:22

## 2022-08-24 RX ADMIN — MAGNESIUM OXIDE 400 MG ORAL TABLET 400 MILLIGRAM(S): 241.3 TABLET ORAL at 12:29

## 2022-08-24 RX ADMIN — Medication 3 MILLIGRAM(S): at 21:21

## 2022-08-24 RX ADMIN — LEVETIRACETAM 1500 MILLIGRAM(S): 250 TABLET, FILM COATED ORAL at 21:21

## 2022-08-24 RX ADMIN — HYDROMORPHONE HYDROCHLORIDE 30 MILLILITER(S): 2 INJECTION INTRAMUSCULAR; INTRAVENOUS; SUBCUTANEOUS at 07:22

## 2022-08-24 RX ADMIN — HEPARIN SODIUM 5000 UNIT(S): 5000 INJECTION INTRAVENOUS; SUBCUTANEOUS at 06:07

## 2022-08-24 RX ADMIN — OXCARBAZEPINE 600 MILLIGRAM(S): 300 TABLET, FILM COATED ORAL at 17:10

## 2022-08-24 RX ADMIN — SENNA PLUS 2 TABLET(S): 8.6 TABLET ORAL at 21:21

## 2022-08-24 RX ADMIN — OXCARBAZEPINE 300 MILLIGRAM(S): 300 TABLET, FILM COATED ORAL at 06:08

## 2022-08-24 RX ADMIN — Medication 500 MILLIGRAM(S): at 14:22

## 2022-08-24 RX ADMIN — LEVETIRACETAM 1125 MILLIGRAM(S): 250 TABLET, FILM COATED ORAL at 12:29

## 2022-08-24 RX ADMIN — MAGNESIUM OXIDE 400 MG ORAL TABLET 400 MILLIGRAM(S): 241.3 TABLET ORAL at 17:09

## 2022-08-24 RX ADMIN — Medication 1000 UNIT(S): at 12:29

## 2022-08-24 RX ADMIN — CHLORHEXIDINE GLUCONATE 1 APPLICATION(S): 213 SOLUTION TOPICAL at 12:30

## 2022-08-24 RX ADMIN — Medication 500 MILLIGRAM(S): at 06:08

## 2022-08-24 RX ADMIN — Medication 81 MILLIGRAM(S): at 12:27

## 2022-08-24 RX ADMIN — HYDROMORPHONE HYDROCHLORIDE 0.5 MILLIGRAM(S): 2 INJECTION INTRAMUSCULAR; INTRAVENOUS; SUBCUTANEOUS at 12:44

## 2022-08-24 RX ADMIN — HEPARIN SODIUM 5000 UNIT(S): 5000 INJECTION INTRAVENOUS; SUBCUTANEOUS at 21:22

## 2022-08-24 RX ADMIN — CEFEPIME 100 MILLIGRAM(S): 1 INJECTION, POWDER, FOR SOLUTION INTRAMUSCULAR; INTRAVENOUS at 21:22

## 2022-08-24 RX ADMIN — CEFEPIME 100 MILLIGRAM(S): 1 INJECTION, POWDER, FOR SOLUTION INTRAMUSCULAR; INTRAVENOUS at 14:22

## 2022-08-24 RX ADMIN — HYDROMORPHONE HYDROCHLORIDE 0.5 MILLIGRAM(S): 2 INJECTION INTRAMUSCULAR; INTRAVENOUS; SUBCUTANEOUS at 12:29

## 2022-08-24 RX ADMIN — PANTOPRAZOLE SODIUM 40 MILLIGRAM(S): 20 TABLET, DELAYED RELEASE ORAL at 06:07

## 2022-08-24 RX ADMIN — MAGNESIUM OXIDE 400 MG ORAL TABLET 400 MILLIGRAM(S): 241.3 TABLET ORAL at 07:20

## 2022-08-24 NOTE — PROGRESS NOTE ADULT - SUBJECTIVE AND OBJECTIVE BOX
ANGE, FAZAL  46y, Male  Allergy: moxifloxacin (Hives)  penicillin (Unknown)      LOS  6d    CHIEF COMPLAINT: pneumothorax, acute hypoxemic respiratory failure (22 Aug 2022 11:31)      INTERVAL EVENTS/HPI  - No acute events overnight  - T(F): , Max: 98.3 (08-23-22 @ 12:00)  - Tolerating medication  - WBC Count: 14.79 (08-24-22 @ 04:29)  WBC Count: 25.37 (08-23-22 @ 04:00)     - Creatinine, Serum: 0.6 (08-24-22 @ 04:29)  Creatinine, Serum: 0.8 (08-23-22 @ 04:00)       ROS  ***    VITALS:  T(F): 97.8, Max: 98.3 (08-23-22 @ 12:00)  HR: 82  BP: 100/63  RR: 25Vital Signs Last 24 Hrs  T(C): 36.6 (24 Aug 2022 06:00), Max: 36.8 (23 Aug 2022 12:00)  T(F): 97.8 (24 Aug 2022 06:00), Max: 98.3 (23 Aug 2022 12:00)  HR: 82 (24 Aug 2022 07:00) (64 - 100)  BP: 100/63 (24 Aug 2022 07:00) (99/62 - 126/73)  BP(mean): 77 (24 Aug 2022 07:00) (75 - 95)  RR: 25 (24 Aug 2022 07:00) (15 - 41)  SpO2: 97% (24 Aug 2022 07:00) (95% - 100%)    Parameters below as of 24 Aug 2022 07:00  Patient On (Oxygen Delivery Method): room air        PHYSICAL EXAM:  ***    FH: Non-contributory  Social Hx: Non-contributory    TESTS & MEASUREMENTS:                        8.8    14.79 )-----------( 429      ( 24 Aug 2022 04:29 )             26.9     08-24    132<L>  |  96<L>  |  11  ----------------------------<  98  4.3   |  28  |  0.6<L>    Ca    7.9<L>      24 Aug 2022 04:29  Mg     1.8     08-24    TPro  5.4<L>  /  Alb  2.8<L>  /  TBili  0.2  /  DBili  <0.2  /  AST  25  /  ALT  37  /  AlkPhos  145<H>  08-24      LIVER FUNCTIONS - ( 24 Aug 2022 04:29 )  Alb: 2.8 g/dL / Pro: 5.4 g/dL / ALK PHOS: 145 U/L / ALT: 37 U/L / AST: 25 U/L / GGT: x               Culture - Acid Fast - Tissue w/Smear (collected 08-22-22 @ 21:40)  Source: .Tissue 1) pleural rind in specimen cup    Culture - Fungal, Tissue (collected 08-22-22 @ 21:40)  Source: .Tissue 1) pleural rind in specimen cup  Preliminary Report (08-24-22 @ 06:45):    Testing in progress    Culture - Tissue with Gram Stain (collected 08-22-22 @ 21:40)  Source: .Tissue 1) pleural rind in specimen cup  Gram Stain (08-23-22 @ 14:58):    Moderate polymorphonuclear leukocytes per low power field    No organisms seen per oil power field    Culture - Fungal, Tissue (collected 08-22-22 @ 21:40)  Source: .Tissue None  Preliminary Report (08-24-22 @ 06:37):    Testing in progress    Culture - Acid Fast - Tissue w/Smear (collected 08-22-22 @ 21:40)  Source: .Tissue None    Culture - Tissue with Gram Stain (collected 08-22-22 @ 21:40)  Source: .Tissue None  Gram Stain (08-23-22 @ 15:15):    No polymorphonuclear leukocytes per low power field    No organisms seen per oil power field    Culture - Urine (collected 08-18-22 @ 20:30)  Source: Clean Catch Clean Catch (Midstream)  Final Report (08-20-22 @ 07:14):    No growth    Culture - Blood (collected 08-18-22 @ 20:20)  Source: .Blood Blood  Final Report (08-24-22 @ 02:01):    No Growth Final    Culture - Blood (collected 08-18-22 @ 20:20)  Source: .Blood Blood  Final Report (08-24-22 @ 02:01):    No Growth Final            INFECTIOUS DISEASES TESTING  COVID-19 PCR: NotDetec (08-21-22 @ 17:39)  Procalcitonin, Serum: 17.80 (08-20-22 @ 23:30)  MRSA PCR Result.: Negative (08-20-22 @ 18:26)  HIV-1/2 Combo Result: Nonreact (08-20-22 @ 00:15)  Legionella Antigen, Urine: Negative (08-19-22 @ 04:04)  Streptococcus pneumoniae Ag, Ur Result: Negative (08-19-22 @ 04:04)  Rapid RVP Result: NotDetec (08-18-22 @ 23:50)  COVID-19 PCR: NotDetec (08-18-22 @ 20:30)  strept    INFLAMMATORY MARKERS  Sedimentation Rate, Erythrocyte: 125 mm/Hr (08-19-22 @ 08:51)  C-Reactive Protein, Serum: 323.9 mg/L (08-19-22 @ 08:51)      RADIOLOGY & ADDITIONAL TESTS:  I have personally reviewed the last available Chest xray  CXR  Xray Chest 1 View- PORTABLE-Urgent:   ACC: [id number] EXAM:  XR CHEST PORTABLE URGENT 1V                          PROCEDURE DATE:  08/22/2022          INTERPRETATION:  Clinical History / Reason for exam: Cardiac surgery    Comparison : Chest radiograph 8/22/2022.    Technique/Positioning: Single AP chest radiograph.    Findings:    Support devices: 3 right-sided chest tubes are noted.    Cardiac/mediastinum/hilum: Stable.    Lung parenchyma/Pleura: Right lower lobe opacity is stable. Stable fluid   and air in the pleural space.    Skeleton/soft tissues: Stable.    Impression:    3 right-sided chest tubes with right lower lobe opacity and stable fluid   and air in the pleural space.    --- End of Report ---            SANDRA BUSBY MD; Attending Radiologist  This document has been electronically signed. Aug 23 2022  9:35AM (08-22-22 @ 23:42)      CT      CARDIOLOGY TESTING  12 Lead ECG:   Ventricular Rate 102 BPM    Atrial Rate 102 BPM    P-R Interval 132 ms    QRS Duration 92 ms    Q-T Interval 334 ms    QTC Calculation(Bazett) 435 ms    P Axis 44 degrees    R Axis 32 degrees    T Axis 27 degrees    Diagnosis Line Sinus tachycardia  Otherwise normal ECG    Confirmed by MELIDA RAGSDALE MD ([id number]) on 8/19/2022 6:24:42 AM (08-18-22 @ 20:01)      MEDICATIONS  aspirin enteric coated 81 Oral daily  BUpivacaine liposome 1.3% Injectable 20 Local Injection once  cefepime   IVPB 2000 IV Intermittent every 8 hours  chlorhexidine 2% Cloths 1 Topical daily  cholecalciferol 1000 Oral daily  heparin   Injectable 5000 SubCutaneous every 8 hours  HYDROmorphone PCA (1 mG/mL) 30 PCA Continuous PCA Continuous  lactated ringers. 1000 IV Continuous <Continuous>  levETIRAcetam 1125 Oral daily  levETIRAcetam 1500 Oral at bedtime  magnesium oxide 400 Oral three times a day with meals  melatonin 3 Oral at bedtime  meperidine     Injectable 25 IV Push once  metroNIDAZOLE    Tablet 500 Oral every 8 hours  multivitamin 1 Oral daily  OXcarbazepine 600 Oral <User Schedule>  OXcarbazepine 300 Oral <User Schedule>  polyethylene glycol 3350 17 Oral daily  senna 2 Oral at bedtime      WEIGHT  Weight (kg): 75 (08-18-22 @ 21:37)  Creatinine, Serum: 0.6 mg/dL (08-24-22 @ 04:29)      ANTIBIOTICS:  cefepime   IVPB 2000 milliGRAM(s) IV Intermittent every 8 hours  metroNIDAZOLE    Tablet 500 milliGRAM(s) Oral every 8 hours      All available historical records have been reviewed       ANGE, FAZAL  46y, Male  Allergy: moxifloxacin (Hives)  penicillin (Unknown)      LOS  6d    CHIEF COMPLAINT: pneumothorax, acute hypoxemic respiratory failure (22 Aug 2022 11:31)      INTERVAL EVENTS/HPI  - No acute events overnight  - T(F): , Max: 98.3 (08-23-22 @ 12:00)  - Tolerating medication  - WBC Count: 14.79 (08-24-22 @ 04:29)  WBC Count: 25.37 (08-23-22 @ 04:00)     - Creatinine, Serum: 0.6 (08-24-22 @ 04:29)  Creatinine, Serum: 0.8 (08-23-22 @ 04:00)       ROS  General: Denies rigors, nightsweats  HEENT: Denies headache, rhinorrhea, sore throat, eye pain  CV: Denies CP, palpitations  PULM: Denies wheezing, hemoptysis  GI: Denies hematemesis, hematochezia, melena  : Denies discharge, hematuria  MSK: Denies arthralgias, myalgias  SKIN: Denies rash, lesions  NEURO: Denies paresthesias, weakness  PSYCH: Denies depression, anxiety     VITALS:  T(F): 97.8, Max: 98.3 (08-23-22 @ 12:00)  HR: 82  BP: 100/63  RR: 25Vital Signs Last 24 Hrs  T(C): 36.6 (24 Aug 2022 06:00), Max: 36.8 (23 Aug 2022 12:00)  T(F): 97.8 (24 Aug 2022 06:00), Max: 98.3 (23 Aug 2022 12:00)  HR: 82 (24 Aug 2022 07:00) (64 - 100)  BP: 100/63 (24 Aug 2022 07:00) (99/62 - 126/73)  BP(mean): 77 (24 Aug 2022 07:00) (75 - 95)  RR: 25 (24 Aug 2022 07:00) (15 - 41)  SpO2: 97% (24 Aug 2022 07:00) (95% - 100%)    Parameters below as of 24 Aug 2022 07:00  Patient On (Oxygen Delivery Method): room air        PHYSICAL EXAM:  Gen: NAD, resting in bed  HEENT: Normocephalic, atraumatic  Neck: supple, no lymphadenopathy  CV: Regular rate & regular rhythm  Lungs: decreased BS at bases, no fremitus CTs  Abdomen: Soft, BS present  Ext: Warm, well perfused  Neuro: non focal, awake  Skin: no rash, no erythema  Lines: no phlebitis     FH: Non-contributory  Social Hx: Non-contributory    TESTS & MEASUREMENTS:                        8.8    14.79 )-----------( 429      ( 24 Aug 2022 04:29 )             26.9     08-24    132<L>  |  96<L>  |  11  ----------------------------<  98  4.3   |  28  |  0.6<L>    Ca    7.9<L>      24 Aug 2022 04:29  Mg     1.8     08-24    TPro  5.4<L>  /  Alb  2.8<L>  /  TBili  0.2  /  DBili  <0.2  /  AST  25  /  ALT  37  /  AlkPhos  145<H>  08-24      LIVER FUNCTIONS - ( 24 Aug 2022 04:29 )  Alb: 2.8 g/dL / Pro: 5.4 g/dL / ALK PHOS: 145 U/L / ALT: 37 U/L / AST: 25 U/L / GGT: x               Culture - Acid Fast - Tissue w/Smear (collected 08-22-22 @ 21:40)  Source: .Tissue 1) pleural rind in specimen cup    Culture - Fungal, Tissue (collected 08-22-22 @ 21:40)  Source: .Tissue 1) pleural rind in specimen cup  Preliminary Report (08-24-22 @ 06:45):    Testing in progress    Culture - Tissue with Gram Stain (collected 08-22-22 @ 21:40)  Source: .Tissue 1) pleural rind in specimen cup  Gram Stain (08-23-22 @ 14:58):    Moderate polymorphonuclear leukocytes per low power field    No organisms seen per oil power field    Culture - Fungal, Tissue (collected 08-22-22 @ 21:40)  Source: .Tissue None  Preliminary Report (08-24-22 @ 06:37):    Testing in progress    Culture - Acid Fast - Tissue w/Smear (collected 08-22-22 @ 21:40)  Source: .Tissue None    Culture - Tissue with Gram Stain (collected 08-22-22 @ 21:40)  Source: .Tissue None  Gram Stain (08-23-22 @ 15:15):    No polymorphonuclear leukocytes per low power field    No organisms seen per oil power field    Culture - Urine (collected 08-18-22 @ 20:30)  Source: Clean Catch Clean Catch (Midstream)  Final Report (08-20-22 @ 07:14):    No growth    Culture - Blood (collected 08-18-22 @ 20:20)  Source: .Blood Blood  Final Report (08-24-22 @ 02:01):    No Growth Final    Culture - Blood (collected 08-18-22 @ 20:20)  Source: .Blood Blood  Final Report (08-24-22 @ 02:01):    No Growth Final            INFECTIOUS DISEASES TESTING  COVID-19 PCR: NotDetec (08-21-22 @ 17:39)  Procalcitonin, Serum: 17.80 (08-20-22 @ 23:30)  MRSA PCR Result.: Negative (08-20-22 @ 18:26)  HIV-1/2 Combo Result: Nonreact (08-20-22 @ 00:15)  Legionella Antigen, Urine: Negative (08-19-22 @ 04:04)  Streptococcus pneumoniae Ag, Ur Result: Negative (08-19-22 @ 04:04)  Rapid RVP Result: NotDetec (08-18-22 @ 23:50)  COVID-19 PCR: NotDetec (08-18-22 @ 20:30)  strept    INFLAMMATORY MARKERS  Sedimentation Rate, Erythrocyte: 125 mm/Hr (08-19-22 @ 08:51)  C-Reactive Protein, Serum: 323.9 mg/L (08-19-22 @ 08:51)      RADIOLOGY & ADDITIONAL TESTS:  I have personally reviewed the last available Chest xray  CXR  Xray Chest 1 View- PORTABLE-Urgent:   ACC: [id number] EXAM:  XR CHEST PORTABLE URGENT 1V                          PROCEDURE DATE:  08/22/2022          INTERPRETATION:  Clinical History / Reason for exam: Cardiac surgery    Comparison : Chest radiograph 8/22/2022.    Technique/Positioning: Single AP chest radiograph.    Findings:    Support devices: 3 right-sided chest tubes are noted.    Cardiac/mediastinum/hilum: Stable.    Lung parenchyma/Pleura: Right lower lobe opacity is stable. Stable fluid   and air in the pleural space.    Skeleton/soft tissues: Stable.    Impression:    3 right-sided chest tubes with right lower lobe opacity and stable fluid   and air in the pleural space.    --- End of Report ---            SANDAR BUSBY MD; Attending Radiologist  This document has been electronically signed. Aug 23 2022  9:35AM (08-22-22 @ 23:42)      CT      CARDIOLOGY TESTING  12 Lead ECG:   Ventricular Rate 102 BPM    Atrial Rate 102 BPM    P-R Interval 132 ms    QRS Duration 92 ms    Q-T Interval 334 ms    QTC Calculation(Bazett) 435 ms    P Axis 44 degrees    R Axis 32 degrees    T Axis 27 degrees    Diagnosis Line Sinus tachycardia  Otherwise normal ECG    Confirmed by MELIDA RAGSDALE MD ([id number]) on 8/19/2022 6:24:42 AM (08-18-22 @ 20:01)      MEDICATIONS  aspirin enteric coated 81 Oral daily  BUpivacaine liposome 1.3% Injectable 20 Local Injection once  cefepime   IVPB 2000 IV Intermittent every 8 hours  chlorhexidine 2% Cloths 1 Topical daily  cholecalciferol 1000 Oral daily  heparin   Injectable 5000 SubCutaneous every 8 hours  HYDROmorphone PCA (1 mG/mL) 30 PCA Continuous PCA Continuous  lactated ringers. 1000 IV Continuous <Continuous>  levETIRAcetam 1125 Oral daily  levETIRAcetam 1500 Oral at bedtime  magnesium oxide 400 Oral three times a day with meals  melatonin 3 Oral at bedtime  meperidine     Injectable 25 IV Push once  metroNIDAZOLE    Tablet 500 Oral every 8 hours  multivitamin 1 Oral daily  OXcarbazepine 600 Oral <User Schedule>  OXcarbazepine 300 Oral <User Schedule>  polyethylene glycol 3350 17 Oral daily  senna 2 Oral at bedtime      WEIGHT  Weight (kg): 75 (08-18-22 @ 21:37)  Creatinine, Serum: 0.6 mg/dL (08-24-22 @ 04:29)      ANTIBIOTICS:  cefepime   IVPB 2000 milliGRAM(s) IV Intermittent every 8 hours  metroNIDAZOLE    Tablet 500 milliGRAM(s) Oral every 8 hours      All available historical records have been reviewed

## 2022-08-24 NOTE — PROGRESS NOTE ADULT - SUBJECTIVE AND OBJECTIVE BOX
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  Diocations and subluxations  Status post Kerri&#x27;s procedure  11/2019    Vitals:   T(F): 98.2 (08-23-22 @ 21:00), Max: 98.3 (08-23-22 @ 12:00)  HR: 81 (08-24-22 @ 00:00)  BP: 101/65 (08-24-22 @ 00:00)  RR: 21 (08-24-22 @ 00:00)  SpO2: 98% (08-24-22 @ 00:00)    Diet, Regular    Fluids: lactated ringers.: Solution, 1000 milliLiter(s) infuse at 10 mL/Hr  Provider's Contact #: [phone]    I & O's:    08-22-22 @ 07:01  -  08-23-22 @ 07:00  --------------------------------------------------------  IN:    IV PiggyBack: 150 mL    Lactated Ringers: 600 mL  Total IN: 750 mL    OUT:    Chest Tube (mL): 290 mL    Chest Tube (mL): 12 mL    Chest Tube (mL): 8 mL    Gastrostomy Tube (mL): 0 mL    Indwelling Catheter - Urethral (mL): 265 mL  Total OUT: 575 mL    Total NET: 175 mL    PHYSICAL EXAM:  General Appearance: AAOX3, NAD  Heart: RRR  Lungs: Normal respiratory effort, R CT x3 x SXN, no air leak  Abdomen:  soft, non tender, non distended  MSK/Extremities:  mobile    MEDICATIONS  (STANDING):  aspirin enteric coated 81 milliGRAM(s) Oral daily  BUpivacaine liposome 1.3% Injectable 20 milliLiter(s) Local Injection once  cefepime   IVPB 2000 milliGRAM(s) IV Intermittent every 8 hours  chlorhexidine 2% Cloths 1 Application(s) Topical daily  cholecalciferol 1000 Unit(s) Oral daily  heparin   Injectable 5000 Unit(s) SubCutaneous every 8 hours  HYDROmorphone PCA (1 mG/mL) 30 milliLiter(s) PCA Continuous PCA Continuous  lactated ringers. 1000 milliLiter(s) (10 mL/Hr) IV Continuous <Continuous>  levETIRAcetam 1125 milliGRAM(s) Oral daily  levETIRAcetam 1500 milliGRAM(s) Oral at bedtime  magnesium oxide 400 milliGRAM(s) Oral three times a day with meals  melatonin 3 milliGRAM(s) Oral at bedtime  meperidine     Injectable 25 milliGRAM(s) IV Push once  metroNIDAZOLE    Tablet 500 milliGRAM(s) Oral every 8 hours  multivitamin 1 Tablet(s) Oral daily  OXcarbazepine 600 milliGRAM(s) Oral <User Schedule>  OXcarbazepine 300 milliGRAM(s) Oral <User Schedule>  pantoprazole    Tablet 40 milliGRAM(s) Oral once  polyethylene glycol 3350 17 Gram(s) Oral daily  senna 2 Tablet(s) Oral at bedtime    MEDICATIONS  (PRN):  ALPRAZolam 0.25 milliGRAM(s) Oral every 8 hours PRN Anxiety  HYDROmorphone  Injectable 0.5 milliGRAM(s) IV Push every 10 minutes PRN Moderate Pain (4 - 6)  ondansetron Injectable 4 milliGRAM(s) IV Push once PRN Nausea and/or Vomiting    DVT PROPHYLAXIS: heparin   Injectable 5000 Unit(s) SubCutaneous every 8 hours    GI PROPHYLAXIS: pantoprazole    Tablet 40 milliGRAM(s) Oral once    ANTICOAGULATION:   ANTIBIOTICS:  cefepime   IVPB 2000 milliGRAM(s)  metroNIDAZOLE    Tablet 500 milliGRAM(s)    LAB/STUDIES:  Labs:  CAPILLARY BLOOD GLUCOSE               10.6   25.37 )-----------( 387      ( 23 Aug 2022 04:00 )             31.7       Auto Neutrophil %: 90.4 % (08-23-22 @ 04:00)  Auto Immature Granulocyte %: 0.9 % (08-23-22 @ 04:00)    08-23    135  |  98  |  15  ----------------------------<  114<H>  4.9   |  26  |  0.8    Calcium, Total Serum: 8.1 mg/dL (08-23-22 @ 04:00)    LFTs:             5.5  | 0.6  | 75       ------------------[144     ( 23 Aug 2022 04:00 )  2.7  | x    | 54          Lipase:x      Amylase:x        Coags:     14.40  ----< 1.25    ( 22 Aug 2022 01:11 )     x        Culture - Acid Fast - Tissue w/Smear (collected 22 Aug 2022 21:40)  Source: .Tissue 1) pleural rind in specimen cup    Culture - Tissue with Gram Stain (collected 22 Aug 2022 21:40)  Source: .Tissue 1) pleural rind in specimen cup  Gram Stain (23 Aug 2022 14:58):    Moderate polymorphonuclear leukocytes per low power field    No organisms seen per oil power field    Culture - Acid Fast - Tissue w/Smear (collected 22 Aug 2022 21:40)  Source: .Tissue None    Culture - Tissue with Gram Stain (collected 22 Aug 2022 21:40)  Source: .Tissue None  Gram Stain (23 Aug 2022 15:15):    No polymorphonuclear leukocytes per low power field    No organisms seen per oil power field

## 2022-08-24 NOTE — PROGRESS NOTE ADULT - ASSESSMENT
1 . hx of EDS vascular type, multiple hx of vascular ruptures including sig colon s/p macedo's  and ileostomy in 2020  .. 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  will follow

## 2022-08-24 NOTE — PROGRESS NOTE ADULT - SUBJECTIVE AND OBJECTIVE BOX
HPI:  46M with PMH Melva-Danlos syndrome, cough variant asthma w/ hx of pulmonary nodules, , perforation of sigmoid colon s/p Kerri's, Colonic perforation w/ ileostomy in , epilepsy, HTN, celiac aneurysm s/p coiling 2010   presented to the ED 22 for intermittent fevers ( recorded at home), mixed productive and non-productive cough, night sweats for 1 month. 22   pt had a fever of 103 which was not breaking. In the morning pt got out of the bed and had a syncopal episode. Pt fell lightheaded, saw flashes and woke up on the floor.   Then pt had severe chest pain on the right side, after which he decided to come to the ED.   In course of hospitalization, pt found to have PTX a/w ruptured bleb and had chest tube placed.  Pt had CT's abd and chest and no findings for PNA or sepsis , but pt has ongoing fever at high temp at 103 and 104.  Pt has had elev wbc and plt count with mild anemia.   Diff for leukocytosis unremarkable.     Infectious Diseases History:  Old Micro Data/Cultures:     FAMILY HISTORY: non-contributory     PAST MEDICAL & SURGICAL HISTORY:  EDS (Melva-Danlos syndrome)      Epilepsy      HTN (hypertension)      Keratoconus      Aneurysm artery, celiac  s/p coiling 2010      Dislocations and subluxations      Status post Kerri&#x27;s procedure  2019          SOCIAL HISTORY  Social History:  Denies alcohol, tobacco and illicit drug use. (05 Sep 2019 22:09)      Recent Travel: [place] ([facility]) - was feeling sick prior to this trip  Other Exposures:     ROS  General: (+) fever and nightsweats  HEENT: Denies headache, rhinorrhea, sore throat, eye pain  CV: (+) CP - right sided, (-) palpitations  PULM: Denies wheezing, hemoptysis (+) dry cough, SOB  GI: Denies hematemesis, hematochezia, melena  : Denies discharge, hematuria  MSK: Denies arthralgias, myalgias  SKIN: Denies rash, lesions  NEURO: Denies paresthesias, weakness    VITALS:  VSS    PHYSICAL EXAM:  pt oob   Gen: NAD, resting in bed, mildly diaphoretic   Neuro: non focal, alert and oriented x3  HEENT: Normocephalic, atraumatic  Neck: supple, no lymphadenopathy  CV: Tachycardic rate & regular rhythm  Lungs: Diminished breath sounds on the right. No wheezing, ronchi or rales bilaterally  CT  Abdomen: Soft, ileostomy present, nontender, nondistended   Ext: Warm, well perfused  Skin: no rash, no lesions  Lines: no phlebitis    TESTS & MEASUREMENTS:                        11.5   30.85 )-----------( 488      ( 18 Aug 2022 20:20 )             36.1   wbc 20K         134<L>  |  95<L>  |  15  ----------------------------<  97  4.3   |  24  |  1.2    Ca    9.7      18 Aug 2022 20:20    TPro  7.7  /  Alb  4.0  /  TBili  0.6  /  DBili  x   /  AST  20  /  ALT  27  /  AlkPhos  138<H>        LIVER FUNCTIONS - ( 18 Aug 2022 20:20 )  Alb: 4.0 g/dL / Pro: 7.7 g/dL / ALK PHOS: 138 U/L / ALT: 27 U/L / AST: 20 U/L / GGT: x           Urinalysis Basic - ( 18 Aug 2022 20:30 )    Color: Yellow / Appearance: Clear / S.035 / pH: x  Gluc: x / Ketone: Moderate  / Bili: Small / Urobili: <2 mg/dL   Blood: x / Protein: 30 mg/dL / Nitrite: Negative   Leuk Esterase: Negative / RBC: 3 /HPF / WBC 2 /HPF   Sq Epi: x / Non Sq Epi: 3 /HPF / Bacteria: FEW          Lactate, Blood: 1.6 mmol/L (22 @ 20:20)      I

## 2022-08-24 NOTE — PROGRESS NOTE ADULT - ASSESSMENT
ASSESSMENT:  46M who presented to the ED on 8/18 with fevers/chest pain and was found to have spontaneous PTX.  S/p bedside 14Fr pigtail placement in ED with resolution of PTX, admitted to medical service for septic workup(WBC 30, Febrile 103 on admission). Thoracic surgery team consulted for further management of chest tube. Pt is s/p Bronchoscopy, with lung decortication using VATS.    Plan:  -care per CTU  -3 chest tubes to stay to suction for now, monitor output amount and character   -f/u daily AM CXR  -encourage ambulation as tolerated  -encourage incentive spirometry use  -regular diet  -multimodal pain control  -IV ABX, if pt begins spiking fevers may add vanocmycin per ID recs    [id number]

## 2022-08-24 NOTE — PROGRESS NOTE ADULT - ASSESSMENT
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 "Extensive dense adhesions, purulent membranes and pocket of purulent fluid Total lung decortication, adequate lung re-expansion Small intermittent air leak"    8/21 repeat CT Chest 1.  Findings are compatible with a large right-sided empyema measuring up to 18.4 cm craniocaudal. This may be communicating with a smaller multiloculated fluid and gas collection more anteriorly, likely within the right major fissure.  2.  Right middle and lower lobe consolidation, likely infectious.  3.  Near resolution of previously seen right-sided pneumothorax status post right chest tube placement. Residual trace right apical pneumothorax   seen.    CT 8/19/22 showing 50% pneumothorax under tension. Lucency seen within the lung parenchyma and may represent air trapped within fissures    S/p Chest tube placement 8/18/22    CXR 8/19/22 showing trace R pneumothorax and R basilar opacity     Legionella Antigen, Urine: Negative (08-19-22 @ 04:04)    Streptococcus pneumoniae Ag, Ur Result: Negative (08-19-22 @ 04:04)    Rapid RVP Result: NotDetec (08-18-22 @ 23:50)    Sedimentation Rate, Erythrocyte: 125 mm/Hr (08-19-22 @ 08:51)    C-Reactive Protein, Serum: 323.9 mg/L (08-19-22 @ 08:51)  Reports > 1 mo dry cough, fever, nightsweats  #Allergies: PCN, avoid fluoroquinolones  #PTX    RECOMMENDATIONS  - f/u OR cultures, thus far NG  - Continue Cefepime 2g IV Q8h  - PO flagyl 500mg TID    If any questions, please call or send a message on [employer] Teams  Please continue to update ID with any pertinent new laboratory or radiographic findings  Spectra [id number]      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, "abnormal blood counts - low RBCs high platelets" f/u with hemeonc - considering bone bx in future, perforation of sigmoid colon s/p Kerri's, Colonic perforation w/ ileostomy in 2020, epilepsy, HTN, 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 "Extensive dense adhesions, purulent membranes and pocket of purulent fluid Total lung decortication, adequate lung re-expansion Small intermittent air leak"    8/21 repeat CT Chest 1.  Findings are compatible with a large right-sided empyema measuring up to 18.4 cm craniocaudal. This may be communicating with a smaller multiloculated fluid and gas collection more anteriorly, likely within the right major fissure.  2.  Right middle and lower lobe consolidation, likely infectious.  3.  Near resolution of previously seen right-sided pneumothorax status post right chest tube placement. Residual trace right apical pneumothorax   seen.    CT 8/19/22 showing 50% pneumothorax under tension. Lucency seen within the lung parenchyma and may represent air trapped within fissures    S/p Chest tube placement 8/18/22    CXR 8/19/22 showing trace R pneumothorax and R basilar opacity     Legionella Antigen, Urine: Negative (08-19-22 @ 04:04)    Streptococcus pneumoniae Ag, Ur Result: Negative (08-19-22 @ 04:04)    Rapid RVP Result: NotDetec (08-18-22 @ 23:50)    Sedimentation Rate, Erythrocyte: 125 mm/Hr (08-19-22 @ 08:51)    C-Reactive Protein, Serum: 323.9 mg/L (08-19-22 @ 08:51)  Reports > 1 mo dry cough, fever, nightsweats  #Allergies: PCN, avoid fluoroquinolones  #PTX    RECOMMENDATIONS  - f/u OR cultures, thus far NG  - Continue Cefepime 2g IV Q8h  - PO flagyl 500mg TID  - Pt and wife requesting no IV antibiotics on D/C< f/u cultures    If any questions, please call or send a message on [employer] Teams  Please continue to update ID with any pertinent new laboratory or radiographic findings  Spectra [id number]

## 2022-08-24 NOTE — PROGRESS NOTE ADULT - ASSESSMENT
1 . hx of EDS vascular type, multiple hx of vascular ruptures including sig colon s/p macedo's  and ileostomy in 2020  .. 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  will follow

## 2022-08-24 NOTE — PROGRESS NOTE ADULT - SUBJECTIVE AND OBJECTIVE BOX
HPI:  46M with PMH Melva-Danlos syndrome, cough variant asthma w/ hx of pulmonary nodules, , perforation of sigmoid colon s/p Kerri's, Colonic perforation w/ ileostomy in , epilepsy, HTN, celiac aneurysm s/p coiling 2010   presented to the ED 22 for intermittent fevers ( recorded at home), mixed productive and non-productive cough, night sweats for 1 month. 22   pt had a fever of 103 which was not breaking. In the morning pt got out of the bed and had a syncopal episode. Pt fell lightheaded, saw flashes and woke up on the floor.   Then pt had severe chest pain on the right side, after which he decided to come to the ED.   In course of hospitalization, pt found to have PTX a/w ruptured bleb and had chest tube placed.  Pt had CT's abd and chest and no findings for PNA or sepsis , but pt has ongoing fever at high temp at 103 and 104.  Pt has had elev wbc and plt count with mild anemia.   Diff for leukocytosis unremarkable.     Infectious Diseases History:  Old Micro Data/Cultures:     FAMILY HISTORY: non-contributory     PAST MEDICAL & SURGICAL HISTORY:  EDS (Melva-Danlos syndrome)      Epilepsy      HTN (hypertension)      Keratoconus      Aneurysm artery, celiac  s/p coiling 2010      Dislocations and subluxations      Status post Kerri&#x27;s procedure  2019          SOCIAL HISTORY  Social History:  Denies alcohol, tobacco and illicit drug use. (05 Sep 2019 22:09)      Recent Travel: [place] ([facility]) - was feeling sick prior to this trip  Other Exposures:     ROS  General: (+) fever and nightsweats  HEENT: Denies headache, rhinorrhea, sore throat, eye pain  CV: (+) CP - right sided, (-) palpitations  PULM: Denies wheezing, hemoptysis (+) dry cough, SOB  GI: Denies hematemesis, hematochezia, melena  : Denies discharge, hematuria  MSK: Denies arthralgias, myalgias  SKIN: Denies rash, lesions  NEURO: Denies paresthesias, weakness    VITALS:  VSS    PHYSICAL EXAM:  pt oob and shaving today   3 chest tubes  Gen: NAD, resting in bed, mildly diaphoretic   Neuro: non focal, alert and oriented x3  HEENT: Normocephalic, atraumatic  Neck: supple, no lymphadenopathy  CV: Tachycardic rate & regular rhythm  Lungs: Diminished breath sounds on the right. No wheezing, ronchi or rales bilaterally  CT  Abdomen: Soft, ileostomy present, nontender, nondistended   Ext: Warm, well perfused  Skin: no rash, no lesions  Lines: no phlebitis    TESTS & MEASUREMENTS:                        11.5   30.85 )-----------( 488      ( 18 Aug 2022 20:20 )             36.1   wbc 20K         134<L>  |  95<L>  |  15  ----------------------------<  97  4.3   |  24  |  1.2    Ca    9.7      18 Aug 2022 20:20    TPro  7.7  /  Alb  4.0  /  TBili  0.6  /  DBili  x   /  AST  20  /  ALT  27  /  AlkPhos  138<H>        LIVER FUNCTIONS - ( 18 Aug 2022 20:20 )  Alb: 4.0 g/dL / Pro: 7.7 g/dL / ALK PHOS: 138 U/L / ALT: 27 U/L / AST: 20 U/L / GGT: x           Urinalysis Basic - ( 18 Aug 2022 20:30 )    Color: Yellow / Appearance: Clear / S.035 / pH: x  Gluc: x / Ketone: Moderate  / Bili: Small / Urobili: <2 mg/dL   Blood: x / Protein: 30 mg/dL / Nitrite: Negative   Leuk Esterase: Negative / RBC: 3 /HPF / WBC 2 /HPF   Sq Epi: x / Non Sq Epi: 3 /HPF / Bacteria: FEW          Lactate, Blood: 1.6 mmol/L (22 @ 20:20)      I

## 2022-08-25 LAB
ANION GAP SERPL CALC-SCNC: 8 MMOL/L — SIGNIFICANT CHANGE UP (ref 7–14)
ANISOCYTOSIS BLD QL: SLIGHT — SIGNIFICANT CHANGE UP
BASOPHILS # BLD AUTO: 0 K/UL — SIGNIFICANT CHANGE UP (ref 0–0.2)
BASOPHILS NFR BLD AUTO: 0 % — SIGNIFICANT CHANGE UP (ref 0–1)
BCR/ABL BY RT - PCR QUANTITATIVE: SIGNIFICANT CHANGE UP
BUN SERPL-MCNC: 7 MG/DL — LOW (ref 10–20)
CALCIUM SERPL-MCNC: 8.2 MG/DL — LOW (ref 8.5–10.1)
CHLORIDE SERPL-SCNC: 98 MMOL/L — SIGNIFICANT CHANGE UP (ref 98–110)
CO2 SERPL-SCNC: 30 MMOL/L — SIGNIFICANT CHANGE UP (ref 17–32)
CREAT SERPL-MCNC: 0.7 MG/DL — SIGNIFICANT CHANGE UP (ref 0.7–1.5)
EGFR: 115 ML/MIN/1.73M2 — SIGNIFICANT CHANGE UP
EOSINOPHIL # BLD AUTO: 0.19 K/UL — SIGNIFICANT CHANGE UP (ref 0–0.7)
EOSINOPHIL NFR BLD AUTO: 1.8 % — SIGNIFICANT CHANGE UP (ref 0–8)
GLUCOSE SERPL-MCNC: 98 MG/DL — SIGNIFICANT CHANGE UP (ref 70–99)
HCT VFR BLD CALC: 25 % — LOW (ref 42–52)
HGB BLD-MCNC: 8.3 G/DL — LOW (ref 14–18)
JAK2 P.V617F BLD/T QL: SIGNIFICANT CHANGE UP
LYMPHOCYTES # BLD AUTO: 1.54 K/UL — SIGNIFICANT CHANGE UP (ref 1.2–3.4)
LYMPHOCYTES # BLD AUTO: 14.8 % — LOW (ref 20.5–51.1)
MAGNESIUM SERPL-MCNC: 1.7 MG/DL — LOW (ref 1.8–2.4)
MANUAL SMEAR VERIFICATION: SIGNIFICANT CHANGE UP
MCHC RBC-ENTMCNC: 27.8 PG — SIGNIFICANT CHANGE UP (ref 27–31)
MCHC RBC-ENTMCNC: 33.2 G/DL — SIGNIFICANT CHANGE UP (ref 32–37)
MCV RBC AUTO: 83.6 FL — SIGNIFICANT CHANGE UP (ref 80–94)
METAMYELOCYTES # FLD: 1.7 % — HIGH (ref 0–0)
MICROCYTES BLD QL: SLIGHT — SIGNIFICANT CHANGE UP
MONOCYTES # BLD AUTO: 1.27 K/UL — HIGH (ref 0.1–0.6)
MONOCYTES NFR BLD AUTO: 12.2 % — HIGH (ref 1.7–9.3)
MYELOCYTES NFR BLD: 1.7 % — HIGH (ref 0–0)
NEUTROPHILS # BLD AUTO: 7.07 K/UL — HIGH (ref 1.4–6.5)
NEUTROPHILS NFR BLD AUTO: 67.8 % — SIGNIFICANT CHANGE UP (ref 42.2–75.2)
OVALOCYTES BLD QL SMEAR: SLIGHT — SIGNIFICANT CHANGE UP
PLAT MORPH BLD: NORMAL — SIGNIFICANT CHANGE UP
PLATELET # BLD AUTO: 435 K/UL — HIGH (ref 130–400)
POIKILOCYTOSIS BLD QL AUTO: SLIGHT — SIGNIFICANT CHANGE UP
POLYCHROMASIA BLD QL SMEAR: SLIGHT — SIGNIFICANT CHANGE UP
POTASSIUM SERPL-MCNC: 4.3 MMOL/L — SIGNIFICANT CHANGE UP (ref 3.5–5)
POTASSIUM SERPL-SCNC: 4.3 MMOL/L — SIGNIFICANT CHANGE UP (ref 3.5–5)
RBC # BLD: 2.99 M/UL — LOW (ref 4.7–6.1)
RBC # FLD: 14.7 % — HIGH (ref 11.5–14.5)
RBC BLD AUTO: ABNORMAL
SODIUM SERPL-SCNC: 136 MMOL/L — SIGNIFICANT CHANGE UP (ref 135–146)
WBC # BLD: 10.43 K/UL — SIGNIFICANT CHANGE UP (ref 4.8–10.8)
WBC # FLD AUTO: 10.43 K/UL — SIGNIFICANT CHANGE UP (ref 4.8–10.8)

## 2022-08-25 PROCEDURE — 71045 X-RAY EXAM CHEST 1 VIEW: CPT | Mod: 26,77

## 2022-08-25 PROCEDURE — 71045 X-RAY EXAM CHEST 1 VIEW: CPT | Mod: 26

## 2022-08-25 RX ORDER — METOPROLOL TARTRATE 50 MG
12.5 TABLET ORAL EVERY 12 HOURS
Refills: 0 | Status: DISCONTINUED | OUTPATIENT
Start: 2022-08-25 | End: 2022-08-29

## 2022-08-25 RX ADMIN — HEPARIN SODIUM 5000 UNIT(S): 5000 INJECTION INTRAVENOUS; SUBCUTANEOUS at 21:52

## 2022-08-25 RX ADMIN — LEVETIRACETAM 1125 MILLIGRAM(S): 250 TABLET, FILM COATED ORAL at 11:45

## 2022-08-25 RX ADMIN — Medication 3 MILLIGRAM(S): at 21:52

## 2022-08-25 RX ADMIN — HEPARIN SODIUM 5000 UNIT(S): 5000 INJECTION INTRAVENOUS; SUBCUTANEOUS at 13:08

## 2022-08-25 RX ADMIN — CEFEPIME 100 MILLIGRAM(S): 1 INJECTION, POWDER, FOR SOLUTION INTRAMUSCULAR; INTRAVENOUS at 05:12

## 2022-08-25 RX ADMIN — CEFEPIME 100 MILLIGRAM(S): 1 INJECTION, POWDER, FOR SOLUTION INTRAMUSCULAR; INTRAVENOUS at 21:52

## 2022-08-25 RX ADMIN — Medication 500 MILLIGRAM(S): at 21:52

## 2022-08-25 RX ADMIN — HEPARIN SODIUM 5000 UNIT(S): 5000 INJECTION INTRAVENOUS; SUBCUTANEOUS at 05:12

## 2022-08-25 RX ADMIN — Medication 81 MILLIGRAM(S): at 11:45

## 2022-08-25 RX ADMIN — MAGNESIUM OXIDE 400 MG ORAL TABLET 400 MILLIGRAM(S): 241.3 TABLET ORAL at 17:27

## 2022-08-25 RX ADMIN — LEVETIRACETAM 1500 MILLIGRAM(S): 250 TABLET, FILM COATED ORAL at 21:52

## 2022-08-25 RX ADMIN — OXCARBAZEPINE 600 MILLIGRAM(S): 300 TABLET, FILM COATED ORAL at 17:27

## 2022-08-25 RX ADMIN — CHLORHEXIDINE GLUCONATE 1 APPLICATION(S): 213 SOLUTION TOPICAL at 21:00

## 2022-08-25 RX ADMIN — HYDROMORPHONE HYDROCHLORIDE 0.5 MILLIGRAM(S): 2 INJECTION INTRAMUSCULAR; INTRAVENOUS; SUBCUTANEOUS at 01:55

## 2022-08-25 RX ADMIN — MAGNESIUM OXIDE 400 MG ORAL TABLET 400 MILLIGRAM(S): 241.3 TABLET ORAL at 11:44

## 2022-08-25 RX ADMIN — Medication 500 MILLIGRAM(S): at 05:12

## 2022-08-25 RX ADMIN — CEFEPIME 100 MILLIGRAM(S): 1 INJECTION, POWDER, FOR SOLUTION INTRAMUSCULAR; INTRAVENOUS at 13:09

## 2022-08-25 RX ADMIN — Medication 1000 UNIT(S): at 11:45

## 2022-08-25 RX ADMIN — Medication 1 TABLET(S): at 11:44

## 2022-08-25 RX ADMIN — OXCARBAZEPINE 300 MILLIGRAM(S): 300 TABLET, FILM COATED ORAL at 05:12

## 2022-08-25 RX ADMIN — Medication 500 MILLIGRAM(S): at 13:09

## 2022-08-25 RX ADMIN — Medication 12.5 MILLIGRAM(S): at 09:48

## 2022-08-25 RX ADMIN — MAGNESIUM OXIDE 400 MG ORAL TABLET 400 MILLIGRAM(S): 241.3 TABLET ORAL at 07:31

## 2022-08-25 RX ADMIN — HYDROMORPHONE HYDROCHLORIDE 0.5 MILLIGRAM(S): 2 INJECTION INTRAMUSCULAR; INTRAVENOUS; SUBCUTANEOUS at 02:10

## 2022-08-25 NOTE — PROGRESS NOTE ADULT - SUBJECTIVE AND OBJECTIVE BOX
ANGE, FAZAL  46y, Male  Allergy: moxifloxacin (Hives)  penicillin (Unknown)      LOS  7d    CHIEF COMPLAINT: pneumothorax, acute hypoxemic respiratory failure (22 Aug 2022 11:31)      INTERVAL EVENTS/HPI  - No acute events overnight  - T(F): , Max: 99.2 (08-25-22 @ 08:00)  - Denies any worsening symptoms  - Tolerating medication  - WBC Count: 10.43 (08-25-22 @ 04:30) downtrending   WBC Count: 14.16 (08-24-22 @ 13:37)     - Creatinine, Serum: 0.7 (08-25-22 @ 04:30)  Creatinine, Serum: 0.6 (08-24-22 @ 04:29)       ROS  General: Denies rigors, nightsweats  HEENT: Denies headache, rhinorrhea, sore throat, eye pain  CV: Denies CP, palpitations  PULM: Denies wheezing, hemoptysis  GI: Denies hematemesis, hematochezia, melena  : Denies discharge, hematuria  MSK: Denies arthralgias, myalgias  SKIN: Denies rash, lesions  NEURO: Denies paresthesias, weakness  PSYCH: Denies depression, anxiety    VITALS:  T(F): 98.6, Max: 99.2 (08-25-22 @ 08:00)  HR: 84  BP: 105/60  RR: 20Vital Signs Last 24 Hrs  T(C): 37 (25 Aug 2022 12:00), Max: 37.3 (25 Aug 2022 08:00)  T(F): 98.6 (25 Aug 2022 12:00), Max: 99.2 (25 Aug 2022 08:00)  HR: 84 (25 Aug 2022 13:00) (69 - 101)  BP: 105/60 (25 Aug 2022 12:00) (91/62 - 110/76)  BP(mean): 75 (25 Aug 2022 12:00) (69 - 89)  RR: 20 (25 Aug 2022 13:00) (14 - 37)  SpO2: 98% (25 Aug 2022 13:00) (96% - 99%)    Parameters below as of 25 Aug 2022 13:00  Patient On (Oxygen Delivery Method): room air        PHYSICAL EXAM:  Gen: NAD, resting in bed  HEENT: Normocephalic, atraumatic  Neck: supple, no lymphadenopathy  CV: Regular rate & regular rhythm  Lungs: decreased BS at bases, no fremitus CTs  Abdomen: Soft, BS present  Ext: Warm, well perfused  Neuro: non focal, awake  Skin: no rash, no erythema  Lines: no phlebitis    FH: Non-contributory  Social Hx: Non-contributory    TESTS & MEASUREMENTS:                        8.3    10.43 )-----------( 435      ( 25 Aug 2022 04:30 )             25.0     08-25    136  |  98  |  7<L>  ----------------------------<  98  4.3   |  30  |  0.7    Ca    8.2<L>      25 Aug 2022 04:30  Mg     1.7     08-25    TPro  5.4<L>  /  Alb  2.8<L>  /  TBili  0.2  /  DBili  <0.2  /  AST  25  /  ALT  37  /  AlkPhos  145<H>  08-24      LIVER FUNCTIONS - ( 24 Aug 2022 04:29 )  Alb: 2.8 g/dL / Pro: 5.4 g/dL / ALK PHOS: 145 U/L / ALT: 37 U/L / AST: 25 U/L / GGT: x               Culture - Acid Fast - Tissue w/Smear (collected 08-22-22 @ 21:40)  Source: .Tissue 1) pleural rind in specimen cup  Preliminary Report (08-24-22 @ 15:04):    Culture is being performed.    Culture - Fungal, Tissue (collected 08-22-22 @ 21:40)  Source: .Tissue 1) pleural rind in specimen cup  Preliminary Report (08-24-22 @ 06:45):    Testing in progress    Culture - Tissue with Gram Stain (collected 08-22-22 @ 21:40)  Source: .Tissue 1) pleural rind in specimen cup  Gram Stain (08-23-22 @ 14:58):    Moderate polymorphonuclear leukocytes per low power field    No organisms seen per oil power field  Preliminary Report (08-24-22 @ 10:24):    No growth    Culture - Fungal, Tissue (collected 08-22-22 @ 21:40)  Source: .Tissue None  Preliminary Report (08-24-22 @ 06:37):    Testing in progress    Culture - Acid Fast - Tissue w/Smear (collected 08-22-22 @ 21:40)  Source: .Tissue None  Preliminary Report (08-24-22 @ 15:04):    Culture is being performed.    Culture - Tissue with Gram Stain (collected 08-22-22 @ 21:40)  Source: .Tissue None  Gram Stain (08-23-22 @ 15:15):    No polymorphonuclear leukocytes per low power field    No organisms seen per oil power field  Preliminary Report (08-24-22 @ 08:22):    No growth    Culture - Urine (collected 08-18-22 @ 20:30)  Source: Clean Catch Clean Catch (Midstream)  Final Report (08-20-22 @ 07:14):    No growth    Culture - Blood (collected 08-18-22 @ 20:20)  Source: .Blood Blood  Final Report (08-24-22 @ 02:01):    No Growth Final    Culture - Blood (collected 08-18-22 @ 20:20)  Source: .Blood Blood  Final Report (08-24-22 @ 02:01):    No Growth Final            INFECTIOUS DISEASES TESTING  COVID-19 PCR: NotDetec (08-21-22 @ 17:39)  Procalcitonin, Serum: 17.80 (08-20-22 @ 23:30)  MRSA PCR Result.: Negative (08-20-22 @ 18:26)  HIV-1/2 Combo Result: Nonreact (08-20-22 @ 00:15)  Legionella Antigen, Urine: Negative (08-19-22 @ 04:04)  Streptococcus pneumoniae Ag, Ur Result: Negative (08-19-22 @ 04:04)  Rapid RVP Result: NotDetec (08-18-22 @ 23:50)  COVID-19 PCR: NotDetec (08-18-22 @ 20:30)      INFLAMMATORY MARKERS  Sedimentation Rate, Erythrocyte: 125 mm/Hr (08-19-22 @ 08:51)  C-Reactive Protein, Serum: 323.9 mg/L (08-19-22 @ 08:51)      RADIOLOGY & ADDITIONAL TESTS:  I have personally reviewed the last available Chest xray  CXR  Xray Chest 1 View- PORTABLE-Urgent:   ACC: [id number] EXAM:  XR CHEST PORTABLE URGENT 1V                          PROCEDURE DATE:  08/22/2022          INTERPRETATION:  Clinical History / Reason for exam: Cardiac surgery    Comparison : Chest radiograph 8/22/2022.    Technique/Positioning: Single AP chest radiograph.    Findings:    Support devices: 3 right-sided chest tubes are noted.    Cardiac/mediastinum/hilum: Stable.    Lung parenchyma/Pleura: Right lower lobe opacity is stable. Stable fluid   and air in the pleural space.    Skeleton/soft tissues: Stable.    Impression:    3 right-sided chest tubes with right lower lobe opacity and stable fluid   and air in the pleural space.    --- End of Report ---            SANDRA BUSBY MD; Attending Radiologist  This document has been electronically signed. Aug 23 2022  9:35AM (08-22-22 @ 23:42)      CT      CARDIOLOGY TESTING  12 Lead ECG:   Ventricular Rate 102 BPM    Atrial Rate 102 BPM    P-R Interval 132 ms    QRS Duration 92 ms    Q-T Interval 334 ms    QTC Calculation(Bazett) 435 ms    P Axis 44 degrees    R Axis 32 degrees    T Axis 27 degrees    Diagnosis Line Sinus tachycardia  Otherwise normal ECG    Confirmed by MELIDA RAGSDALE MD ([id number]) on 8/19/2022 6:24:42 AM (08-18-22 @ 20:01)      MEDICATIONS  aspirin enteric coated 81 Oral daily  BUpivacaine liposome 1.3% Injectable 20 Local Injection once  cefepime   IVPB 2000 IV Intermittent every 8 hours  chlorhexidine 2% Cloths 1 Topical daily  cholecalciferol 1000 Oral daily  heparin   Injectable 5000 SubCutaneous every 8 hours  HYDROmorphone PCA (1 mG/mL) 30 PCA Continuous PCA Continuous  lactated ringers. 1000 IV Continuous <Continuous>  levETIRAcetam 1125 Oral daily  levETIRAcetam 1500 Oral at bedtime  magnesium oxide 400 Oral three times a day with meals  melatonin 3 Oral at bedtime  meperidine     Injectable 25 IV Push once  metoprolol tartrate 12.5 Oral every 12 hours  metroNIDAZOLE    Tablet 500 Oral every 8 hours  multivitamin 1 Oral daily  OXcarbazepine 600 Oral <User Schedule>  OXcarbazepine 300 Oral <User Schedule>  polyethylene glycol 3350 17 Oral daily  senna 2 Oral at bedtime      WEIGHT  Weight (kg): 75 (08-18-22 @ 21:37)  Creatinine, Serum: 0.7 mg/dL (08-25-22 @ 04:30)      ANTIBIOTICS:  cefepime   IVPB 2000 milliGRAM(s) IV Intermittent every 8 hours  metroNIDAZOLE    Tablet 500 milliGRAM(s) Oral every 8 hours      All available historical records have been reviewed

## 2022-08-25 NOTE — PROGRESS NOTE ADULT - ASSESSMENT
46M who presented to the ED on 8/18 with fevers/chest pain and was found to have spontaneous PTX and was diagnosed with empyema. Pt is currently s/p Bronchoscopy, with lung decortication using VATS. Pt tolerated procedure well and is progressing accordingly.     Plan:  -Care per CTU  -3 CT's, to be WS today  -remove posterior CT today  -regular diet  -ambulate as tolerated  -IS  -pain control  -qdaily xrays  -ID for ABX recs, possible DC with midline and IV ABX, pending cx    [phone]

## 2022-08-25 NOTE — PROGRESS NOTE ADULT - ASSESSMENT
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 "Extensive dense adhesions, purulent membranes and pocket of purulent fluid Total lung decortication, adequate lung re-expansion Small intermittent air leak"    8/21 repeat CT Chest 1.  Findings are compatible with a large right-sided empyema measuring up to 18.4 cm craniocaudal. This may be communicating with a smaller multiloculated fluid and gas collection more anteriorly, likely within the right major fissure.  2.  Right middle and lower lobe consolidation, likely infectious.  3.  Near resolution of previously seen right-sided pneumothorax status post right chest tube placement. Residual trace right apical pneumothorax   seen.    CT 8/19/22 showing 50% pneumothorax under tension. Lucency seen within the lung parenchyma and may represent air trapped within fissures    S/p Chest tube placement 8/18/22    CXR 8/19/22 showing trace R pneumothorax and R basilar opacity     Legionella Antigen, Urine: Negative (08-19-22 @ 04:04)    Streptococcus pneumoniae Ag, Ur Result: Negative (08-19-22 @ 04:04)    Rapid RVP Result: NotDetec (08-18-22 @ 23:50)    Sedimentation Rate, Erythrocyte: 125 mm/Hr (08-19-22 @ 08:51)    C-Reactive Protein, Serum: 323.9 mg/L (08-19-22 @ 08:51)  Reports > 1 mo dry cough, fever, nightsweats  #Allergies: PCN, avoid fluoroquinolones  #PTX    RECOMMENDATIONS  - f/u OR cultures, thus far NG  - Continue Cefepime 2g IV Q8h  - PO flagyl 500mg TID  - Midline in place- likely plan for D/C with current midline x 14 days post-op Cefepime 2g q12h IV and PO flagyl 500mg TID, then augmentin 875mg BID x 14 days    If any questions, please call or send a message on [employer]  Please continue to update ID with any pertinent new laboratory or radiographic findings  Spectra [phone]

## 2022-08-25 NOTE — PROGRESS NOTE ADULT - ASSESSMENT
1 . hx of EDS vascular type, multiple hx of vascular ruptures including sig colon s/p macedo's  and ileostomy in 2020  .. celiac and other vessel aneurysm and rupture  ..no hx of excessive bleeding or bruises  ..no hx of postop bleeding    2 s/p  pTX after bleb ruptures a/w cough and fever for past 2 wks  s/p chest tube placement  on antibx  CT of chest and abd without abcess or infection foci  >>s/p > thorasic surgery in CTU with 3 chest tubs >> 2 chest tubes     3 elev wbc at 20 to 30K and elev plt count..resolving   reactive process suspected  r/o MPD  send flow cytometry and GABRIELLA 2 studies /ABL/BCR     4 discussed possible benefit with DDAVP, plt transfusion and vit D therapy to strengthen soft tissue.  will follow            1 . hx of EDS vascular type, multiple hx of vascular ruptures including sig colon s/p macedo's  and ileostomy in 2020  .. celiac and other vessel aneurysm and rupture  ..no hx of excessive bleeding or bruises  ..no hx of postop bleeding    2 s/p  pTX after bleb ruptures a/w cough and fever for past 2 wks  s/p chest tube placement  on antibx  CT of chest and abd without abcess or infection foci  >>s/p > thorasic surgery in CTU with 3 chest tubs >> 2 chest tubes     3 elev wbc at 20 to 30K and elev plt count..resolving   reactive process suspected>>> wbc 10K   r/o MPD  send flow cytometry and GABRIELLA 2 studies /ABL/BCR     4 discussed possible benefit with DDAVP, plt transfusion and vit D therapy to strengthen soft tissue.  will follow

## 2022-08-25 NOTE — PROGRESS NOTE ADULT - SUBJECTIVE AND OBJECTIVE BOX
HPI:  46M with PMH Melva-Danlos syndrome, cough variant asthma w/ hx of pulmonary nodules, , perforation of sigmoid colon s/p Kerri's, Colonic perforation w/ ileostomy in , epilepsy, HTN, celiac aneurysm s/p coiling 2010   presented to the ED 22 for intermittent fevers ( recorded at home), mixed productive and non-productive cough, night sweats for 1 month. 22   pt had a fever of 103 which was not breaking. In the morning pt got out of the bed and had a syncopal episode. Pt fell lightheaded, saw flashes and woke up on the floor.   Then pt had severe chest pain on the right side, after which he decided to come to the ED.   In course of hospitalization, pt found to have PTX a/w ruptured bleb and had chest tube placed.  Pt had CT's abd and chest and no findings for PNA or sepsis , but pt has ongoing fever at high temp at 103 and 104.  Pt has had elev wbc and plt count with mild anemia.   Diff for leukocytosis unremarkable.     Infectious Diseases History:  Old Micro Data/Cultures:     FAMILY HISTORY: non-contributory     PAST MEDICAL & SURGICAL HISTORY:  EDS (Melva-Danlos syndrome)      Epilepsy      HTN (hypertension)      Keratoconus      Aneurysm artery, celiac  s/p coiling 2010      Dislocations and subluxations      Status post Kerri&#x27;s procedure  2019          SOCIAL HISTORY  Social History:  Denies alcohol, tobacco and illicit drug use. (05 Sep 2019 22:09)      Recent Travel: [place] ([facility] access hospital) - was feeling sick prior to this trip  Other Exposures:     ROS  General: (+) fever and nightsweats  HEENT: Denies headache, rhinorrhea, sore throat, eye pain  CV: (+) CP - right sided, (-) palpitations  PULM: Denies wheezing, hemoptysis (+) dry cough, SOB  GI: Denies hematemesis, hematochezia, melena  : Denies discharge, hematuria  MSK: Denies arthralgias, myalgias  SKIN: Denies rash, lesions  NEURO: Denies paresthesias, weakness    VITALS:  VSS    PHYSICAL EXAM:  pt oob and shaving today   3 chest tubes  Gen: NAD, resting in bed, mildly diaphoretic   Neuro: non focal, alert and oriented x3  HEENT: Normocephalic, atraumatic  Neck: supple, no lymphadenopathy  CV: Tachycardic rate & regular rhythm  Lungs: Diminished breath sounds on the right. No wheezing, ronchi or rales bilaterally  CT  Abdomen: Soft, ileostomy present, nontender, nondistended   Ext: Warm, well perfused  Skin: no rash, no lesions  Lines: no phlebitis    TESTS & MEASUREMENTS:                        11.5   30.85 )-----------( 488      ( 18 Aug 2022 20:20 )             36.1   wbc 20K         134<L>  |  95<L>  |  15  ----------------------------<  97  4.3   |  24  |  1.2    Ca    9.7      18 Aug 2022 20:20    TPro  7.7  /  Alb  4.0  /  TBili  0.6  /  DBili  x   /  AST  20  /  ALT  27  /  AlkPhos  138<H>        LIVER FUNCTIONS - ( 18 Aug 2022 20:20 )  Alb: 4.0 g/dL / Pro: 7.7 g/dL / ALK PHOS: 138 U/L / ALT: 27 U/L / AST: 20 U/L / GGT: x           Urinalysis Basic - ( 18 Aug 2022 20:30 )    Color: Yellow / Appearance: Clear / S.035 / pH: x  Gluc: x / Ketone: Moderate  / Bili: Small / Urobili: <2 mg/dL   Blood: x / Protein: 30 mg/dL / Nitrite: Negative   Leuk Esterase: Negative / RBC: 3 /HPF / WBC 2 /HPF   Sq Epi: x / Non Sq Epi: 3 /HPF / Bacteria: FEW          Lactate, Blood: 1.6 mmol/L (22 @ 20:20)      I

## 2022-08-25 NOTE — PROGRESS NOTE ADULT - SUBJECTIVE AND OBJECTIVE BOX
GENERAL SURGERY PROGRESS NOTE    Patient: FAZAL CASSIDY , 46y ([birth year])Male   MRN: [id number]  Location: [address]   Visit: 08-18-22 Inpatient  Date: 08-25-22 @ 11:21      Procedure/Dx/Injuries: empyema, s/p VAT and R lung decortication    Events of past 24 hours: no acute events    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      Dislocations and subluxations      Status post Kerri&#x27;s procedure  11/2019          Vitals:   T(F): 99.2 (08-25-22 @ 08:00), Max: 99.2 (08-25-22 @ 08:00)  HR: 85 (08-25-22 @ 10:00)  BP: 107/56 (08-25-22 @ 10:00)  RR: 21 (08-25-22 @ 10:00)  SpO2: 97% (08-25-22 @ 10:00)      Diet, Regular      Fluids:     I & O's:    08-24-22 @ 07:01  -  08-25-22 @ 07:00  --------------------------------------------------------  IN:    IV PiggyBack: 150 mL    Lactated Ringers: 240 mL    Oral Fluid: 360 mL  Total IN: 750 mL    OUT:    Chest Tube (mL): 25 mL    Chest Tube (mL): 17 mL    Chest Tube (mL): 75 mL    Voided (mL): 2350 mL  Total OUT: 2467 mL    Total NET: -1717 mL          PHYSICAL EXAM:  General Appearance: AAOX3, NAD  Heart: RRR  Lungs: CTAX2, 3 CT x sxn, no leak  Abdomen:  soft, non tender, non distended  MSK/Extremities:  mobile      MEDICATIONS  (STANDING):  aspirin enteric coated 81 milliGRAM(s) Oral daily  BUpivacaine liposome 1.3% Injectable 20 milliLiter(s) Local Injection once  cefepime   IVPB 2000 milliGRAM(s) IV Intermittent every 8 hours  chlorhexidine 2% Cloths 1 Application(s) Topical daily  cholecalciferol 1000 Unit(s) Oral daily  heparin   Injectable 5000 Unit(s) SubCutaneous every 8 hours  HYDROmorphone PCA (1 mG/mL) 30 milliLiter(s) PCA Continuous PCA Continuous  lactated ringers. 1000 milliLiter(s) (10 mL/Hr) IV Continuous <Continuous>  levETIRAcetam 1125 milliGRAM(s) Oral daily  levETIRAcetam 1500 milliGRAM(s) Oral at bedtime  magnesium oxide 400 milliGRAM(s) Oral three times a day with meals  melatonin 3 milliGRAM(s) Oral at bedtime  meperidine     Injectable 25 milliGRAM(s) IV Push once  metoprolol tartrate 12.5 milliGRAM(s) Oral every 12 hours  metroNIDAZOLE    Tablet 500 milliGRAM(s) Oral every 8 hours  multivitamin 1 Tablet(s) Oral daily  OXcarbazepine 600 milliGRAM(s) Oral <User Schedule>  OXcarbazepine 300 milliGRAM(s) Oral <User Schedule>  polyethylene glycol 3350 17 Gram(s) Oral daily  senna 2 Tablet(s) Oral at bedtime    MEDICATIONS  (PRN):  ALPRAZolam 0.25 milliGRAM(s) Oral every 8 hours PRN Anxiety  HYDROmorphone  Injectable 0.5 milliGRAM(s) IV Push every 10 minutes PRN Moderate Pain (4 - 6)  ondansetron Injectable 4 milliGRAM(s) IV Push once PRN Nausea and/or Vomiting      DVT PROPHYLAXIS: heparin   Injectable 5000 Unit(s) SubCutaneous every 8 hours    GI PROPHYLAXIS:   ANTICOAGULATION:   ANTIBIOTICS:  cefepime   IVPB 2000 milliGRAM(s)  metroNIDAZOLE    Tablet 500 milliGRAM(s)            LAB/STUDIES:  Labs:  CAPILLARY BLOOD GLUCOSE                              8.3    10.43 )-----------( 435      ( 25 Aug 2022 04:30 )             25.0       Auto Neutrophil %: 67.8 % (08-25-22 @ 04:30)    08-25    136  |  98  |  7<L>  ----------------------------<  98  4.3   |  30  |  0.7      Calcium, Total Serum: 8.2 mg/dL (08-25-22 @ 04:30)      LFTs:             5.4  | 0.2  | 25       ------------------[145     ( 24 Aug 2022 04:29 )  2.8  | <0.2 | 37          Lipase:x      Amylase:x             Coags:                Culture - Acid Fast - Tissue w/Smear (collected 22 Aug 2022 21:40)  Source: .Tissue 1) pleural rind in specimen cup  Preliminary Report (24 Aug 2022 15:04):    Culture is being performed.    Culture - Fungal, Tissue (collected 22 Aug 2022 21:40)  Source: .Tissue 1) pleural rind in specimen cup  Preliminary Report (24 Aug 2022 06:45):    Testing in progress    Culture - Tissue with Gram Stain (collected 22 Aug 2022 21:40)  Source: .Tissue 1) pleural rind in specimen cup  Gram Stain (23 Aug 2022 14:58):    Moderate polymorphonuclear leukocytes per low power field    No organisms seen per oil power field  Preliminary Report (24 Aug 2022 10:24):    No growth    Culture - Fungal, Tissue (collected 22 Aug 2022 21:40)  Source: .Tissue None  Preliminary Report (24 Aug 2022 06:37):    Testing in progress    Culture - Acid Fast - Tissue w/Smear (collected 22 Aug 2022 21:40)  Source: .Tissue None  Preliminary Report (24 Aug 2022 15:04):    Culture is being performed.    Culture - Tissue with Gram Stain (collected 22 Aug 2022 21:40)  Source: .Tissue None  Gram Stain (23 Aug 2022 15:15):    No polymorphonuclear leukocytes per low power field    No organisms seen per oil power field  Preliminary Report (24 Aug 2022 08:22):    No growth              IMAGING:      ACCESS/ DEVICES:  [x ] Peripheral IV  [ ] Central Venous Line	[ ] R	[ ] L	[ ] IJ	[ ] Fem	[ ] SC	Placed:   [ ] Arterial Line		[ ] R	[ ] L	[ ] Fem	[ ] Rad	[ ] Ax	Placed:   [ ] PICC:					[ ] Mediport  [ ] Urinary Catheter,  Date Placed:   [ ] Chest tube: [ ] Right, [ ] Left  [ ] NADIRA/Isaiah Drains

## 2022-08-26 LAB
ANION GAP SERPL CALC-SCNC: 8 MMOL/L — SIGNIFICANT CHANGE UP (ref 7–14)
BASOPHILS # BLD AUTO: 0.05 K/UL — SIGNIFICANT CHANGE UP (ref 0–0.2)
BASOPHILS NFR BLD AUTO: 0.5 % — SIGNIFICANT CHANGE UP (ref 0–1)
BUN SERPL-MCNC: 8 MG/DL — LOW (ref 10–20)
CALCIUM SERPL-MCNC: 8.3 MG/DL — LOW (ref 8.5–10.1)
CHLORIDE SERPL-SCNC: 95 MMOL/L — LOW (ref 98–110)
CO2 SERPL-SCNC: 29 MMOL/L — SIGNIFICANT CHANGE UP (ref 17–32)
CREAT SERPL-MCNC: 0.7 MG/DL — SIGNIFICANT CHANGE UP (ref 0.7–1.5)
EGFR: 115 ML/MIN/1.73M2 — SIGNIFICANT CHANGE UP
EOSINOPHIL # BLD AUTO: 0.3 K/UL — SIGNIFICANT CHANGE UP (ref 0–0.7)
EOSINOPHIL NFR BLD AUTO: 3.1 % — SIGNIFICANT CHANGE UP (ref 0–8)
GLUCOSE SERPL-MCNC: 94 MG/DL — SIGNIFICANT CHANGE UP (ref 70–99)
HCT VFR BLD CALC: 27.9 % — LOW (ref 42–52)
HGB BLD-MCNC: 9.1 G/DL — LOW (ref 14–18)
IMM GRANULOCYTES NFR BLD AUTO: 5.6 % — HIGH (ref 0.1–0.3)
LYMPHOCYTES # BLD AUTO: 1.58 K/UL — SIGNIFICANT CHANGE UP (ref 1.2–3.4)
LYMPHOCYTES # BLD AUTO: 16.5 % — LOW (ref 20.5–51.1)
MAGNESIUM SERPL-MCNC: 1.9 MG/DL — SIGNIFICANT CHANGE UP (ref 1.8–2.4)
MCHC RBC-ENTMCNC: 27.2 PG — SIGNIFICANT CHANGE UP (ref 27–31)
MCHC RBC-ENTMCNC: 32.6 G/DL — SIGNIFICANT CHANGE UP (ref 32–37)
MCV RBC AUTO: 83.5 FL — SIGNIFICANT CHANGE UP (ref 80–94)
MONOCYTES # BLD AUTO: 0.8 K/UL — HIGH (ref 0.1–0.6)
MONOCYTES NFR BLD AUTO: 8.3 % — SIGNIFICANT CHANGE UP (ref 1.7–9.3)
NEUTROPHILS # BLD AUTO: 6.32 K/UL — SIGNIFICANT CHANGE UP (ref 1.4–6.5)
NEUTROPHILS NFR BLD AUTO: 66 % — SIGNIFICANT CHANGE UP (ref 42.2–75.2)
NRBC # BLD: 0 /100 WBCS — SIGNIFICANT CHANGE UP (ref 0–0)
PLATELET # BLD AUTO: 511 K/UL — HIGH (ref 130–400)
POTASSIUM SERPL-MCNC: 4.6 MMOL/L — SIGNIFICANT CHANGE UP (ref 3.5–5)
POTASSIUM SERPL-SCNC: 4.6 MMOL/L — SIGNIFICANT CHANGE UP (ref 3.5–5)
RBC # BLD: 3.34 M/UL — LOW (ref 4.7–6.1)
RBC # FLD: 14.9 % — HIGH (ref 11.5–14.5)
SODIUM SERPL-SCNC: 132 MMOL/L — LOW (ref 135–146)
WBC # BLD: 9.59 K/UL — SIGNIFICANT CHANGE UP (ref 4.8–10.8)
WBC # FLD AUTO: 9.59 K/UL — SIGNIFICANT CHANGE UP (ref 4.8–10.8)

## 2022-08-26 PROCEDURE — 71045 X-RAY EXAM CHEST 1 VIEW: CPT | Mod: 26

## 2022-08-26 PROCEDURE — 71045 X-RAY EXAM CHEST 1 VIEW: CPT | Mod: 26,77

## 2022-08-26 RX ORDER — PANTOPRAZOLE SODIUM 20 MG/1
40 TABLET, DELAYED RELEASE ORAL
Refills: 0 | Status: DISCONTINUED | OUTPATIENT
Start: 2022-08-26 | End: 2022-08-29

## 2022-08-26 RX ADMIN — Medication 1000 UNIT(S): at 11:22

## 2022-08-26 RX ADMIN — HEPARIN SODIUM 5000 UNIT(S): 5000 INJECTION INTRAVENOUS; SUBCUTANEOUS at 13:19

## 2022-08-26 RX ADMIN — Medication 81 MILLIGRAM(S): at 11:22

## 2022-08-26 RX ADMIN — HEPARIN SODIUM 5000 UNIT(S): 5000 INJECTION INTRAVENOUS; SUBCUTANEOUS at 05:22

## 2022-08-26 RX ADMIN — Medication 500 MILLIGRAM(S): at 21:15

## 2022-08-26 RX ADMIN — MAGNESIUM OXIDE 400 MG ORAL TABLET 400 MILLIGRAM(S): 241.3 TABLET ORAL at 07:59

## 2022-08-26 RX ADMIN — Medication 1 TABLET(S): at 11:22

## 2022-08-26 RX ADMIN — LEVETIRACETAM 1500 MILLIGRAM(S): 250 TABLET, FILM COATED ORAL at 21:14

## 2022-08-26 RX ADMIN — OXCARBAZEPINE 300 MILLIGRAM(S): 300 TABLET, FILM COATED ORAL at 05:21

## 2022-08-26 RX ADMIN — Medication 3 MILLIGRAM(S): at 21:14

## 2022-08-26 RX ADMIN — Medication 500 MILLIGRAM(S): at 13:19

## 2022-08-26 RX ADMIN — Medication 12.5 MILLIGRAM(S): at 05:21

## 2022-08-26 RX ADMIN — Medication 12.5 MILLIGRAM(S): at 17:11

## 2022-08-26 RX ADMIN — OXCARBAZEPINE 600 MILLIGRAM(S): 300 TABLET, FILM COATED ORAL at 17:12

## 2022-08-26 RX ADMIN — CEFEPIME 100 MILLIGRAM(S): 1 INJECTION, POWDER, FOR SOLUTION INTRAMUSCULAR; INTRAVENOUS at 13:19

## 2022-08-26 RX ADMIN — LEVETIRACETAM 1125 MILLIGRAM(S): 250 TABLET, FILM COATED ORAL at 11:23

## 2022-08-26 RX ADMIN — HEPARIN SODIUM 5000 UNIT(S): 5000 INJECTION INTRAVENOUS; SUBCUTANEOUS at 21:16

## 2022-08-26 RX ADMIN — CHLORHEXIDINE GLUCONATE 1 APPLICATION(S): 213 SOLUTION TOPICAL at 11:17

## 2022-08-26 RX ADMIN — CEFEPIME 100 MILLIGRAM(S): 1 INJECTION, POWDER, FOR SOLUTION INTRAMUSCULAR; INTRAVENOUS at 05:22

## 2022-08-26 RX ADMIN — PANTOPRAZOLE SODIUM 40 MILLIGRAM(S): 20 TABLET, DELAYED RELEASE ORAL at 17:11

## 2022-08-26 RX ADMIN — Medication 500 MILLIGRAM(S): at 05:22

## 2022-08-26 RX ADMIN — MAGNESIUM OXIDE 400 MG ORAL TABLET 400 MILLIGRAM(S): 241.3 TABLET ORAL at 12:40

## 2022-08-26 RX ADMIN — CEFEPIME 100 MILLIGRAM(S): 1 INJECTION, POWDER, FOR SOLUTION INTRAMUSCULAR; INTRAVENOUS at 21:16

## 2022-08-26 RX ADMIN — MAGNESIUM OXIDE 400 MG ORAL TABLET 400 MILLIGRAM(S): 241.3 TABLET ORAL at 17:11

## 2022-08-26 RX ADMIN — POLYETHYLENE GLYCOL 3350 17 GRAM(S): 17 POWDER, FOR SOLUTION ORAL at 11:23

## 2022-08-26 NOTE — PROGRESS NOTE ADULT - SUBJECTIVE AND OBJECTIVE BOX
OPERATIVE PROCEDURE(s):                POD #                       46yMale  SURGEON(s): CORI Barnes  SUBJECTIVE ASSESSMENT:  Patient has no complaints at this time.    Vital Signs Last 24 Hrs  T(F): 98.5 (26 Aug 2022 08:00), Max: 98.6 (25 Aug 2022 12:00)  HR: 83 (26 Aug 2022 08:00) (71 - 93)  BP: 98/58 (26 Aug 2022 08:00) (84/52 - 107/56)  BP(mean): 70 (26 Aug 2022 08:00) (62 - 77)  ABP: --  ABP(mean): --  RR: 25 (26 Aug 2022 08:00) (12 - 37)  SpO2: 97% (26 Aug 2022 08:00) (97% - 99%)      I&O's Detail    25 Aug 2022 07:01  -  26 Aug 2022 07:00  --------------------------------------------------------  IN:    IV PiggyBack: 50 mL    Lactated Ringers: 240 mL    Oral Fluid: 680 mL  Total IN: 970 mL    OUT:    Chest Tube (mL): 0 mL    Chest Tube (mL): 50 mL    Chest Tube (mL): 8 mL    Voided (mL): 1550 mL  Total OUT: 1608 mL        Net:   I&O's Detail    24 Aug 2022 07:01  -  25 Aug 2022 07:00  --------------------------------------------------------  Total NET: -1715 mL      25 Aug 2022 07:01  -  26 Aug 2022 07:00  --------------------------------------------------------  Total NET: -638 mL        CAPILLARY BLOOD GLUCOSE        Physical Exam:  General: NAD; A&Ox3/Patient is intubated and sedated  Cardiac: S1/S2, RRR, no murmur, no rubs  Lungs: unlabored respirations, CTA b/l, no wheeze, no rales, no crackles  Abdomen: Soft/NT/ND; positive bowel sounds x 4  Sternum: Intact, no click, incision healing well with no drainage  Incisions: Incisions clean/dry/intact  Extremities: No edema b/l lower extremities; good capillary refill; no cyanosis; palpable 1+ pedal pulses b/l    Central Venous Catheter: Yes[]  No[X]  Salazar Catheter: Yes  [] , No  [X]   NGT: Yes [] No [X]   EPICARDIAL WIRES:  [] YES [X] NO                                         BOWEL MOVEMENT:  [] YES [X] NO, If No, Timing since last BM: (08/24/22)     Day # 2 days  CHEST TUBE(Left/Right):  [X] YES [] NO, If yes -  AIR LEAKS:  [] YES [X] NO        LABS:                        9.1<L>  9.59  )-----------( 511<H>    ( 26 Aug 2022 06:00 )             27.9<L>                        8.3<L>  10.43 )-----------( 435<H>    ( 25 Aug 2022 04:30 )             25.0<L>    08-26    132<L>  |  95<L>  |  8<L>  ----------------------------<  94  4.6   |  29  |  0.7  08-25    136  |  98  |  7<L>  ----------------------------<  98  4.3   |  30  |  0.7    Ca    8.3<L>      26 Aug 2022 06:00  Mg     1.9     08-26    TPro  5.4<L> [6.0 - 8.0]  /  Alb  2.8<L> [3.5 - 5.2]  /  TBili  0.2 [0.2 - 1.2]  /  DBili  <0.2 [0.0 - 0.3]  /  AST  25 [0 - 41]  /  ALT  37 [0 - 41]  /  AlkPhos  145<H> [30 - 115]  08-24          RADIOLOGY & ADDITIONAL TESTS:  CXR:   EKG:  MEDICATIONS  (STANDING):  aspirin enteric coated 81 milliGRAM(s) Oral daily  BUpivacaine liposome 1.3% Injectable 20 milliLiter(s) Local Injection once  cefepime   IVPB 2000 milliGRAM(s) IV Intermittent every 8 hours  chlorhexidine 2% Cloths 1 Application(s) Topical daily  cholecalciferol 1000 Unit(s) Oral daily  heparin   Injectable 5000 Unit(s) SubCutaneous every 8 hours  HYDROmorphone PCA (1 mG/mL) 30 milliLiter(s) PCA Continuous PCA Continuous  lactated ringers. 1000 milliLiter(s) (10 mL/Hr) IV Continuous <Continuous>  levETIRAcetam 1125 milliGRAM(s) Oral daily  levETIRAcetam 1500 milliGRAM(s) Oral at bedtime  magnesium oxide 400 milliGRAM(s) Oral three times a day with meals  melatonin 3 milliGRAM(s) Oral at bedtime  meperidine     Injectable 25 milliGRAM(s) IV Push once  metoprolol tartrate 12.5 milliGRAM(s) Oral every 12 hours  metroNIDAZOLE    Tablet 500 milliGRAM(s) Oral every 8 hours  multivitamin 1 Tablet(s) Oral daily  OXcarbazepine 600 milliGRAM(s) Oral <User Schedule>  OXcarbazepine 300 milliGRAM(s) Oral <User Schedule>  polyethylene glycol 3350 17 Gram(s) Oral daily  senna 2 Tablet(s) Oral at bedtime    MEDICATIONS  (PRN):  ALPRAZolam 0.25 milliGRAM(s) Oral every 8 hours PRN Anxiety  HYDROmorphone  Injectable 0.5 milliGRAM(s) IV Push every 10 minutes PRN Moderate Pain (4 - 6)  ondansetron Injectable 4 milliGRAM(s) IV Push once PRN Nausea and/or Vomiting    HEPARIN:  [X] YES [] NO  Dose: 5000 UNITS/HR UNITS Q8H  LOVENOX:[] YES [X] NO    COUMADIN: []  YES [X] NO  SCD's: YES b/l  GI Prophylaxis: Protonix [X], Pepcid [], None []    Post-Op Aspirin: Yes [X],  No [], If No, then contra-indication:  Post-Op Statin: Yes [], No[X], If No, then contra-indication: Not indicated for operation.  Post-Op Beta-Blockers: Yes [X], No [], If No, then contra-indication:    Allergies:  moxifloxacin (Hives)  penicillin (Unknown)      Ambulation/Activity Status: Ambulates several times daily with assistance.    Assessment/Plan:  46y Male status-post .....  - Case and plan discussed with CTU Intensivist and CT Surgeon - Dr. Perkins/Urmila/Aparna  - Continue CTU supportive care    - Continue DVT/GI prophylaxis  - Incentive Spirometry 10 times an hour  - Continue to advance physical activity as tolerated and continue PT/OT as directed  - D/C  anterior chest tube   - Continue antibiotic regiment   1. CAD: Continue ASA, statin, BB  2. HTN: Hold lisinopril.  3. Anemia: Order blood smear   4. DM/Glucose Control: None  5. Syncope: follow up with echo                                OPERATIVE PROCEDURE(s):                POD #                       46yMale  SURGEON(s): CORI Barnes  SUBJECTIVE ASSESSMENT:  Patient has no complaints at this time.    Vital Signs Last 24 Hrs  T(F): 98.5 (26 Aug 2022 08:00), Max: 98.6 (25 Aug 2022 12:00)  HR: 83 (26 Aug 2022 08:00) (71 - 93)  BP: 98/58 (26 Aug 2022 08:00) (84/52 - 107/56)  BP(mean): 70 (26 Aug 2022 08:00) (62 - 77)  ABP: --  ABP(mean): --  RR: 25 (26 Aug 2022 08:00) (12 - 37)  SpO2: 97% (26 Aug 2022 08:00) (97% - 99%)      I&O's Detail    25 Aug 2022 07:01  -  26 Aug 2022 07:00  --------------------------------------------------------  IN:    IV PiggyBack: 50 mL    Lactated Ringers: 240 mL    Oral Fluid: 680 mL  Total IN: 970 mL    OUT:    Chest Tube (mL): 0 mL    Chest Tube (mL): 50 mL    Chest Tube (mL): 8 mL    Voided (mL): 1550 mL  Total OUT: 1608 mL        Net:   I&O's Detail    24 Aug 2022 07:01  -  25 Aug 2022 07:00  --------------------------------------------------------  Total NET: -1715 mL      25 Aug 2022 07:01  -  26 Aug 2022 07:00  --------------------------------------------------------  Total NET: -638 mL        CAPILLARY BLOOD GLUCOSE        Physical Exam:  General: NAD; A&Ox3/Patient is intubated and sedated  Cardiac: S1/S2, RRR, no murmur, no rubs  Lungs: unlabored respirations, CTA b/l, no wheeze, no rales, no crackles  Abdomen: Soft/NT/ND; positive bowel sounds x 4  Sternum: Intact, no click, incision healing well with no drainage  Incisions: Incisions clean/dry/intact  Extremities: No edema b/l lower extremities; good capillary refill; no cyanosis; palpable 1+ pedal pulses b/l    Central Venous Catheter: Yes[]  No[X]  Salazar Catheter: Yes  [] , No  [X]   NGT: Yes [] No [X]   EPICARDIAL WIRES:  [] YES [X] NO                                         BOWEL MOVEMENT:  [] YES [X] NO  CHEST TUBE(Left/Right):  [X] YES [] NO, If yes -  AIR LEAKS:  [] YES [X] NO        LABS:                        9.1<L>  9.59  )-----------( 511<H>    ( 26 Aug 2022 06:00 )             27.9<L>                        8.3<L>  10.43 )-----------( 435<H>    ( 25 Aug 2022 04:30 )             25.0<L>    08-26    132<L>  |  95<L>  |  8<L>  ----------------------------<  94  4.6   |  29  |  0.7  08-25    136  |  98  |  7<L>  ----------------------------<  98  4.3   |  30  |  0.7    Ca    8.3<L>      26 Aug 2022 06:00  Mg     1.9     08-26    TPro  5.4<L> [6.0 - 8.0]  /  Alb  2.8<L> [3.5 - 5.2]  /  TBili  0.2 [0.2 - 1.2]  /  DBili  <0.2 [0.0 - 0.3]  /  AST  25 [0 - 41]  /  ALT  37 [0 - 41]  /  AlkPhos  145<H> [30 - 115]  08-24          RADIOLOGY & ADDITIONAL TESTS:  CXR:   EKG:  MEDICATIONS  (STANDING):  aspirin enteric coated 81 milliGRAM(s) Oral daily  BUpivacaine liposome 1.3% Injectable 20 milliLiter(s) Local Injection once  cefepime   IVPB 2000 milliGRAM(s) IV Intermittent every 8 hours  chlorhexidine 2% Cloths 1 Application(s) Topical daily  cholecalciferol 1000 Unit(s) Oral daily  heparin   Injectable 5000 Unit(s) SubCutaneous every 8 hours  HYDROmorphone PCA (1 mG/mL) 30 milliLiter(s) PCA Continuous PCA Continuous  lactated ringers. 1000 milliLiter(s) (10 mL/Hr) IV Continuous <Continuous>  levETIRAcetam 1125 milliGRAM(s) Oral daily  levETIRAcetam 1500 milliGRAM(s) Oral at bedtime  magnesium oxide 400 milliGRAM(s) Oral three times a day with meals  melatonin 3 milliGRAM(s) Oral at bedtime  meperidine     Injectable 25 milliGRAM(s) IV Push once  metoprolol tartrate 12.5 milliGRAM(s) Oral every 12 hours  metroNIDAZOLE    Tablet 500 milliGRAM(s) Oral every 8 hours  multivitamin 1 Tablet(s) Oral daily  OXcarbazepine 600 milliGRAM(s) Oral <User Schedule>  OXcarbazepine 300 milliGRAM(s) Oral <User Schedule>  polyethylene glycol 3350 17 Gram(s) Oral daily  senna 2 Tablet(s) Oral at bedtime    MEDICATIONS  (PRN):  ALPRAZolam 0.25 milliGRAM(s) Oral every 8 hours PRN Anxiety  HYDROmorphone  Injectable 0.5 milliGRAM(s) IV Push every 10 minutes PRN Moderate Pain (4 - 6)  ondansetron Injectable 4 milliGRAM(s) IV Push once PRN Nausea and/or Vomiting    HEPARIN:  [X] YES [] NO  Dose: 5000 UNITS/HR UNITS Q8H  LOVENOX:[] YES [X] NO    COUMADIN: []  YES [X] NO  SCD's: YES b/l  GI Prophylaxis: Protonix [X], Pepcid [], None []    Post-Op Aspirin: Yes [X],  No [], If No, then contra-indication:  Post-Op Statin: Yes [], No[X], If No, then contra-indication: Not indicated for operation.  Post-Op Beta-Blockers: Yes [X], No [], If No, then contra-indication:    Allergies:  moxifloxacin (Hives)  penicillin (Unknown)      Ambulation/Activity Status: Ambulates several times daily with assistance.    Assessment/Plan:  46y Male status-post .....  - Case and plan discussed with CTU Intensivist and CT Surgeon - Dr. Perkins/Urmila/Aparna  - Continue CTU supportive care    - Continue DVT/GI prophylaxis  - Incentive Spirometry 10 times an hour  - Continue to advance physical activity as tolerated and continue PT/OT as directed  - D/C  anterior chest tube   - Continue antibiotic regiment   1. CAD: Continue ASA, statin, BB  2. HTN: Hold lisinopril.  3. Anemia: Order blood smear   4. DM/Glucose Control: None  5. Syncope: follow up with echo                                OPERATIVE PROCEDURE(s):                POD #  4    Bronchoscopy, with lung decortication using VATS for empyema                  46yMale  SURGEON(s): CORI Barnes  SUBJECTIVE ASSESSMENT:  Patient has no complaints at this time.    Vital Signs Last 24 Hrs  T(F): 98.5 (26 Aug 2022 08:00), Max: 98.6 (25 Aug 2022 12:00)  HR: 83 (26 Aug 2022 08:00) (71 - 93)  BP: 98/58 (26 Aug 2022 08:00) (84/52 - 107/56)  BP(mean): 70 (26 Aug 2022 08:00) (62 - 77)  RR: 25 (26 Aug 2022 08:00) (12 - 37)  SpO2: 97% (26 Aug 2022 08:00) (97% - 99%)      I&O's Detail    25 Aug 2022 07:01  -  26 Aug 2022 07:00  --------------------------------------------------------  IN:    IV PiggyBack: 50 mL    Lactated Ringers: 240 mL    Oral Fluid: 680 mL  Total IN: 970 mL    OUT:    Chest Tube (mL): 0 mL    Chest Tube (mL): 50 mL    Chest Tube (mL): 8 mL    Voided (mL): 1550 mL  Total OUT: 1608 mL    Net:   I&O's Detail    24 Aug 2022 07:01  -  25 Aug 2022 07:00  --------------------------------------------------------  Total NET: -1715 mL    25 Aug 2022 07:01  -  26 Aug 2022 07:00  --------------------------------------------------------  Total NET: -638 mL      CAPILLARY BLOOD GLUCOSE    Physical Exam:  General: NAD; A&Ox3  Cardiac: S1/S2, RRR, no murmur, no rubs  Lungs: unlabored shalow respirations, b/l bs decreased on right  Abdomen: Soft/NT/ND  Extremities: No edema b/l lower extremities    Central Venous Catheter: Yes[]  No[X]  Salazar Catheter: Yes  [] , No  [X]   NGT: Yes [] No [X]   EPICARDIAL WIRES:  [] YES [X] NO                                         BOWEL MOVEMENT:  [] YES [X] NO  CHEST TUBE(Left/Right):  [X] YES [] NO, If yes -  AIR LEAKS:  [] YES [X] NO        LABS:                        9.1<L>  9.59  )-----------( 511<H>    ( 26 Aug 2022 06:00 )             27.9<L>                        8.3<L>  10.43 )-----------( 435<H>    ( 25 Aug 2022 04:30 )             25.0<L>    08-26    132<L>  |  95<L>  |  8<L>  ----------------------------<  94  4.6   |  29  |  0.7  08-25    136  |  98  |  7<L>  ----------------------------<  98  4.3   |  30  |  0.7    Ca    8.3<L>      26 Aug 2022 06:00  Mg     1.9     08-26    TPro  5.4<L> [6.0 - 8.0]  /  Alb  2.8<L> [3.5 - 5.2]  /  TBili  0.2 [0.2 - 1.2]  /  DBili  <0.2 [0.0 - 0.3]  /  AST  25 [0 - 41]  /  ALT  37 [0 - 41]  /  AlkPhos  145<H> [30 - 115]  08-24    RADIOLOGY & ADDITIONAL TESTS:    CXR:  < from: Xray Chest 1 View-PORTABLE IMMEDIATE (Xray Chest 1 View-PORTABLE IMMEDIATE .) (08.26.22 @ 12:43) >  Interval removal of one of 2 right-sided chest tubes. Previously   described right pneumothorax is not well visualized on the current exam   and may be improving.  Redemonstrated right basilar opacity.    MEDICATIONS  (STANDING):  aspirin enteric coated 81 milliGRAM(s) Oral daily  BUpivacaine liposome 1.3% Injectable 20 milliLiter(s) Local Injection once  cefepime   IVPB 2000 milliGRAM(s) IV Intermittent every 8 hours  chlorhexidine 2% Cloths 1 Application(s) Topical daily  cholecalciferol 1000 Unit(s) Oral daily  heparin   Injectable 5000 Unit(s) SubCutaneous every 8 hours  HYDROmorphone PCA (1 mG/mL) 30 milliLiter(s) PCA Continuous PCA Continuous  lactated ringers. 1000 milliLiter(s) (10 mL/Hr) IV Continuous <Continuous>  levETIRAcetam 1125 milliGRAM(s) Oral daily  levETIRAcetam 1500 milliGRAM(s) Oral at bedtime  magnesium oxide 400 milliGRAM(s) Oral three times a day with meals  melatonin 3 milliGRAM(s) Oral at bedtime  meperidine     Injectable 25 milliGRAM(s) IV Push once  metoprolol tartrate 12.5 milliGRAM(s) Oral every 12 hours  metroNIDAZOLE    Tablet 500 milliGRAM(s) Oral every 8 hours  multivitamin 1 Tablet(s) Oral daily  OXcarbazepine 600 milliGRAM(s) Oral <User Schedule>  OXcarbazepine 300 milliGRAM(s) Oral <User Schedule>  polyethylene glycol 3350 17 Gram(s) Oral daily  senna 2 Tablet(s) Oral at bedtime    MEDICATIONS  (PRN):  ALPRAZolam 0.25 milliGRAM(s) Oral every 8 hours PRN Anxiety  HYDROmorphone  Injectable 0.5 milliGRAM(s) IV Push every 10 minutes PRN Moderate Pain (4 - 6)  ondansetron Injectable 4 milliGRAM(s) IV Push once PRN Nausea and/or Vomiting      Allergies:  moxifloxacin (Hives)  penicillin (Unknown)      Ambulation/Activity Status: Ambulates several times daily with assistance.    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     Sepsis on admission (T100.9, Pulse 117, WBC 30.85) secondary to R pneumothorax/ empyema     - Case and plan discussed with CTU Intensivist and CT Surgeon - Dr. Perkins/Urmila/Aparna/ Lino Barnes  - Continue CTU supportive care    - Continue DVT/ start GI prophylaxis  - Incentive Spirometry 10 times an hour  - Continue to advance physical activity as tolerated and continue PT/OT as directed  - D/C  anterior chest tube   - Continue antibiotic regiment   - HTN: Hold lisinopril.  -. Anemia: Order blood smear   - Syncope: TTE EF 58% with mild mr - contributed to sepsis

## 2022-08-26 NOTE — PROGRESS NOTE ADULT - SUBJECTIVE AND OBJECTIVE BOX
HPI:  46M with PMH Melva-Danlos syndrome, cough variant asthma w/ hx of pulmonary nodules, , perforation of sigmoid colon s/p Kerri's, Colonic perforation w/ ileostomy in , epilepsy, HTN, celiac aneurysm s/p coiling 2010   presented to the ED 22 for intermittent fevers ( recorded at home), mixed productive and non-productive cough, night sweats for 1 month. 22   pt had a fever of 103 which was not breaking. In the morning pt got out of the bed and had a syncopal episode. Pt fell lightheaded, saw flashes and woke up on the floor.   Then pt had severe chest pain on the right side, after which he decided to come to the ED.   In course of hospitalization, pt found to have PTX a/w ruptured bleb and had chest tube placed.  Pt had CT's abd and chest and no findings for PNA or sepsis , but pt has ongoing fever at high temp at 103 and 104.  Pt has had elev wbc and plt count with mild anemia.   Diff for leukocytosis unremarkable.     Infectious Diseases History:  Old Micro Data/Cultures:     FAMILY HISTORY: non-contributory     PAST MEDICAL & SURGICAL HISTORY:  EDS (Melva-Danlos syndrome)      Epilepsy      HTN (hypertension)      Keratoconus      Aneurysm artery, celiac  s/p coiling 2010      Dislocations and subluxations      Status post Kerri&#x27;s procedure  2019          SOCIAL HISTORY  Social History:  Denies alcohol, tobacco and illicit drug use. (05 Sep 2019 22:09)      Recent Travel: [place] ([facility]) - was feeling sick prior to this trip  Other Exposures:     ROS  General: (+) fever and nightsweats  HEENT: Denies headache, rhinorrhea, sore throat, eye pain  CV: (+) CP - right sided, (-) palpitations  PULM: Denies wheezing, hemoptysis (+) dry cough, SOB  GI: Denies hematemesis, hematochezia, melena  : Denies discharge, hematuria  MSK: Denies arthralgias, myalgias  SKIN: Denies rash, lesions  NEURO: Denies paresthesias, weakness    VITALS:  VSS    PHYSICAL EXAM:  pt oob and shaving today   3 chest tubes  Gen: NAD, resting in bed, mildly diaphoretic   Neuro: non focal, alert and oriented x3  HEENT: Normocephalic, atraumatic  Neck: supple, no lymphadenopathy  CV: Tachycardic rate & regular rhythm  Lungs: Diminished breath sounds on the right. No wheezing, ronchi or rales bilaterally  CT  Abdomen: Soft, ileostomy present, nontender, nondistended   Ext: Warm, well perfused  Skin: no rash, no lesions  Lines: no phlebitis    TESTS & MEASUREMENTS:                        11.5   30.85 )-----------( 488      ( 18 Aug 2022 20:20 )             36.1   wbc 20K         134<L>  |  95<L>  |  15  ----------------------------<  97  4.3   |  24  |  1.2    Ca    9.7      18 Aug 2022 20:20    TPro  7.7  /  Alb  4.0  /  TBili  0.6  /  DBili  x   /  AST  20  /  ALT  27  /  AlkPhos  138<H>        LIVER FUNCTIONS - ( 18 Aug 2022 20:20 )  Alb: 4.0 g/dL / Pro: 7.7 g/dL / ALK PHOS: 138 U/L / ALT: 27 U/L / AST: 20 U/L / GGT: x           Urinalysis Basic - ( 18 Aug 2022 20:30 )    Color: Yellow / Appearance: Clear / S.035 / pH: x  Gluc: x / Ketone: Moderate  / Bili: Small / Urobili: <2 mg/dL   Blood: x / Protein: 30 mg/dL / Nitrite: Negative   Leuk Esterase: Negative / RBC: 3 /HPF / WBC 2 /HPF   Sq Epi: x / Non Sq Epi: 3 /HPF / Bacteria: FEW          Lactate, Blood: 1.6 mmol/L (22 @ 20:20)      I

## 2022-08-26 NOTE — PROGRESS NOTE ADULT - SUBJECTIVE AND OBJECTIVE BOX
ANGE, FAZAL  46y, Male  Allergy: moxifloxacin (Hives)  penicillin (Unknown)      LOS  8d    CHIEF COMPLAINT: pneumothorax, acute hypoxemic respiratory failure (22 Aug 2022 11:31)      INTERVAL EVENTS/HPI  - No acute events overnight  - T(F): , Max: 98.6 (08-25-22 @ 12:00)  - Denies any worsening symptoms  - Tolerating medication  - WBC Count: 9.59 (08-26-22 @ 06:00)  WBC Count: 10.43 (08-25-22 @ 04:30)  - Creatinine, Serum: 0.7 (08-26-22 @ 06:00)  Creatinine, Serum: 0.7 (08-25-22 @ 04:30)       ROS  General: Denies rigors, nightsweats  HEENT: Denies headache, rhinorrhea, sore throat, eye pain  CV: Denies CP, palpitations  PULM: Denies wheezing, hemoptysis  GI: Denies hematemesis, hematochezia, melena  : Denies discharge, hematuria  MSK: Denies arthralgias, myalgias  SKIN: Denies rash, lesions  NEURO: Denies paresthesias, weakness  PSYCH: Denies depression, anxiety    VITALS:  T(F): 98.5, Max: 98.6 (08-25-22 @ 12:00)  HR: 84  BP: 105/57  RR: 24Vital Signs Last 24 Hrs  T(C): 36.9 (26 Aug 2022 08:00), Max: 37 (25 Aug 2022 12:00)  T(F): 98.5 (26 Aug 2022 08:00), Max: 98.6 (25 Aug 2022 12:00)  HR: 84 (26 Aug 2022 11:00) (71 - 84)  BP: 105/57 (26 Aug 2022 11:00) (84/52 - 105/60)  BP(mean): 77 (26 Aug 2022 11:00) (62 - 77)  RR: 24 (26 Aug 2022 11:00) (12 - 37)  SpO2: 98% (26 Aug 2022 11:00) (97% - 99%)    Parameters below as of 26 Aug 2022 10:00  Patient On (Oxygen Delivery Method): room air        PHYSICAL EXAM:  Gen: NAD, resting in bed  HEENT: Normocephalic, atraumatic  Neck: supple, no lymphadenopathy  CV: Regular rate & regular rhythm  Lungs: decreased BS at bases, no fremitus CTs  Abdomen: Soft, BS present  Ext: Warm, well perfused  Neuro: non focal, awake  Skin: no rash, no erythema  Lines: no phlebitis    FH: Non-contributory  Social Hx: Non-contributory    TESTS & MEASUREMENTS:                        9.1    9.59  )-----------( 511      ( 26 Aug 2022 06:00 )             27.9     08-26    132<L>  |  95<L>  |  8<L>  ----------------------------<  94  4.6   |  29  |  0.7    Ca    8.3<L>      26 Aug 2022 06:00  Mg     1.9     08-26              Culture - Acid Fast - Tissue w/Smear (collected 08-22-22 @ 21:40)  Source: .Tissue 1) pleural rind in specimen cup  Preliminary Report (08-24-22 @ 15:04):    Culture is being performed.    Culture - Fungal, Tissue (collected 08-22-22 @ 21:40)  Source: .Tissue 1) pleural rind in specimen cup  Preliminary Report (08-24-22 @ 06:45):    Testing in progress    Culture - Tissue with Gram Stain (collected 08-22-22 @ 21:40)  Source: .Tissue 1) pleural rind in specimen cup  Gram Stain (08-23-22 @ 14:58):    Moderate polymorphonuclear leukocytes per low power field    No organisms seen per oil power field  Preliminary Report (08-24-22 @ 10:24):    No growth    Culture - Fungal, Tissue (collected 08-22-22 @ 21:40)  Source: .Tissue None  Preliminary Report (08-24-22 @ 06:37):    Testing in progress    Culture - Acid Fast - Tissue w/Smear (collected 08-22-22 @ 21:40)  Source: .Tissue None  Preliminary Report (08-24-22 @ 15:04):    Culture is being performed.    Culture - Tissue with Gram Stain (collected 08-22-22 @ 21:40)  Source: .Tissue None  Gram Stain (08-23-22 @ 15:15):    No polymorphonuclear leukocytes per low power field    No organisms seen per oil power field  Preliminary Report (08-24-22 @ 08:22):    No growth    Culture - Urine (collected 08-18-22 @ 20:30)  Source: Clean Catch Clean Catch (Midstream)  Final Report (08-20-22 @ 07:14):    No growth    Culture - Blood (collected 08-18-22 @ 20:20)  Source: .Blood Blood  Final Report (08-24-22 @ 02:01):    No Growth Final    Culture - Blood (collected 08-18-22 @ 20:20)  Source: .Blood Blood  Final Report (08-24-22 @ 02:01):    No Growth Final            INFECTIOUS DISEASES TESTING  COVID-19 PCR: NotDetec (08-21-22 @ 17:39)  Procalcitonin, Serum: 17.80 (08-20-22 @ 23:30)  MRSA PCR Result.: Negative (08-20-22 @ 18:26)  HIV-1/2 Combo Result: Nonreact (08-20-22 @ 00:15)  Legionella Antigen, Urine: Negative (08-19-22 @ 04:04)  Streptococcus pneumoniae Ag, Ur Result: Negative (08-19-22 @ 04:04)  Rapid RVP Result: NotDetec (08-18-22 @ 23:50)  COVID-19 PCR: NotDetec (08-18-22 @ 20:30)      INFLAMMATORY MARKERS  Sedimentation Rate, Erythrocyte: 125 mm/Hr (08-19-22 @ 08:51)  C-Reactive Protein, Serum: 323.9 mg/L (08-19-22 @ 08:51)      RADIOLOGY & ADDITIONAL TESTS:  I have personally reviewed the last available Chest xray  CXR      CT      CARDIOLOGY TESTING  12 Lead ECG:   Ventricular Rate 102 BPM    Atrial Rate 102 BPM    P-R Interval 132 ms    QRS Duration 92 ms    Q-T Interval 334 ms    QTC Calculation(Bazett) 435 ms    P Axis 44 degrees    R Axis 32 degrees    T Axis 27 degrees    Diagnosis Line Sinus tachycardia  Otherwise normal ECG    Confirmed by MELIDA RAGSDALE MD ([id number]) on 8/19/2022 6:24:42 AM (08-18-22 @ 20:01)      MEDICATIONS  aspirin enteric coated 81 Oral daily  BUpivacaine liposome 1.3% Injectable 20 Local Injection once  cefepime   IVPB 2000 IV Intermittent every 8 hours  chlorhexidine 2% Cloths 1 Topical daily  cholecalciferol 1000 Oral daily  heparin   Injectable 5000 SubCutaneous every 8 hours  HYDROmorphone PCA (1 mG/mL) 30 PCA Continuous PCA Continuous  lactated ringers. 1000 IV Continuous <Continuous>  levETIRAcetam 1125 Oral daily  levETIRAcetam 1500 Oral at bedtime  magnesium oxide 400 Oral three times a day with meals  melatonin 3 Oral at bedtime  meperidine     Injectable 25 IV Push once  metoprolol tartrate 12.5 Oral every 12 hours  metroNIDAZOLE    Tablet 500 Oral every 8 hours  multivitamin 1 Oral daily  OXcarbazepine 600 Oral <User Schedule>  OXcarbazepine 300 Oral <User Schedule>  polyethylene glycol 3350 17 Oral daily  senna 2 Oral at bedtime      WEIGHT  Weight (kg): 75 (08-18-22 @ 21:37)  Creatinine, Serum: 0.7 mg/dL (08-26-22 @ 06:00)      ANTIBIOTICS:  cefepime   IVPB 2000 milliGRAM(s) IV Intermittent every 8 hours  metroNIDAZOLE    Tablet 500 milliGRAM(s) Oral every 8 hours      All available historical records have been reviewed

## 2022-08-26 NOTE — PROGRESS NOTE ADULT - ASSESSMENT
1 . hx of EDS vascular type, multiple hx of vascular ruptures including sig colon s/p macedo's  and ileostomy in 2020  .. 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  will follow            1 . hx of EDS vascular type, multiple hx of vascular ruptures including sig colon s/p macedo's  and ileostomy in 2020  .. 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  will follow

## 2022-08-26 NOTE — PROGRESS NOTE ADULT - ASSESSMENT
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 "Extensive dense adhesions, purulent membranes and pocket of purulent fluid Total lung decortication, adequate lung re-expansion Small intermittent air leak"    8/21 repeat CT Chest 1.  Findings are compatible with a large right-sided empyema measuring up to 18.4 cm craniocaudal. This may be communicating with a smaller multiloculated fluid and gas collection more anteriorly, likely within the right major fissure.  2.  Right middle and lower lobe consolidation, likely infectious.  3.  Near resolution of previously seen right-sided pneumothorax status post right chest tube placement. Residual trace right apical pneumothorax   seen.    CT 8/19/22 showing 50% pneumothorax under tension. Lucency seen within the lung parenchyma and may represent air trapped within fissures    S/p Chest tube placement 8/18/22    CXR 8/19/22 showing trace R pneumothorax and R basilar opacity     Legionella Antigen, Urine: Negative (08-19-22 @ 04:04)    Streptococcus pneumoniae Ag, Ur Result: Negative (08-19-22 @ 04:04)    Rapid RVP Result: NotDetec (08-18-22 @ 23:50)    Sedimentation Rate, Erythrocyte: 125 mm/Hr (08-19-22 @ 08:51)    C-Reactive Protein, Serum: 323.9 mg/L (08-19-22 @ 08:51)  Reports > 1 mo dry cough, fever, nightsweats  #Allergies: PCN, avoid fluoroquinolones  #PTX    RECOMMENDATIONS  - f/u OR cultures, thus far NG  - Continue Cefepime 2g IV Q8h  - PO flagyl 500mg TID  - Midline in place- likely plan for D/C with current midline x 14 days post-op Cefepime 2g q12h IV and PO flagyl 500mg TID, then augmentin 875mg BID x 14 days  - Weekly CBC, CMP, ESR/CRP  - ID follow-up with Dr. Noah Pavon for Telehealth. We will call the patient between 10:30-6:30      [address]       [phone]       Fax [phone]   If any questions, please call or send a message on [employer] Teams  Please continue to update ID with any pertinent new laboratory or radiographic findings  Spectra [id number]

## 2022-08-27 LAB
ANION GAP SERPL CALC-SCNC: 6 MMOL/L — LOW (ref 7–14)
BASOPHILS # BLD AUTO: 0.08 K/UL — SIGNIFICANT CHANGE UP (ref 0–0.2)
BASOPHILS NFR BLD AUTO: 0.8 % — SIGNIFICANT CHANGE UP (ref 0–1)
BUN SERPL-MCNC: 8 MG/DL — LOW (ref 10–20)
CALCIUM SERPL-MCNC: 8.5 MG/DL — SIGNIFICANT CHANGE UP (ref 8.5–10.1)
CHLORIDE SERPL-SCNC: 96 MMOL/L — LOW (ref 98–110)
CO2 SERPL-SCNC: 32 MMOL/L — SIGNIFICANT CHANGE UP (ref 17–32)
CREAT SERPL-MCNC: 0.7 MG/DL — SIGNIFICANT CHANGE UP (ref 0.7–1.5)
EGFR: 115 ML/MIN/1.73M2 — SIGNIFICANT CHANGE UP
EOSINOPHIL # BLD AUTO: 0.34 K/UL — SIGNIFICANT CHANGE UP (ref 0–0.7)
EOSINOPHIL NFR BLD AUTO: 3.3 % — SIGNIFICANT CHANGE UP (ref 0–8)
GLUCOSE SERPL-MCNC: 91 MG/DL — SIGNIFICANT CHANGE UP (ref 70–99)
HCT VFR BLD CALC: 26.7 % — LOW (ref 42–52)
HGB BLD-MCNC: 8.7 G/DL — LOW (ref 14–18)
IMM GRANULOCYTES NFR BLD AUTO: 7.5 % — HIGH (ref 0.1–0.3)
LYMPHOCYTES # BLD AUTO: 1.25 K/UL — SIGNIFICANT CHANGE UP (ref 1.2–3.4)
LYMPHOCYTES # BLD AUTO: 12.1 % — LOW (ref 20.5–51.1)
MAGNESIUM SERPL-MCNC: 1.9 MG/DL — SIGNIFICANT CHANGE UP (ref 1.8–2.4)
MCHC RBC-ENTMCNC: 27.6 PG — SIGNIFICANT CHANGE UP (ref 27–31)
MCHC RBC-ENTMCNC: 32.6 G/DL — SIGNIFICANT CHANGE UP (ref 32–37)
MCV RBC AUTO: 84.8 FL — SIGNIFICANT CHANGE UP (ref 80–94)
MONOCYTES # BLD AUTO: 1.14 K/UL — HIGH (ref 0.1–0.6)
MONOCYTES NFR BLD AUTO: 11.1 % — HIGH (ref 1.7–9.3)
NEUTROPHILS # BLD AUTO: 6.73 K/UL — HIGH (ref 1.4–6.5)
NEUTROPHILS NFR BLD AUTO: 65.2 % — SIGNIFICANT CHANGE UP (ref 42.2–75.2)
NRBC # BLD: 0 /100 WBCS — SIGNIFICANT CHANGE UP (ref 0–0)
PLATELET # BLD AUTO: 534 K/UL — HIGH (ref 130–400)
POTASSIUM SERPL-MCNC: 5.1 MMOL/L — HIGH (ref 3.5–5)
POTASSIUM SERPL-SCNC: 5.1 MMOL/L — HIGH (ref 3.5–5)
RBC # BLD: 3.15 M/UL — LOW (ref 4.7–6.1)
RBC # FLD: 15.5 % — HIGH (ref 11.5–14.5)
SODIUM SERPL-SCNC: 134 MMOL/L — LOW (ref 135–146)
WBC # BLD: 10.31 K/UL — SIGNIFICANT CHANGE UP (ref 4.8–10.8)
WBC # FLD AUTO: 10.31 K/UL — SIGNIFICANT CHANGE UP (ref 4.8–10.8)

## 2022-08-27 PROCEDURE — 71045 X-RAY EXAM CHEST 1 VIEW: CPT | Mod: 26

## 2022-08-27 PROCEDURE — 71045 X-RAY EXAM CHEST 1 VIEW: CPT | Mod: 26,77

## 2022-08-27 PROCEDURE — 93970 EXTREMITY STUDY: CPT | Mod: 26

## 2022-08-27 RX ADMIN — OXCARBAZEPINE 600 MILLIGRAM(S): 300 TABLET, FILM COATED ORAL at 18:05

## 2022-08-27 RX ADMIN — Medication 81 MILLIGRAM(S): at 12:27

## 2022-08-27 RX ADMIN — PANTOPRAZOLE SODIUM 40 MILLIGRAM(S): 20 TABLET, DELAYED RELEASE ORAL at 06:59

## 2022-08-27 RX ADMIN — CEFEPIME 100 MILLIGRAM(S): 1 INJECTION, POWDER, FOR SOLUTION INTRAMUSCULAR; INTRAVENOUS at 06:59

## 2022-08-27 RX ADMIN — LEVETIRACETAM 1500 MILLIGRAM(S): 250 TABLET, FILM COATED ORAL at 21:24

## 2022-08-27 RX ADMIN — Medication 0.25 MILLIGRAM(S): at 19:43

## 2022-08-27 RX ADMIN — Medication 12.5 MILLIGRAM(S): at 06:57

## 2022-08-27 RX ADMIN — MAGNESIUM OXIDE 400 MG ORAL TABLET 400 MILLIGRAM(S): 241.3 TABLET ORAL at 18:05

## 2022-08-27 RX ADMIN — CHLORHEXIDINE GLUCONATE 1 APPLICATION(S): 213 SOLUTION TOPICAL at 21:32

## 2022-08-27 RX ADMIN — HEPARIN SODIUM 5000 UNIT(S): 5000 INJECTION INTRAVENOUS; SUBCUTANEOUS at 14:16

## 2022-08-27 RX ADMIN — CEFEPIME 100 MILLIGRAM(S): 1 INJECTION, POWDER, FOR SOLUTION INTRAMUSCULAR; INTRAVENOUS at 14:16

## 2022-08-27 RX ADMIN — MAGNESIUM OXIDE 400 MG ORAL TABLET 400 MILLIGRAM(S): 241.3 TABLET ORAL at 08:39

## 2022-08-27 RX ADMIN — HYDROMORPHONE HYDROCHLORIDE 0.5 MILLIGRAM(S): 2 INJECTION INTRAMUSCULAR; INTRAVENOUS; SUBCUTANEOUS at 00:50

## 2022-08-27 RX ADMIN — Medication 12.5 MILLIGRAM(S): at 18:05

## 2022-08-27 RX ADMIN — OXCARBAZEPINE 300 MILLIGRAM(S): 300 TABLET, FILM COATED ORAL at 06:57

## 2022-08-27 RX ADMIN — Medication 500 MILLIGRAM(S): at 21:23

## 2022-08-27 RX ADMIN — CEFEPIME 100 MILLIGRAM(S): 1 INJECTION, POWDER, FOR SOLUTION INTRAMUSCULAR; INTRAVENOUS at 21:24

## 2022-08-27 RX ADMIN — HEPARIN SODIUM 5000 UNIT(S): 5000 INJECTION INTRAVENOUS; SUBCUTANEOUS at 06:56

## 2022-08-27 RX ADMIN — LEVETIRACETAM 1125 MILLIGRAM(S): 250 TABLET, FILM COATED ORAL at 12:28

## 2022-08-27 RX ADMIN — Medication 1 TABLET(S): at 12:26

## 2022-08-27 RX ADMIN — MAGNESIUM OXIDE 400 MG ORAL TABLET 400 MILLIGRAM(S): 241.3 TABLET ORAL at 12:25

## 2022-08-27 RX ADMIN — Medication 3 MILLIGRAM(S): at 21:23

## 2022-08-27 RX ADMIN — Medication 1000 UNIT(S): at 12:25

## 2022-08-27 RX ADMIN — HEPARIN SODIUM 5000 UNIT(S): 5000 INJECTION INTRAVENOUS; SUBCUTANEOUS at 21:24

## 2022-08-27 RX ADMIN — Medication 500 MILLIGRAM(S): at 14:17

## 2022-08-27 RX ADMIN — HYDROMORPHONE HYDROCHLORIDE 0.5 MILLIGRAM(S): 2 INJECTION INTRAMUSCULAR; INTRAVENOUS; SUBCUTANEOUS at 01:05

## 2022-08-27 RX ADMIN — Medication 500 MILLIGRAM(S): at 06:56

## 2022-08-27 NOTE — PROGRESS NOTE ADULT - SUBJECTIVE AND OBJECTIVE BOX
GENERAL SURGERY PROGRESS NOTE    Patient: FAZAL CASSIDY , 46y ([birth year])Male   MRN: [id number]  Location: [address]   Visit: 08-18-22 Inpatient  Date: 08-27-22 @ 00:32    Hospital Day #: 10  Post-Op Day #: 5    Procedure/Dx/Injuries: empyema, s/p VAT and R lung decortication    Events of past 24 hours:  -Chest tube #2 removed  -Chest tube #1 to waterseal  -Seen by ID, recs: d/c w/ Cefepime x14d and PO flagyl post-op, then Augmentin 875 BID x14d    PAST MEDICAL & SURGICAL HISTORY:  EDS (Melva-Danlos syndrome)  Epilepsy  HTN (hypertension)  Keratoconus  Aneurysm artery, 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  Dislocations and subluxations  Status post Kerri&#x27;s procedure  11/2019    Vitals:   T(F): 98.4 (08-26-22 @ 20:00), Max: 98.5 (08-26-22 @ 08:00)  HR: 82 (08-26-22 @ 20:00)  BP: 94/57 (08-26-22 @ 20:00)  RR: 40 (08-26-22 @ 20:00)  SpO2: 98% (08-26-22 @ 20:00)    Diet, Regular    I & O's:    08-25-22 @ 07:01  -  08-26-22 @ 07:00  --------------------------------------------------------  IN:    IV PiggyBack: 50 mL    Lactated Ringers: 240 mL    Oral Fluid: 680 mL  Total IN: 970 mL    OUT:    Chest Tube (mL): 0 mL    Chest Tube (mL): 50 mL    Chest Tube (mL): 8 mL    Voided (mL): 1550 mL  Total OUT: 1608 mL    Total NET: -638 mL    Bowel Movement: : [x] YES [] NO  Flatus: : [x] YES [] NO    PHYSICAL EXAM:  General: NAD, AAOx3, calm and cooperative  Cardiac: RRR S1, S2  Respiratory: normal respiratory effort  Chest: 1 chest tube to water seal  Abdomen: Soft, non-distended, non-tender  Skin: Warm/dry, normal color, no jaundice  Incision/wound: healing well, dressings in place, clean, dry and intact    MEDICATIONS  (STANDING):  aspirin enteric coated 81 milliGRAM(s) Oral daily  BUpivacaine liposome 1.3% Injectable 20 milliLiter(s) Local Injection once  cefepime   IVPB 2000 milliGRAM(s) IV Intermittent every 8 hours  chlorhexidine 2% Cloths 1 Application(s) Topical daily  cholecalciferol 1000 Unit(s) Oral daily  heparin   Injectable 5000 Unit(s) SubCutaneous every 8 hours  HYDROmorphone PCA (1 mG/mL) 30 milliLiter(s) PCA Continuous PCA Continuous  lactated ringers. 1000 milliLiter(s) (10 mL/Hr) IV Continuous <Continuous>  levETIRAcetam 1125 milliGRAM(s) Oral daily  levETIRAcetam 1500 milliGRAM(s) Oral at bedtime  magnesium oxide 400 milliGRAM(s) Oral three times a day with meals  melatonin 3 milliGRAM(s) Oral at bedtime  meperidine     Injectable 25 milliGRAM(s) IV Push once  metoprolol tartrate 12.5 milliGRAM(s) Oral every 12 hours  metroNIDAZOLE    Tablet 500 milliGRAM(s) Oral every 8 hours  multivitamin 1 Tablet(s) Oral daily  OXcarbazepine 600 milliGRAM(s) Oral <User Schedule>  OXcarbazepine 300 milliGRAM(s) Oral <User Schedule>  pantoprazole    Tablet 40 milliGRAM(s) Oral before breakfast  polyethylene glycol 3350 17 Gram(s) Oral daily  senna 2 Tablet(s) Oral at bedtime    MEDICATIONS  (PRN):  ALPRAZolam 0.25 milliGRAM(s) Oral every 8 hours PRN Anxiety  HYDROmorphone  Injectable 0.5 milliGRAM(s) IV Push every 10 minutes PRN Moderate Pain (4 - 6)  ondansetron Injectable 4 milliGRAM(s) IV Push once PRN Nausea and/or Vomiting    DVT PROPHYLAXIS: heparin   Injectable 5000 Unit(s) SubCutaneous every 8 hours    GI PROPHYLAXIS: pantoprazole    Tablet 40 milliGRAM(s) Oral before breakfast    ANTICOAGULATION:   ANTIBIOTICS:  cefepime   IVPB 2000 milliGRAM(s)  metroNIDAZOLE    Tablet 500 milliGRAM(s)    LAB/STUDIES:  Labs:  CAPILLARY BLOOD GLUCOSE                        9.1    9.59  )-----------( 511      ( 26 Aug 2022 06:00 )             27.9       Auto Neutrophil %: 66.0 % (08-26-22 @ 06:00)  Auto Immature Granulocyte %: 5.6 % (08-26-22 @ 06:00)    08-26    132<L>  |  95<L>  |  8<L>  ----------------------------<  94  4.6   |  29  |  0.7    Calcium, Total Serum: 8.3 mg/dL (08-26-22 @ 06:00)

## 2022-08-27 NOTE — PROGRESS NOTE ADULT - ASSESSMENT
45 y/o male with     1 . hx of EDS vascular type, multiple hx of vascular ruptures including sig colon s/p macedo's  and ileostomy in 2020  .. 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  will f/u

## 2022-08-27 NOTE — PROGRESS NOTE ADULT - SUBJECTIVE AND OBJECTIVE BOX
Patient is a 46y old  Male who presents with a chief complaint of pneumothorax, acute hypoxemic respiratory failure (22 Aug 2022 11:31)       Pt is seen and examined  pt is awake and out of bed to chair  pt seems comfortable and denies any complaints at this time          ROS:  Negative     MEDICATIONS  (STANDING):  aspirin enteric coated 81 milliGRAM(s) Oral daily  BUpivacaine liposome 1.3% Injectable 20 milliLiter(s) Local Injection once  cefepime   IVPB 2000 milliGRAM(s) IV Intermittent every 8 hours  chlorhexidine 2% Cloths 1 Application(s) Topical daily  cholecalciferol 1000 Unit(s) Oral daily  heparin   Injectable 5000 Unit(s) SubCutaneous every 8 hours  lactated ringers. 1000 milliLiter(s) (10 mL/Hr) IV Continuous <Continuous>  levETIRAcetam 1125 milliGRAM(s) Oral daily  levETIRAcetam 1500 milliGRAM(s) Oral at bedtime  magnesium oxide 400 milliGRAM(s) Oral three times a day with meals  melatonin 3 milliGRAM(s) Oral at bedtime  meperidine     Injectable 25 milliGRAM(s) IV Push once  metoprolol tartrate 12.5 milliGRAM(s) Oral every 12 hours  metroNIDAZOLE    Tablet 500 milliGRAM(s) Oral every 8 hours  multivitamin 1 Tablet(s) Oral daily  OXcarbazepine 600 milliGRAM(s) Oral <User Schedule>  OXcarbazepine 300 milliGRAM(s) Oral <User Schedule>  pantoprazole    Tablet 40 milliGRAM(s) Oral before breakfast  polyethylene glycol 3350 17 Gram(s) Oral daily  senna 2 Tablet(s) Oral at bedtime    MEDICATIONS  (PRN):  ALPRAZolam 0.25 milliGRAM(s) Oral every 8 hours PRN Anxiety  ondansetron Injectable 4 milliGRAM(s) IV Push once PRN Nausea and/or Vomiting      Allergies    moxifloxacin (Hives)  penicillin (Unknown)    Intolerances        Vital Signs Last 24 Hrs  T(C): 36.7 (27 Aug 2022 16:23), Max: 36.9 (26 Aug 2022 20:00)  T(F): 98 (27 Aug 2022 16:23), Max: 98.4 (26 Aug 2022 20:00)  HR: 82 (27 Aug 2022 18:20) (73 - 92)  BP: 122/70 (27 Aug 2022 18:20) (84/55 - 122/70)  BP(mean): 85 (27 Aug 2022 18:20) (64 - 85)  RR: 20 (27 Aug 2022 18:20) (15 - 40)  SpO2: 98% (27 Aug 2022 18:20) (95% - 99%)    Parameters below as of 27 Aug 2022 18:20  Patient On (Oxygen Delivery Method): room air        PHYSICAL EXAM  General: adult in NAD  HEENT: clear oropharynx, anicteric sclera, pink conjunctiva  Neck: supple  CV: normal S1/S2 with no murmur rubs or gallops  Lungs: positive air movement b/l ant lungs,clear to auscultation, no wheezes, no rales  Abdomen: soft non-tender non-distended, no hepatosplenomegaly  Ext: no clubbing cyanosis or edema  Skin: no rashes and no petechiae  Neuro: alert and oriented X 4, no focal deficits  LABS:                          8.7    10.31 )-----------( 534      ( 27 Aug 2022 06:50 )             26.7         Mean Cell Volume : 84.8 fL  Mean Cell Hemoglobin : 27.6 pg  Mean Cell Hemoglobin Concentration : 32.6 g/dL  Auto Neutrophil # : 6.73 K/uL  Auto Lymphocyte # : 1.25 K/uL  Auto Monocyte # : 1.14 K/uL  Auto Eosinophil # : 0.34 K/uL  Auto Basophil # : 0.08 K/uL  Auto Neutrophil % : 65.2 %  Auto Lymphocyte % : 12.1 %  Auto Monocyte % : 11.1 %  Auto Eosinophil % : 3.3 %  Auto Basophil % : 0.8 %    Serial CBC's  08-27 @ 06:50  Hct-26.7 / Hgb-8.7 / Plat-534 / RBC-3.15 / WBC-10.31          Serial CBC's  08-26 @ 06:00  Hct-27.9 / Hgb-9.1 / Plat-511 / RBC-3.34 / WBC-9.59          Serial CBC's  08-25 @ 04:30  Hct-25.0 / Hgb-8.3 / Plat-435 / RBC-2.99 / WBC-10.43          Serial CBC's  08-24 @ 13:37  Hct-24.8 / Hgb-8.4 / Plat-417 / RBC-3.01 / WBC-14.16          Serial CBC's  08-24 @ 04:29  Hct-26.9 / Hgb-8.8 / Plat-429 / RBC-3.29 / WBC-14.79            08-27    134<L>  |  96<L>  |  8<L>  ----------------------------<  91  5.1<H>   |  32  |  0.7    Ca    8.5      27 Aug 2022 06:50  Mg     1.9     08-27            WBC Count: 10.31 K/uL (08-27-22 @ 06:50)  Hemoglobin: 8.7 g/dL (08-27-22 @ 06:50)  Hematocrit: 26.7 % (08-27-22 @ 06:50)  Platelet Count - Automated: 534 K/uL (08-27-22 @ 06:50)  WBC Count: 9.59 K/uL (08-26-22 @ 06:00)  Hemoglobin: 9.1 g/dL (08-26-22 @ 06:00)  Hematocrit: 27.9 % (08-26-22 @ 06:00)  Platelet Count - Automated: 511 K/uL (08-26-22 @ 06:00)  WBC Count: 10.43 K/uL (08-25-22 @ 04:30)  Hemoglobin: 8.3 g/dL (08-25-22 @ 04:30)  Hematocrit: 25.0 % (08-25-22 @ 04:30)  Platelet Count - Automated: 435 K/uL (08-25-22 @ 04:30)  WBC Count: 14.16 K/uL (08-24-22 @ 13:37)  Hemoglobin: 8.4 g/dL (08-24-22 @ 13:37)  Hematocrit: 24.8 % (08-24-22 @ 13:37)  Platelet Count - Automated: 417 K/uL (08-24-22 @ 13:37)  WBC Count: 14.79 K/uL (08-24-22 @ 04:29)  Hemoglobin: 8.8 g/dL (08-24-22 @ 04:29)  Hematocrit: 26.9 % (08-24-22 @ 04:29)  Platelet Count - Automated: 429 K/uL (08-24-22 @ 04:29)  WBC Count: 25.37 K/uL (08-23-22 @ 04:00)  Hemoglobin: 10.6 g/dL (08-23-22 @ 04:00)  Hematocrit: 31.7 % (08-23-22 @ 04:00)  Platelet Count - Automated: 387 K/uL (08-23-22 @ 04:00)  WBC Count: 7.32 K/uL (08-22-22 @ 01:11)  Hemoglobin: 8.7 g/dL (08-22-22 @ 01:11)  Hematocrit: 25.8 % (08-22-22 @ 01:11)  Platelet Count - Automated: 317 K/uL (08-22-22 @ 01:11)  WBC Count: 9.74 K/uL (08-20-22 @ 23:30)  Hemoglobin: 8.6 g/dL (08-20-22 @ 23:30)  Hematocrit: 26.4 % (08-20-22 @ 23:30)  Platelet Count - Automated: 347 K/uL (08-20-22 @ 23:30)  WBC Count: 13.23 K/uL (08-20-22 @ 00:15)  Hemoglobin: 8.7 g/dL (08-20-22 @ 00:15)  Hematocrit: 26.5 % (08-20-22 @ 00:15)  Platelet Count - Automated: 319 K/uL (08-20-22 @ 00:15)  WBC Count: 21.38 K/uL (08-19-22 @ 08:51)  Hemoglobin: 10.2 g/dL (08-19-22 @ 08:51)  Hematocrit: 30.9 % (08-19-22 @ 08:51)  Platelet Count - Automated: 357 K/uL (08-19-22 @ 08:51)  Ferritin, Serum: 399 ng/mL (08-19-22 @ 08:51)  Reticulocyte Percent: 1.7 % (08-19-22 @ 08:51)  Hemoglobin: 11.5 g/dL (08-18-22 @ 20:20)  Hematocrit: 36.1 % (08-18-22 @ 20:20)  Platelet Count - Automated: 488 K/uL (08-18-22 @ 20:20)  WBC Count: 30.85 K/uL (08-18-22 @ 20:20)                BLOOD SMEAR INTERPRETATION:       RADIOLOGY & ADDITIONAL STUDIES:

## 2022-08-27 NOTE — PROGRESS NOTE ADULT - ASSESSMENT
ASSESSMENT:  ASSESSMENT:   46M who presented to the ED on 8/18 with fevers/chest pain and was found to have spontaneous PTX and was diagnosed with empyema. Pt is currently s/p Bronchoscopy, with lung decortication using VATS. Pt tolerated procedure well and is progressing accordingly.     PLAN:  -Care per CTU  -1 chest tube, to waterseal, likely to be removed today  -tolerating regular diet well  -encourage ambulation as tolerated  -encourage incentive spirometry 10x per hour  -multimodal pain control  -f/u AM CXR  -ID for ABX recs, possible DC with midline and IV ABX, pending cx    [phone]

## 2022-08-28 LAB
ANION GAP SERPL CALC-SCNC: 12 MMOL/L — SIGNIFICANT CHANGE UP (ref 7–14)
BASOPHILS # BLD AUTO: 0.05 K/UL — SIGNIFICANT CHANGE UP (ref 0–0.2)
BASOPHILS NFR BLD AUTO: 0.5 % — SIGNIFICANT CHANGE UP (ref 0–1)
BUN SERPL-MCNC: 10 MG/DL — SIGNIFICANT CHANGE UP (ref 10–20)
CALCIUM SERPL-MCNC: 8.9 MG/DL — SIGNIFICANT CHANGE UP (ref 8.5–10.1)
CHLORIDE SERPL-SCNC: 95 MMOL/L — LOW (ref 98–110)
CO2 SERPL-SCNC: 29 MMOL/L — SIGNIFICANT CHANGE UP (ref 17–32)
CREAT SERPL-MCNC: 0.8 MG/DL — SIGNIFICANT CHANGE UP (ref 0.7–1.5)
EGFR: 111 ML/MIN/1.73M2 — SIGNIFICANT CHANGE UP
EOSINOPHIL # BLD AUTO: 0.25 K/UL — SIGNIFICANT CHANGE UP (ref 0–0.7)
EOSINOPHIL NFR BLD AUTO: 2.3 % — SIGNIFICANT CHANGE UP (ref 0–8)
GLUCOSE SERPL-MCNC: 94 MG/DL — SIGNIFICANT CHANGE UP (ref 70–99)
HCT VFR BLD CALC: 26.1 % — LOW (ref 42–52)
HGB BLD-MCNC: 8.3 G/DL — LOW (ref 14–18)
IMM GRANULOCYTES NFR BLD AUTO: 7.9 % — HIGH (ref 0.1–0.3)
LYMPHOCYTES # BLD AUTO: 1.37 K/UL — SIGNIFICANT CHANGE UP (ref 1.2–3.4)
LYMPHOCYTES # BLD AUTO: 12.3 % — LOW (ref 20.5–51.1)
MAGNESIUM SERPL-MCNC: 2 MG/DL — SIGNIFICANT CHANGE UP (ref 1.8–2.4)
MCHC RBC-ENTMCNC: 26.9 PG — LOW (ref 27–31)
MCHC RBC-ENTMCNC: 31.8 G/DL — LOW (ref 32–37)
MCV RBC AUTO: 84.5 FL — SIGNIFICANT CHANGE UP (ref 80–94)
MONOCYTES # BLD AUTO: 1.01 K/UL — HIGH (ref 0.1–0.6)
MONOCYTES NFR BLD AUTO: 9.1 % — SIGNIFICANT CHANGE UP (ref 1.7–9.3)
NEUTROPHILS # BLD AUTO: 7.55 K/UL — HIGH (ref 1.4–6.5)
NEUTROPHILS NFR BLD AUTO: 67.9 % — SIGNIFICANT CHANGE UP (ref 42.2–75.2)
NRBC # BLD: 0 /100 WBCS — SIGNIFICANT CHANGE UP (ref 0–0)
PLATELET # BLD AUTO: 614 K/UL — HIGH (ref 130–400)
POTASSIUM SERPL-MCNC: 5.1 MMOL/L — HIGH (ref 3.5–5)
POTASSIUM SERPL-SCNC: 5.1 MMOL/L — HIGH (ref 3.5–5)
RBC # BLD: 3.09 M/UL — LOW (ref 4.7–6.1)
RBC # FLD: 15.8 % — HIGH (ref 11.5–14.5)
SODIUM SERPL-SCNC: 136 MMOL/L — SIGNIFICANT CHANGE UP (ref 135–146)
WBC # BLD: 11.11 K/UL — HIGH (ref 4.8–10.8)
WBC # FLD AUTO: 11.11 K/UL — HIGH (ref 4.8–10.8)

## 2022-08-28 PROCEDURE — 36573 INSJ PICC RS&I 5 YR+: CPT | Mod: RT

## 2022-08-28 PROCEDURE — 71045 X-RAY EXAM CHEST 1 VIEW: CPT | Mod: 26

## 2022-08-28 RX ADMIN — SENNA PLUS 2 TABLET(S): 8.6 TABLET ORAL at 21:12

## 2022-08-28 RX ADMIN — HEPARIN SODIUM 5000 UNIT(S): 5000 INJECTION INTRAVENOUS; SUBCUTANEOUS at 05:22

## 2022-08-28 RX ADMIN — MAGNESIUM OXIDE 400 MG ORAL TABLET 400 MILLIGRAM(S): 241.3 TABLET ORAL at 13:34

## 2022-08-28 RX ADMIN — LEVETIRACETAM 1500 MILLIGRAM(S): 250 TABLET, FILM COATED ORAL at 21:12

## 2022-08-28 RX ADMIN — LEVETIRACETAM 1125 MILLIGRAM(S): 250 TABLET, FILM COATED ORAL at 11:19

## 2022-08-28 RX ADMIN — PANTOPRAZOLE SODIUM 40 MILLIGRAM(S): 20 TABLET, DELAYED RELEASE ORAL at 06:28

## 2022-08-28 RX ADMIN — Medication 12.5 MILLIGRAM(S): at 05:22

## 2022-08-28 RX ADMIN — Medication 500 MILLIGRAM(S): at 13:33

## 2022-08-28 RX ADMIN — OXCARBAZEPINE 300 MILLIGRAM(S): 300 TABLET, FILM COATED ORAL at 05:26

## 2022-08-28 RX ADMIN — CEFEPIME 100 MILLIGRAM(S): 1 INJECTION, POWDER, FOR SOLUTION INTRAMUSCULAR; INTRAVENOUS at 05:23

## 2022-08-28 RX ADMIN — HEPARIN SODIUM 5000 UNIT(S): 5000 INJECTION INTRAVENOUS; SUBCUTANEOUS at 13:34

## 2022-08-28 RX ADMIN — HEPARIN SODIUM 5000 UNIT(S): 5000 INJECTION INTRAVENOUS; SUBCUTANEOUS at 21:13

## 2022-08-28 RX ADMIN — Medication 0.25 MILLIGRAM(S): at 12:33

## 2022-08-28 RX ADMIN — Medication 1000 UNIT(S): at 11:21

## 2022-08-28 RX ADMIN — MAGNESIUM OXIDE 400 MG ORAL TABLET 400 MILLIGRAM(S): 241.3 TABLET ORAL at 11:18

## 2022-08-28 RX ADMIN — Medication 500 MILLIGRAM(S): at 05:26

## 2022-08-28 RX ADMIN — Medication 81 MILLIGRAM(S): at 11:18

## 2022-08-28 RX ADMIN — Medication 12.5 MILLIGRAM(S): at 17:24

## 2022-08-28 RX ADMIN — OXCARBAZEPINE 600 MILLIGRAM(S): 300 TABLET, FILM COATED ORAL at 17:25

## 2022-08-28 RX ADMIN — MAGNESIUM OXIDE 400 MG ORAL TABLET 400 MILLIGRAM(S): 241.3 TABLET ORAL at 17:24

## 2022-08-28 RX ADMIN — CEFEPIME 100 MILLIGRAM(S): 1 INJECTION, POWDER, FOR SOLUTION INTRAMUSCULAR; INTRAVENOUS at 21:13

## 2022-08-28 RX ADMIN — Medication 3 MILLIGRAM(S): at 21:12

## 2022-08-28 RX ADMIN — CEFEPIME 100 MILLIGRAM(S): 1 INJECTION, POWDER, FOR SOLUTION INTRAMUSCULAR; INTRAVENOUS at 13:33

## 2022-08-28 RX ADMIN — Medication 500 MILLIGRAM(S): at 21:12

## 2022-08-28 RX ADMIN — Medication 1 TABLET(S): at 11:18

## 2022-08-28 RX ADMIN — POLYETHYLENE GLYCOL 3350 17 GRAM(S): 17 POWDER, FOR SOLUTION ORAL at 11:21

## 2022-08-28 NOTE — PROGRESS NOTE ADULT - SUBJECTIVE AND OBJECTIVE BOX
Patient is a 46y old  Male who presents with a chief complaint of pneumothorax, acute hypoxemic respiratory failure (22 Aug 2022 11:31)       Pt is seen and examined  pt is awake and out to of bed to chair   pt seems comfortable and denies any complaints at this time          ROS:  Negative  MEDICATIONS  (STANDING):  aspirin enteric coated 81 milliGRAM(s) Oral daily  BUpivacaine liposome 1.3% Injectable 20 milliLiter(s) Local Injection once  cefepime   IVPB 2000 milliGRAM(s) IV Intermittent every 8 hours  chlorhexidine 2% Cloths 1 Application(s) Topical daily  cholecalciferol 1000 Unit(s) Oral daily  heparin   Injectable 5000 Unit(s) SubCutaneous every 8 hours  lactated ringers. 1000 milliLiter(s) (10 mL/Hr) IV Continuous <Continuous>  levETIRAcetam 1125 milliGRAM(s) Oral daily  levETIRAcetam 1500 milliGRAM(s) Oral at bedtime  magnesium oxide 400 milliGRAM(s) Oral three times a day with meals  melatonin 3 milliGRAM(s) Oral at bedtime  meperidine     Injectable 25 milliGRAM(s) IV Push once  metoprolol tartrate 12.5 milliGRAM(s) Oral every 12 hours  metroNIDAZOLE    Tablet 500 milliGRAM(s) Oral every 8 hours  multivitamin 1 Tablet(s) Oral daily  OXcarbazepine 600 milliGRAM(s) Oral <User Schedule>  OXcarbazepine 300 milliGRAM(s) Oral <User Schedule>  pantoprazole    Tablet 40 milliGRAM(s) Oral before breakfast  polyethylene glycol 3350 17 Gram(s) Oral daily  senna 2 Tablet(s) Oral at bedtime    MEDICATIONS  (PRN):  ALPRAZolam 0.25 milliGRAM(s) Oral every 8 hours PRN Anxiety  ondansetron Injectable 4 milliGRAM(s) IV Push once PRN Nausea and/or Vomiting      Allergies    moxifloxacin (Hives)  penicillin (Unknown)    Intolerances        Vital Signs Last 24 Hrs  T(C): 36.1 (28 Aug 2022 08:00), Max: 36.7 (27 Aug 2022 16:23)  T(F): 97 (28 Aug 2022 08:00), Max: 98 (27 Aug 2022 16:23)  HR: 77 (28 Aug 2022 14:00) (67 - 82)  BP: 109/68 (28 Aug 2022 14:00) (90/57 - 122/70)  BP(mean): 83 (28 Aug 2022 14:00) (67 - 89)  RR: 18 (28 Aug 2022 14:00) (14 - 20)  SpO2: 98% (28 Aug 2022 10:00) (96% - 100%)    Parameters below as of 28 Aug 2022 14:00  Patient On (Oxygen Delivery Method): room air        PHYSICAL EXAM  General: adult in NAD  HEENT: clear oropharynx, anicteric sclera, pink conjunctiva  Neck: supple  CV: normal S1/S2 with no murmur rubs or gallops  Lungs: positive air movement b/l ant lungs,clear to auscultation, no wheezes, no rales  Abdomen: soft non-tender non-distended, no hepatosplenomegaly  Ext: no clubbing cyanosis or edema  Skin: no rashes and no petechiae  Neuro: alert and oriented X 4, no focal deficits  LABS:                          8.3    11.11 )-----------( 614      ( 28 Aug 2022 04:56 )             26.1         Mean Cell Volume : 84.5 fL  Mean Cell Hemoglobin : 26.9 pg  Mean Cell Hemoglobin Concentration : 31.8 g/dL  Auto Neutrophil # : 7.55 K/uL  Auto Lymphocyte # : 1.37 K/uL  Auto Monocyte # : 1.01 K/uL  Auto Eosinophil # : 0.25 K/uL  Auto Basophil # : 0.05 K/uL  Auto Neutrophil % : 67.9 %  Auto Lymphocyte % : 12.3 %  Auto Monocyte % : 9.1 %  Auto Eosinophil % : 2.3 %  Auto Basophil % : 0.5 %    Serial CBC's  08-28 @ 04:56  Hct-26.1 / Hgb-8.3 / Plat-614 / RBC-3.09 / WBC-11.11          Serial CBC's  08-27 @ 06:50  Hct-26.7 / Hgb-8.7 / Plat-534 / RBC-3.15 / WBC-10.31          Serial CBC's  08-26 @ 06:00  Hct-27.9 / Hgb-9.1 / Plat-511 / RBC-3.34 / WBC-9.59          Serial CBC's  08-25 @ 04:30  Hct-25.0 / Hgb-8.3 / Plat-435 / RBC-2.99 / WBC-10.43            08-28    136  |  95<L>  |  10  ----------------------------<  94  5.1<H>   |  29  |  0.8    Ca    8.9      28 Aug 2022 04:56  Mg     2.0     08-28            WBC Count: 11.11 K/uL (08-28-22 @ 04:56)  Hemoglobin: 8.3 g/dL (08-28-22 @ 04:56)  Hematocrit: 26.1 % (08-28-22 @ 04:56)  Platelet Count - Automated: 614 K/uL (08-28-22 @ 04:56)  WBC Count: 10.31 K/uL (08-27-22 @ 06:50)  Hemoglobin: 8.7 g/dL (08-27-22 @ 06:50)  Hematocrit: 26.7 % (08-27-22 @ 06:50)  Platelet Count - Automated: 534 K/uL (08-27-22 @ 06:50)  WBC Count: 9.59 K/uL (08-26-22 @ 06:00)  Hemoglobin: 9.1 g/dL (08-26-22 @ 06:00)  Hematocrit: 27.9 % (08-26-22 @ 06:00)  Platelet Count - Automated: 511 K/uL (08-26-22 @ 06:00)  WBC Count: 10.43 K/uL (08-25-22 @ 04:30)  Hemoglobin: 8.3 g/dL (08-25-22 @ 04:30)  Hematocrit: 25.0 % (08-25-22 @ 04:30)  Platelet Count - Automated: 435 K/uL (08-25-22 @ 04:30)  WBC Count: 14.16 K/uL (08-24-22 @ 13:37)  Hemoglobin: 8.4 g/dL (08-24-22 @ 13:37)  Hematocrit: 24.8 % (08-24-22 @ 13:37)  Platelet Count - Automated: 417 K/uL (08-24-22 @ 13:37)  WBC Count: 14.79 K/uL (08-24-22 @ 04:29)  Hemoglobin: 8.8 g/dL (08-24-22 @ 04:29)  Hematocrit: 26.9 % (08-24-22 @ 04:29)  Platelet Count - Automated: 429 K/uL (08-24-22 @ 04:29)  WBC Count: 25.37 K/uL (08-23-22 @ 04:00)  Hemoglobin: 10.6 g/dL (08-23-22 @ 04:00)  Hematocrit: 31.7 % (08-23-22 @ 04:00)  Platelet Count - Automated: 387 K/uL (08-23-22 @ 04:00)  WBC Count: 7.32 K/uL (08-22-22 @ 01:11)  Hemoglobin: 8.7 g/dL (08-22-22 @ 01:11)  Hematocrit: 25.8 % (08-22-22 @ 01:11)  Platelet Count - Automated: 317 K/uL (08-22-22 @ 01:11)  WBC Count: 9.74 K/uL (08-20-22 @ 23:30)  Hemoglobin: 8.6 g/dL (08-20-22 @ 23:30)  Hematocrit: 26.4 % (08-20-22 @ 23:30)  Platelet Count - Automated: 347 K/uL (08-20-22 @ 23:30)  WBC Count: 13.23 K/uL (08-20-22 @ 00:15)  Hemoglobin: 8.7 g/dL (08-20-22 @ 00:15)  Hematocrit: 26.5 % (08-20-22 @ 00:15)  Platelet Count - Automated: 319 K/uL (08-20-22 @ 00:15)  WBC Count: 21.38 K/uL (08-19-22 @ 08:51)  Hemoglobin: 10.2 g/dL (08-19-22 @ 08:51)  Hematocrit: 30.9 % (08-19-22 @ 08:51)  Platelet Count - Automated: 357 K/uL (08-19-22 @ 08:51)  Ferritin, Serum: 399 ng/mL (08-19-22 @ 08:51)  Reticulocyte Percent: 1.7 % (08-19-22 @ 08:51)  Hemoglobin: 11.5 g/dL (08-18-22 @ 20:20)  Hematocrit: 36.1 % (08-18-22 @ 20:20)  Platelet Count - Automated: 488 K/uL (08-18-22 @ 20:20)  WBC Count: 30.85 K/uL (08-18-22 @ 20:20)                BLOOD SMEAR INTERPRETATION:       RADIOLOGY & ADDITIONAL STUDIES:

## 2022-08-28 NOTE — CHART NOTE - NSCHARTNOTEFT_GEN_A_CORE
Patient seen today.  all chest tubes out as of yesterday   Xray Chest 1 View- PORTABLE-Routine (Xray Chest 1 View- PORTABLE-Routine in AM.) (08.28.22 @ 05:38) >    Right basilar opacity/effusion without significant change.    Case Management called yesterday regarding discharge plans  Home care arrangements were made but no arrangements for IV infusion therapy    Case management given Rx for antibiotics which will end on 9/5.  Needed procedure note for midline insertion which was put in chart today.    Unable to get services without preauthorization from insurance company till tomorrow    Plan is for probable discharge tomorrow once services established

## 2022-08-28 NOTE — PROCEDURE NOTE - NSMIDLINEBRANDFT_VASC_A_CORE
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
- s/p PKP OS 10/18/21 Plan: POW#1, doing well. Edema persistent, will increase pred, sutures intact Increase pred to 6x/d for 1 week then decrease back down to 4x/d. Cont ATs QID
d/c oflox. Restart ACV 400mg qd. Precautions reviewed.
[facility] [place]

## 2022-08-28 NOTE — PROGRESS NOTE ADULT - SUBJECTIVE AND OBJECTIVE BOX
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      Dislocations and subluxations      Status post Kerri&#x27;s procedure  11/2019          Vitals:   T(F): 97.2 (08-28-22 @ 04:00), Max: 98 (08-27-22 @ 16:23)  HR: 67 (08-28-22 @ 06:00)  BP: 96/66 (08-28-22 @ 06:00)  RR: 14 (08-28-22 @ 06:00)  SpO2: 96% (08-28-22 @ 06:00)      Diet, Regular      Fluids:     I & O's:    08-27-22 @ 07:01  -  08-28-22 @ 07:00  --------------------------------------------------------  IN:    IV PiggyBack: 100 mL    Lactated Ringers: 50 mL    Oral Fluid: 1720 mL  Total IN: 1870 mL    OUT:    Chest Tube (mL): 8 mL    Voided (mL): 3575 mL  Total OUT: 3583 mL    Total NET: -1713 mL          PHYSICAL EXAM:  General Appearance: AAOx3, NAD  Heart: RRR  Lungs: CTAX2  Abdomen:  soft, non tender, non ditention  MSK/Extremities:  mobile      MEDICATIONS  (STANDING):  aspirin enteric coated 81 milliGRAM(s) Oral daily  BUpivacaine liposome 1.3% Injectable 20 milliLiter(s) Local Injection once  cefepime   IVPB 2000 milliGRAM(s) IV Intermittent every 8 hours  chlorhexidine 2% Cloths 1 Application(s) Topical daily  cholecalciferol 1000 Unit(s) Oral daily  heparin   Injectable 5000 Unit(s) SubCutaneous every 8 hours  lactated ringers. 1000 milliLiter(s) (10 mL/Hr) IV Continuous <Continuous>  levETIRAcetam 1125 milliGRAM(s) Oral daily  levETIRAcetam 1500 milliGRAM(s) Oral at bedtime  magnesium oxide 400 milliGRAM(s) Oral three times a day with meals  melatonin 3 milliGRAM(s) Oral at bedtime  meperidine     Injectable 25 milliGRAM(s) IV Push once  metoprolol tartrate 12.5 milliGRAM(s) Oral every 12 hours  metroNIDAZOLE    Tablet 500 milliGRAM(s) Oral every 8 hours  multivitamin 1 Tablet(s) Oral daily  OXcarbazepine 600 milliGRAM(s) Oral <User Schedule>  OXcarbazepine 300 milliGRAM(s) Oral <User Schedule>  pantoprazole    Tablet 40 milliGRAM(s) Oral before breakfast  polyethylene glycol 3350 17 Gram(s) Oral daily  senna 2 Tablet(s) Oral at bedtime    MEDICATIONS  (PRN):  ALPRAZolam 0.25 milliGRAM(s) Oral every 8 hours PRN Anxiety  ondansetron Injectable 4 milliGRAM(s) IV Push once PRN Nausea and/or Vomiting      DVT PROPHYLAXIS: heparin   Injectable 5000 Unit(s) SubCutaneous every 8 hours    GI PROPHYLAXIS: pantoprazole    Tablet 40 milliGRAM(s) Oral before breakfast    ANTICOAGULATION:   ANTIBIOTICS:  cefepime   IVPB 2000 milliGRAM(s)  metroNIDAZOLE    Tablet 500 milliGRAM(s)            LAB/STUDIES:  Labs:  CAPILLARY BLOOD GLUCOSE                              8.3    11.11 )-----------( 614      ( 28 Aug 2022 04:56 )             26.1       Auto Neutrophil %: 67.9 % (08-28-22 @ 04:56)  Auto Immature Granulocyte %: 7.9 % (08-28-22 @ 04:56)    08-28    136  |  95<L>  |  10  ----------------------------<  94  5.1<H>   |  29  |  0.8      Calcium, Total Serum: 8.9 mg/dL (08-28-22 @ 04:56)      LFTs:         Coags:                        IMAGING:      ACCESS/ DEVICES:  [x ] Peripheral IV  [ ] Central Venous Line	[ ] R	[ ] L	[ ] IJ	[ ] Fem	[ ] SC	Placed:   [ ] Arterial Line		[ ] R	[ ] L	[ ] Fem	[ ] Rad	[ ] Ax	Placed:   [ ] PICC:					[ ] Mediport  [ ] Urinary Catheter,  Date Placed:   [ ] Chest tube: [ ] Right, [ ] Left  [ ] NADIRA/Isaiah Drains

## 2022-08-28 NOTE — PROGRESS NOTE ADULT - ASSESSMENT
47 y/o male with     1 . hx of EDS vascular type, multiple hx of vascular ruptures including sig colon s/p macedo's  and ileostomy in 2020  .. 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  will f/u    45 y/o male with     1 . hx of EDS vascular type, multiple hx of vascular ruptures including sig colon s/p macedo's  and ileostomy in 2020  .. 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  will f/u

## 2022-08-28 NOTE — PROGRESS NOTE ADULT - ASSESSMENT
46M who presented to the ED on 8/18 with fevers/chest pain and was found to have spontaneous PTX and was diagnosed with empyema. Pt is currently s/p Bronchoscopy, with lung decortication using VATS. Pt tolerated procedure well and is progressing accordingly.     PLAN:  -Care per CTU  -tolerating regular diet well  -encourage ambulation as tolerated  -encourage incentive spirometry 10x per hour  -multimodal pain control  -f/u AM CXR  -ID for ABX recs, possible DC with midline and IV ABX, pending cx    [phone]

## 2022-08-29 ENCOUNTER — TRANSCRIPTION ENCOUNTER (OUTPATIENT)
Age: 47
End: 2022-08-29

## 2022-08-29 VITALS
HEART RATE: 80 BPM | TEMPERATURE: 98 F | OXYGEN SATURATION: 98 % | DIASTOLIC BLOOD PRESSURE: 65 MMHG | SYSTOLIC BLOOD PRESSURE: 105 MMHG | RESPIRATION RATE: 17 BRPM

## 2022-08-29 PROCEDURE — 71045 X-RAY EXAM CHEST 1 VIEW: CPT | Mod: 26

## 2022-08-29 RX ORDER — METOPROLOL TARTRATE 50 MG
0.5 TABLET ORAL
Qty: 30 | Refills: 0
Start: 2022-08-29 | End: 2022-09-27

## 2022-08-29 RX ORDER — OXYCODONE AND ACETAMINOPHEN 5; 325 MG/1; MG/1
1 TABLET ORAL
Qty: 30 | Refills: 0
Start: 2022-08-29

## 2022-08-29 RX ORDER — CEFEPIME 1 G/1
2 INJECTION, POWDER, FOR SOLUTION INTRAMUSCULAR; INTRAVENOUS
Qty: 0 | Refills: 0 | DISCHARGE
Start: 2022-08-29

## 2022-08-29 RX ORDER — LISINOPRIL 2.5 MG/1
0 TABLET ORAL
Qty: 90 | Refills: 0 | DISCHARGE

## 2022-08-29 RX ORDER — OXYCODONE HYDROCHLORIDE 5 MG/1
5 TABLET ORAL EVERY 4 HOURS
Refills: 0 | Status: DISCONTINUED | OUTPATIENT
Start: 2022-08-29 | End: 2022-08-29

## 2022-08-29 RX ORDER — METRONIDAZOLE 500 MG
1 TABLET ORAL
Qty: 21 | Refills: 0
Start: 2022-08-29 | End: 2022-09-04

## 2022-08-29 RX ORDER — ASPIRIN/CALCIUM CARB/MAGNESIUM 324 MG
1 TABLET ORAL
Qty: 30 | Refills: 0
Start: 2022-08-29

## 2022-08-29 RX ORDER — ASPIRIN/CALCIUM CARB/MAGNESIUM 324 MG
0 TABLET ORAL
Qty: 0 | Refills: 0 | DISCHARGE

## 2022-08-29 RX ORDER — PANTOPRAZOLE SODIUM 20 MG/1
1 TABLET, DELAYED RELEASE ORAL
Qty: 30 | Refills: 0
Start: 2022-08-29

## 2022-08-29 RX ADMIN — LEVETIRACETAM 1125 MILLIGRAM(S): 250 TABLET, FILM COATED ORAL at 13:17

## 2022-08-29 RX ADMIN — Medication 12.5 MILLIGRAM(S): at 05:47

## 2022-08-29 RX ADMIN — OXYCODONE HYDROCHLORIDE 5 MILLIGRAM(S): 5 TABLET ORAL at 04:00

## 2022-08-29 RX ADMIN — OXYCODONE HYDROCHLORIDE 5 MILLIGRAM(S): 5 TABLET ORAL at 03:30

## 2022-08-29 RX ADMIN — Medication 500 MILLIGRAM(S): at 13:18

## 2022-08-29 RX ADMIN — MAGNESIUM OXIDE 400 MG ORAL TABLET 400 MILLIGRAM(S): 241.3 TABLET ORAL at 09:00

## 2022-08-29 RX ADMIN — Medication 500 MILLIGRAM(S): at 05:47

## 2022-08-29 RX ADMIN — PANTOPRAZOLE SODIUM 40 MILLIGRAM(S): 20 TABLET, DELAYED RELEASE ORAL at 06:31

## 2022-08-29 RX ADMIN — CEFEPIME 100 MILLIGRAM(S): 1 INJECTION, POWDER, FOR SOLUTION INTRAMUSCULAR; INTRAVENOUS at 05:48

## 2022-08-29 RX ADMIN — Medication 1 TABLET(S): at 13:18

## 2022-08-29 RX ADMIN — OXYCODONE HYDROCHLORIDE 5 MILLIGRAM(S): 5 TABLET ORAL at 13:37

## 2022-08-29 RX ADMIN — OXCARBAZEPINE 300 MILLIGRAM(S): 300 TABLET, FILM COATED ORAL at 05:47

## 2022-08-29 RX ADMIN — OXYCODONE HYDROCHLORIDE 5 MILLIGRAM(S): 5 TABLET ORAL at 14:07

## 2022-08-29 RX ADMIN — Medication 81 MILLIGRAM(S): at 13:19

## 2022-08-29 RX ADMIN — POLYETHYLENE GLYCOL 3350 17 GRAM(S): 17 POWDER, FOR SOLUTION ORAL at 13:16

## 2022-08-29 RX ADMIN — CEFEPIME 100 MILLIGRAM(S): 1 INJECTION, POWDER, FOR SOLUTION INTRAMUSCULAR; INTRAVENOUS at 13:19

## 2022-08-29 RX ADMIN — HEPARIN SODIUM 5000 UNIT(S): 5000 INJECTION INTRAVENOUS; SUBCUTANEOUS at 13:18

## 2022-08-29 RX ADMIN — Medication 1000 UNIT(S): at 13:18

## 2022-08-29 RX ADMIN — HEPARIN SODIUM 5000 UNIT(S): 5000 INJECTION INTRAVENOUS; SUBCUTANEOUS at 05:48

## 2022-08-29 RX ADMIN — MAGNESIUM OXIDE 400 MG ORAL TABLET 400 MILLIGRAM(S): 241.3 TABLET ORAL at 13:18

## 2022-08-29 NOTE — PROGRESS NOTE ADULT - ASSESSMENT
46M who presented to the ED on 8/18 with fevers/chest pain and was found to have spontaneous PTX and was diagnosed with empyema. Pt is currently s/p Bronchoscopy, with lung decortication using VATS. Pt tolerated procedure well and is progressing accordingly.     PLAN:  -Care per CTU  -tolerating regular diet well  -encourage ambulation as tolerated  -encourage incentive spirometry 10x per hour  -multimodal pain control  -f/u AM CXR  -Possible DC with midline and IV ABX, pending cx - if VNS services set up with CM    [phone]

## 2022-08-29 NOTE — DISCHARGE NOTE PROVIDER - NSDCFUADDINST_GEN_ALL_CORE_FT
please avoid any heavy lifting, pushing, or pulling anything > 10 lbs x 4 weeks; please no driving or sitting in the front seat or sleeping on side x 2 weeks; check temp and weight daily and shower daily

## 2022-08-29 NOTE — DISCHARGE NOTE PROVIDER - NSDCMRMEDTOKEN_GEN_ALL_CORE_FT
Aspir 81 oral delayed release tablet: orally once a day  Keppra: 1125 milligram(s) orally once a day  Keppra: 1500 milligram(s) orally once a day (at bedtime)  Multiple Vitamins oral tablet: 1 tab(s) orally once a day  OXCARBAZEPIN TAB 600M milligram(s) orally once a day (at bedtime)  Trileptal 300 mg oral tablet: 300 milligram(s) orally once a day in the morning  Vitamin D3 25 mcg (1000 intl units) oral tablet, chewable: 1 tab(s) orally once a day   amoxicillin-clavulanate 875 mg-125 mg oral tablet: 1 tab(s) orally every 12 hours   Aspir 81 oral delayed release tablet: orally once a day  cefepime 2 g intravenous injection: 2 gram(s) intravenous every 8 hours  Keppra: 1125 milligram(s) orally once a day  Keppra: 1500 milligram(s) orally once a day (at bedtime)  metoprolol tartrate 25 mg oral tablet: 0.5 tab(s) orally 2 times a day   metroNIDAZOLE 500 mg oral tablet: 1 tab(s) orally every 8 hours  Multiple Vitamins oral tablet: 1 tab(s) orally once a day  OXCARBAZEPIN TAB 600M milligram(s) orally once a day (at bedtime)  oxycodone-acetaminophen 5 mg-325 mg oral tablet: 1 tab(s) orally every 6 hours MDD:6  pantoprazole 40 mg oral delayed release tablet: 1 tab(s) orally once a day (before a meal)  Trileptal 300 mg oral tablet: 300 milligram(s) orally once a day in the morning  Vitamin D3 25 mcg (1000 intl units) oral tablet, chewable: 1 tab(s) orally once a day   amoxicillin-clavulanate 875 mg-125 mg oral tablet: 1 tab(s) orally every 12 hours   Aspir 81 oral delayed release tablet: 1 tab(s) orally once a day   cefepime 2 g intravenous injection: 2 gram(s) intravenous every 8 hours  Keppra: 1125 milligram(s) orally once a day  Keppra: 1500 milligram(s) orally once a day (at bedtime)  metoprolol tartrate 25 mg oral tablet: 0.5 tab(s) orally 2 times a day   metroNIDAZOLE 500 mg oral tablet: 1 tab(s) orally every 8 hours  Multiple Vitamins oral tablet: 1 tab(s) orally once a day  OXCARBAZEPIN TAB 600M milligram(s) orally once a day (at bedtime)  oxycodone-acetaminophen 5 mg-325 mg oral tablet: 1 tab(s) orally every 6 hours MDD:6  pantoprazole 40 mg oral delayed release tablet: 1 tab(s) orally once a day (before a meal)  Trileptal 300 mg oral tablet: 300 milligram(s) orally once a day in the morning  Vitamin D3 25 mcg (1000 intl units) oral tablet, chewable: 1 tab(s) orally once a day   amoxicillin-clavulanate 875 mg-125 mg oral tablet: 1 tab(s) orally every 12 hours   Aspir 81 oral delayed release tablet: 1 tab(s) orally once a day   cefepime 2 g intravenous injection: 2 gram(s) intravenous 2 times a day  Keppra: 1125 milligram(s) orally once a day  Keppra: 1500 milligram(s) orally once a day (at bedtime)  metoprolol tartrate 25 mg oral tablet: 0.5 tab(s) orally 2 times a day   metroNIDAZOLE 500 mg oral tablet: 1 tab(s) orally every 8 hours  Multiple Vitamins oral tablet: 1 tab(s) orally once a day  OXCARBAZEPIN TAB 600M milligram(s) orally once a day (at bedtime)  oxycodone-acetaminophen 5 mg-325 mg oral tablet: 1 tab(s) orally every 6 hours MDD:6  pantoprazole 40 mg oral delayed release tablet: 1 tab(s) orally once a day (before a meal)  Trileptal 300 mg oral tablet: 300 milligram(s) orally once a day in the morning  Vitamin D3 25 mcg (1000 intl units) oral tablet, chewable: 1 tab(s) orally once a day

## 2022-08-29 NOTE — DISCHARGE NOTE PROVIDER - NSDCFUADDAPPT_GEN_ALL_CORE_FT
please follow up with dr mullins on 9/13 at 1pm. please follow up with dr mullins on 9/13 at 1pm.    please follow up with dr sage of infectious diease on Tuesday for telehealth visit.  The office will call to schedule appointment and will arrange for weekly CBC, CMP, ESR, and CRP labs

## 2022-08-29 NOTE — DISCHARGE NOTE PROVIDER - HOSPITAL COURSE
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 Yesterday pt had a fever of 103 which was not breaking. In the morning pt got out of the bed and had a syncopal episode. Pt fell lightheaded, saw flashes and woke up on the floor. Then pt had severe chest pain on the right side, after which he decided to come to the ED.   He was found to have a large right empyema.  On 8/22, the patient had a bronchoscopy with right lung decortication using VATS.  Postoperatively, the patient was treated with IV anti-biotics as per ID via midline.  He was then discharged home in stable condition on POD #7.

## 2022-08-29 NOTE — DISCHARGE NOTE PROVIDER - NSDCCPCAREPLAN_GEN_ALL_CORE_FT
PRINCIPAL DISCHARGE DIAGNOSIS  Diagnosis: Sepsis, unspecified organism  Assessment and Plan of Treatment:       SECONDARY DISCHARGE DIAGNOSES  Diagnosis: Pneumothorax, right  Assessment and Plan of Treatment:     Diagnosis: Cough  Assessment and Plan of Treatment:     Diagnosis: EDS (Melva-Danlos syndrome)  Assessment and Plan of Treatment:     Diagnosis: Tension pneumothorax  Assessment and Plan of Treatment:

## 2022-08-29 NOTE — DISCHARGE NOTE PROVIDER - CARE PROVIDER_API CALL
Allen Ellington (MD)  Surgery; Thoracic and Cardiac Surgery  [address] [place] [id number]  Phone: [phone]  Fax: [phone]  Follow Up Time:     Noah Fournier)  Infectious Disease; Internal Medicine  [address] [place] [address]  Phone: [phone]  Fax: [phone]  Follow Up Time:

## 2022-08-29 NOTE — DISCHARGE NOTE NURSING/CASE MANAGEMENT/SOCIAL WORK - NSDCFUADDAPPT_GEN_ALL_CORE_FT
please follow up with dr mullins on 9/13 at 1pm.    please follow up with dr sage of infectious diease on Tuesday for telehealth visit.  The office will call to schedule appointment and will arrange for weekly CBC, CMP, ESR, and CRP labs

## 2022-08-29 NOTE — DISCHARGE NOTE NURSING/CASE MANAGEMENT/SOCIAL WORK - PATIENT PORTAL LINK FT
You can access the FollowMyHealth Patient Portal offered by [facility] by registering at the following website: [email or web address]. By joining [facility]’s FollowMyHealth portal, you will also be able to view your health information using other applications (apps) compatible with our system.

## 2022-08-29 NOTE — PROGRESS NOTE ADULT - SUBJECTIVE AND OBJECTIVE BOX
THORACIC SURGERY PROGRESS NOTE    Patient: FAZAL CASSIDY , 46y ([birth year])Male   MRN: [id number]  Location: [address]  Visit: 08-18-22 Inpatient  Date: 08-29-22 @ 09:20    Procedure/Dx/Injuries: s/p vats, bronch, and lung decortication for empyema    Events of past 24 hours: Midline placed. Midline, surgery, and ID notes sent to infusion services - insurance approval pending for home infusion (cefepime 2gm IV q12 for 14 days). Patient denies any chest pain, abdominal pain. Complains of mild shortness of breath when showering today and walking around the unit - recovered quickly.     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      Dislocations and subluxations      Status post Kerri&#x27;s procedure  11/2019          Vitals:   T(F): 98.1 (08-29-22 @ 08:00), Max: 98.1 (08-29-22 @ 08:00)  HR: 74 (08-29-22 @ 08:00)  BP: 98/57 (08-29-22 @ 08:00)  RR: 17 (08-29-22 @ 08:00)  SpO2: 98% (08-29-22 @ 08:00)      Diet, Regular      Fluids:     I & O's:    08-28-22 @ 07:01  -  08-29-22 @ 07:00  --------------------------------------------------------  IN:    IV PiggyBack: 100 mL    Oral Fluid: 240 mL  Total IN: 340 mL    OUT:    Voided (mL): 800 mL  Total OUT: 800 mL    Total NET: -460 mL    PHYSICAL EXAM:  General Appearance: AAOx3, NAD  Heart: RRR  Lungs: CTAX2  Abdomen:  soft, non tender, non ditention  MSK/Extremities:  mobile    MEDICATIONS  (STANDING):  aspirin enteric coated 81 milliGRAM(s) Oral daily  cefepime   IVPB 2000 milliGRAM(s) IV Intermittent every 8 hours  chlorhexidine 2% Cloths 1 Application(s) Topical daily  cholecalciferol 1000 Unit(s) Oral daily  heparin   Injectable 5000 Unit(s) SubCutaneous every 8 hours  levETIRAcetam 1125 milliGRAM(s) Oral daily  levETIRAcetam 1500 milliGRAM(s) Oral at bedtime  magnesium oxide 400 milliGRAM(s) Oral three times a day with meals  melatonin 3 milliGRAM(s) Oral at bedtime  metoprolol tartrate 12.5 milliGRAM(s) Oral every 12 hours  metroNIDAZOLE    Tablet 500 milliGRAM(s) Oral every 8 hours  multivitamin 1 Tablet(s) Oral daily  OXcarbazepine 600 milliGRAM(s) Oral <User Schedule>  OXcarbazepine 300 milliGRAM(s) Oral <User Schedule>  pantoprazole    Tablet 40 milliGRAM(s) Oral before breakfast  polyethylene glycol 3350 17 Gram(s) Oral daily  senna 2 Tablet(s) Oral at bedtime    MEDICATIONS  (PRN):  ALPRAZolam 0.25 milliGRAM(s) Oral every 8 hours PRN Anxiety  oxyCODONE    IR 5 milliGRAM(s) Oral every 4 hours PRN Severe Pain (7 - 10)      DVT PROPHYLAXIS: heparin   Injectable 5000 Unit(s) SubCutaneous every 8 hours    GI PROPHYLAXIS: pantoprazole    Tablet 40 milliGRAM(s) Oral before breakfast    ANTICOAGULATION:   ANTIBIOTICS:  cefepime   IVPB 2000 milliGRAM(s)  metroNIDAZOLE    Tablet 500 milliGRAM(s)            LAB/STUDIES:  Labs:  CAPILLARY BLOOD GLUCOSE                              8.3    11.11 )-----------( 614      ( 28 Aug 2022 04:56 )             26.1         08-28    136  |  95<L>  |  10  ----------------------------<  94  5.1<H>   |  29  |  0.8      IMAGING:    < from: Xray Chest 1 View- PORTABLE-Routine (Xray Chest 1 View- PORTABLE-Routine in AM.) (08.29.22 @ 05:57) >  1. Unchanged right basilar opacity/pleural effusion.      ACCESS/ DEVICES:  [ ] Peripheral IV  [ ] Central Venous Line	[ ] R	[ ] L	[ ] IJ	[ ] Fem	[ ] SC	Placed:   [ ] Arterial Line		[ ] R	[ ] L	[ ] Fem	[ ] Rad	[ ] Ax	Placed:   [ ] PICC:					[ ] Mediport  [ ] Urinary Catheter,  Date Placed:   [ ] Chest tube: [ ] Right, [ ] Left  [ ] NADIRA/Isaiah Drains  [x] Midline

## 2022-09-01 LAB — SURGICAL PATHOLOGY STUDY: SIGNIFICANT CHANGE UP

## 2022-09-02 LAB — BCR/ABL BY RT - PCR QUANTITATIVE: SIGNIFICANT CHANGE UP

## 2022-09-04 DIAGNOSIS — D64.9 ANEMIA, UNSPECIFIED: ICD-10-CM

## 2022-09-04 DIAGNOSIS — A41.9 SEPSIS, UNSPECIFIED ORGANISM: ICD-10-CM

## 2022-09-04 DIAGNOSIS — J86.9 PYOTHORAX WITHOUT FISTULA: ICD-10-CM

## 2022-09-04 DIAGNOSIS — Z90.49 ACQUIRED ABSENCE OF OTHER SPECIFIED PARTS OF DIGESTIVE TRACT: ICD-10-CM

## 2022-09-04 DIAGNOSIS — J96.01 ACUTE RESPIRATORY FAILURE WITH HYPOXIA: ICD-10-CM

## 2022-09-04 DIAGNOSIS — J18.9 PNEUMONIA, UNSPECIFIED ORGANISM: ICD-10-CM

## 2022-09-04 DIAGNOSIS — G40.909 EPILEPSY, UNSPECIFIED, NOT INTRACTABLE, WITHOUT STATUS EPILEPTICUS: ICD-10-CM

## 2022-09-04 DIAGNOSIS — E83.42 HYPOMAGNESEMIA: ICD-10-CM

## 2022-09-04 DIAGNOSIS — Q79.60 EHLERS-DANLOS SYNDROME, UNSPECIFIED: ICD-10-CM

## 2022-09-04 DIAGNOSIS — J90 PLEURAL EFFUSION, NOT ELSEWHERE CLASSIFIED: ICD-10-CM

## 2022-09-04 DIAGNOSIS — I10 ESSENTIAL (PRIMARY) HYPERTENSION: ICD-10-CM

## 2022-09-04 DIAGNOSIS — Z88.0 ALLERGY STATUS TO PENICILLIN: ICD-10-CM

## 2022-09-04 DIAGNOSIS — Z88.1 ALLERGY STATUS TO OTHER ANTIBIOTIC AGENTS STATUS: ICD-10-CM

## 2022-09-04 DIAGNOSIS — R55 SYNCOPE AND COLLAPSE: ICD-10-CM

## 2022-09-04 DIAGNOSIS — D75.839 THROMBOCYTOSIS, UNSPECIFIED: ICD-10-CM

## 2022-09-07 LAB
EXTRACTION: SIGNIFICANT CHANGE UP
JAK2 EXON 13 MUT ANL BLD/T: SIGNIFICANT CHANGE UP
JAK2 EXONS 12-15 PCR: SIGNIFICANT CHANGE UP
Lab: SIGNIFICANT CHANGE UP
METHOD:: SIGNIFICANT CHANGE UP
REFERENCES: SIGNIFICANT CHANGE UP
REFLEX:: SIGNIFICANT CHANGE UP

## 2022-09-13 ENCOUNTER — APPOINTMENT (OUTPATIENT)
Dept: CARDIOTHORACIC SURGERY | Facility: CLINIC | Age: 47
End: 2022-09-13

## 2022-09-13 ENCOUNTER — OUTPATIENT (OUTPATIENT)
Dept: OUTPATIENT SERVICES | Facility: HOSPITAL | Age: 47
LOS: 1 days | Discharge: HOME | End: 2022-09-13

## 2022-09-13 VITALS
RESPIRATION RATE: 14 BRPM | BODY MASS INDEX: 22.51 KG/M2 | OXYGEN SATURATION: 98 % | WEIGHT: 152 LBS | HEIGHT: 69 IN | SYSTOLIC BLOOD PRESSURE: 112 MMHG | DIASTOLIC BLOOD PRESSURE: 76 MMHG | HEART RATE: 65 BPM

## 2022-09-13 DIAGNOSIS — Z93.3 COLOSTOMY STATUS: Chronic | ICD-10-CM

## 2022-09-13 DIAGNOSIS — Z09 ENCOUNTER FOR FOLLOW-UP EXAMINATION AFTER COMPLETED TREATMENT FOR CONDITIONS OTHER THAN MALIGNANT NEOPLASM: ICD-10-CM

## 2022-09-13 DIAGNOSIS — R06.02 SHORTNESS OF BREATH: ICD-10-CM

## 2022-09-13 PROCEDURE — 99024 POSTOP FOLLOW-UP VISIT: CPT

## 2022-09-13 PROCEDURE — 71046 X-RAY EXAM CHEST 2 VIEWS: CPT | Mod: 26

## 2022-10-01 ENCOUNTER — INPATIENT (INPATIENT)
Facility: HOSPITAL | Age: 47
LOS: 0 days | Discharge: HOME | End: 2022-10-02
Attending: HOSPITALIST | Admitting: HOSPITALIST

## 2022-10-01 VITALS
OXYGEN SATURATION: 100 % | HEART RATE: 75 BPM | TEMPERATURE: 98 F | SYSTOLIC BLOOD PRESSURE: 153 MMHG | HEIGHT: 73 IN | RESPIRATION RATE: 18 BRPM | DIASTOLIC BLOOD PRESSURE: 103 MMHG

## 2022-10-01 DIAGNOSIS — Z93.3 COLOSTOMY STATUS: Chronic | ICD-10-CM

## 2022-10-01 LAB
ALBUMIN SERPL ELPH-MCNC: 4.3 G/DL — SIGNIFICANT CHANGE UP (ref 3.5–5.2)
ALP SERPL-CCNC: 104 U/L — SIGNIFICANT CHANGE UP (ref 30–115)
ALT FLD-CCNC: 15 U/L — SIGNIFICANT CHANGE UP (ref 0–41)
ANION GAP SERPL CALC-SCNC: 12 MMOL/L — SIGNIFICANT CHANGE UP (ref 7–14)
APPEARANCE UR: CLEAR — SIGNIFICANT CHANGE UP
AST SERPL-CCNC: 23 U/L — SIGNIFICANT CHANGE UP (ref 0–41)
BACTERIA # UR AUTO: NEGATIVE — SIGNIFICANT CHANGE UP
BASOPHILS # BLD AUTO: 0.05 K/UL — SIGNIFICANT CHANGE UP (ref 0–0.2)
BASOPHILS NFR BLD AUTO: 0.6 % — SIGNIFICANT CHANGE UP (ref 0–1)
BILIRUB SERPL-MCNC: <0.2 MG/DL — SIGNIFICANT CHANGE UP (ref 0.2–1.2)
BILIRUB UR-MCNC: NEGATIVE — SIGNIFICANT CHANGE UP
BUN SERPL-MCNC: 15 MG/DL — SIGNIFICANT CHANGE UP (ref 10–20)
CALCIUM SERPL-MCNC: 8.7 MG/DL — SIGNIFICANT CHANGE UP (ref 8.4–10.5)
CHLORIDE SERPL-SCNC: 104 MMOL/L — SIGNIFICANT CHANGE UP (ref 98–110)
CO2 SERPL-SCNC: 24 MMOL/L — SIGNIFICANT CHANGE UP (ref 17–32)
COLOR SPEC: SIGNIFICANT CHANGE UP
CREAT SERPL-MCNC: 1 MG/DL — SIGNIFICANT CHANGE UP (ref 0.7–1.5)
DIFF PNL FLD: ABNORMAL
EGFR: 94 ML/MIN/1.73M2 — SIGNIFICANT CHANGE UP
EOSINOPHIL # BLD AUTO: 0.05 K/UL — SIGNIFICANT CHANGE UP (ref 0–0.7)
EOSINOPHIL NFR BLD AUTO: 0.6 % — SIGNIFICANT CHANGE UP (ref 0–8)
EPI CELLS # UR: 1 /HPF — SIGNIFICANT CHANGE UP (ref 0–5)
GLUCOSE SERPL-MCNC: 107 MG/DL — HIGH (ref 70–99)
GLUCOSE UR QL: NEGATIVE — SIGNIFICANT CHANGE UP
HCT VFR BLD CALC: 37.6 % — LOW (ref 42–52)
HGB BLD-MCNC: 12.3 G/DL — LOW (ref 14–18)
HYALINE CASTS # UR AUTO: 2 /LPF — SIGNIFICANT CHANGE UP (ref 0–7)
IMM GRANULOCYTES NFR BLD AUTO: 0.5 % — HIGH (ref 0.1–0.3)
KETONES UR-MCNC: NEGATIVE — SIGNIFICANT CHANGE UP
LEUKOCYTE ESTERASE UR-ACNC: NEGATIVE — SIGNIFICANT CHANGE UP
LIDOCAIN IGE QN: 33 U/L — SIGNIFICANT CHANGE UP (ref 7–60)
LYMPHOCYTES # BLD AUTO: 0.81 K/UL — LOW (ref 1.2–3.4)
LYMPHOCYTES # BLD AUTO: 9.7 % — LOW (ref 20.5–51.1)
MCHC RBC-ENTMCNC: 28.2 PG — SIGNIFICANT CHANGE UP (ref 27–31)
MCHC RBC-ENTMCNC: 32.7 G/DL — SIGNIFICANT CHANGE UP (ref 32–37)
MCV RBC AUTO: 86.2 FL — SIGNIFICANT CHANGE UP (ref 80–94)
MONOCYTES # BLD AUTO: 0.43 K/UL — SIGNIFICANT CHANGE UP (ref 0.1–0.6)
MONOCYTES NFR BLD AUTO: 5.1 % — SIGNIFICANT CHANGE UP (ref 1.7–9.3)
NEUTROPHILS # BLD AUTO: 7.01 K/UL — HIGH (ref 1.4–6.5)
NEUTROPHILS NFR BLD AUTO: 83.5 % — HIGH (ref 42.2–75.2)
NITRITE UR-MCNC: NEGATIVE — SIGNIFICANT CHANGE UP
NRBC # BLD: 0 /100 WBCS — SIGNIFICANT CHANGE UP (ref 0–0)
PH UR: 6.5 — SIGNIFICANT CHANGE UP (ref 5–8)
PLATELET # BLD AUTO: 288 K/UL — SIGNIFICANT CHANGE UP (ref 130–400)
POTASSIUM SERPL-MCNC: 4.4 MMOL/L — SIGNIFICANT CHANGE UP (ref 3.5–5)
POTASSIUM SERPL-SCNC: 4.4 MMOL/L — SIGNIFICANT CHANGE UP (ref 3.5–5)
PROT SERPL-MCNC: 7.6 G/DL — SIGNIFICANT CHANGE UP (ref 6–8)
PROT UR-MCNC: ABNORMAL
RBC # BLD: 4.36 M/UL — LOW (ref 4.7–6.1)
RBC # FLD: 15.3 % — HIGH (ref 11.5–14.5)
RBC CASTS # UR COMP ASSIST: 52 /HPF — HIGH (ref 0–4)
SODIUM SERPL-SCNC: 140 MMOL/L — SIGNIFICANT CHANGE UP (ref 135–146)
SP GR SPEC: 1.03 — HIGH (ref 1.01–1.03)
UROBILINOGEN FLD QL: SIGNIFICANT CHANGE UP
WBC # BLD: 8.39 K/UL — SIGNIFICANT CHANGE UP (ref 4.8–10.8)
WBC # FLD AUTO: 8.39 K/UL — SIGNIFICANT CHANGE UP (ref 4.8–10.8)
WBC UR QL: 4 /HPF — SIGNIFICANT CHANGE UP (ref 0–5)

## 2022-10-01 PROCEDURE — 74174 CTA ABD&PLVS W/CONTRAST: CPT | Mod: 26,MA

## 2022-10-01 PROCEDURE — 99285 EMERGENCY DEPT VISIT HI MDM: CPT

## 2022-10-01 RX ORDER — SODIUM CHLORIDE 9 MG/ML
1000 INJECTION, SOLUTION INTRAVENOUS ONCE
Refills: 0 | Status: COMPLETED | OUTPATIENT
Start: 2022-10-01 | End: 2022-10-01

## 2022-10-01 RX ORDER — KETOROLAC TROMETHAMINE 30 MG/ML
15 SYRINGE (ML) INJECTION ONCE
Refills: 0 | Status: DISCONTINUED | OUTPATIENT
Start: 2022-10-01 | End: 2022-10-01

## 2022-10-01 RX ORDER — MORPHINE SULFATE 50 MG/1
4 CAPSULE, EXTENDED RELEASE ORAL ONCE
Refills: 0 | Status: DISCONTINUED | OUTPATIENT
Start: 2022-10-01 | End: 2022-10-01

## 2022-10-01 RX ORDER — DIATRIZOATE MEGLUMINE 180 MG/ML
30 INJECTION, SOLUTION INTRAVESICAL ONCE
Refills: 0 | Status: DISCONTINUED | OUTPATIENT
Start: 2022-10-01 | End: 2022-10-01

## 2022-10-01 RX ADMIN — MORPHINE SULFATE 4 MILLIGRAM(S): 50 CAPSULE, EXTENDED RELEASE ORAL at 17:13

## 2022-10-01 RX ADMIN — MORPHINE SULFATE 4 MILLIGRAM(S): 50 CAPSULE, EXTENDED RELEASE ORAL at 20:55

## 2022-10-01 RX ADMIN — SODIUM CHLORIDE 1000 MILLILITER(S): 9 INJECTION, SOLUTION INTRAVENOUS at 17:13

## 2022-10-01 NOTE — ED PROVIDER NOTE - PROGRESS NOTE DETAILS
DERRELL: Case endorsed to Dr. Mcpherson pending UA, CTA abd/pelvis read, re-eval, dispo. Anita spoke with vascular team about reuslts of pts CT scan who evaluated pt and are working on dispo. plan to signout pt Anita: spoke with vascular team about results of pts CT scan who evaluated pt and are working on dispo. plan to signout pt to [id number]

## 2022-10-01 NOTE — ED PROVIDER NOTE - OBJECTIVE STATEMENT
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 Sandy and Dr. 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  Patient states he had no trauma to the area, denies CP, SOB, N, V, D

## 2022-10-01 NOTE — ED PROVIDER NOTE - ATTENDING APP SHARED VISIT CONTRIBUTION OF CARE
46 year old male, pmhx as documented presenting with R flank pain x 1 hour associated with 2 episodes of hematuria. Denies having this before. States pain is sharp, severe, non-radiating, no palliative or provocative factors. Otherwise denies fevers, N/V/D, blood in stool, chest pain, dyspnea or any other complaints.    Vital Signs: I have reviewed the initial vital signs.  Constitutional: NAD, well-nourished, appears stated age, no acute distress.  HEENT: Airway patent, moist MM, no erythema/swelling/deformity of oral structures. EOMI, PERRLA.  CV: regular rate, regular rhythm, well-perfused extremities, 2+ b/l DP and radial pulses equal.  Lungs: BCTA, no increased WOB.  ABD: NTND, no guarding or rebound, no pulsatile mass, no hernias. (+) ostomy bag in place, no CVA tenderness  MSK: Neck supple, nontender, nl ROM, no stepoff. Chest nontender. Back nontender in TLS spine or to b/l bony structures or flanks. Ext nontender, nl rom, no deformity.   INTEG: Skin warm, dry, no rash.  NEURO: A&Ox3, normal strength, nl sensation throughout, normal speech.   PSYCH: Calm, cooperative, normal affect and interaction.    Abd non-tender. Will obtain labs, CT abd/pelvis, symptomatic control PRN, re-eval.

## 2022-10-01 NOTE — ED PROVIDER NOTE - PHYSICAL EXAMINATION
VITAL SIGNS: I have reviewed nursing notes and confirm.  CONSTITUTIONAL:  in no acute distress.  SKIN: Skin exam is warm and dry, no acute rash.  HEAD: Normocephalic; atraumatic.  EYES:  EOM intact; conjunctiva and sclera clear.  ENT: No nasal discharge; airway clear  NECK: Supple; non tender.  CARD:  wounds to right chest from VATS procedure. S1, S2 normal; no murmurs, gallops, or rubs. Regular rate and rhythm.  RESP: No wheezes, rales or rhonchi. Speaking in full sentences.   ABD: multiple surgical scars to abdomen with colostomy bag to RLQ. soft; non-distended; non-tender; No rebound or guarding. No CVA tenderness.   EXT: Normal ROM. No clubbing, cyanosis or edema.  NEURO: Alert, oriented. Grossly unremarkable. No focal deficits.

## 2022-10-01 NOTE — ED PROVIDER NOTE - NS ED ROS FT
Review of Systems  Constitutional:  No fever, chills, malaise, generalized weakness.  Eyes:  No visual changes, eye pain, or discharge.  ENMT:  No hearing changes, pain, or discharge. No nasal congestion, discharge, or bleeding. No throat pain, swelling, or difficulty swallowing.  Cardiac:  No chest pain, palpitations, syncope, or edema.  Respiratory:  No dyspnea, orthopnea, stridor, wheezing, or cough. No hemoptysis.  GI:  No nausea, vomiting, diarrhea, or abdominal pain. No melena or hematochezia.  :  No dysuria, + hematuria, - frequency, - burning. No testicle pain/swelling, no penile discharge.  MS:  No myalgia, muscle weakness, gait abnormality, joint swelling, joint pain, or back pain.  Skin:  No skin rash, pruritis, jaundice, or lesions.  Neuro:  No headache, dizziness, loss of sensation, or focal weakness.  No change in mental status.

## 2022-10-01 NOTE — ED PROVIDER NOTE - CLINICAL SUMMARY MEDICAL DECISION MAKING FREE TEXT BOX
faviok: received sign out from Dr. Lopez. Patient with flank pain.  CTA with questionable pyelonephritis but urinalysis not consistent with this.  Given the inflammation and some urinary symptoms we will give antibiotics pending culture.  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  No pain there.  Seen by vascular and feel no intervention needed at this time.  Admit to medicine.

## 2022-10-01 NOTE — ED ADULT NURSE NOTE - OBJECTIVE STATEMENT
pt presents co r side flank pain x today. Pt states he felt the pain get sharp earlier today, went to the bathroom and saw some blood in the urine. Pt AAOx4, speaking in clear and complete sentences.

## 2022-10-01 NOTE — ED PROVIDER NOTE - NS ED ATTENDING STATEMENT MOD
This was a shared visit with the RAINER. I reviewed and verified the documentation and independently performed the documented:

## 2022-10-01 NOTE — ED ADULT TRIAGE NOTE - HEIGHT IN CM
----- Message from Daniel Zhao MD sent at 12/22/2021  7:48 AM CST -----  We need to add hpv to this given her prior history of positive hpv in 2019.   185.42

## 2022-10-01 NOTE — ED ADULT TRIAGE NOTE - CHIEF COMPLAINT QUOTE
Patient presents to ED c/o right flank pain associated with hematuria. Patient reports hx of vascular Melva Danos syndrome. Patient noted with pulses in bilateral extremities.

## 2022-10-02 ENCOUNTER — TRANSCRIPTION ENCOUNTER (OUTPATIENT)
Age: 47
End: 2022-10-02

## 2022-10-02 VITALS
DIASTOLIC BLOOD PRESSURE: 79 MMHG | HEART RATE: 73 BPM | TEMPERATURE: 98 F | OXYGEN SATURATION: 98 % | RESPIRATION RATE: 18 BRPM | SYSTOLIC BLOOD PRESSURE: 113 MMHG

## 2022-10-02 LAB
APPEARANCE UR: CLEAR — SIGNIFICANT CHANGE UP
BILIRUB UR-MCNC: NEGATIVE — SIGNIFICANT CHANGE UP
COLOR SPEC: SIGNIFICANT CHANGE UP
CREAT ?TM UR-MCNC: 142 MG/DL — SIGNIFICANT CHANGE UP
DIFF PNL FLD: ABNORMAL
GLUCOSE UR QL: SIGNIFICANT CHANGE UP
KETONES UR-MCNC: NEGATIVE — SIGNIFICANT CHANGE UP
LEUKOCYTE ESTERASE UR-ACNC: NEGATIVE — SIGNIFICANT CHANGE UP
NITRITE UR-MCNC: NEGATIVE — SIGNIFICANT CHANGE UP
OSMOLALITY UR: 630 MOS/KG — SIGNIFICANT CHANGE UP (ref 50–1200)
PH UR: 6 — SIGNIFICANT CHANGE UP (ref 5–8)
POTASSIUM UR-SCNC: 28 MMOL/L — SIGNIFICANT CHANGE UP
PROT ?TM UR-MCNC: 47 MG/DLG/24H — SIGNIFICANT CHANGE UP
PROT UR-MCNC: ABNORMAL
PROT/CREAT UR-RTO: 0.3 RATIO — HIGH (ref 0–0.2)
SARS-COV-2 RNA SPEC QL NAA+PROBE: SIGNIFICANT CHANGE UP
SODIUM UR-SCNC: 91 MMOL/L — SIGNIFICANT CHANGE UP
SP GR SPEC: 1.03 — SIGNIFICANT CHANGE UP (ref 1.01–1.03)
UROBILINOGEN FLD QL: SIGNIFICANT CHANGE UP
UUN UR-MCNC: 680 MG/DL — SIGNIFICANT CHANGE UP

## 2022-10-02 PROCEDURE — 76770 US EXAM ABDO BACK WALL COMP: CPT | Mod: 26

## 2022-10-02 PROCEDURE — 99236 HOSP IP/OBS SAME DATE HI 85: CPT

## 2022-10-02 RX ORDER — ENOXAPARIN SODIUM 100 MG/ML
40 INJECTION SUBCUTANEOUS EVERY 24 HOURS
Refills: 0 | Status: DISCONTINUED | OUTPATIENT
Start: 2022-10-02 | End: 2022-10-02

## 2022-10-02 RX ORDER — ACETAMINOPHEN 500 MG
650 TABLET ORAL EVERY 6 HOURS
Refills: 0 | Status: DISCONTINUED | OUTPATIENT
Start: 2022-10-02 | End: 2022-10-02

## 2022-10-02 RX ORDER — LEVETIRACETAM 250 MG/1
1500 TABLET, FILM COATED ORAL
Refills: 0 | Status: DISCONTINUED | OUTPATIENT
Start: 2022-10-02 | End: 2022-10-02

## 2022-10-02 RX ORDER — AZTREONAM 2 G
2000 VIAL (EA) INJECTION ONCE
Refills: 0 | Status: COMPLETED | OUTPATIENT
Start: 2022-10-02 | End: 2022-10-02

## 2022-10-02 RX ORDER — ALBUTEROL 90 UG/1
2 AEROSOL, METERED ORAL EVERY 6 HOURS
Refills: 0 | Status: DISCONTINUED | OUTPATIENT
Start: 2022-10-02 | End: 2022-10-02

## 2022-10-02 RX ORDER — LEVETIRACETAM 250 MG/1
600 TABLET, FILM COATED ORAL
Refills: 0 | Status: DISCONTINUED | OUTPATIENT
Start: 2022-10-02 | End: 2022-10-02

## 2022-10-02 RX ORDER — METOPROLOL TARTRATE 50 MG
12.5 TABLET ORAL EVERY 12 HOURS
Refills: 0 | Status: DISCONTINUED | OUTPATIENT
Start: 2022-10-02 | End: 2022-10-02

## 2022-10-02 RX ORDER — MORPHINE SULFATE 50 MG/1
4 CAPSULE, EXTENDED RELEASE ORAL ONCE
Refills: 0 | Status: DISCONTINUED | OUTPATIENT
Start: 2022-10-02 | End: 2022-10-02

## 2022-10-02 RX ORDER — SODIUM CHLORIDE 9 MG/ML
1000 INJECTION, SOLUTION INTRAVENOUS
Refills: 0 | Status: DISCONTINUED | OUTPATIENT
Start: 2022-10-02 | End: 2022-10-02

## 2022-10-02 RX ORDER — LEVETIRACETAM 250 MG/1
1125 TABLET, FILM COATED ORAL
Refills: 0 | Status: DISCONTINUED | OUTPATIENT
Start: 2022-10-02 | End: 2022-10-02

## 2022-10-02 RX ORDER — LISINOPRIL 2.5 MG/1
10 TABLET ORAL DAILY
Refills: 0 | Status: DISCONTINUED | OUTPATIENT
Start: 2022-10-02 | End: 2022-10-02

## 2022-10-02 RX ORDER — LEVETIRACETAM 250 MG/1
300 TABLET, FILM COATED ORAL
Refills: 0 | Status: DISCONTINUED | OUTPATIENT
Start: 2022-10-02 | End: 2022-10-02

## 2022-10-02 RX ORDER — CHLORHEXIDINE GLUCONATE 213 G/1000ML
1 SOLUTION TOPICAL
Refills: 0 | Status: DISCONTINUED | OUTPATIENT
Start: 2022-10-02 | End: 2022-10-02

## 2022-10-02 RX ORDER — PANTOPRAZOLE SODIUM 20 MG/1
40 TABLET, DELAYED RELEASE ORAL
Refills: 0 | Status: DISCONTINUED | OUTPATIENT
Start: 2022-10-02 | End: 2022-10-02

## 2022-10-02 RX ORDER — OXCARBAZEPINE 300 MG/1
300 TABLET, FILM COATED ORAL
Refills: 0 | Status: DISCONTINUED | OUTPATIENT
Start: 2022-10-02 | End: 2022-10-02

## 2022-10-02 RX ORDER — IPRATROPIUM/ALBUTEROL SULFATE 18-103MCG
3 AEROSOL WITH ADAPTER (GRAM) INHALATION EVERY 6 HOURS
Refills: 0 | Status: DISCONTINUED | OUTPATIENT
Start: 2022-10-02 | End: 2022-10-02

## 2022-10-02 RX ORDER — OXCARBAZEPINE 300 MG/1
600 TABLET, FILM COATED ORAL
Refills: 0 | Status: DISCONTINUED | OUTPATIENT
Start: 2022-10-02 | End: 2022-10-02

## 2022-10-02 RX ADMIN — Medication 50 MILLIGRAM(S): at 05:36

## 2022-10-02 RX ADMIN — SODIUM CHLORIDE 60 MILLILITER(S): 9 INJECTION, SOLUTION INTRAVENOUS at 07:03

## 2022-10-02 RX ADMIN — MORPHINE SULFATE 4 MILLIGRAM(S): 50 CAPSULE, EXTENDED RELEASE ORAL at 03:01

## 2022-10-02 RX ADMIN — LISINOPRIL 10 MILLIGRAM(S): 2.5 TABLET ORAL at 07:10

## 2022-10-02 RX ADMIN — ENOXAPARIN SODIUM 40 MILLIGRAM(S): 100 INJECTION SUBCUTANEOUS at 07:03

## 2022-10-02 RX ADMIN — PANTOPRAZOLE SODIUM 40 MILLIGRAM(S): 20 TABLET, DELAYED RELEASE ORAL at 07:03

## 2022-10-02 NOTE — DISCHARGE NOTE PROVIDER - NSDCCPCAREPLAN_GEN_ALL_CORE_FT
PRINCIPAL DISCHARGE DIAGNOSIS  Diagnosis: Flank pain  Assessment and Plan of Treatment: Follow up with your primary care doctor within 2-4 weeks of discharge.

## 2022-10-02 NOTE — CONSULT NOTE ADULT - SUBJECTIVE AND OBJECTIVE BOX
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     In the ED:  - Urinalysis (+) for blood, though not visibly red, no infection  - Hgb 12.3, other labs unremarkable  - Pulses intact bilaterally (palpable PT, DP dopplerable), legs warm to touch, equal    CTA obtained  - Intimal flap visualized in L ext iliac artery  - Stable b/l aneurysms of b/l common iliac arteries  - cortical scarring of kidneys L > R    PAST MEDICAL & SURGICAL HISTORY:  EDS (Melva-Danlos syndrome)  Epilepsy  HTN (hypertension)  Keratoconus  Aneurysm artery, celiac  s/p coiling 2010  Dislocations and subluxations  Status post Kerri&#x27;s procedure  2019    moxifloxacin (Hives)  penicillin (Unknown)    Home Medications:  cefepime 2 g intravenous injection: 2 gram(s) intravenous 2 times a day (29 Aug 2022 16:08)  Keppra: 1125 milligram(s) orally once a day (19 Aug 2022 03:20)  Keppra: 1500 milligram(s) orally once a day (at bedtime) (19 Aug 2022 03:20)  Multiple Vitamins oral tablet: 1 tab(s) orally once a day (19 Aug 2022 03:20)  OXCARBAZEPIN TAB 600M milligram(s) orally once a day (at bedtime) (19 Aug 2022 03:20)  Trileptal 300 mg oral tablet: 300 milligram(s) orally once a day in the morning (19 Aug 2022 03:20)  Vitamin D3 25 mcg (1000 intl units) oral tablet, chewable: 1 tab(s) orally once a day (19 Aug 2022 03:20)    12 point ROS otherwise normal except as stated in HPI    PHYSICAL EXAM  Vital Signs Last 24 Hrs  T(C): 36.7 (01 Oct 2022 15:20), Max: 36.7 (01 Oct 2022 15:20)  T(F): 98 (01 Oct 2022 15:20), Max: 98 (01 Oct 2022 15:20)  HR: 75 (01 Oct 2022 15:20) (75 - 75)  BP: 153/103 (01 Oct 2022 15:20) (153/103 - 153/103)  BP(mean): --  RR: 18 (01 Oct 2022 15:20) (18 - 18)  SpO2: 100% (01 Oct 2022 15:20) (100% - 100%)    Parameters below as of 01 Oct 2022 15:20  Patient On (Oxygen Delivery Method): room air    Appearance: Normal	  HEENT:   Normal oral mucosa, PERRL, EOMI	  Neck: Supple, - JVD; Carotid Bruit   Cardiovascular: Normal S1 S2, No JVD, No murmurs,   Respiratory: Lungs clear to auscultation, No Rales, Rhonchi, Wheezing	  Gastrointestinal:  Soft, Non-tender, positive BS	  Skin: No rashes, No ecchymoses, No cyanosis  Extremities: Normal range of motion, No clubbing, cyanosis or edema, no temperature change  Vascular: Peripheral pulses palpable 2+ bilaterally  Neurologic: Non-focal  Psychiatry: A & O x 3, Mood & affect appropriate    PULSES:  Femoral: 2+ b/l  Dorsal Pedal: DS+ b/l  Posterior Tibial: 2+ b/l    MEDICATIONS:   MEDICATIONS  (STANDING):    MEDICATIONS  (PRN):    LAB/STUDIES:                        12.3   8.39  )-----------( 288      ( 01 Oct 2022 16:33 )             37.6     10-01    140  |  104  |  15  ----------------------------<  107<H>  4.4   |  24  |  1.0    Ca    8.7      01 Oct 2022 16:33    TPro  7.6  /  Alb  4.3  /  TBili  <0.2  /  DBili  x   /  AST  23  /  ALT  15  /  AlkPhos  104  10-01    LIVER FUNCTIONS - ( 01 Oct 2022 16:33 )  Alb: 4.3 g/dL / Pro: 7.6 g/dL / ALK PHOS: 104 U/L / ALT: 15 U/L / AST: 23 U/L / GGT: x           Urinalysis Basic - ( 01 Oct 2022 20:57 )    Color: Light Yellow / Appearance: Clear / S.032 / pH: x  Gluc: x / Ketone: Negative  / Bili: Negative / Urobili: <2 mg/dL   Blood: x / Protein: 30 mg/dL / Nitrite: Negative   Leuk Esterase: Negative / RBC: 52 /HPF / WBC 4 /HPF   Sq Epi: x / Non Sq Epi: 1 /HPF / Bacteria: Negative    IMAGING:  CT Angio Abdomen and Pelvis w/ IV Cont (10.01.22 @ 18:12)  Right kidney findings can be seen in pyelonephritis. Correlate with   urinalysis. No evidence of obstructing calculus.    Left external iliac artery dissection, which appears new from prior exam   2022. Additional vascular findings described above.    Right lower lobe 5.6 cm thick-walled cavitary mass. Additional right   middle and lower lobe nodular and consolidative opacities. Although this   is compatible with known empyema, neoplastic process is not entirely   excluded

## 2022-10-02 NOTE — H&P ADULT - NSHPLABSRESULTS_GEN_ALL_CORE
Labs:                        12.3   8.39  )-----------( 288      ( 01 Oct 2022 16:33 )             37.6     140  |  104  |  15  ----------------------------<  107<H>  4.4   |  24  |  1.0    Ca    8.7      01 Oct 2022 16:33    TPro  7.6  /  Alb  4.3  /  TBili  <0.2  /  DBili  x   /  AST  23  /  ALT  15  /  AlkPhos  104  10-01    Urinalysis Basic - ( 01 Oct 2022 20:57 )  Color: Light Yellow / Appearance: Clear / S.032 / pH: x  Gluc: x / Ketone: Negative  / Bili: Negative / Urobili: <2 mg/dL   Blood: x / Protein: 30 mg/dL / Nitrite: Negative   Leuk Esterase: Negative / RBC: 52 /HPF / WBC 4 /HPF   Sq Epi: x / Non Sq Epi: 1 /HPF / Bacteria: Negative

## 2022-10-02 NOTE — DISCHARGE NOTE NURSING/CASE MANAGEMENT/SOCIAL WORK - NSDCPEFALRISK_GEN_ALL_CORE
For information on Fall & Injury Prevention, visit: [email or web address] OR  [email or web address] OR  https://www.cdc.gov/steadi/patient.html

## 2022-10-02 NOTE — DISCHARGE NOTE PROVIDER - NSDCMRMEDTOKEN_GEN_ALL_CORE_FT
Aspir 81 oral delayed release tablet: 1 tab(s) orally once a day   Keppra: 1125 milligram(s) orally once a day  Keppra: 1500 milligram(s) orally once a day (at bedtime)  Multiple Vitamins oral tablet: 1 tab(s) orally once a day  OXCARBAZEPIN TAB 600M milligram(s) orally once a day (at bedtime)  oxycodone-acetaminophen 5 mg-325 mg oral tablet: 1 tab(s) orally every 6 hours MDD:6  pantoprazole 40 mg oral delayed release tablet: 1 tab(s) orally once a day (before a meal)  Trileptal 300 mg oral tablet: 300 milligram(s) orally once a day in the morning  Vitamin D3 25 mcg (1000 intl units) oral tablet, chewable: 1 tab(s) orally once a day

## 2022-10-02 NOTE — H&P ADULT - NSHPPHYSICALEXAM_GEN_ALL_CORE
Vital Signs Last 24 Hrs  T(C): 37.2 (02 Oct 2022 02:28), Max: 37.2 (02 Oct 2022 02:28)  T(F): 98.9 (02 Oct 2022 02:28), Max: 98.9 (02 Oct 2022 02:28)  HR: 77 (02 Oct 2022 02:28) (75 - 77)  BP: 131/86 (02 Oct 2022 02:28) (131/86 - 153/103)  BP(mean): 102 (02 Oct 2022 02:28) (102 - 102)  RR: 17 (02 Oct 2022 02:28) (17 - 18)  SpO2: 100% (02 Oct 2022 02:28) (100% - 100%)    Parameters below as of 02 Oct 2022 02:28  Patient On (Oxygen Delivery Method): room air    PHYSICAL EXAM  GENERAL: NAD  HEAD:  NCAT, EOMI, PERRL, conjunctiva clear  NECK: Supple, Nontender  NERVOUS SYSTEM: AAOx3, NFD  CHEST/LUNG: CTA b/l  HEART: +s1s2 RRR  ABDOMEN: soft, NT/ND  EXTREMITIES:  pp, no edema  SKIN: No rashes or lesions Vital Signs Last 24 Hrs  T(C): 37.2 (02 Oct 2022 02:28), Max: 37.2 (02 Oct 2022 02:28)  T(F): 98.9 (02 Oct 2022 02:28), Max: 98.9 (02 Oct 2022 02:28)  HR: 77 (02 Oct 2022 02:28) (75 - 77)  BP: 131/86 (02 Oct 2022 02:28) (131/86 - 153/103)  BP(mean): 102 (02 Oct 2022 02:28) (102 - 102)  RR: 17 (02 Oct 2022 02:28) (17 - 18)  SpO2: 100% (02 Oct 2022 02:28) (100% - 100%)    Parameters below as of 02 Oct 2022 02:28  Patient On (Oxygen Delivery Method): room air    PHYSICAL EXAM  GENERAL: NAD  HEAD:  NCAT, EOMI, PERRL, CC  NECK: Supple, Nontender  NERVOUS SYSTEM: AAOx3, NFD  CHEST/LUNG: CTA b/l  HEART: +s1s2 RRR  ABDOMEN: soft, NT/ND, no CVA tenderness, +ileostomy bag  EXTREMITIES:  pp, no edema  SKIN: No rashes or lesions

## 2022-10-02 NOTE — CONSULT NOTE ADULT - ASSESSMENT
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   Vascular surgery consulted for L external iliac artery dissection in the setting of Melva-Danlos Syndrome.     Patient has asymptomatic L external iliac artery dissection, and seems to be an incidental finding. He is experiencing no pain on his left side and his distal pulses are equal and intact on both sides. Vascular surgical intervention is this patient population is high risk and likely to exacerbate incidence of iatrogenic dissection and other pathologic vascular formations.     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 However, he requires medical workup to exclude other kidney pathology.      PLAN:   - No surgical intervention indicated at this time   - Admit to medicine for BP control (Systolic goal < 140) and hematuria work up   - Kidney ultrasound   - Recommend nephrology consult   - Monitor for resolution of flank pain and hematuria via urinalysis   - Vascular surgery will continue to follow    SPECTRA [id number]

## 2022-10-02 NOTE — DISCHARGE NOTE NURSING/CASE MANAGEMENT/SOCIAL WORK - PATIENT PORTAL LINK FT
You can access the FollowMyHealth Patient Portal offered by [facility] by registering at the following website: [email or web address]. By joining [employer]’s FollowMyHealth portal, you will also be able to view your health information using other applications (apps) compatible with our system.

## 2022-10-02 NOTE — H&P ADULT - ATTENDING COMMENTS
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     No intervention pursued by vascular surgery team given pt is asymptomatic on left side and high risk for vascular procedures. Right flank pain and hematuria resolved overnight. Etiology likely due to microaneurysm rupture. UA negative for infection and no stones were identified on RBUS and CTAP. Medically stable for discharge with outpatient f/u.

## 2022-10-02 NOTE — DISCHARGE NOTE PROVIDER - CARE PROVIDER_API CALL
Jerry Bello (DO)  Infectious Disease; Internal Medicine  [address] [place] [address]  Phone: [phone]  Fax: [phone]  Follow Up Time: 1 month

## 2022-10-02 NOTE — H&P ADULT - HISTORY OF PRESENT ILLNESS
47yo M w h/o HTN, Epilepsy, GERD, Asthma?, Melva-Danlos syndrome (followed at [facility]), Colon perforation s/p Kerri's and Ileostomy (2020) celiac aneurysm s/p coiling (2010) presents for R flank pain. Patient states the pain is sharp, nonradiating and associated with hematuria     In ED, T 98, /103, HR75, RR18, SpO2 100 on RA; Labs unremarkable; UA w wbc 4 (neg LE & nitrites) and large blood.  CT angio showing delayed right nephrogram w scattered cortical perfusion defects (can be seen in pyelonephritis), but no evidence of obstructing calculus. 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    Vascular Surgery was consulted and patient was admitted for further evaluation.        47yo M w h/o HTN, Epilepsy, GERD, Asthma?, Melva-Danlos syndrome (followed at [facility]), Colon perforation s/p Kerri's and Ileostomy (2020) celiac aneurysm s/p coiling (2010) presents for R flank pain. Patient states the pain is sharp, nonradiating and associated with hematuria     In ED, T 98, /103, HR75, RR18, SpO2 100 on RA; Labs unremarkable; UA w wbc 4 (neg LE & nitrites) and large blood.  CT angio showing delayed right nephrogram w scattered cortical perfusion defects (can be seen in pyelonephritis), but no evidence of obstructing calculus. 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    Vascular Surgery was consulted and patient was admitted for further evaluation.        47yo M w h/o HTN, Epilepsy, GERD, Asthma?, Vascular Melva-Danlos syndrome (follows at [facility]), celiac aneurysm s/p coiling (2010), Diverticulitis s/p colon perforation s/p Kerri x2 (2019 & 2020) and now w Ileostomy, presents for R flank pain. Patient states the pain is sharp, nonradiating and associated with hematuria x2 episodes. Patient with no other complaints; ROS otherwise neg.     In ED, T 98, /103, HR75, RR18, SpO2 100 on RA; Labs unremarkable; UA w wbc 4 (neg LE & nitrites) and large blood.  CT angio showing delayed right nephrogram w scattered cortical perfusion defects (can be seen in pyelonephritis), but no evidence of obstructing calculus. 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    Vascular Surgery was consulted and patient was admitted for further evaluation.

## 2022-10-02 NOTE — H&P ADULT - NSVTERISKASSESS_GEN_ALL_CORE FT
No complaints No complaints No complaints No complaints No complaints No complaints No complaints No complaints No complaints No complaints VTE Present on Admission, Assessment Completed on: 02-Oct-2022 05:27 No complaints No complaints No complaints No complaints No complaints No complaints No complaints No complaints

## 2022-10-02 NOTE — H&P ADULT - ASSESSMENT
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 Patient states the pain is sharp, nonradiating and associated with hematuria     In ED, T 98, /103, HR75, RR18, SpO2 100 on RA; Labs unremarkable; UA w wbc 4 (neg LE & nitrites) and large blood.  CT angio showing delayed right nephrogram w scattered cortical perfusion defects (can be seen in pyelonephritis), but no evidence of obstructing calculus. 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    Vascular Surgery was consulted and patient was admitted for further evaluation.    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   - CT w finding that can be seen in pyelo though wbc UA w wbc 4, but neg LE & nitrites > will send more comprehensive urine studies  - started gentle hydration; hold off on abx for now  - f/u kbus and pending labs  - consider nep    #L External iliac dissection  - vascular sx eval appreciated - no intervention    #Chronic lung nodule s/p bronch w R lung decortication using VATS (08/2022)  - CT showing RLL nodular and consolidative opacities + thick walled cavitary mass (5.6x2.7x2.3 - likely empyema)   - CTSx followed last admission    #HTN  - cont lopressor    #Epilepsy  - cont oxcarbazepine and levetiracetam > check levels    #GERD  - cont ppi    #Asthma?  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    #R flank pain a/w hematuria - possibly 2/2 nonobstructing stone vs pyelo vs vascular rupture insetting of ED - stable no longer w pain or hematuria  - reports two episodes of sharp flank pain a/w hematuria - has voided 3 times since w no blood  - CT w no obstructing calculus and findings that can be seen in pyelo though UA w wbc 4, but neg LE & nitrites > will send more comprehensive urine studies  - started gentle hydration; hold off on further abx for now as dont think its pyelo (no longer in pain, no cva tenderness)   - f/u kbus and pending labs  - consider nepho eval     #L External iliac dissection  - vascular sx eval appreciated - no intervention  - outpatient follow up    #Chronic lung nodule s/p bronch w R lung decortication using VATS for empyema (08/2022) complicated by Afib (resolved)  - CT showing RLL nodular and consolidative opacities + thick walled cavitary mass (5.6x2.7x2.3)   - CTSx followed last admission > outpatient follow up    #HTN - elevated on admission  - was on lopressor for post VATS Afib (no longer taking)  - will restart lisinopril (previously held for soft bp)    #Epilepsy  - cont oxcarbazepine and levetiracetam > check levels    #GERD  - cont ppi    #Asthma?  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    #R flank pain a/w hematuria - possibly 2/2 stone vs pyelo vs vascular rupture insetting of Melva-Danlos- stable no longer w pain or hematuria  - reports two episodes of sharp flank pain a/w hematuria - has voided 3 times since w no blood  - CT w no obstructing calculus and findings that can be seen in pyelo though UA w wbc 4, but neg LE & nitrites > will send more comprehensive urine studies  - started gentle hydration; hold off on further abx for now as dont think its pyelo (no longer in pain, no cva tenderness)   - f/u kbus and pending labs  - consider nepho eval    #L External iliac dissection  - vascular sx eval appreciated - no intervention  - outpatient follow up    #Chronic lung nodule s/p bronch w R lung decortication using VATS for empyema (08/2022) complicated by Afib (resolved)  - CT showing RLL nodular and consolidative opacities + thick walled cavitary mass (5.6x2.7x2.3)   - CTSx followed last admission > outpatient follow up    #HTN - elevated on admission  - was on lopressor for post VATS Afib (no longer taking)  - will restart lisinopril (previously held for soft bp)    #Epilepsy  - cont oxcarbazepine and levetiracetam > check levels    #GERD  - cont ppi    #Asthma?  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    #R flank pain a/w hematuria - possibly 2/2 stone vs pyelo vs vascular rupture insetting of Melva-Danlos- stable no longer w pain or hematuria  - reports two episodes of sharp flank pain a/w hematuria - has voided 3 times since w no blood  - CT w no obstructing calculus and findings that can be seen in pyelo though UA w wbc 4, but neg LE & nitrites > will send more comprehensive urine studies  - started gentle hydration; hold off on further abx for now as dont think its pyelo (no longer in pain, no cva tenderness)   - f/u kbus and pending labs    #L External iliac dissection  - vascular sx eval appreciated - no intervention  - outpatient follow up    #Chronic lung nodule s/p bronch w R lung decortication using VATS for empyema (08/2022) complicated by Afib (resolved)  - CT showing RLL nodular and consolidative opacities + thick walled cavitary mass (5.6x2.7x2.3)   - CTSx followed last admission > outpatient follow up    #HTN - elevated on admission  - was on lopressor for post VATS Afib (no longer taking)  - will restart lisinopril (previously held for soft bp)    #Epilepsy  - cont oxcarbazepine and levetiracetam > check levels    #GERD  - cont ppi    #Asthma?  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

## 2022-10-03 LAB
CALCIUM UR-MCNC: 16 MG/DL — SIGNIFICANT CHANGE UP
CULTURE RESULTS: SIGNIFICANT CHANGE UP
SPECIMEN SOURCE: SIGNIFICANT CHANGE UP
URATE UR-MCNC: 53.6 MG/DL — SIGNIFICANT CHANGE UP

## 2022-10-05 LAB — MYOGLOBIN UR-MCNC: 4 NG/ML — SIGNIFICANT CHANGE UP (ref 0–13)

## 2022-10-06 DIAGNOSIS — Z20.822 CONTACT WITH AND (SUSPECTED) EXPOSURE TO COVID-19: ICD-10-CM

## 2022-10-06 DIAGNOSIS — G40.909 EPILEPSY, UNSPECIFIED, NOT INTRACTABLE, WITHOUT STATUS EPILEPTICUS: ICD-10-CM

## 2022-10-06 DIAGNOSIS — K21.9 GASTRO-ESOPHAGEAL REFLUX DISEASE WITHOUT ESOPHAGITIS: ICD-10-CM

## 2022-10-06 DIAGNOSIS — Z79.2 LONG TERM (CURRENT) USE OF ANTIBIOTICS: ICD-10-CM

## 2022-10-06 DIAGNOSIS — Z79.82 LONG TERM (CURRENT) USE OF ASPIRIN: ICD-10-CM

## 2022-10-06 DIAGNOSIS — Z88.0 ALLERGY STATUS TO PENICILLIN: ICD-10-CM

## 2022-10-06 DIAGNOSIS — I10 ESSENTIAL (PRIMARY) HYPERTENSION: ICD-10-CM

## 2022-10-06 DIAGNOSIS — Z93.2 ILEOSTOMY STATUS: ICD-10-CM

## 2022-10-06 DIAGNOSIS — R91.1 SOLITARY PULMONARY NODULE: ICD-10-CM

## 2022-10-06 DIAGNOSIS — I72.2 ANEURYSM OF RENAL ARTERY: ICD-10-CM

## 2022-10-06 DIAGNOSIS — Z90.49 ACQUIRED ABSENCE OF OTHER SPECIFIED PARTS OF DIGESTIVE TRACT: ICD-10-CM

## 2022-10-06 DIAGNOSIS — Q79.63 VASCULAR EHLERS-DANLOS SYNDROME: ICD-10-CM

## 2022-10-06 DIAGNOSIS — R31.9 HEMATURIA, UNSPECIFIED: ICD-10-CM

## 2022-10-06 DIAGNOSIS — R10.9 UNSPECIFIED ABDOMINAL PAIN: ICD-10-CM

## 2022-10-06 DIAGNOSIS — I77.72 DISSECTION OF ILIAC ARTERY: ICD-10-CM

## 2022-10-10 LAB
CREATININE, RNDM UR: 12.27 MMOL/L — SIGNIFICANT CHANGE UP (ref 1.77–23.31)
CYSTINE UR-MCNC: 9.7 — SIGNIFICANT CHANGE UP (ref 3.4–16.4)

## 2022-10-20 ENCOUNTER — APPOINTMENT (OUTPATIENT)
Age: 47
End: 2022-10-20

## 2022-10-20 VITALS
HEIGHT: 69 IN | WEIGHT: 156 LBS | HEART RATE: 77 BPM | SYSTOLIC BLOOD PRESSURE: 140 MMHG | RESPIRATION RATE: 12 BRPM | DIASTOLIC BLOOD PRESSURE: 80 MMHG | BODY MASS INDEX: 23.11 KG/M2 | OXYGEN SATURATION: 99 %

## 2022-10-20 PROCEDURE — 71046 X-RAY EXAM CHEST 2 VIEWS: CPT

## 2022-10-20 PROCEDURE — 99214 OFFICE O/P EST MOD 30 MIN: CPT | Mod: 25

## 2022-10-20 NOTE — PROCEDURE
[FreeTextEntry1] : CXR PA and Lateral \par The costophrenic and cardiophrenic angles are sharp\par The aubree parenchyma shows no infiltrates, consolidations, or nodules \par The Mediastinum is within normal limits\par No pleural effusions\par

## 2022-10-20 NOTE — ASSESSMENT
[FreeTextEntry1] : NO evidence of significant pleural effusion \par Possible cough variant asthma / UACS resolved \par Minor atelectasis on CT chest \par SP VATS last August at [facility] for empyema \par ELLA Christy

## 2022-12-06 ENCOUNTER — NON-APPOINTMENT (OUTPATIENT)
Age: 47
End: 2022-12-06

## 2022-12-26 NOTE — DIETITIAN INITIAL EVALUATION ADULT - ENTER TO (CAL/KG)
[de-identified] : cough [FreeTextEntry6] : 12 year old girl BIB mother with c/o cough, headache, fever and achiness for the past 3-4 days. Pt also c/o sore throat. No SOB, difficulty breathing, chest pain, or wheezex. No n/v/d. No abdominal pain or rash. No loss of smell or taste. Good po/uop/bm. Normal sleep and activity.\par  30

## 2023-06-27 NOTE — ED PROVIDER NOTE - CPE EDP ENMT NORM
Referred To Otolaryngology For Closure Text (Leave Blank If You Do Not Want): After obtaining clear surgical margins the patient was sent to otolaryngology for surgical repair.  The patient understands they will receive post-surgical care and follow-up from the referring physician's office. normal...

## 2023-07-12 NOTE — H&P ADULT - BIRTH SEX
Routine OB Visit    S: 25 year old yo  @ 19w2d here today for routine OB prenatal visit. Denies cramping or VB. Feeling some FM. Feels much better after starting Labetalol. Her symptoms have resolved.    O:   Visit Vitals  /60   Wt 95.5 kg (210 lb 8.6 oz)   LMP 2023   BMI 37.30 kg/m²     FHT: 150 bpm    A/P: 25 year old yo  @ 19w2d for routine PNC visit.    1. Routine PNC   -20 week anatomy scheduled  -Cont PNV.    -FMC and LRP reviewed.    -Preeclampsia precautions reviewed.      2. Chronic HTN  -Cont Labetalol 100 mg BID    -RTC in 4 weeks.    Candi Perez D.O.     Male

## 2023-10-12 ENCOUNTER — NON-APPOINTMENT (OUTPATIENT)
Age: 48
End: 2023-10-12

## 2023-10-26 ENCOUNTER — APPOINTMENT (OUTPATIENT)
Dept: PULMONOLOGY | Facility: CLINIC | Age: 48
End: 2023-10-26
Payer: COMMERCIAL

## 2023-10-26 VITALS
WEIGHT: 155 LBS | BODY MASS INDEX: 22.96 KG/M2 | SYSTOLIC BLOOD PRESSURE: 124 MMHG | HEART RATE: 75 BPM | OXYGEN SATURATION: 99 % | RESPIRATION RATE: 14 BRPM | DIASTOLIC BLOOD PRESSURE: 74 MMHG | HEIGHT: 69 IN

## 2023-10-26 DIAGNOSIS — Z09 ENCOUNTER FOR FOLLOW-UP EXAMINATION AFTER COMPLETED TREATMENT FOR CONDITIONS OTHER THAN MALIGNANT NEOPLASM: ICD-10-CM

## 2023-10-26 DIAGNOSIS — J45.909 UNSPECIFIED ASTHMA, UNCOMPLICATED: ICD-10-CM

## 2023-10-26 PROCEDURE — 99214 OFFICE O/P EST MOD 30 MIN: CPT | Mod: 25

## 2023-10-26 PROCEDURE — 94010 BREATHING CAPACITY TEST: CPT

## 2023-12-01 NOTE — ED ADULT TRIAGE NOTE - AS TEMP SITE
High fall risk, Confused, difficulty following 1-step directions at time and poor safety awareness
oral

## 2024-02-29 NOTE — ED ADULT NURSE REASSESSMENT NOTE - NS ED NURSE REASSESS COMMENT FT1
So Seymour  1986      CC:  Yearly exam    S:  37 y.o. female here for yearly exam. Her cycles are absent on the Nexplanon.     Sexual activity: She is sexually active without pain, bleeding or dryness.     Contraception: She uses Nexplanon for contraception.     Last Pap 11/5/2020 - normal/negative HPV  Last Mammo - never    We reviewed ASCCP guidelines for Pap testing today.       Current Outpatient Medications:     Etonogestrel (NEXPLANON SC), Inject under the skin, Disp: , Rfl:     LORazepam (ATIVAN) 1 mg tablet, Take 1 tablet 45 minutes prior to MRI and may take additional tablet 15 minutes prior to MRI as needed for anxiety, Disp: 2 tablet, Rfl: 0    sertraline (ZOLOFT) 50 mg tablet, Take 1.5 tablets (75 mg total) by mouth daily, Disp: 135 tablet, Rfl: 1    dextromethorphan 15 MG/5ML syrup, Take 10 mL (30 mg total) by mouth 4 (four) times a day as needed for cough (Patient not taking: Reported on 2/29/2024), Disp: 120 mL, Rfl: 0    meloxicam (Mobic) 15 mg tablet, Take 1 tablet (15 mg total) by mouth daily, Disp: 30 tablet, Rfl: 0    metoclopramide (Reglan) 10 mg tablet, Take 1 tablet (10 mg total) by mouth every 6 (six) hours as needed (For headache or nausea) (Patient not taking: Reported on 2/29/2024), Disp: 30 tablet, Rfl: 0    naproxen (Naprosyn) 500 mg tablet, Take 1 tablet (500 mg total) by mouth every 12 (twelve) hours as needed for mild pain or moderate pain (body aches) (Patient not taking: Reported on 2/29/2024), Disp: 30 tablet, Rfl: 0    nortriptyline (PAMELOR) 10 mg capsule, Take 1 capsule (10 mg total) by mouth daily at bedtime (Patient not taking: Reported on 2/29/2024), Disp: 30 capsule, Rfl: 4    tiZANidine (ZANAFLEX) 2 mg tablet, Take 1 tablet (2 mg total) by mouth every 8 (eight) hours as needed for muscle spasms (Patient not taking: Reported on 2/29/2024), Disp: 90 tablet, Rfl: 1  Social History     Socioeconomic History    Marital status: /Civil Union     Spouse name: Not on  patient admitted to medicine for abdominal pain. Patient moved to ED main 9H and report given to ED Hold RN Rodrigo. Patient in stable condition and nad. "file    Number of children: Not on file    Years of education: Not on file    Highest education level: Not on file   Occupational History    Not on file   Tobacco Use    Smoking status: Never    Smokeless tobacco: Never   Vaping Use    Vaping status: Never Used   Substance and Sexual Activity    Alcohol use: Yes     Alcohol/week: 28.0 standard drinks of alcohol     Types: 28 Shots of liquor per week     Comment: 3-4 daily -- titos    Drug use: Never    Sexual activity: Yes     Partners: Male     Birth control/protection: Implant   Other Topics Concern    Not on file   Social History Narrative    Not on file     Social Determinants of Health     Financial Resource Strain: Not on file   Food Insecurity: Not on file   Transportation Needs: Not on file   Physical Activity: Not on file   Stress: Not on file   Social Connections: Not on file   Intimate Partner Violence: Not on file   Housing Stability: Not on file     Family History   Problem Relation Age of Onset    Hyperlipidemia Mother     Transient ischemic attack Father     Diabetes type II Father     Stroke Father         mini stroke    Dementia Maternal Grandmother     Heart attack Paternal Grandmother     No Known Problems Sister       Past Medical History:   Diagnosis Date    Anemia     20 years ago    Concussion     Migraine     Urinary tract infection     In the past     Varicella         Review of Systems   Respiratory: Negative.    Cardiovascular: Negative.    Gastrointestinal: Negative for constipation and diarrhea.   Genitourinary: Negative for difficulty urinating, pelvic pain, vaginal bleeding, vaginal discharge, itching or odor.    O:  Blood pressure 116/70, height 5' 6.5\" (1.689 m), weight 92 kg (202 lb 12.8 oz), not currently breastfeeding.    Patient appears well and is not in distress  Neck is supple without masses  Breasts are symmetrical without mass, tenderness, nipple discharge, skin changes or adenopathy.   Nexplanon palpated in right upper " extremity  Abdomen is soft and nontender without masses.   External genitals are normal without lesions or rashes.  Urethral meatus and urethra are normal  Bladder is normal to palpation  Vagina is normal without discharge or bleeding.   Cervix is normal without discharge or lesion.   Uterus is normal, mobile, nontender without palpable mass.  Adnexa are normal, nontender, without palpable mass.     A:   Yearly exam.     P:   Pap due 2025   Mammo at age 40   Return for Nexplanon removal/reinsertion in July    RTO one year for yearly exam or sooner as needed.

## 2024-03-20 ENCOUNTER — EMERGENCY (EMERGENCY)
Facility: HOSPITAL | Age: 49
LOS: 0 days | Discharge: HOME | End: 2024-03-20
Attending: STUDENT IN AN ORGANIZED HEALTH CARE EDUCATION/TRAINING PROGRAM
Payer: COMMERCIAL

## 2024-03-20 VITALS
DIASTOLIC BLOOD PRESSURE: 75 MMHG | OXYGEN SATURATION: 99 % | RESPIRATION RATE: 18 BRPM | WEIGHT: 149.91 LBS | HEART RATE: 84 BPM | HEIGHT: 69 IN | TEMPERATURE: 98 F | SYSTOLIC BLOOD PRESSURE: 156 MMHG

## 2024-03-20 VITALS
RESPIRATION RATE: 18 BRPM | SYSTOLIC BLOOD PRESSURE: 108 MMHG | DIASTOLIC BLOOD PRESSURE: 71 MMHG | OXYGEN SATURATION: 98 % | HEART RATE: 97 BPM | TEMPERATURE: 97 F

## 2024-03-20 DIAGNOSIS — R10.32 LEFT LOWER QUADRANT PAIN: ICD-10-CM

## 2024-03-20 DIAGNOSIS — Q79.60 EHLERS-DANLOS SYNDROME, UNSPECIFIED: ICD-10-CM

## 2024-03-20 DIAGNOSIS — R10.12 LEFT UPPER QUADRANT PAIN: ICD-10-CM

## 2024-03-20 DIAGNOSIS — Z88.1 ALLERGY STATUS TO OTHER ANTIBIOTIC AGENTS STATUS: ICD-10-CM

## 2024-03-20 DIAGNOSIS — Z88.0 ALLERGY STATUS TO PENICILLIN: ICD-10-CM

## 2024-03-20 DIAGNOSIS — Z93.3 COLOSTOMY STATUS: Chronic | ICD-10-CM

## 2024-03-20 LAB
ALBUMIN SERPL ELPH-MCNC: 4.5 G/DL — SIGNIFICANT CHANGE UP (ref 3.5–5.2)
ALP SERPL-CCNC: 79 U/L — SIGNIFICANT CHANGE UP (ref 30–115)
ALT FLD-CCNC: 22 U/L — SIGNIFICANT CHANGE UP (ref 0–41)
ANION GAP SERPL CALC-SCNC: 11 MMOL/L — SIGNIFICANT CHANGE UP (ref 7–14)
APPEARANCE UR: CLEAR — SIGNIFICANT CHANGE UP
APTT BLD: 33.9 SEC — SIGNIFICANT CHANGE UP (ref 27–39.2)
AST SERPL-CCNC: 26 U/L — SIGNIFICANT CHANGE UP (ref 0–41)
BASOPHILS # BLD AUTO: 0.03 K/UL — SIGNIFICANT CHANGE UP (ref 0–0.2)
BASOPHILS NFR BLD AUTO: 0.4 % — SIGNIFICANT CHANGE UP (ref 0–1)
BILIRUB SERPL-MCNC: 0.4 MG/DL — SIGNIFICANT CHANGE UP (ref 0.2–1.2)
BILIRUB UR-MCNC: NEGATIVE — SIGNIFICANT CHANGE UP
BUN SERPL-MCNC: 12 MG/DL — SIGNIFICANT CHANGE UP (ref 10–20)
CALCIUM SERPL-MCNC: 9.5 MG/DL — SIGNIFICANT CHANGE UP (ref 8.4–10.5)
CHLORIDE SERPL-SCNC: 99 MMOL/L — SIGNIFICANT CHANGE UP (ref 98–110)
CO2 SERPL-SCNC: 25 MMOL/L — SIGNIFICANT CHANGE UP (ref 17–32)
COLOR SPEC: YELLOW — SIGNIFICANT CHANGE UP
CREAT SERPL-MCNC: 0.9 MG/DL — SIGNIFICANT CHANGE UP (ref 0.7–1.5)
DIFF PNL FLD: NEGATIVE — SIGNIFICANT CHANGE UP
EGFR: 105 ML/MIN/1.73M2 — SIGNIFICANT CHANGE UP
EOSINOPHIL # BLD AUTO: 0.03 K/UL — SIGNIFICANT CHANGE UP (ref 0–0.7)
EOSINOPHIL NFR BLD AUTO: 0.4 % — SIGNIFICANT CHANGE UP (ref 0–8)
GAS PNL BLDV: SIGNIFICANT CHANGE UP
GLUCOSE SERPL-MCNC: 98 MG/DL — SIGNIFICANT CHANGE UP (ref 70–99)
GLUCOSE UR QL: NEGATIVE MG/DL — SIGNIFICANT CHANGE UP
HCT VFR BLD CALC: 39.6 % — LOW (ref 42–52)
HGB BLD-MCNC: 13.6 G/DL — LOW (ref 14–18)
IMM GRANULOCYTES NFR BLD AUTO: 0.3 % — SIGNIFICANT CHANGE UP (ref 0.1–0.3)
INR BLD: 1.01 RATIO — SIGNIFICANT CHANGE UP (ref 0.65–1.3)
KETONES UR-MCNC: NEGATIVE MG/DL — SIGNIFICANT CHANGE UP
LEUKOCYTE ESTERASE UR-ACNC: NEGATIVE — SIGNIFICANT CHANGE UP
LIDOCAIN IGE QN: 24 U/L — SIGNIFICANT CHANGE UP (ref 7–60)
LYMPHOCYTES # BLD AUTO: 0.93 K/UL — LOW (ref 1.2–3.4)
LYMPHOCYTES # BLD AUTO: 12.8 % — LOW (ref 20.5–51.1)
MCHC RBC-ENTMCNC: 30.4 PG — SIGNIFICANT CHANGE UP (ref 27–31)
MCHC RBC-ENTMCNC: 34.3 G/DL — SIGNIFICANT CHANGE UP (ref 32–37)
MCV RBC AUTO: 88.6 FL — SIGNIFICANT CHANGE UP (ref 80–94)
MONOCYTES # BLD AUTO: 0.61 K/UL — HIGH (ref 0.1–0.6)
MONOCYTES NFR BLD AUTO: 8.4 % — SIGNIFICANT CHANGE UP (ref 1.7–9.3)
NEUTROPHILS # BLD AUTO: 5.65 K/UL — SIGNIFICANT CHANGE UP (ref 1.4–6.5)
NEUTROPHILS NFR BLD AUTO: 77.7 % — HIGH (ref 42.2–75.2)
NITRITE UR-MCNC: NEGATIVE — SIGNIFICANT CHANGE UP
NRBC # BLD: 0 /100 WBCS — SIGNIFICANT CHANGE UP (ref 0–0)
PH UR: 6.5 — SIGNIFICANT CHANGE UP (ref 5–8)
PLATELET # BLD AUTO: 225 K/UL — SIGNIFICANT CHANGE UP (ref 130–400)
PMV BLD: 9.3 FL — SIGNIFICANT CHANGE UP (ref 7.4–10.4)
POTASSIUM SERPL-MCNC: 5.4 MMOL/L — HIGH (ref 3.5–5)
POTASSIUM SERPL-SCNC: 5.4 MMOL/L — HIGH (ref 3.5–5)
PROT SERPL-MCNC: 7.5 G/DL — SIGNIFICANT CHANGE UP (ref 6–8)
PROT UR-MCNC: NEGATIVE MG/DL — SIGNIFICANT CHANGE UP
PROTHROM AB SERPL-ACNC: 11.5 SEC — SIGNIFICANT CHANGE UP (ref 9.95–12.87)
RBC # BLD: 4.47 M/UL — LOW (ref 4.7–6.1)
RBC # FLD: 12.3 % — SIGNIFICANT CHANGE UP (ref 11.5–14.5)
SODIUM SERPL-SCNC: 135 MMOL/L — SIGNIFICANT CHANGE UP (ref 135–146)
SP GR SPEC: 1.02 — SIGNIFICANT CHANGE UP (ref 1–1.03)
UROBILINOGEN FLD QL: 0.2 MG/DL — SIGNIFICANT CHANGE UP (ref 0.2–1)
WBC # BLD: 7.27 K/UL — SIGNIFICANT CHANGE UP (ref 4.8–10.8)
WBC # FLD AUTO: 7.27 K/UL — SIGNIFICANT CHANGE UP (ref 4.8–10.8)

## 2024-03-20 PROCEDURE — 85014 HEMATOCRIT: CPT

## 2024-03-20 PROCEDURE — 84132 ASSAY OF SERUM POTASSIUM: CPT

## 2024-03-20 PROCEDURE — 85730 THROMBOPLASTIN TIME PARTIAL: CPT

## 2024-03-20 PROCEDURE — 99284 EMERGENCY DEPT VISIT MOD MDM: CPT | Mod: 25

## 2024-03-20 PROCEDURE — 36415 COLL VENOUS BLD VENIPUNCTURE: CPT

## 2024-03-20 PROCEDURE — 82803 BLOOD GASES ANY COMBINATION: CPT

## 2024-03-20 PROCEDURE — 99285 EMERGENCY DEPT VISIT HI MDM: CPT

## 2024-03-20 PROCEDURE — 71260 CT THORAX DX C+: CPT | Mod: 26,MC

## 2024-03-20 PROCEDURE — 85018 HEMOGLOBIN: CPT

## 2024-03-20 PROCEDURE — 96374 THER/PROPH/DIAG INJ IV PUSH: CPT | Mod: XU

## 2024-03-20 PROCEDURE — 71260 CT THORAX DX C+: CPT | Mod: MC

## 2024-03-20 PROCEDURE — 85610 PROTHROMBIN TIME: CPT

## 2024-03-20 PROCEDURE — 81003 URINALYSIS AUTO W/O SCOPE: CPT

## 2024-03-20 PROCEDURE — 74174 CTA ABD&PLVS W/CONTRAST: CPT | Mod: MC

## 2024-03-20 PROCEDURE — 83605 ASSAY OF LACTIC ACID: CPT

## 2024-03-20 PROCEDURE — 85025 COMPLETE CBC W/AUTO DIFF WBC: CPT

## 2024-03-20 PROCEDURE — 80053 COMPREHEN METABOLIC PANEL: CPT

## 2024-03-20 PROCEDURE — 87086 URINE CULTURE/COLONY COUNT: CPT

## 2024-03-20 PROCEDURE — 84295 ASSAY OF SERUM SODIUM: CPT

## 2024-03-20 PROCEDURE — 74174 CTA ABD&PLVS W/CONTRAST: CPT | Mod: 26,MC

## 2024-03-20 PROCEDURE — 82330 ASSAY OF CALCIUM: CPT

## 2024-03-20 PROCEDURE — 83690 ASSAY OF LIPASE: CPT

## 2024-03-20 RX ORDER — KETOROLAC TROMETHAMINE 30 MG/ML
15 SYRINGE (ML) INJECTION ONCE
Refills: 0 | Status: DISCONTINUED | OUTPATIENT
Start: 2024-03-20 | End: 2024-03-20

## 2024-03-20 RX ORDER — SODIUM CHLORIDE 9 MG/ML
1000 INJECTION, SOLUTION INTRAVENOUS ONCE
Refills: 0 | Status: COMPLETED | OUTPATIENT
Start: 2024-03-20 | End: 2024-03-20

## 2024-03-20 RX ADMIN — Medication 15 MILLIGRAM(S): at 15:09

## 2024-03-20 RX ADMIN — SODIUM CHLORIDE 1000 MILLILITER(S): 9 INJECTION, SOLUTION INTRAVENOUS at 15:09

## 2024-03-20 NOTE — ED PROVIDER NOTE - CLINICAL SUMMARY MEDICAL DECISION MAKING FREE TEXT BOX
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  The pain was most significant when it first came on but since then it has been a lower degree of pain on and off.  He states that the initial pain is similar to when he had the aneurysm rupture.  Denies fevers, chills, chest pain or shortness of breath, nausea, vomiting, abdominal pain, diarrhea, dysuria, hematuria. 48-year-old male, past medical history of Melva-Danlos syndrome, perforated sigmoid colon status post Caraballo's procedure, celiac artery aneurysm rupture status post coil embolization, mild carotid/iliac aneurysms, empyema/pneumothorax status post VATS, presenting for sudden onset left flank pain noted 4 days ago, non-radiating, alleviated with belching, no aggravating factors.  The pain was most significant when it first came on but since then it has been a lower degree of pain on and off.  He states that the initial pain is similar to when he had the aneurysm rupture.  Denies fevers, chills, chest pain or shortness of breath, nausea, vomiting, abdominal pain, diarrhea, dysuria, hematuria. Labs and EKG were ordered and reviewed. Well appearing on exam. No acute distress. No CVA tenderness. Abdomen soft NDNT.  Imaging was ordered and reviewed by me.  Appropriate medications for patient's presenting complaints were ordered and effects were reassessed.  Patient's records (prior hospital, ED visit, and/or nursing home notes if available) were reviewed.  Discussed all results with patient. Given return precautions and copies of all results. Patient comfortable with plan.

## 2024-03-20 NOTE — ED PROVIDER NOTE - NSFOLLOWUPINSTRUCTIONS_ED_ALL_ED_FT
Flank Pain, Adult  Flank pain is pain in your side. The flank is the area of your side between your upper belly (abdomen) and your back. The pain may occur over a short time (acute), or it may be long-term or come back often (chronic). It may be mild or very bad. Pain in this area can be caused by many different things.    Follow these instructions at home:  Image   Drink enough fluid to keep your pee (urine) clear or pale yellow.  Rest as told by your doctor.  Take over-the-counter and prescription medicines only as told by your doctor.  Keep a journal to keep track of:  What has caused your flank pain.  What has made it feel better.  Keep all follow-up visits as told by your doctor. This is important.  Contact a doctor if:  Medicine does not help your pain.  You have new symptoms.  Your pain gets worse.  You have a fever.  Your symptoms last longer than 2–3 days.  You have trouble peeing.  You are peeing more often than normal.  Get help right away if:  You have trouble breathing.  You are short of breath.  Your belly hurts, or it is swollen or red.  You feel sick to your stomach (nauseous).  You throw up (vomit).  You feel like you will pass out, or you do pass out (faint).  You have blood in your pee.  Summary  Flank pain is pain in your side. The flank is the area of your side between your upper belly (abdomen) and your back.  Flank pain may occur over a short time (acute), or it may be long-term or come back often (chronic). It may be mild or very bad.  Pain in this area can be caused by many different things.  Contact your doctor if your symptoms get worse or they last longer than 2–3 days.  This information is not intended to replace advice given to you by your health care provider. Make sure you discuss any questions you have with your health care provider.

## 2024-03-20 NOTE — ED ADULT NURSE NOTE - NSFALLRISKINTERV_ED_ALL_ED

## 2024-03-20 NOTE — ED PROVIDER NOTE - PATIENT PORTAL LINK FT
You can access the FollowMyHealth Patient Portal offered by [facility] by registering at the following website: [email or web address]. By joining [employer]’s FollowMyHealth portal, you will also be able to view your health information using other applications (apps) compatible with our system.

## 2024-03-20 NOTE — ED PROVIDER NOTE - OBJECTIVE STATEMENT
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 the pain has mostly subsided, but he is concerned because it feels similar to when he had the aneurysm rupture. Denies fever/chill/HA/dizziness/chest pain/palpitation/sob/abd pain/n/v/d/ black stool/bloody stool/urinary sxs

## 2024-03-20 NOTE — ED ADULT TRIAGE NOTE - CHIEF COMPLAINT QUOTE
Patient has hx of Melva-Danlos syndrome complaining of left kidney pain radiating to abdomen x2 days

## 2024-03-20 NOTE — ED PROVIDER NOTE - PHYSICAL EXAMINATION
CONSTITUTIONAL: Well-appearing; well-nourished; in no apparent distress.   NECK: Supple; non-tender; no cervical lymphadenopathy.   CARDIOVASCULAR: Normal S1, S2; no murmurs, rubs, or gallops.   RESPIRATORY: Normal chest excursion with respiration; breath sounds clear and equal bilaterally; no wheezes, rhonchi, or rales.  GI/: Non-distended; non-tender; no palpable organomegaly.   MS: No evidence of trauma or deformity. Non-tender to palpation. No scoliosis. No CVA tenderness. Normal ROM in all four extremities; non-tender to palpation; distal pulses are normal.   SKIN: Normal for age and race; warm; dry; good turgor; no apparent lesions or exudate.   NEURO/PSYCH: A & O x 4; grossly unremarkable. mood and manner are appropriate

## 2024-03-21 LAB
CULTURE RESULTS: NO GROWTH — SIGNIFICANT CHANGE UP
SPECIMEN SOURCE: SIGNIFICANT CHANGE UP

## 2024-03-21 NOTE — CHART NOTE - NSCHARTNOTEFT_GEN_A_CORE
Scheduled appt 08/16/2024 02:00 PM w/ Dr. Lara @ [address] [facility] (gastro).

## 2024-06-14 ENCOUNTER — INPATIENT (INPATIENT)
Facility: HOSPITAL | Age: 49
LOS: 2 days | Discharge: HOME | DRG: 315 | End: 2024-06-17
Attending: STUDENT IN AN ORGANIZED HEALTH CARE EDUCATION/TRAINING PROGRAM | Admitting: INTERNAL MEDICINE
Payer: COMMERCIAL

## 2024-06-14 VITALS
HEART RATE: 85 BPM | TEMPERATURE: 99 F | RESPIRATION RATE: 18 BRPM | SYSTOLIC BLOOD PRESSURE: 137 MMHG | OXYGEN SATURATION: 99 % | DIASTOLIC BLOOD PRESSURE: 97 MMHG | HEIGHT: 69 IN | WEIGHT: 154.98 LBS

## 2024-06-14 DIAGNOSIS — Z93.3 COLOSTOMY STATUS: Chronic | ICD-10-CM

## 2024-06-14 LAB
ALBUMIN SERPL ELPH-MCNC: 4.4 G/DL — SIGNIFICANT CHANGE UP (ref 3.5–5.2)
ALP SERPL-CCNC: 118 U/L — HIGH (ref 30–115)
ALT FLD-CCNC: 27 U/L — SIGNIFICANT CHANGE UP (ref 0–41)
ANION GAP SERPL CALC-SCNC: 17 MMOL/L — HIGH (ref 7–14)
AST SERPL-CCNC: 24 U/L — SIGNIFICANT CHANGE UP (ref 0–41)
BASOPHILS # BLD AUTO: 0.05 K/UL — SIGNIFICANT CHANGE UP (ref 0–0.2)
BASOPHILS NFR BLD AUTO: 0.4 % — SIGNIFICANT CHANGE UP (ref 0–1)
BILIRUB SERPL-MCNC: 0.2 MG/DL — SIGNIFICANT CHANGE UP (ref 0.2–1.2)
BUN SERPL-MCNC: 18 MG/DL — SIGNIFICANT CHANGE UP (ref 10–20)
CALCIUM SERPL-MCNC: 10.2 MG/DL — SIGNIFICANT CHANGE UP (ref 8.4–10.5)
CHLORIDE SERPL-SCNC: 99 MMOL/L — SIGNIFICANT CHANGE UP (ref 98–110)
CO2 SERPL-SCNC: 22 MMOL/L — SIGNIFICANT CHANGE UP (ref 17–32)
CREAT SERPL-MCNC: 1 MG/DL — SIGNIFICANT CHANGE UP (ref 0.7–1.5)
CRP SERPL-MCNC: 96.4 MG/L — HIGH
EGFR: 93 ML/MIN/1.73M2 — SIGNIFICANT CHANGE UP
EOSINOPHIL # BLD AUTO: 0.01 K/UL — SIGNIFICANT CHANGE UP (ref 0–0.7)
EOSINOPHIL NFR BLD AUTO: 0.1 % — SIGNIFICANT CHANGE UP (ref 0–8)
ERYTHROCYTE [SEDIMENTATION RATE] IN BLOOD: 86 MM/HR — HIGH (ref 0–10)
GLUCOSE SERPL-MCNC: 92 MG/DL — SIGNIFICANT CHANGE UP (ref 70–99)
HCT VFR BLD CALC: 44.9 % — SIGNIFICANT CHANGE UP (ref 42–52)
HGB BLD-MCNC: 14.5 G/DL — SIGNIFICANT CHANGE UP (ref 14–18)
IMM GRANULOCYTES NFR BLD AUTO: 0.5 % — HIGH (ref 0.1–0.3)
LIDOCAIN IGE QN: 24 U/L — SIGNIFICANT CHANGE UP (ref 7–60)
LYMPHOCYTES # BLD AUTO: 0.68 K/UL — LOW (ref 1.2–3.4)
LYMPHOCYTES # BLD AUTO: 5.6 % — LOW (ref 20.5–51.1)
MAGNESIUM SERPL-MCNC: 1.8 MG/DL — SIGNIFICANT CHANGE UP (ref 1.8–2.4)
MCHC RBC-ENTMCNC: 28.7 PG — SIGNIFICANT CHANGE UP (ref 27–31)
MCHC RBC-ENTMCNC: 32.3 G/DL — SIGNIFICANT CHANGE UP (ref 32–37)
MCV RBC AUTO: 88.9 FL — SIGNIFICANT CHANGE UP (ref 80–94)
MONOCYTES # BLD AUTO: 0.84 K/UL — HIGH (ref 0.1–0.6)
MONOCYTES NFR BLD AUTO: 6.9 % — SIGNIFICANT CHANGE UP (ref 1.7–9.3)
NEUTROPHILS # BLD AUTO: 10.49 K/UL — HIGH (ref 1.4–6.5)
NEUTROPHILS NFR BLD AUTO: 86.5 % — HIGH (ref 42.2–75.2)
NRBC # BLD: 0 /100 WBCS — SIGNIFICANT CHANGE UP (ref 0–0)
PLATELET # BLD AUTO: 423 K/UL — HIGH (ref 130–400)
PMV BLD: 9.6 FL — SIGNIFICANT CHANGE UP (ref 7.4–10.4)
POTASSIUM SERPL-MCNC: 5.2 MMOL/L — HIGH (ref 3.5–5)
POTASSIUM SERPL-SCNC: 5.2 MMOL/L — HIGH (ref 3.5–5)
PROT SERPL-MCNC: 8 G/DL — SIGNIFICANT CHANGE UP (ref 6–8)
RBC # BLD: 5.05 M/UL — SIGNIFICANT CHANGE UP (ref 4.7–6.1)
RBC # FLD: 11.9 % — SIGNIFICANT CHANGE UP (ref 11.5–14.5)
SODIUM SERPL-SCNC: 138 MMOL/L — SIGNIFICANT CHANGE UP (ref 135–146)
TROPONIN T, HIGH SENSITIVITY RESULT: 8 NG/L — SIGNIFICANT CHANGE UP (ref 6–21)
TROPONIN T, HIGH SENSITIVITY RESULT: 9 NG/L — SIGNIFICANT CHANGE UP (ref 6–21)
WBC # BLD: 12.13 K/UL — HIGH (ref 4.8–10.8)
WBC # FLD AUTO: 12.13 K/UL — HIGH (ref 4.8–10.8)

## 2024-06-14 PROCEDURE — 71046 X-RAY EXAM CHEST 2 VIEWS: CPT | Mod: 26

## 2024-06-14 PROCEDURE — 99223 1ST HOSP IP/OBS HIGH 75: CPT

## 2024-06-14 PROCEDURE — 71275 CT ANGIOGRAPHY CHEST: CPT | Mod: 26,MC

## 2024-06-14 PROCEDURE — 74174 CTA ABD&PLVS W/CONTRAST: CPT | Mod: 26,MC

## 2024-06-14 RX ORDER — KETOROLAC TROMETHAMINE 30 MG/ML
15 INJECTION, SOLUTION INTRAMUSCULAR ONCE
Refills: 0 | Status: DISCONTINUED | OUTPATIENT
Start: 2024-06-14 | End: 2024-06-14

## 2024-06-14 RX ORDER — MORPHINE SULFATE 100 MG/1
4 TABLET, EXTENDED RELEASE ORAL ONCE
Refills: 0 | Status: DISCONTINUED | OUTPATIENT
Start: 2024-06-14 | End: 2024-06-14

## 2024-06-14 RX ORDER — DEXTROSE MONOHYDRATE AND SODIUM CHLORIDE 5; .3 G/100ML; G/100ML
1000 INJECTION, SOLUTION INTRAVENOUS ONCE
Refills: 0 | Status: COMPLETED | OUTPATIENT
Start: 2024-06-14 | End: 2024-06-14

## 2024-06-14 RX ORDER — METOPROLOL TARTRATE 50 MG
100 TABLET ORAL DAILY
Refills: 0 | Status: DISCONTINUED | OUTPATIENT
Start: 2024-06-14 | End: 2024-06-15

## 2024-06-14 RX ADMIN — MORPHINE SULFATE 4 MILLIGRAM(S): 100 TABLET, EXTENDED RELEASE ORAL at 10:22

## 2024-06-14 RX ADMIN — MORPHINE SULFATE 4 MILLIGRAM(S): 100 TABLET, EXTENDED RELEASE ORAL at 10:52

## 2024-06-14 RX ADMIN — KETOROLAC TROMETHAMINE 15 MILLIGRAM(S): 30 INJECTION, SOLUTION INTRAMUSCULAR at 18:43

## 2024-06-14 RX ADMIN — KETOROLAC TROMETHAMINE 15 MILLIGRAM(S): 30 INJECTION, SOLUTION INTRAMUSCULAR at 19:13

## 2024-06-14 RX ADMIN — DEXTROSE MONOHYDRATE AND SODIUM CHLORIDE 1000 MILLILITER(S): 5; .3 INJECTION, SOLUTION INTRAVENOUS at 15:01

## 2024-06-14 RX ADMIN — MORPHINE SULFATE 4 MILLIGRAM(S): 100 TABLET, EXTENDED RELEASE ORAL at 13:04

## 2024-06-14 RX ADMIN — DEXTROSE MONOHYDRATE AND SODIUM CHLORIDE 1000 MILLILITER(S): 5; .3 INJECTION, SOLUTION INTRAVENOUS at 16:39

## 2024-06-14 RX ADMIN — MORPHINE SULFATE 4 MILLIGRAM(S): 100 TABLET, EXTENDED RELEASE ORAL at 13:34

## 2024-06-14 RX ADMIN — Medication 100 MILLIGRAM(S): at 18:43

## 2024-06-15 ENCOUNTER — RESULT REVIEW (OUTPATIENT)
Age: 49
End: 2024-06-15

## 2024-06-15 DIAGNOSIS — I31.9 DISEASE OF PERICARDIUM, UNSPECIFIED: ICD-10-CM

## 2024-06-15 LAB
TROPONIN T, HIGH SENSITIVITY RESULT: 132 NG/L — CRITICAL HIGH (ref 6–21)
TROPONIN T, HIGH SENSITIVITY RESULT: 159 NG/L — CRITICAL HIGH (ref 6–21)

## 2024-06-15 PROCEDURE — 99254 IP/OBS CNSLTJ NEW/EST MOD 60: CPT

## 2024-06-15 PROCEDURE — 85027 COMPLETE CBC AUTOMATED: CPT

## 2024-06-15 PROCEDURE — 36415 COLL VENOUS BLD VENIPUNCTURE: CPT

## 2024-06-15 PROCEDURE — 83036 HEMOGLOBIN GLYCOSYLATED A1C: CPT

## 2024-06-15 PROCEDURE — 78452 HT MUSCLE IMAGE SPECT MULT: CPT | Mod: 26,MC

## 2024-06-15 PROCEDURE — 93306 TTE W/DOPPLER COMPLETE: CPT | Mod: 26

## 2024-06-15 PROCEDURE — 93010 ELECTROCARDIOGRAM REPORT: CPT | Mod: 76

## 2024-06-15 PROCEDURE — 93005 ELECTROCARDIOGRAM TRACING: CPT

## 2024-06-15 PROCEDURE — 83735 ASSAY OF MAGNESIUM: CPT

## 2024-06-15 PROCEDURE — 0225U NFCT DS DNA&RNA 21 SARSCOV2: CPT

## 2024-06-15 PROCEDURE — 99233 SBSQ HOSP IP/OBS HIGH 50: CPT

## 2024-06-15 PROCEDURE — 86618 LYME DISEASE ANTIBODY: CPT

## 2024-06-15 PROCEDURE — 80048 BASIC METABOLIC PNL TOTAL CA: CPT

## 2024-06-15 PROCEDURE — 93306 TTE W/DOPPLER COMPLETE: CPT

## 2024-06-15 PROCEDURE — 80061 LIPID PANEL: CPT

## 2024-06-15 PROCEDURE — 84484 ASSAY OF TROPONIN QUANT: CPT

## 2024-06-15 PROCEDURE — 99222 1ST HOSP IP/OBS MODERATE 55: CPT | Mod: GC

## 2024-06-15 PROCEDURE — 85025 COMPLETE CBC W/AUTO DIFF WBC: CPT

## 2024-06-15 PROCEDURE — 86658 ENTEROVIRUS ANTIBODY: CPT

## 2024-06-15 RX ORDER — ASPIRIN 325 MG/1
81 TABLET, FILM COATED ORAL DAILY
Refills: 0 | Status: DISCONTINUED | OUTPATIENT
Start: 2024-06-15 | End: 2024-06-17

## 2024-06-15 RX ORDER — COLCHICINE 0.6 MG/1
0.6 TABLET ORAL EVERY 12 HOURS
Refills: 0 | Status: DISCONTINUED | OUTPATIENT
Start: 2024-06-15 | End: 2024-06-17

## 2024-06-15 RX ORDER — LEVETIRACETAM 100 MG/ML
1125 INJECTION INTRAVENOUS DAILY
Refills: 0 | Status: DISCONTINUED | OUTPATIENT
Start: 2024-06-16 | End: 2024-06-17

## 2024-06-15 RX ORDER — LEVETIRACETAM 100 MG/ML
1500 INJECTION INTRAVENOUS EVERY 24 HOURS
Refills: 0 | Status: DISCONTINUED | OUTPATIENT
Start: 2024-06-15 | End: 2024-06-17

## 2024-06-15 RX ORDER — METOPROLOL TARTRATE 50 MG
100 TABLET ORAL ONCE
Refills: 0 | Status: COMPLETED | OUTPATIENT
Start: 2024-06-15 | End: 2024-06-15

## 2024-06-15 RX ORDER — ENOXAPARIN SODIUM 100 MG/ML
40 INJECTION SUBCUTANEOUS EVERY 24 HOURS
Refills: 0 | Status: DISCONTINUED | OUTPATIENT
Start: 2024-06-16 | End: 2024-06-17

## 2024-06-15 RX ORDER — KETOROLAC TROMETHAMINE 30 MG/ML
15 INJECTION, SOLUTION INTRAMUSCULAR ONCE
Refills: 0 | Status: DISCONTINUED | OUTPATIENT
Start: 2024-06-15 | End: 2024-06-15

## 2024-06-15 RX ORDER — OXCARBAZEPINE 600 MG/1
300 TABLET, FILM COATED ORAL
Refills: 0 | Status: DISCONTINUED | OUTPATIENT
Start: 2024-06-16 | End: 2024-06-17

## 2024-06-15 RX ORDER — LISINOPRIL 5 MG/1
1 TABLET ORAL
Refills: 0 | DISCHARGE

## 2024-06-15 RX ORDER — LISINOPRIL 5 MG/1
30 TABLET ORAL DAILY
Refills: 0 | Status: DISCONTINUED | OUTPATIENT
Start: 2024-06-15 | End: 2024-06-17

## 2024-06-15 RX ORDER — OXCARBAZEPINE 600 MG/1
600 TABLET, FILM COATED ORAL
Refills: 0 | Status: DISCONTINUED | OUTPATIENT
Start: 2024-06-15 | End: 2024-06-17

## 2024-06-15 RX ORDER — PANTOPRAZOLE SODIUM 40 MG/10ML
40 INJECTION, POWDER, FOR SOLUTION INTRAVENOUS
Refills: 0 | Status: DISCONTINUED | OUTPATIENT
Start: 2024-06-15 | End: 2024-06-17

## 2024-06-15 RX ORDER — SODIUM CHLORIDE 0.9 % (FLUSH) 0.9 %
1000 SYRINGE (ML) INJECTION ONCE
Refills: 0 | Status: COMPLETED | OUTPATIENT
Start: 2024-06-15 | End: 2024-06-15

## 2024-06-15 RX ORDER — REGADENOSON 0.08 MG/ML
0.4 INJECTION, SOLUTION INTRAVENOUS ONCE
Refills: 0 | Status: COMPLETED | OUTPATIENT
Start: 2024-06-15 | End: 2024-06-15

## 2024-06-15 RX ORDER — ACETAMINOPHEN 325 MG
650 TABLET ORAL EVERY 6 HOURS
Refills: 0 | Status: DISCONTINUED | OUTPATIENT
Start: 2024-06-15 | End: 2024-06-17

## 2024-06-15 RX ORDER — CHOLECALCIFEROL (VITAMIN D3) 125 MCG
1 CAPSULE ORAL
Qty: 0 | Refills: 0 | DISCHARGE

## 2024-06-15 RX ADMIN — Medication 600 MILLIGRAM(S): at 22:36

## 2024-06-15 RX ADMIN — Medication 600 MILLIGRAM(S): at 22:06

## 2024-06-15 RX ADMIN — LEVETIRACETAM 1500 MILLIGRAM(S): 100 INJECTION INTRAVENOUS at 18:54

## 2024-06-15 RX ADMIN — Medication 3 MILLIGRAM(S): at 22:06

## 2024-06-15 RX ADMIN — REGADENOSON 0.4 MILLIGRAM(S): 0.08 INJECTION, SOLUTION INTRAVENOUS at 11:08

## 2024-06-15 RX ADMIN — COLCHICINE 0.6 MILLIGRAM(S): 0.6 TABLET ORAL at 19:49

## 2024-06-15 RX ADMIN — OXCARBAZEPINE 600 MILLIGRAM(S): 600 TABLET, FILM COATED ORAL at 19:49

## 2024-06-15 RX ADMIN — Medication 1000 MILLILITER(S): at 13:40

## 2024-06-15 RX ADMIN — Medication 100 MILLIGRAM(S): at 05:21

## 2024-06-15 RX ADMIN — KETOROLAC TROMETHAMINE 15 MILLIGRAM(S): 30 INJECTION, SOLUTION INTRAMUSCULAR at 06:03

## 2024-06-16 ENCOUNTER — TRANSCRIPTION ENCOUNTER (OUTPATIENT)
Age: 49
End: 2024-06-16

## 2024-06-16 LAB
ANION GAP SERPL CALC-SCNC: 12 MMOL/L — SIGNIFICANT CHANGE UP (ref 7–14)
BASOPHILS # BLD AUTO: 0.05 K/UL — SIGNIFICANT CHANGE UP (ref 0–0.2)
BASOPHILS NFR BLD AUTO: 1 % — SIGNIFICANT CHANGE UP (ref 0–1)
BUN SERPL-MCNC: 13 MG/DL — SIGNIFICANT CHANGE UP (ref 10–20)
CALCIUM SERPL-MCNC: 8.7 MG/DL — SIGNIFICANT CHANGE UP (ref 8.4–10.4)
CHLORIDE SERPL-SCNC: 104 MMOL/L — SIGNIFICANT CHANGE UP (ref 98–110)
CHOLEST SERPL-MCNC: 161 MG/DL — SIGNIFICANT CHANGE UP
CO2 SERPL-SCNC: 25 MMOL/L — SIGNIFICANT CHANGE UP (ref 17–32)
CREAT SERPL-MCNC: 0.7 MG/DL — SIGNIFICANT CHANGE UP (ref 0.7–1.5)
EGFR: 114 ML/MIN/1.73M2 — SIGNIFICANT CHANGE UP
EOSINOPHIL # BLD AUTO: 0.08 K/UL — SIGNIFICANT CHANGE UP (ref 0–0.7)
EOSINOPHIL NFR BLD AUTO: 1.6 % — SIGNIFICANT CHANGE UP (ref 0–8)
GLUCOSE SERPL-MCNC: 93 MG/DL — SIGNIFICANT CHANGE UP (ref 70–99)
HCT VFR BLD CALC: 39.2 % — LOW (ref 42–52)
HDLC SERPL-MCNC: 36 MG/DL — LOW
HGB BLD-MCNC: 12.8 G/DL — LOW (ref 14–18)
IMM GRANULOCYTES NFR BLD AUTO: 0.8 % — HIGH (ref 0.1–0.3)
LIPID PNL WITH DIRECT LDL SERPL: 103 MG/DL — HIGH
LYMPHOCYTES # BLD AUTO: 1 K/UL — LOW (ref 1.2–3.4)
LYMPHOCYTES # BLD AUTO: 19.8 % — LOW (ref 20.5–51.1)
MAGNESIUM SERPL-MCNC: 1.9 MG/DL — SIGNIFICANT CHANGE UP (ref 1.8–2.4)
MCHC RBC-ENTMCNC: 29 PG — SIGNIFICANT CHANGE UP (ref 27–31)
MCHC RBC-ENTMCNC: 32.7 G/DL — SIGNIFICANT CHANGE UP (ref 32–37)
MCV RBC AUTO: 88.7 FL — SIGNIFICANT CHANGE UP (ref 80–94)
MONOCYTES # BLD AUTO: 0.46 K/UL — SIGNIFICANT CHANGE UP (ref 0.1–0.6)
MONOCYTES NFR BLD AUTO: 9.1 % — SIGNIFICANT CHANGE UP (ref 1.7–9.3)
NEUTROPHILS # BLD AUTO: 3.41 K/UL — SIGNIFICANT CHANGE UP (ref 1.4–6.5)
NEUTROPHILS NFR BLD AUTO: 67.7 % — SIGNIFICANT CHANGE UP (ref 42.2–75.2)
NON HDL CHOLESTEROL: 125 MG/DL — SIGNIFICANT CHANGE UP
NRBC # BLD: 0 /100 WBCS — SIGNIFICANT CHANGE UP (ref 0–0)
PLATELET # BLD AUTO: 347 K/UL — SIGNIFICANT CHANGE UP (ref 130–400)
PMV BLD: 9.4 FL — SIGNIFICANT CHANGE UP (ref 7.4–10.4)
POTASSIUM SERPL-MCNC: 4.3 MMOL/L — SIGNIFICANT CHANGE UP (ref 3.5–5)
POTASSIUM SERPL-SCNC: 4.3 MMOL/L — SIGNIFICANT CHANGE UP (ref 3.5–5)
RAPID RVP RESULT: SIGNIFICANT CHANGE UP
RBC # BLD: 4.42 M/UL — LOW (ref 4.7–6.1)
RBC # FLD: 11.9 % — SIGNIFICANT CHANGE UP (ref 11.5–14.5)
SARS-COV-2 RNA SPEC QL NAA+PROBE: SIGNIFICANT CHANGE UP
SODIUM SERPL-SCNC: 141 MMOL/L — SIGNIFICANT CHANGE UP (ref 135–146)
TRIGL SERPL-MCNC: 108 MG/DL — SIGNIFICANT CHANGE UP
TROPONIN T, HIGH SENSITIVITY RESULT: 136 NG/L — CRITICAL HIGH (ref 6–21)
TROPONIN T, HIGH SENSITIVITY RESULT: 251 NG/L — CRITICAL HIGH (ref 6–21)
WBC # BLD: 5.04 K/UL — SIGNIFICANT CHANGE UP (ref 4.8–10.8)
WBC # FLD AUTO: 5.04 K/UL — SIGNIFICANT CHANGE UP (ref 4.8–10.8)

## 2024-06-16 PROCEDURE — 93010 ELECTROCARDIOGRAM REPORT: CPT

## 2024-06-16 PROCEDURE — 99233 SBSQ HOSP IP/OBS HIGH 50: CPT

## 2024-06-16 RX ORDER — PANTOPRAZOLE SODIUM 40 MG/10ML
1 INJECTION, POWDER, FOR SOLUTION INTRAVENOUS
Qty: 30 | Refills: 0
Start: 2024-06-16 | End: 2024-07-15

## 2024-06-16 RX ORDER — COLCHICINE 0.6 MG/1
1 TABLET ORAL
Qty: 60 | Refills: 0
Start: 2024-06-16 | End: 2024-07-15

## 2024-06-16 RX ADMIN — Medication 600 MILLIGRAM(S): at 05:32

## 2024-06-16 RX ADMIN — OXCARBAZEPINE 600 MILLIGRAM(S): 600 TABLET, FILM COATED ORAL at 17:19

## 2024-06-16 RX ADMIN — Medication 600 MILLIGRAM(S): at 14:13

## 2024-06-16 RX ADMIN — Medication 600 MILLIGRAM(S): at 21:19

## 2024-06-16 RX ADMIN — Medication 1 TABLET(S): at 11:21

## 2024-06-16 RX ADMIN — ASPIRIN 81 MILLIGRAM(S): 325 TABLET, FILM COATED ORAL at 11:21

## 2024-06-16 RX ADMIN — PANTOPRAZOLE SODIUM 40 MILLIGRAM(S): 40 INJECTION, POWDER, FOR SOLUTION INTRAVENOUS at 05:32

## 2024-06-16 RX ADMIN — COLCHICINE 0.6 MILLIGRAM(S): 0.6 TABLET ORAL at 17:13

## 2024-06-16 RX ADMIN — Medication 600 MILLIGRAM(S): at 21:49

## 2024-06-16 RX ADMIN — LEVETIRACETAM 1125 MILLIGRAM(S): 100 INJECTION INTRAVENOUS at 11:22

## 2024-06-16 RX ADMIN — OXCARBAZEPINE 300 MILLIGRAM(S): 600 TABLET, FILM COATED ORAL at 05:31

## 2024-06-16 RX ADMIN — COLCHICINE 0.6 MILLIGRAM(S): 0.6 TABLET ORAL at 05:32

## 2024-06-16 RX ADMIN — LEVETIRACETAM 1500 MILLIGRAM(S): 100 INJECTION INTRAVENOUS at 17:19

## 2024-06-16 RX ADMIN — Medication 3 MILLIGRAM(S): at 21:19

## 2024-06-16 RX ADMIN — Medication 600 MILLIGRAM(S): at 16:31

## 2024-06-16 RX ADMIN — ENOXAPARIN SODIUM 40 MILLIGRAM(S): 100 INJECTION SUBCUTANEOUS at 11:21

## 2024-06-17 ENCOUNTER — TRANSCRIPTION ENCOUNTER (OUTPATIENT)
Age: 49
End: 2024-06-17

## 2024-06-17 VITALS
RESPIRATION RATE: 18 BRPM | SYSTOLIC BLOOD PRESSURE: 135 MMHG | TEMPERATURE: 98 F | DIASTOLIC BLOOD PRESSURE: 87 MMHG | OXYGEN SATURATION: 100 % | HEART RATE: 64 BPM

## 2024-06-17 LAB
A1C WITH ESTIMATED AVERAGE GLUCOSE RESULT: 5.6 % — SIGNIFICANT CHANGE UP (ref 4–5.6)
ANION GAP SERPL CALC-SCNC: 12 MMOL/L — SIGNIFICANT CHANGE UP (ref 7–14)
BUN SERPL-MCNC: 13 MG/DL — SIGNIFICANT CHANGE UP (ref 10–20)
CALCIUM SERPL-MCNC: 8.9 MG/DL — SIGNIFICANT CHANGE UP (ref 8.4–10.5)
CHLORIDE SERPL-SCNC: 105 MMOL/L — SIGNIFICANT CHANGE UP (ref 98–110)
CO2 SERPL-SCNC: 25 MMOL/L — SIGNIFICANT CHANGE UP (ref 17–32)
CREAT SERPL-MCNC: 0.8 MG/DL — SIGNIFICANT CHANGE UP (ref 0.7–1.5)
EGFR: 109 ML/MIN/1.73M2 — SIGNIFICANT CHANGE UP
ESTIMATED AVERAGE GLUCOSE: 114 MG/DL — SIGNIFICANT CHANGE UP (ref 68–114)
GLUCOSE SERPL-MCNC: 87 MG/DL — SIGNIFICANT CHANGE UP (ref 70–99)
HCT VFR BLD CALC: 38.9 % — LOW (ref 42–52)
HGB BLD-MCNC: 13 G/DL — LOW (ref 14–18)
MCHC RBC-ENTMCNC: 29.4 PG — SIGNIFICANT CHANGE UP (ref 27–31)
MCHC RBC-ENTMCNC: 33.4 G/DL — SIGNIFICANT CHANGE UP (ref 32–37)
MCV RBC AUTO: 88 FL — SIGNIFICANT CHANGE UP (ref 80–94)
NRBC # BLD: 0 /100 WBCS — SIGNIFICANT CHANGE UP (ref 0–0)
PLATELET # BLD AUTO: 345 K/UL — SIGNIFICANT CHANGE UP (ref 130–400)
PMV BLD: 9.3 FL — SIGNIFICANT CHANGE UP (ref 7.4–10.4)
POTASSIUM SERPL-MCNC: 4.4 MMOL/L — SIGNIFICANT CHANGE UP (ref 3.5–5)
POTASSIUM SERPL-SCNC: 4.4 MMOL/L — SIGNIFICANT CHANGE UP (ref 3.5–5)
RBC # BLD: 4.42 M/UL — LOW (ref 4.7–6.1)
RBC # FLD: 11.8 % — SIGNIFICANT CHANGE UP (ref 11.5–14.5)
SODIUM SERPL-SCNC: 142 MMOL/L — SIGNIFICANT CHANGE UP (ref 135–146)
TROPONIN T, HIGH SENSITIVITY RESULT: 186 NG/L — CRITICAL HIGH (ref 6–21)
WBC # BLD: 4.9 K/UL — SIGNIFICANT CHANGE UP (ref 4.8–10.8)
WBC # FLD AUTO: 4.9 K/UL — SIGNIFICANT CHANGE UP (ref 4.8–10.8)

## 2024-06-17 PROCEDURE — 99232 SBSQ HOSP IP/OBS MODERATE 35: CPT

## 2024-06-17 PROCEDURE — 99239 HOSP IP/OBS DSCHRG MGMT >30: CPT

## 2024-06-17 RX ORDER — DIAZEPAM 10 MG/1
2 TABLET ORAL ONCE
Refills: 0 | Status: DISCONTINUED | OUTPATIENT
Start: 2024-06-17 | End: 2024-06-17

## 2024-06-17 RX ADMIN — Medication 600 MILLIGRAM(S): at 05:51

## 2024-06-17 RX ADMIN — LEVETIRACETAM 1125 MILLIGRAM(S): 100 INJECTION INTRAVENOUS at 11:24

## 2024-06-17 RX ADMIN — LEVETIRACETAM 1500 MILLIGRAM(S): 100 INJECTION INTRAVENOUS at 18:55

## 2024-06-17 RX ADMIN — Medication 600 MILLIGRAM(S): at 05:21

## 2024-06-17 RX ADMIN — Medication 1 TABLET(S): at 11:24

## 2024-06-17 RX ADMIN — OXCARBAZEPINE 600 MILLIGRAM(S): 600 TABLET, FILM COATED ORAL at 18:56

## 2024-06-17 RX ADMIN — ASPIRIN 81 MILLIGRAM(S): 325 TABLET, FILM COATED ORAL at 11:24

## 2024-06-17 RX ADMIN — PANTOPRAZOLE SODIUM 40 MILLIGRAM(S): 40 INJECTION, POWDER, FOR SOLUTION INTRAVENOUS at 05:21

## 2024-06-17 RX ADMIN — ENOXAPARIN SODIUM 40 MILLIGRAM(S): 100 INJECTION SUBCUTANEOUS at 11:24

## 2024-06-17 RX ADMIN — OXCARBAZEPINE 300 MILLIGRAM(S): 600 TABLET, FILM COATED ORAL at 05:21

## 2024-06-17 RX ADMIN — DIAZEPAM 2 MILLIGRAM(S): 10 TABLET ORAL at 16:47

## 2024-06-17 RX ADMIN — COLCHICINE 0.6 MILLIGRAM(S): 0.6 TABLET ORAL at 18:57

## 2024-06-17 RX ADMIN — COLCHICINE 0.6 MILLIGRAM(S): 0.6 TABLET ORAL at 05:21

## 2024-06-18 ENCOUNTER — NON-APPOINTMENT (OUTPATIENT)
Age: 49
End: 2024-06-18

## 2024-06-18 LAB
B BURGDOR C6 AB SER-ACNC: NEGATIVE — SIGNIFICANT CHANGE UP
B BURGDOR IGG+IGM SER-ACNC: 0.09 INDEX — SIGNIFICANT CHANGE UP (ref 0.01–0.89)

## 2024-06-19 LAB
CV B1 AB TITR FLD: HIGH
CV B2 AB TITR FLD: HIGH
CV B3 AB TITR FLD: HIGH
CV B4 AB TITR FLD: HIGH
CV B5 AB TITR FLD: HIGH
CV B6 AB TITR FLD: HIGH

## 2024-06-20 LAB
CK MB BLD-MCNC: NEGATIVE TITER — SIGNIFICANT CHANGE UP
COXSACKIE TYPE A-24: NEGATIVE TITER — SIGNIFICANT CHANGE UP
CV A24 IGG TITR SER IF: NEGATIVE TITER — SIGNIFICANT CHANGE UP
CV A7 AB SER-ACNC: NEGATIVE TITER — SIGNIFICANT CHANGE UP
CV A9 AB TITR FLD: NEGATIVE TITER — SIGNIFICANT CHANGE UP

## 2024-06-25 DIAGNOSIS — Z88.0 ALLERGY STATUS TO PENICILLIN: ICD-10-CM

## 2024-06-25 DIAGNOSIS — Z79.82 LONG TERM (CURRENT) USE OF ASPIRIN: ICD-10-CM

## 2024-06-25 DIAGNOSIS — I30.9 ACUTE PERICARDITIS, UNSPECIFIED: ICD-10-CM

## 2024-06-25 DIAGNOSIS — G40.909 EPILEPSY, UNSPECIFIED, NOT INTRACTABLE, WITHOUT STATUS EPILEPTICUS: ICD-10-CM

## 2024-06-25 DIAGNOSIS — D75.839 THROMBOCYTOSIS, UNSPECIFIED: ICD-10-CM

## 2024-06-25 DIAGNOSIS — Z87.891 PERSONAL HISTORY OF NICOTINE DEPENDENCE: ICD-10-CM

## 2024-06-25 DIAGNOSIS — F40.240 CLAUSTROPHOBIA: ICD-10-CM

## 2024-06-25 DIAGNOSIS — I10 ESSENTIAL (PRIMARY) HYPERTENSION: ICD-10-CM

## 2024-06-25 DIAGNOSIS — Q79.60 EHLERS-DANLOS SYNDROME, UNSPECIFIED: ICD-10-CM

## 2024-06-29 ENCOUNTER — EMERGENCY (EMERGENCY)
Facility: HOSPITAL | Age: 49
LOS: 0 days | Discharge: HOME | End: 2024-06-29
Attending: EMERGENCY MEDICINE
Payer: COMMERCIAL

## 2024-06-29 VITALS
DIASTOLIC BLOOD PRESSURE: 99 MMHG | HEIGHT: 69 IN | WEIGHT: 160.06 LBS | RESPIRATION RATE: 20 BRPM | OXYGEN SATURATION: 99 % | TEMPERATURE: 98 F | HEART RATE: 103 BPM | SYSTOLIC BLOOD PRESSURE: 147 MMHG

## 2024-06-29 VITALS — HEART RATE: 88 BPM

## 2024-06-29 DIAGNOSIS — Z88.1 ALLERGY STATUS TO OTHER ANTIBIOTIC AGENTS: ICD-10-CM

## 2024-06-29 DIAGNOSIS — G40.909 EPILEPSY, UNSPECIFIED, NOT INTRACTABLE, WITHOUT STATUS EPILEPTICUS: ICD-10-CM

## 2024-06-29 DIAGNOSIS — S43.014A ANTERIOR DISLOCATION OF RIGHT HUMERUS, INITIAL ENCOUNTER: ICD-10-CM

## 2024-06-29 DIAGNOSIS — Z93.3 COLOSTOMY STATUS: Chronic | ICD-10-CM

## 2024-06-29 DIAGNOSIS — Q79.60 EHLERS-DANLOS SYNDROME, UNSPECIFIED: ICD-10-CM

## 2024-06-29 DIAGNOSIS — I10 ESSENTIAL (PRIMARY) HYPERTENSION: ICD-10-CM
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
## 2024-06-29 DIAGNOSIS — M25.511 PAIN IN RIGHT SHOULDER: ICD-10-CM

## 2024-06-29 DIAGNOSIS — I51.4 MYOCARDITIS, UNSPECIFIED: ICD-10-CM

## 2024-06-29 DIAGNOSIS — Z88.0 ALLERGY STATUS TO PENICILLIN: ICD-10-CM

## 2024-06-29 PROCEDURE — 23650 CLTX SHO DSLC W/MNPJ WO ANES: CPT | Mod: RT

## 2024-06-29 PROCEDURE — 73030 X-RAY EXAM OF SHOULDER: CPT | Mod: 26,RT

## 2024-06-29 PROCEDURE — 96375 TX/PRO/DX INJ NEW DRUG ADDON: CPT | Mod: XU

## 2024-06-29 PROCEDURE — 99284 EMERGENCY DEPT VISIT MOD MDM: CPT | Mod: 25

## 2024-06-29 PROCEDURE — 23650 CLTX SHO DSLC W/MNPJ WO ANES: CPT | Mod: 54,RT

## 2024-06-29 PROCEDURE — 73030 X-RAY EXAM OF SHOULDER: CPT | Mod: RT

## 2024-06-29 PROCEDURE — 96374 THER/PROPH/DIAG INJ IV PUSH: CPT | Mod: XU

## 2024-06-29 PROCEDURE — 99285 EMERGENCY DEPT VISIT HI MDM: CPT | Mod: 57

## 2024-06-29 RX ORDER — KETOROLAC TROMETHAMINE 30 MG/ML
30 INJECTION, SOLUTION INTRAMUSCULAR; INTRAVENOUS ONCE
Refills: 0 | Status: DISCONTINUED | OUTPATIENT
Start: 2024-06-29 | End: 2024-06-29

## 2024-06-29 RX ORDER — HYDROMORPHONE/SOD CHLOR,ISO/PF 2 MG/10 ML
1 SYRINGE (ML) INJECTION ONCE
Refills: 0 | Status: DISCONTINUED | OUTPATIENT
Start: 2024-06-29 | End: 2024-06-29

## 2024-06-29 RX ADMIN — Medication 1 MILLIGRAM(S): at 12:37

## 2024-06-29 RX ADMIN — Medication 4 MILLIGRAM(S): at 13:00

## 2024-06-29 RX ADMIN — KETOROLAC TROMETHAMINE 30 MILLIGRAM(S): 30 INJECTION, SOLUTION INTRAMUSCULAR; INTRAVENOUS at 14:19

## 2024-07-01 ENCOUNTER — NON-APPOINTMENT (OUTPATIENT)
Age: 49
End: 2024-07-01

## 2024-07-01 ENCOUNTER — APPOINTMENT (OUTPATIENT)
Dept: ORTHOPEDIC SURGERY | Facility: CLINIC | Age: 49
End: 2024-07-01
Payer: COMMERCIAL

## 2024-07-01 DIAGNOSIS — Z87.39 PERSONAL HISTORY OF OTHER DISEASES OF THE MUSCULOSKELETAL SYSTEM AND CONNECTIVE TISSUE: ICD-10-CM

## 2024-07-01 PROCEDURE — 99203 OFFICE O/P NEW LOW 30 MIN: CPT | Mod: 25

## 2024-07-01 PROCEDURE — A4565: CPT | Mod: RT

## 2024-07-15 ENCOUNTER — APPOINTMENT (OUTPATIENT)
Dept: ORTHOPEDIC SURGERY | Facility: CLINIC | Age: 49
End: 2024-07-15
Payer: COMMERCIAL

## 2024-07-15 DIAGNOSIS — M25.311 OTHER INSTABILITY, RIGHT SHOULDER: ICD-10-CM

## 2024-07-15 PROCEDURE — 99204 OFFICE O/P NEW MOD 45 MIN: CPT

## 2024-08-16 ENCOUNTER — APPOINTMENT (OUTPATIENT)
Dept: GASTROENTEROLOGY | Facility: CLINIC | Age: 49
End: 2024-08-16
Payer: COMMERCIAL

## 2024-08-16 DIAGNOSIS — Z93.2 ILEOSTOMY STATUS: ICD-10-CM

## 2024-08-16 DIAGNOSIS — Z80.1 FAMILY HISTORY OF MALIGNANT NEOPLASM OF TRACHEA, BRONCHUS AND LUNG: ICD-10-CM

## 2024-08-16 DIAGNOSIS — R12 HEARTBURN: ICD-10-CM

## 2024-08-16 DIAGNOSIS — Z78.9 OTHER SPECIFIED HEALTH STATUS: ICD-10-CM

## 2024-08-16 DIAGNOSIS — R14.0 ABDOMINAL DISTENSION (GASEOUS): ICD-10-CM

## 2024-08-16 DIAGNOSIS — D64.9 ANEMIA, UNSPECIFIED: ICD-10-CM

## 2024-08-16 DIAGNOSIS — R14.3 FLATULENCE: ICD-10-CM

## 2024-08-16 DIAGNOSIS — R10.9 UNSPECIFIED ABDOMINAL PAIN: ICD-10-CM

## 2024-08-16 DIAGNOSIS — R10.13 EPIGASTRIC PAIN: ICD-10-CM

## 2024-08-16 PROCEDURE — 99204 OFFICE O/P NEW MOD 45 MIN: CPT

## 2024-08-16 RX ORDER — FAMOTIDINE 20 MG/1
20 TABLET, FILM COATED ORAL
Refills: 0 | Status: ACTIVE | COMMUNITY
Start: 2024-08-16

## 2024-08-19 NOTE — END OF VISIT
[] : Fellow [FreeTextEntry3] :  Pt seen/examined, agree with above findings and plan as outlined by Diane D'Amico, PA.

## 2024-08-19 NOTE — PHYSICAL EXAM
[Alert] : alert [Normal Voice/Communication] : normal voice/communication [No Acute Distress] : no acute distress [Well Developed] : well developed [Well Nourished] : well nourished [Sclera] : the sclera and conjunctiva were normal [Hearing Threshold Finger Rub Not [place]] : hearing was normal [Normal Lips/Gums] : the lips and gums were normal [Oropharynx] : the oropharynx was normal [Normal Appearance] : the appearance of the neck was normal [No Respiratory Distress] : no respiratory distress [No Acc Muscle Use] : no accessory muscle use [Respiration, Rhythm And Depth] : normal respiratory rhythm and effort [Auscultation Breath Sounds / Voice Sounds] : lungs were clear to auscultation bilaterally [Heart Rate And Rhythm] : heart rate was normal and rhythm regular [Normal S1, S2] : normal S1 and S2 [Bowel Sounds] : normal bowel sounds [Abdomen Tenderness] : non-tender [Abdomen Soft] : soft [Abnormal Walk] : normal gait [Normal Color / Pigmentation] : normal skin color and pigmentation [Oriented To Time, Place, And Person] : oriented to person, place, and time [de-identified] : Ileostomy bag intact

## 2024-08-19 NOTE — PHYSICAL EXAM
[Alert] : alert [Normal Voice/Communication] : normal voice/communication [No Acute Distress] : no acute distress [Well Developed] : well developed [Well Nourished] : well nourished [Sclera] : the sclera and conjunctiva were normal [Hearing Threshold Finger Rub Not [place]] : hearing was normal [Normal Lips/Gums] : the lips and gums were normal [Oropharynx] : the oropharynx was normal [Normal Appearance] : the appearance of the neck was normal [No Respiratory Distress] : no respiratory distress [No Acc Muscle Use] : no accessory muscle use [Respiration, Rhythm And Depth] : normal respiratory rhythm and effort [Auscultation Breath Sounds / Voice Sounds] : lungs were clear to auscultation bilaterally [Heart Rate And Rhythm] : heart rate was normal and rhythm regular [Normal S1, S2] : normal S1 and S2 [Bowel Sounds] : normal bowel sounds [Abdomen Tenderness] : non-tender [Abdomen Soft] : soft [Abnormal Walk] : normal gait [Normal Color / Pigmentation] : normal skin color and pigmentation [Oriented To Time, Place, And Person] : oriented to person, place, and time [de-identified] : Ileostomy bag intact

## 2024-08-19 NOTE — ASSESSMENT
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 Lane), presents here today for further evaluation of epigastric pain, bloating, and heartburn. He began experiencing upper abdominal pain in 2021 with no correlation to any particular foods initially.  He stopped drinking milk (but still ingesting other dairy foods) 5 months ago. He stated that he has not had any further abdominal pain since then. He also noted decreased bloating. He has had heartburn x 2 over the past 3 months that is relieved by pepcid. No vomiting, dysphagia, or recent weight loss. No bloody or black stools.   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

## 2024-08-19 NOTE — HISTORY OF PRESENT ILLNESS
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 Lane), presents here today for further evaluation of epigastric pain, bloating, and heartburn. He began experiencing upper abdominal pain in 2021 with no correlation to any particular foods initially.  He stopped drinking milk (but still ingesting other dairy foods) 5 months ago. He stated that he has not had any further abdominal pain since then. He also noted decreased bloating. He has had mild heartburn x 2 over the past 3 months that is relieved by pepcid. No vomiting, dysphagia, or recent weight loss. No bloody or black stools. Weight fluctuating, overall stable.

## 2024-09-16 ENCOUNTER — APPOINTMENT (OUTPATIENT)
Dept: ORTHOPEDIC SURGERY | Facility: CLINIC | Age: 49
End: 2024-09-16
Payer: COMMERCIAL

## 2024-09-16 DIAGNOSIS — M25.311 OTHER INSTABILITY, RIGHT SHOULDER: ICD-10-CM

## 2024-09-16 DIAGNOSIS — Z87.39 PERSONAL HISTORY OF OTHER DISEASES OF THE MUSCULOSKELETAL SYSTEM AND CONNECTIVE TISSUE: ICD-10-CM

## 2024-09-16 PROCEDURE — 99213 OFFICE O/P EST LOW 20 MIN: CPT

## 2024-09-16 PROCEDURE — 73030 X-RAY EXAM OF SHOULDER: CPT | Mod: RT

## 2024-09-16 NOTE — HISTORY OF PRESENT ILLNESS
[de-identified] : Patient is here for follow-up on his right shoulder dislocation, he did have multiple dislocations on the left shoulder, has been 2 months and is going to therapy he is very happy with the shoulder  Exam is limited to 0 to 90 degrees beyond that able in the going father  X-rays do show no dislocation no soft tissue calcifications taken today in the office 3 views right shoulder  Recommend he continue with therapy I will see him back in 6 to 8 weeks

## 2024-10-01 ENCOUNTER — NON-APPOINTMENT (OUTPATIENT)
Age: 49
End: 2024-10-01

## 2024-10-21 ENCOUNTER — APPOINTMENT (OUTPATIENT)
Dept: GASTROENTEROLOGY | Facility: CLINIC | Age: 49
End: 2024-10-21
Payer: COMMERCIAL

## 2024-10-21 DIAGNOSIS — K57.20 DIVERTICULITIS OF LARGE INTESTINE WITH PERFORATION AND ABSCESS W/OUT BLEEDING: ICD-10-CM

## 2024-10-21 DIAGNOSIS — K21.9 GASTRO-ESOPHAGEAL REFLUX DISEASE W/OUT ESOPHAGITIS: ICD-10-CM

## 2024-10-21 PROCEDURE — 99213 OFFICE O/P EST LOW 20 MIN: CPT

## 2024-10-28 ENCOUNTER — APPOINTMENT (OUTPATIENT)
Dept: ORTHOPEDIC SURGERY | Facility: CLINIC | Age: 49
End: 2024-10-28

## 2024-10-28 PROBLEM — K57.20 PERFORATION OF SIGMOID COLON DUE TO DIVERTICULITIS: Status: RESOLVED | Noted: 2019-11-13 | Resolved: 2024-10-28

## 2024-11-11 ENCOUNTER — APPOINTMENT (OUTPATIENT)
Dept: ORTHOPEDIC SURGERY | Facility: CLINIC | Age: 49
End: 2024-11-11
Payer: COMMERCIAL

## 2024-11-11 DIAGNOSIS — M25.311 OTHER INSTABILITY, RIGHT SHOULDER: ICD-10-CM

## 2024-11-11 PROCEDURE — 99213 OFFICE O/P EST LOW 20 MIN: CPT

## 2025-04-30 ENCOUNTER — APPOINTMENT (OUTPATIENT)
Dept: PULMONOLOGY | Facility: CLINIC | Age: 50
End: 2025-04-30
Payer: COMMERCIAL

## 2025-04-30 ENCOUNTER — APPOINTMENT (OUTPATIENT)
Dept: PULMONOLOGY | Facility: CLINIC | Age: 50
End: 2025-04-30

## 2025-04-30 VITALS
BODY MASS INDEX: 24.44 KG/M2 | HEART RATE: 75 BPM | SYSTOLIC BLOOD PRESSURE: 110 MMHG | RESPIRATION RATE: 12 BRPM | WEIGHT: 165 LBS | DIASTOLIC BLOOD PRESSURE: 60 MMHG | HEIGHT: 69 IN | OXYGEN SATURATION: 98 %

## 2025-04-30 DIAGNOSIS — Z09 ENCOUNTER FOR FOLLOW-UP EXAMINATION AFTER COMPLETED TREATMENT FOR CONDITIONS OTHER THAN MALIGNANT NEOPLASM: ICD-10-CM

## 2025-04-30 DIAGNOSIS — R91.8 OTHER NONSPECIFIC ABNORMAL FINDING OF LUNG FIELD: ICD-10-CM

## 2025-04-30 DIAGNOSIS — J45.909 UNSPECIFIED ASTHMA, UNCOMPLICATED: ICD-10-CM

## 2025-04-30 PROCEDURE — 94010 BREATHING CAPACITY TEST: CPT

## 2025-04-30 PROCEDURE — 94727 GAS DIL/WSHOT DETER LNG VOL: CPT

## 2025-04-30 PROCEDURE — 99214 OFFICE O/P EST MOD 30 MIN: CPT | Mod: 25

## 2025-04-30 PROCEDURE — 94729 DIFFUSING CAPACITY: CPT

## 2025-05-07 NOTE — PATIENT PROFILE ADULT - FUNCTIONAL ASSESSMENT - DAILY ACTIVITY 5.
CT pelvis with L sacral/medial gluteal decubitis ulcer with lysis of coccyx with c/f osteomyelitis  Also with unstageable sacral ulcer, not receiving adequate wound care, and is in pain  Meeting sepsis criteria with tachycardia, tacypnea, and leukocytosis     - procal, crp, esr elevated     Plan:  dc zosyn  c/w Levaquin based on sensitivities  ID consulted, plan for 10 day abx course  Blood cx neg  Ucx shows 50,000-100,000 GN rods  wound culture grew e. coli, ESBL proteus, enterococcus   wound care debrided 2/29  ID consulted, plan for 7-10 day abx course No 3 = A little assistance